# Patient Record
Sex: FEMALE | Race: BLACK OR AFRICAN AMERICAN | NOT HISPANIC OR LATINO
[De-identification: names, ages, dates, MRNs, and addresses within clinical notes are randomized per-mention and may not be internally consistent; named-entity substitution may affect disease eponyms.]

---

## 2017-03-24 ENCOUNTER — TRANSCRIPTION ENCOUNTER (OUTPATIENT)
Age: 82
End: 2017-03-24

## 2017-04-20 ENCOUNTER — TRANSCRIPTION ENCOUNTER (OUTPATIENT)
Age: 82
End: 2017-04-20

## 2017-07-25 ENCOUNTER — TRANSCRIPTION ENCOUNTER (OUTPATIENT)
Age: 82
End: 2017-07-25

## 2018-05-17 ENCOUNTER — INPATIENT (INPATIENT)
Facility: HOSPITAL | Age: 83
LOS: 0 days | Discharge: ANOTHER HOSPITAL | End: 2018-05-18
Attending: INTERNAL MEDICINE | Admitting: INTERNAL MEDICINE
Payer: MEDICARE

## 2018-05-17 VITALS
HEART RATE: 78 BPM | DIASTOLIC BLOOD PRESSURE: 85 MMHG | RESPIRATION RATE: 20 BRPM | SYSTOLIC BLOOD PRESSURE: 164 MMHG | WEIGHT: 142.64 LBS

## 2018-05-17 DIAGNOSIS — I63.9 CEREBRAL INFARCTION, UNSPECIFIED: ICD-10-CM

## 2018-05-17 LAB
ALBUMIN SERPL ELPH-MCNC: 4 G/DL — SIGNIFICANT CHANGE UP (ref 3.3–5)
ALP SERPL-CCNC: 104 U/L — SIGNIFICANT CHANGE UP (ref 40–120)
ALT FLD-CCNC: 18 U/L — SIGNIFICANT CHANGE UP (ref 4–33)
APTT BLD: 28.2 SEC — SIGNIFICANT CHANGE UP (ref 27.5–37.4)
AST SERPL-CCNC: 29 U/L — SIGNIFICANT CHANGE UP (ref 4–32)
BASE EXCESS BLDV CALC-SCNC: 4 MMOL/L — SIGNIFICANT CHANGE UP
BASOPHILS # BLD AUTO: 0.05 K/UL — SIGNIFICANT CHANGE UP (ref 0–0.2)
BASOPHILS NFR BLD AUTO: 0.7 % — SIGNIFICANT CHANGE UP (ref 0–2)
BILIRUB SERPL-MCNC: 0.3 MG/DL — SIGNIFICANT CHANGE UP (ref 0.2–1.2)
BLD GP AB SCN SERPL QL: NEGATIVE — SIGNIFICANT CHANGE UP
BLOOD GAS VENOUS - CREATININE: 0.77 MG/DL — SIGNIFICANT CHANGE UP (ref 0.5–1.3)
BUN SERPL-MCNC: 14 MG/DL — SIGNIFICANT CHANGE UP (ref 7–23)
CALCIUM SERPL-MCNC: 9.6 MG/DL — SIGNIFICANT CHANGE UP (ref 8.4–10.5)
CHLORIDE BLDV-SCNC: 109 MMOL/L — HIGH (ref 96–108)
CHLORIDE SERPL-SCNC: 101 MMOL/L — SIGNIFICANT CHANGE UP (ref 98–107)
CK MB BLD-MCNC: 2.18 NG/ML — SIGNIFICANT CHANGE UP (ref 1–4.7)
CK SERPL-CCNC: 124 U/L — SIGNIFICANT CHANGE UP (ref 25–170)
CO2 SERPL-SCNC: 23 MMOL/L — SIGNIFICANT CHANGE UP (ref 22–31)
CREAT SERPL-MCNC: 0.74 MG/DL — SIGNIFICANT CHANGE UP (ref 0.5–1.3)
EOSINOPHIL # BLD AUTO: 0.1 K/UL — SIGNIFICANT CHANGE UP (ref 0–0.5)
EOSINOPHIL NFR BLD AUTO: 1.5 % — SIGNIFICANT CHANGE UP (ref 0–6)
GAS PNL BLDV: 139 MMOL/L — SIGNIFICANT CHANGE UP (ref 136–146)
GLUCOSE BLDV-MCNC: 152 — HIGH (ref 70–99)
GLUCOSE SERPL-MCNC: 141 MG/DL — HIGH (ref 70–99)
HCO3 BLDV-SCNC: 26 MMOL/L — SIGNIFICANT CHANGE UP (ref 20–27)
HCT VFR BLD CALC: 47.8 % — HIGH (ref 34.5–45)
HCT VFR BLDV CALC: 48.4 % — HIGH (ref 34.5–45)
HGB BLD-MCNC: 15.3 G/DL — SIGNIFICANT CHANGE UP (ref 11.5–15.5)
HGB BLDV-MCNC: 15.8 G/DL — HIGH (ref 11.5–15.5)
IMM GRANULOCYTES # BLD AUTO: 0.02 # — SIGNIFICANT CHANGE UP
IMM GRANULOCYTES NFR BLD AUTO: 0.3 % — SIGNIFICANT CHANGE UP (ref 0–1.5)
INR BLD: 1.04 — SIGNIFICANT CHANGE UP (ref 0.88–1.17)
LACTATE BLDV-MCNC: 2.5 MMOL/L — HIGH (ref 0.5–2)
LYMPHOCYTES # BLD AUTO: 3.96 K/UL — HIGH (ref 1–3.3)
LYMPHOCYTES # BLD AUTO: 58.4 % — HIGH (ref 13–44)
MCHC RBC-ENTMCNC: 24.8 PG — LOW (ref 27–34)
MCHC RBC-ENTMCNC: 32 % — SIGNIFICANT CHANGE UP (ref 32–36)
MCV RBC AUTO: 77.3 FL — LOW (ref 80–100)
MONOCYTES # BLD AUTO: 0.77 K/UL — SIGNIFICANT CHANGE UP (ref 0–0.9)
MONOCYTES NFR BLD AUTO: 11.4 % — SIGNIFICANT CHANGE UP (ref 2–14)
NEUTROPHILS # BLD AUTO: 1.88 K/UL — SIGNIFICANT CHANGE UP (ref 1.8–7.4)
NEUTROPHILS NFR BLD AUTO: 27.7 % — LOW (ref 43–77)
NRBC # FLD: 0 — SIGNIFICANT CHANGE UP
PCO2 BLDV: 44 MMHG — SIGNIFICANT CHANGE UP (ref 41–51)
PH BLDV: 7.43 PH — SIGNIFICANT CHANGE UP (ref 7.32–7.43)
PLATELET # BLD AUTO: 224 K/UL — SIGNIFICANT CHANGE UP (ref 150–400)
PMV BLD: 11.7 FL — SIGNIFICANT CHANGE UP (ref 7–13)
PO2 BLDV: 25 MMHG — LOW (ref 35–40)
POTASSIUM BLDV-SCNC: 4.5 MMOL/L — SIGNIFICANT CHANGE UP (ref 3.4–4.5)
POTASSIUM SERPL-MCNC: 5.2 MMOL/L — SIGNIFICANT CHANGE UP (ref 3.5–5.3)
POTASSIUM SERPL-SCNC: 5.2 MMOL/L — SIGNIFICANT CHANGE UP (ref 3.5–5.3)
PROT SERPL-MCNC: 8.3 G/DL — SIGNIFICANT CHANGE UP (ref 6–8.3)
PROTHROM AB SERPL-ACNC: 11.6 SEC — SIGNIFICANT CHANGE UP (ref 9.8–13.1)
RBC # BLD: 6.18 M/UL — HIGH (ref 3.8–5.2)
RBC # FLD: 15.4 % — HIGH (ref 10.3–14.5)
RH IG SCN BLD-IMP: POSITIVE — SIGNIFICANT CHANGE UP
SAO2 % BLDV: 44.8 % — LOW (ref 60–85)
SODIUM SERPL-SCNC: 140 MMOL/L — SIGNIFICANT CHANGE UP (ref 135–145)
TROPONIN T SERPL-MCNC: < 0.06 NG/ML — SIGNIFICANT CHANGE UP (ref 0–0.06)
WBC # BLD: 6.78 K/UL — SIGNIFICANT CHANGE UP (ref 3.8–10.5)
WBC # FLD AUTO: 6.78 K/UL — SIGNIFICANT CHANGE UP (ref 3.8–10.5)

## 2018-05-17 PROCEDURE — 71045 X-RAY EXAM CHEST 1 VIEW: CPT | Mod: 26

## 2018-05-17 PROCEDURE — 70450 CT HEAD/BRAIN W/O DYE: CPT | Mod: 26

## 2018-05-17 RX ORDER — ALTEPLASE 100 MG
51 KIT INTRAVENOUS ONCE
Qty: 0 | Refills: 0 | Status: COMPLETED | OUTPATIENT
Start: 2018-05-17 | End: 2018-05-17

## 2018-05-17 RX ORDER — NICARDIPINE HYDROCHLORIDE 30 MG/1
2.5 CAPSULE, EXTENDED RELEASE ORAL
Qty: 40 | Refills: 0 | Status: DISCONTINUED | OUTPATIENT
Start: 2018-05-17 | End: 2018-06-05

## 2018-05-17 RX ORDER — ALTEPLASE 100 MG
5.7 KIT INTRAVENOUS ONCE
Qty: 0 | Refills: 0 | Status: COMPLETED | OUTPATIENT
Start: 2018-05-17 | End: 2018-05-17

## 2018-05-17 RX ORDER — ONDANSETRON 8 MG/1
4 TABLET, FILM COATED ORAL ONCE
Qty: 0 | Refills: 0 | Status: COMPLETED | OUTPATIENT
Start: 2018-05-17 | End: 2018-05-17

## 2018-05-17 RX ORDER — METOCLOPRAMIDE HCL 10 MG
10 TABLET ORAL ONCE
Qty: 0 | Refills: 0 | Status: COMPLETED | OUTPATIENT
Start: 2018-05-17 | End: 2018-05-17

## 2018-05-17 RX ORDER — PANTOPRAZOLE SODIUM 20 MG/1
40 TABLET, DELAYED RELEASE ORAL ONCE
Qty: 0 | Refills: 0 | Status: COMPLETED | OUTPATIENT
Start: 2018-05-17 | End: 2018-05-17

## 2018-05-17 RX ADMIN — Medication 10 MILLIGRAM(S): at 23:36

## 2018-05-17 RX ADMIN — NICARDIPINE HYDROCHLORIDE 12.5 MG/HR: 30 CAPSULE, EXTENDED RELEASE ORAL at 23:43

## 2018-05-17 RX ADMIN — ONDANSETRON 4 MILLIGRAM(S): 8 TABLET, FILM COATED ORAL at 23:36

## 2018-05-17 RX ADMIN — PANTOPRAZOLE SODIUM 40 MILLIGRAM(S): 20 TABLET, DELAYED RELEASE ORAL at 23:59

## 2018-05-17 RX ADMIN — ALTEPLASE 51 MILLIGRAM(S): KIT at 22:20

## 2018-05-17 RX ADMIN — ALTEPLASE 342 MILLIGRAM(S): KIT at 22:17

## 2018-05-17 NOTE — ED PROVIDER NOTE - PROGRESS NOTE DETAILS
[facility] Called to evaluate patient for stroke. 85F with DM p/w sudden onset slurred speech and generalized weakness at 2045, witnessed by family, along with generalized weakness. Persistent slurred speech since onset. No head injury, no recent trauma, no recent illness. On exam pt with slurred speech, no focal weakness, oriented to name and place but not year (abnormal per pt's family). Code stroke called, neuro c/s called and informed, CT ordered Raphael: While in ED, after tPA, after admission to MICU, pt developed severe vomiting around midnight with new right sided paralysis and dense aphasia. These are new sx. CT showed no bleeding, CTA, p-er Neuro, demonstrates basliar lesion and they have requested xfer to [facility]. Accepted by Dr. Zhen Reyes and also d/w Dr. JUNE Kendrick in [facility] ED.  EMS now at bedside to xfer to [facility].  Daughter consented. Raphael: While in ED, after tPA, after admission to MICU, pt developed severe vomiting around midnight with new right sided paralysis and dense aphasia. These are new sx. CT showed no bleeding, CTA, per Neuro, demonstrates basilar lesion and they have requested xfer to [facility] for possible intervention. Images unavailable to me and report pending. Accepted by Dr. Zhen Reyes and also d/w Dr. JUNE Kendrick in [facility] ED.  EMS now at bedside to xfer to [facility].  Daughter consented. VSS and pt protecting AW.  Is alert and cooperative.

## 2018-05-17 NOTE — ED PROVIDER NOTE - ATTENDING CONTRIBUTION TO CARE
85F p/w slurred speech sudden onset about 1/2 hour ago, a/w gen weakness.  Was shopping with daughter, felt like there was an 'earthquake under her".   Speech is still slightly slurred but improved.  Feeling dizzy when standing.  Had 3 episodes of vomiting.  Seen upon arrival, improving sx.  Nearly passed out.  USOH prior to the event.  Stroke eval in progress - likely not tPA - given improving deficits and no unilateral weakness..  Gait assessed mildly unsteady, c/o R arm numbness.  Decision made with Dr Reyes, stroke neurologist as well as resident CHARLA Munoz to give tPA, pt within window and displaying signs of stroke.  Pharmacy called, tPA administered bolus at 1019pm by Reagan.   Informed consent obtained from the family.    PMHX - DM -   meds glucophage  PSHX hemorrhoidectomy  no T  All none except latex  VS:  179/99 78 100RA.    98.2O    GEN - malaise; A+O x3   HEAD - NC/AT     ENT - PEERL, EOMI, mucous membranes  moist , no discharge      NECK: Neck supple, non-tender without lymphadenopathy, no masses, no JVD  PULM - CTA b/l,  symmetric breath sounds  COR -  normal heart sounds    ABD - , ND, NT, soft, no guarding, no rebound, no masses    BACK - no CVA tenderness, nontender spine     EXTREMS - no edema, no deformity, warm and well perfused    SKIN - no rash or bruising      NEUROLOGIC - alert, CN 2-12 intact, sensation nl, motor 5/5 RUE/LUE/RLE/LLE.

## 2018-05-17 NOTE — ED PROVIDER NOTE - CRITICAL CARE PROVIDED
additional history taking/documentation/consult w/ pt's family directly relating to pts condition/interpretation of diagnostic studies/consultation with other physicians/direct patient care (not related to procedure)

## 2018-05-17 NOTE — H&P ADULT - NSHPPHYSICALEXAM_GEN_ALL_CORE
GEN - moderate distress, malaise  HEAD - NC/AT     ENT - PEERL, EOMI, mucous membranes moist, no discharge      NECK: Neck supple, non-tender without lymphadenopathy, no masses, no JVD  PULM - CTA b/l,  symmetric breath sounds  COR -  normal heart sounds    ABD - ND, NT, soft, no guarding, no rebound, no masses    BACK - no CVA tenderness, nontender spine     EXTREM - no edema, no deformity, warm and well perfused    SKIN - no rash or bruising      NEUROLOGIC - AAOx2, slurred speech, alert, CN 2-12 intact, sensation nl, motor 5/5 RUE/LUE/RLE/LLE. GEN - moderate distress, malaise  HEAD - NC/AT     ENT - PEERL, EOMI, mucous membranes moist, no discharge      NECK: Neck supple, non-tender without lymphadenopathy, no masses, no JVD  PULM - CTA b/l,  symmetric breath sounds  COR -  normal heart sounds    ABD - ND, NT, soft, no guarding, no rebound, no masses    BACK - no CVA tenderness, nontender spine     EXTREM - no edema, no deformity, warm and well perfused    SKIN - no rash or bruising      NEUROLOGIC - AAOx3, slightly slurred speech, alert, CN 2-12 intact, sensation nl, motor 5/5 RUE/LUE/RLE/LLE.

## 2018-05-17 NOTE — ED PROVIDER NOTE - OBJECTIVE STATEMENT
85F p/w slurred speech sudden onset about 1/2 hour ago, a/w gen weakness.  Was shopping with daughter, felt like there was an 'earthquake under her".   Speech is still slightly slurred but improved.  Feeling dizzy when standing.  Had 3 episodes of vomiting.  Seen upon arrival, improving sx.  Nearly passed out.  USOH prior to the event.  Stroke eval in progress - likely not tPA - given improving deficits and no unilateral weakness..    PMHX - DM -   meds glucophage  PSHX hemorrhoidectomy  no T  All none except latex  VS:  179/99 78 100RA.    98.2O    GEN - malaise; A+O x3   HEAD - NC/AT     ENT - PEERL, EOMI, mucous membranes  moist , no discharge      NECK: Neck supple, non-tender without lymphadenopathy, no masses, no JVD  PULM - CTA b/l,  symmetric breath sounds  COR -  normal heart sounds    ABD - , ND, NT, soft, no guarding, no rebound, no masses    BACK - no CVA tenderness, nontender spine     EXTREMS - no edema, no deformity, warm and well perfused    SKIN - no rash or bruising      NEUROLOGIC - alert, CN 2-12 intact, sensation nl, motor 5/5 RUE/LUE/RLE/LLE. 85F p/w slurred speech sudden onset about 1/2 hour ago, a/w gen weakness.  Was shopping with daughter, felt like there was an 'earthquake under her".   Speech is still slightly slurred but improved.  Feeling dizzy when standing.  Had 3 episodes of vomiting.  Seen upon arrival, improving sx.  Nearly passed out.  USOH prior to the event.  Stroke eval in progress - likely not tPA - given improving deficits and no unilateral weakness..  Gait assessed mildly unsteady, c/o R arm numbness.  Decision made with Dr Reyes, stroke neurologist as well as resident CHARLA Munoz to give tPA, pt within window and displaying signs of stroke.  Pharmacy called, tPA administered bolus at 1019pm by Reagan.   Informed consent obtained from the family.    PMHX - DM -   meds glucophage  PSHX hemorrhoidectomy  no T  All none except latex  VS:  179/99 78 100RA.    98.2O    GEN - malaise; A+O x3   HEAD - NC/AT     ENT - PEERL, EOMI, mucous membranes  moist , no discharge      NECK: Neck supple, non-tender without lymphadenopathy, no masses, no JVD  PULM - CTA b/l,  symmetric breath sounds  COR -  normal heart sounds    ABD - , ND, NT, soft, no guarding, no rebound, no masses    BACK - no CVA tenderness, nontender spine     EXTREMS - no edema, no deformity, warm and well perfused    SKIN - no rash or bruising      NEUROLOGIC - alert, CN 2-12 intact, sensation nl, motor 5/5 RUE/LUE/RLE/LLE.

## 2018-05-17 NOTE — H&P ADULT - ASSESSMENT
85F with PMH DM p/w slurred speech sudden onset about 1/2 hour prior to arrival associated with generalized weakness.  Given tPA in the ED.    # Neuro  - pt presented with slurred speech, weakness and AMS (AAOx2) in setting of acute stroke  - tPA administered in ED  - CT orginianlly with no hemorrhage/mass effect/shift  - CT A ordered and repeat Ct head ordered given vomit with bloody streaks after tPA administration      # GI  - pt with nausea in ED prior to tPA administration and post-tPA pt with vomiting with bloody streaks    - NPO for the first 12 hours post-tPA  - received protonix 40 IV x1  - zofran and reglan as needed, each given once in ED      # Resp  - CXR with no urgent findings    # CV  - EKG done in the ED  - TTE ordered  - cardiac enzymes negative x1  - started on nicardipine drip      #   - UA and urine cx ordered    # Heme  - will keep active T+S and continue to trend Hg in setting of tPA administration  - no DVT ppx for 24 hours post-tPA    # MSK  - PT and OT consults placed 85F with PMH DM p/w slurred speech sudden onset about 1/2 hour prior to arrival associated with generalized weakness.  Given tPA in the ED.    # Neuro  - pt presented with slurred speech, weakness and AMS (AAOx2) in setting of acute stroke  - tPA administered in ED  - CT orginianlly with no hemorrhage/mass effect/shift  - CT A ordered and repeat Ct head ordered given vomit with bloody streaks after tPA administration    # GI  - pt with nausea in ED prior to tPA administration and post-tPA pt with vomiting with bloody streaks  - NPO for the first 12 hours post-tPA  - received protonix 40 IV x1  - zofran and reglan as needed, each given once in ED    # Resp  - CXR with no urgent findings    # CV  - EKG done in the ED  - TTE ordered  - cardiac enzymes negative x1  - started on nicardipine drip    #   - UA and urine cx ordered    # Heme  - will keep active T+S and continue to trend Hg in setting of tPA administration  - no DVT ppx for 24 hours post-tPA    # MSK  - PT and OT consults placed

## 2018-05-17 NOTE — H&P ADULT - HISTORY OF PRESENT ILLNESS
85F p/w slurred speech sudden onset about 1/2 hour prior to arrival associated with generalized weakness.  Was shopping with daughter, felt like there was an 'earthquake under her".   Speech is still slightly slurred but improved over time while in the ED.  Pt was feeling dizzy when standing and had 3 episodes of vomiting.  Gait was assessed and pt was mildly unsteady, c/o R arm numbness.  Decision was made to give tPA as pt was within window and displaying signs of stroke.  tPA was administered at 1019pm  Informed consent obtained from the family. 85F with PMH DM p/w slurred speech sudden onset about 1/2 hour prior to arrival associated with generalized weakness.  Was shopping with daughter, felt like there was an 'earthquake under her".   Speech is still slightly slurred but improved over time while in the ED.  Pt was feeling dizzy when standing and had 3 episodes of vomiting.  Gait was assessed and pt was mildly unsteady, c/o R arm numbness.  Decision was made to give tPA as pt was within window and displaying signs of stroke.  tPA was administered at 1019pm.  Informed consent obtained from the family.

## 2018-05-17 NOTE — CONSULT NOTE ADULT - ASSESSMENT
84 yearold RH AA woman PMH of DM type 2 presents with  sudden onset slurred speech and "dizziness" sensation at 8:45pm  (although patient states she did not feel anything spinning) . Patient also had right hand numbness/"cramping" which she could not fully explain. Upon coming to ED patient had 3 episodes of emesis. On exam, patient had mild dysarthria and gait was wide based and ataxic, needed assistence. No weakness or sensory deficit noted on neuro exam.    Symptoms suspicious for possible posterior stroke. tPA was given because ataxic gait could potentially life changing deficit and benefits outweighed risk.  Family wanted patient to get tPA, gave verbal consent, risks and benefits explained in detail by my self and ED attending.       tPA bolus of 5.7 was given at 10:17pm followed by a 51mg drip.       Plan:  -CTA H/N STAT  -MICU admission s/p tPA  -MRI brain w/o con  -Hba1c, Lipid panel  -TTE  -blood pressure control <180/105  -CTH in 24 hours; Hold ASA and DVT ppx until repeat CTH or MRI is stable  -NPO; dysphagia screen  -physical therapy      Case and plan discussed with Dr. Reyes

## 2018-05-17 NOTE — H&P ADULT - NSHPLABSRESULTS_GEN_ALL_CORE
15.3   6.78  )-----------( 224      ( 17 May 2018 22:00 )             47.8       05-17    140  |  101  |  14  ----------------------------<  141<H>  5.2   |  23  |  0.74    Ca    9.6      17 May 2018 22:00    TPro  8.3  /  Alb  4.0  /  TBili  0.3  /  DBili  x   /  AST  29  /  ALT  18  /  AlkPhos  104  05-17              PT/INR - ( 17 May 2018 22:00 )   PT: 11.6 SEC;   INR: 1.04          PTT - ( 17 May 2018 22:00 )  PTT:28.2 SEC    Lactate Trend      CARDIAC MARKERS ( 17 May 2018 22:00 )  x     / < 0.06 ng/mL / 124 u/L / 2.18 ng/mL / x            CAPILLARY BLOOD GLUCOSE      POCT Blood Glucose.: 126 mg/dL (17 May 2018 21:29)

## 2018-05-17 NOTE — ED PROVIDER NOTE - MEDICAL DECISION MAKING DETAILS
86yo F pmhx DM, , p/w CC slurred speech/difficulty walking - code stroke called - neuro at bedside. will send labs, ekg, cxr and reassess.

## 2018-05-17 NOTE — ED ADULT NURSE NOTE - OBJECTIVE STATEMENT
Fac RN: Pt aox3, ambulatory at baseline, brought in by daughter because pt verbalized that she felt like there was an "earthquake" Fac RN: Pt aox3, ambulatory at baseline, brought in by daughter because pt verbalized that she felt like there was an "earthquake" under her feet, felt dizzy, as if the entire store was moving, felt as if she was floating. In the car on the way to the ED, dtr reports that pt had slurred speech and vomited 3 times. Upon arrival and pt was on the stretcher, pt improved but still with slurred speech. Pt denies any pain, abd pain, fever, chills. Code stroke called. Neuro eval was done, TPA ordered, bolus given by neuro resident. 20G and 22G placed on RFA, labs sent, VS as noted, NAD.

## 2018-05-17 NOTE — CONSULT NOTE ADULT - SUBJECTIVE AND OBJECTIVE BOX
Neurology Consult    Name  JERED BOYCE    HPI: 86 yearold RH AA woman PMH of DM type 2 presents with slurred speech sudden onset at 8:45pm. Patient was shopping with daughter at [employer] when she suddenly started to scream that she felt there was an "earthquake" beneath her and states she felt dizzy although she denies having a spinning sensation. Daughter states her mother got weak in both legs, felt an itch all over her face and had numbness in both arms. Upon coming to the emergency room the patient vomiting 3 times. Daughter states slurred speech is better but not back to baseline. Patient also reported that her right hand felt weak but upon further questioning she stated it was more of a numnbess/ "cramping" feeling. Patient could not ambulate properly. At baseline has an unsteady gait but family states it is much worse now.     tPA bolus of 5.7 was given at 10:17pm followed by a 51mg drip.     NIHSS-1, MRS-0    	PMHX - DM -   	meds glucophage  	PSHX hemorrhoidectomy        MEDICATIONS  (STANDING):  alteplase    Bolus 5.7 milliGRAM(s) IV Bolus once  alteplase    IVPB 51 milliGRAM(s) IV Intermittent once    MEDICATIONS  (PRN):      Allergies    No Known Allergies    Intolerances        Objective:   Vital Signs Last 24 Hrs  T(C): --  T(F): --  HR: 78 (17 May 2018 21:44) (78 - 78)  BP: 164/85 (17 May 2018 21:44) (164/85 - 164/85)  BP(mean): --  RR: 20 (17 May 2018 21:44) (20 - 20)  SpO2: --    General Exam:   General appearance: No acute distress                   Neurological Exam:  Mental Status: AAOx3, fluent speech, follows commands, able to name objects and repeat    Cranial Nerves: EOMI, PERRL, V1-V3 intact, facial symmetry intact, mild dysarthria, tongue midline    Motor: UE 5/5 b/l, LE 5/5 b/l. No drift x4    Sensation: Intact to LT throughout    Coordination: FTN intact b/l      Gait: wide based, unsteady. unable to tandem gait, leans to both sides, needs assitance    Labs:            CBC Full  -  ( 17 May 2018 22:00 )  WBC Count : 6.78 K/uL  Hemoglobin : 15.3 g/dL  Hematocrit : 47.8 %  Platelet Count - Automated : 224 K/uL  Mean Cell Volume : 77.3 fL  Mean Cell Hemoglobin : 24.8 pg  Mean Cell Hemoglobin Concentration : 32.0 %  Auto Neutrophil # : 1.88 K/uL  Auto Lymphocyte # : 3.96 K/uL  Auto Monocyte # : 0.77 K/uL  Auto Eosinophil # : 0.10 K/uL  Auto Basophil # : 0.05 K/uL  Auto Neutrophil % : 27.7 %  Auto Lymphocyte % : 58.4 %  Auto Monocyte % : 11.4 %  Auto Eosinophil % : 1.5 %  Auto Basophil % : 0.7 %      Radiology  CTH: No acute intracranial bleeding, mass effect, or shift.

## 2018-05-17 NOTE — ED ADULT NURSE NOTE - CHIEF COMPLAINT QUOTE
pt brought in by family for possible stroke. as per daughter, they were shopping and at 2045 pt endorsed generalized numbness and dizziness. daughter noticed unsteady gait and slurred speech which she says is getting better now. no facial droop, upper or lower extremity drift noted. pt had an episode of vomitting. confused as to year and is not pts baseline as per daughter. [employer] notified, code stroke called. charge aware, pt straight to ct scan.

## 2018-05-17 NOTE — ED ADULT TRIAGE NOTE - CHIEF COMPLAINT QUOTE
pt brought in by family for possible stroke. as per daughter, they were shopping and at 2045 pt endorsed generalized numbness and dizziness. daughter noticed unsteady gait and slurred speech which she says is getting better now. no facial droop or upper or lower extremity drift noted. pt had an episode of vomitting. [employer] notified, code stroke called. charge aware, pt straight to ct scan. pt brought in by family for possible stroke. as per daughter, they were shopping and at 2045 pt endorsed generalized numbness and dizziness. daughter noticed unsteady gait and slurred speech which she says is getting better now. no facial droop, upper or lower extremity drift noted. pt had an episode of vomitting. confused as to year and is not pts baseline as per daughter. [employer] notified, code stroke called. charge aware, pt straight to ct scan.

## 2018-05-18 ENCOUNTER — TRANSCRIPTION ENCOUNTER (OUTPATIENT)
Age: 83
End: 2018-05-18

## 2018-05-18 ENCOUNTER — APPOINTMENT (OUTPATIENT)
Dept: NEUROSURGERY | Facility: CLINIC | Age: 83
End: 2018-05-18

## 2018-05-18 ENCOUNTER — INPATIENT (INPATIENT)
Facility: HOSPITAL | Age: 83
LOS: 11 days | Discharge: HOME | DRG: 61 | End: 2018-05-30
Attending: PSYCHIATRY & NEUROLOGY | Admitting: PSYCHIATRY & NEUROLOGY
Payer: MEDICARE

## 2018-05-18 VITALS
OXYGEN SATURATION: 99 % | DIASTOLIC BLOOD PRESSURE: 68 MMHG | TEMPERATURE: 98 F | RESPIRATION RATE: 26 BRPM | HEART RATE: 104 BPM | SYSTOLIC BLOOD PRESSURE: 123 MMHG

## 2018-05-18 VITALS
SYSTOLIC BLOOD PRESSURE: 122 MMHG | RESPIRATION RATE: 18 BRPM | HEART RATE: 87 BPM | DIASTOLIC BLOOD PRESSURE: 66 MMHG | OXYGEN SATURATION: 100 %

## 2018-05-18 DIAGNOSIS — I63.9 CEREBRAL INFARCTION, UNSPECIFIED: ICD-10-CM

## 2018-05-18 LAB
ALBUMIN SERPL ELPH-MCNC: 3.7 G/DL — SIGNIFICANT CHANGE UP (ref 3.3–5)
ALP SERPL-CCNC: 62 U/L — SIGNIFICANT CHANGE UP (ref 40–120)
ALT FLD-CCNC: <5 U/L — LOW (ref 10–45)
ANION GAP SERPL CALC-SCNC: -3 MMOL/L — LOW (ref 5–17)
ANION GAP SERPL CALC-SCNC: 12 MMOL/L — SIGNIFICANT CHANGE UP (ref 5–17)
ANION GAP SERPL CALC-SCNC: 13 MMOL/L — SIGNIFICANT CHANGE UP (ref 5–17)
ANION GAP SERPL CALC-SCNC: 16 MMOL/L — SIGNIFICANT CHANGE UP (ref 5–17)
APTT BLD: 21.1 SEC — SIGNIFICANT CHANGE UP (ref 27.5–37.4)
APTT BLD: 26.5 SEC — LOW (ref 27.5–37.4)
APTT BLD: 34.3 SEC — SIGNIFICANT CHANGE UP (ref 27.5–37.4)
APTT BLD: 34.3 SEC — SIGNIFICANT CHANGE UP (ref 27.5–37.4)
AST SERPL-CCNC: <5 U/L — LOW (ref 10–40)
BASOPHILS # BLD AUTO: 0 K/UL — SIGNIFICANT CHANGE UP (ref 0–0.2)
BASOPHILS NFR BLD AUTO: 0.1 % — SIGNIFICANT CHANGE UP (ref 0–2)
BILIRUB SERPL-MCNC: 0.4 MG/DL — SIGNIFICANT CHANGE UP (ref 0.2–1.2)
BLD GP AB SCN SERPL QL: NEGATIVE — SIGNIFICANT CHANGE UP
BUN SERPL-MCNC: 12 MG/DL — SIGNIFICANT CHANGE UP (ref 7–23)
BUN SERPL-MCNC: 7 MG/DL — SIGNIFICANT CHANGE UP (ref 7–23)
BUN SERPL-MCNC: 8 MG/DL — SIGNIFICANT CHANGE UP (ref 7–23)
BUN SERPL-MCNC: 8 MG/DL — SIGNIFICANT CHANGE UP (ref 7–23)
CALCIUM SERPL-MCNC: 8.1 MG/DL — LOW (ref 8.4–10.5)
CALCIUM SERPL-MCNC: 8.2 MG/DL — LOW (ref 8.4–10.5)
CALCIUM SERPL-MCNC: 8.2 MG/DL — LOW (ref 8.4–10.5)
CALCIUM SERPL-MCNC: 8.5 MG/DL — SIGNIFICANT CHANGE UP (ref 8.4–10.5)
CHLORIDE SERPL-SCNC: 105 MMOL/L — SIGNIFICANT CHANGE UP (ref 96–108)
CHLORIDE SERPL-SCNC: 106 MMOL/L — SIGNIFICANT CHANGE UP (ref 96–108)
CHLORIDE SERPL-SCNC: 106 MMOL/L — SIGNIFICANT CHANGE UP (ref 96–108)
CHLORIDE SERPL-SCNC: 99 MMOL/L — SIGNIFICANT CHANGE UP (ref 96–108)
CHOLEST SERPL-MCNC: 171 MG/DL — SIGNIFICANT CHANGE UP (ref 10–199)
CO2 SERPL-SCNC: 19 MMOL/L — LOW (ref 22–31)
CO2 SERPL-SCNC: 20 MMOL/L — LOW (ref 22–31)
CO2 SERPL-SCNC: 21 MMOL/L — LOW (ref 22–31)
CO2 SERPL-SCNC: 25 MMOL/L — SIGNIFICANT CHANGE UP (ref 22–31)
CREAT SERPL-MCNC: 0.46 MG/DL — LOW (ref 0.5–1.3)
CREAT SERPL-MCNC: 0.53 MG/DL — SIGNIFICANT CHANGE UP (ref 0.5–1.3)
CREAT SERPL-MCNC: 0.54 MG/DL — SIGNIFICANT CHANGE UP (ref 0.5–1.3)
CREAT SERPL-MCNC: 0.55 MG/DL — SIGNIFICANT CHANGE UP (ref 0.5–1.3)
EOSINOPHIL # BLD AUTO: 0 K/UL — SIGNIFICANT CHANGE UP (ref 0–0.5)
EOSINOPHIL NFR BLD AUTO: 0.2 % — SIGNIFICANT CHANGE UP (ref 0–6)
FIBRINOGEN PPP-MCNC: 420 MG/DL — SIGNIFICANT CHANGE UP (ref 310–510)
FSP PPP-MCNC: >=5 <20
GAS PNL BLDA: SIGNIFICANT CHANGE UP
GLUCOSE BLDC GLUCOMTR-MCNC: 125 MG/DL — HIGH (ref 70–99)
GLUCOSE BLDC GLUCOMTR-MCNC: 190 MG/DL — HIGH (ref 70–99)
GLUCOSE BLDC GLUCOMTR-MCNC: 194 MG/DL — HIGH (ref 70–99)
GLUCOSE SERPL-MCNC: 184 MG/DL — HIGH (ref 70–99)
GLUCOSE SERPL-MCNC: 202 MG/DL — HIGH (ref 70–99)
GLUCOSE SERPL-MCNC: 205 MG/DL — HIGH (ref 70–99)
GLUCOSE SERPL-MCNC: 245 MG/DL — HIGH (ref 70–99)
HBA1C BLD-MCNC: 7.5 % — HIGH (ref 4–5.6)
HCT VFR BLD CALC: 43.5 % — SIGNIFICANT CHANGE UP (ref 34.5–45)
HCT VFR BLD CALC: 44.1 % — SIGNIFICANT CHANGE UP (ref 34.5–45)
HCT VFR BLD CALC: 44.2 % — SIGNIFICANT CHANGE UP (ref 34.5–45)
HCT VFR BLD CALC: 47.1 % — HIGH (ref 34.5–45)
HDLC SERPL-MCNC: 69 MG/DL — SIGNIFICANT CHANGE UP (ref 40–125)
HGB BLD-MCNC: 14 G/DL — SIGNIFICANT CHANGE UP (ref 11.5–15.5)
HGB BLD-MCNC: 14.3 G/DL — SIGNIFICANT CHANGE UP (ref 11.5–15.5)
HGB BLD-MCNC: 14.5 G/DL — SIGNIFICANT CHANGE UP (ref 11.5–15.5)
HGB BLD-MCNC: 14.8 G/DL — SIGNIFICANT CHANGE UP (ref 11.5–15.5)
INR BLD: 1.31 RATIO — HIGH (ref 0.88–1.16)
LIPID PNL WITH DIRECT LDL SERPL: 94 MG/DL — SIGNIFICANT CHANGE UP
LYMPHOCYTES # BLD AUTO: 1.3 K/UL — SIGNIFICANT CHANGE UP (ref 1–3.3)
LYMPHOCYTES # BLD AUTO: 18.6 % — SIGNIFICANT CHANGE UP (ref 13–44)
MAGNESIUM SERPL-MCNC: 1.6 MG/DL — SIGNIFICANT CHANGE UP (ref 1.6–2.6)
MAGNESIUM SERPL-MCNC: 2.1 MG/DL — SIGNIFICANT CHANGE UP (ref 1.6–2.6)
MCHC RBC-ENTMCNC: 25.4 PG — LOW (ref 27–34)
MCHC RBC-ENTMCNC: 25.7 PG — LOW (ref 27–34)
MCHC RBC-ENTMCNC: 26 PG — LOW (ref 27–34)
MCHC RBC-ENTMCNC: 26.4 PG — LOW (ref 27–34)
MCHC RBC-ENTMCNC: 31.3 GM/DL — LOW (ref 32–36)
MCHC RBC-ENTMCNC: 32.1 GM/DL — SIGNIFICANT CHANGE UP (ref 32–36)
MCHC RBC-ENTMCNC: 32.5 GM/DL — SIGNIFICANT CHANGE UP (ref 32–36)
MCHC RBC-ENTMCNC: 32.7 GM/DL — SIGNIFICANT CHANGE UP (ref 32–36)
MCV RBC AUTO: 80.1 FL — SIGNIFICANT CHANGE UP (ref 80–100)
MCV RBC AUTO: 80.2 FL — SIGNIFICANT CHANGE UP (ref 80–100)
MCV RBC AUTO: 80.8 FL — SIGNIFICANT CHANGE UP (ref 80–100)
MCV RBC AUTO: 81 FL — SIGNIFICANT CHANGE UP (ref 80–100)
MONOCYTES # BLD AUTO: 0.6 K/UL — SIGNIFICANT CHANGE UP (ref 0–0.9)
MONOCYTES NFR BLD AUTO: 9.3 % — SIGNIFICANT CHANGE UP (ref 2–14)
NEUTROPHILS # BLD AUTO: 5 K/UL — SIGNIFICANT CHANGE UP (ref 1.8–7.4)
NEUTROPHILS NFR BLD AUTO: 71.8 % — SIGNIFICANT CHANGE UP (ref 43–77)
PHOSPHATE SERPL-MCNC: 2.5 MG/DL — SIGNIFICANT CHANGE UP (ref 2.5–4.5)
PHOSPHATE SERPL-MCNC: 3.9 MG/DL — SIGNIFICANT CHANGE UP (ref 2.5–4.5)
PLATELET # BLD AUTO: 163 K/UL — SIGNIFICANT CHANGE UP (ref 150–400)
PLATELET # BLD AUTO: 182 K/UL — SIGNIFICANT CHANGE UP (ref 150–400)
PLATELET # BLD AUTO: 190 K/UL — SIGNIFICANT CHANGE UP (ref 150–400)
PLATELET # BLD AUTO: 195 K/UL — SIGNIFICANT CHANGE UP (ref 150–400)
POTASSIUM SERPL-MCNC: 3.6 MMOL/L — SIGNIFICANT CHANGE UP (ref 3.5–5.3)
POTASSIUM SERPL-MCNC: 3.6 MMOL/L — SIGNIFICANT CHANGE UP (ref 3.5–5.3)
POTASSIUM SERPL-MCNC: 3.9 MMOL/L — SIGNIFICANT CHANGE UP (ref 3.5–5.3)
POTASSIUM SERPL-MCNC: >9 MMOL/L — CRITICAL HIGH (ref 3.5–5.3)
POTASSIUM SERPL-SCNC: 3.6 MMOL/L — SIGNIFICANT CHANGE UP (ref 3.5–5.3)
POTASSIUM SERPL-SCNC: 3.6 MMOL/L — SIGNIFICANT CHANGE UP (ref 3.5–5.3)
POTASSIUM SERPL-SCNC: 3.9 MMOL/L — SIGNIFICANT CHANGE UP (ref 3.5–5.3)
POTASSIUM SERPL-SCNC: >9 MMOL/L — CRITICAL HIGH (ref 3.5–5.3)
PROT SERPL-MCNC: 8.7 G/DL — HIGH (ref 6–8.3)
PROTHROM AB SERPL-ACNC: 14.2 SEC — HIGH (ref 9.8–12.7)
RBC # BLD: 5.43 M/UL — HIGH (ref 3.8–5.2)
RBC # BLD: 5.47 M/UL — HIGH (ref 3.8–5.2)
RBC # BLD: 5.5 M/UL — HIGH (ref 3.8–5.2)
RBC # BLD: 5.81 M/UL — HIGH (ref 3.8–5.2)
RBC # FLD: 13.5 % — SIGNIFICANT CHANGE UP (ref 10.3–14.5)
RBC # FLD: 13.7 % — SIGNIFICANT CHANGE UP (ref 10.3–14.5)
RH IG SCN BLD-IMP: POSITIVE — SIGNIFICANT CHANGE UP
RH IG SCN BLD-IMP: POSITIVE — SIGNIFICANT CHANGE UP
SODIUM SERPL-SCNC: 121 MMOL/L — LOW (ref 135–145)
SODIUM SERPL-SCNC: 138 MMOL/L — SIGNIFICANT CHANGE UP (ref 135–145)
SODIUM SERPL-SCNC: 139 MMOL/L — SIGNIFICANT CHANGE UP (ref 135–145)
SODIUM SERPL-SCNC: 141 MMOL/L — SIGNIFICANT CHANGE UP (ref 135–145)
TOTAL CHOLESTEROL/HDL RATIO MEASUREMENT: 2.5 RATIO — LOW (ref 3.3–7.1)
TRIGL SERPL-MCNC: 41 MG/DL — SIGNIFICANT CHANGE UP (ref 10–149)
WBC # BLD: 7 K/UL — SIGNIFICANT CHANGE UP (ref 3.8–10.5)
WBC # BLD: 7.8 K/UL — SIGNIFICANT CHANGE UP (ref 3.8–10.5)
WBC # BLD: 8.2 K/UL — SIGNIFICANT CHANGE UP (ref 3.8–10.5)
WBC # BLD: 9.5 K/UL — SIGNIFICANT CHANGE UP (ref 3.8–10.5)
WBC # FLD AUTO: 7 K/UL — SIGNIFICANT CHANGE UP (ref 3.8–10.5)
WBC # FLD AUTO: 7.8 K/UL — SIGNIFICANT CHANGE UP (ref 3.8–10.5)
WBC # FLD AUTO: 8.2 K/UL — SIGNIFICANT CHANGE UP (ref 3.8–10.5)
WBC # FLD AUTO: 9.5 K/UL — SIGNIFICANT CHANGE UP (ref 3.8–10.5)

## 2018-05-18 PROCEDURE — 70496 CT ANGIOGRAPHY HEAD: CPT | Mod: 26

## 2018-05-18 PROCEDURE — 71045 X-RAY EXAM CHEST 1 VIEW: CPT | Mod: 26

## 2018-05-18 PROCEDURE — 36223 PLACE CATH CAROTID/INOM ART: CPT | Mod: 59,RT

## 2018-05-18 PROCEDURE — 76377 3D RENDER W/INTRP POSTPROCES: CPT | Mod: 26

## 2018-05-18 PROCEDURE — 70498 CT ANGIOGRAPHY NECK: CPT | Mod: 26

## 2018-05-18 PROCEDURE — 70450 CT HEAD/BRAIN W/O DYE: CPT | Mod: 26,59

## 2018-05-18 PROCEDURE — 99291 CRITICAL CARE FIRST HOUR: CPT

## 2018-05-18 PROCEDURE — 99292 CRITICAL CARE ADDL 30 MIN: CPT

## 2018-05-18 PROCEDURE — 70450 CT HEAD/BRAIN W/O DYE: CPT | Mod: 26

## 2018-05-18 PROCEDURE — 61645 PERQ ART M-THROMBECT &/NFS: CPT | Mod: RT

## 2018-05-18 PROCEDURE — 93970 EXTREMITY STUDY: CPT | Mod: 26

## 2018-05-18 PROCEDURE — 99285 EMERGENCY DEPT VISIT HI MDM: CPT | Mod: 25

## 2018-05-18 RX ORDER — SODIUM CHLORIDE 9 MG/ML
1000 INJECTION INTRAMUSCULAR; INTRAVENOUS; SUBCUTANEOUS ONCE
Qty: 0 | Refills: 0 | Status: COMPLETED | OUTPATIENT
Start: 2018-05-18 | End: 2018-05-20

## 2018-05-18 RX ORDER — POTASSIUM CHLORIDE 20 MEQ
10 PACKET (EA) ORAL
Qty: 0 | Refills: 0 | Status: COMPLETED | OUTPATIENT
Start: 2018-05-18 | End: 2018-05-18

## 2018-05-18 RX ORDER — INSULIN LISPRO 100/ML
VIAL (ML) SUBCUTANEOUS EVERY 6 HOURS
Qty: 0 | Refills: 0 | Status: DISCONTINUED | OUTPATIENT
Start: 2018-05-18 | End: 2018-05-21

## 2018-05-18 RX ORDER — DEXTROSE 50 % IN WATER 50 %
15 SYRINGE (ML) INTRAVENOUS ONCE
Qty: 0 | Refills: 0 | Status: DISCONTINUED | OUTPATIENT
Start: 2018-05-18 | End: 2018-05-30

## 2018-05-18 RX ORDER — HEPARIN SODIUM 5000 [USP'U]/ML
300 INJECTION INTRAVENOUS; SUBCUTANEOUS
Qty: 25000 | Refills: 0 | Status: DISCONTINUED | OUTPATIENT
Start: 2018-05-18 | End: 2018-05-23

## 2018-05-18 RX ORDER — PHENYLEPHRINE HYDROCHLORIDE 10 MG/ML
0.1 INJECTION INTRAVENOUS
Qty: 40 | Refills: 0 | Status: DISCONTINUED | OUTPATIENT
Start: 2018-05-18 | End: 2018-05-20

## 2018-05-18 RX ORDER — SODIUM CHLORIDE 9 MG/ML
1000 INJECTION, SOLUTION INTRAVENOUS
Qty: 0 | Refills: 0 | Status: DISCONTINUED | OUTPATIENT
Start: 2018-05-18 | End: 2018-05-30

## 2018-05-18 RX ORDER — HEPARIN SODIUM 5000 [USP'U]/ML
300 INJECTION INTRAVENOUS; SUBCUTANEOUS
Qty: 25000 | Refills: 0 | Status: DISCONTINUED | OUTPATIENT
Start: 2018-05-18 | End: 2018-05-18

## 2018-05-18 RX ORDER — ONDANSETRON 8 MG/1
5 TABLET, FILM COATED ORAL EVERY 6 HOURS
Qty: 0 | Refills: 0 | Status: DISCONTINUED | OUTPATIENT
Start: 2018-05-18 | End: 2018-05-19

## 2018-05-18 RX ORDER — SODIUM CHLORIDE 9 MG/ML
1000 INJECTION INTRAMUSCULAR; INTRAVENOUS; SUBCUTANEOUS
Qty: 0 | Refills: 0 | Status: DISCONTINUED | OUTPATIENT
Start: 2018-05-18 | End: 2018-05-21

## 2018-05-18 RX ORDER — DEXTROSE 50 % IN WATER 50 %
25 SYRINGE (ML) INTRAVENOUS ONCE
Qty: 0 | Refills: 0 | Status: DISCONTINUED | OUTPATIENT
Start: 2018-05-18 | End: 2018-05-30

## 2018-05-18 RX ORDER — FENTANYL CITRATE 50 UG/ML
25 INJECTION INTRAVENOUS
Qty: 0 | Refills: 0 | Status: DISCONTINUED | OUTPATIENT
Start: 2018-05-18 | End: 2018-05-18

## 2018-05-18 RX ORDER — INSULIN LISPRO 100/ML
VIAL (ML) SUBCUTANEOUS EVERY 6 HOURS
Qty: 0 | Refills: 0 | Status: DISCONTINUED | OUTPATIENT
Start: 2018-05-18 | End: 2018-05-18

## 2018-05-18 RX ORDER — DEXTROSE 50 % IN WATER 50 %
12.5 SYRINGE (ML) INTRAVENOUS ONCE
Qty: 0 | Refills: 0 | Status: DISCONTINUED | OUTPATIENT
Start: 2018-05-18 | End: 2018-05-30

## 2018-05-18 RX ORDER — DEXMEDETOMIDINE HYDROCHLORIDE IN 0.9% SODIUM CHLORIDE 4 UG/ML
0.1 INJECTION INTRAVENOUS
Qty: 200 | Refills: 0 | Status: DISCONTINUED | OUTPATIENT
Start: 2018-05-18 | End: 2018-05-18

## 2018-05-18 RX ORDER — MAGNESIUM SULFATE 500 MG/ML
1 VIAL (ML) INJECTION ONCE
Qty: 0 | Refills: 0 | Status: COMPLETED | OUTPATIENT
Start: 2018-05-18 | End: 2018-05-18

## 2018-05-18 RX ORDER — GLUCAGON INJECTION, SOLUTION 0.5 MG/.1ML
1 INJECTION, SOLUTION SUBCUTANEOUS ONCE
Qty: 0 | Refills: 0 | Status: DISCONTINUED | OUTPATIENT
Start: 2018-05-18 | End: 2018-05-30

## 2018-05-18 RX ORDER — PROPOFOL 10 MG/ML
5 INJECTION, EMULSION INTRAVENOUS
Qty: 1000 | Refills: 0 | Status: DISCONTINUED | OUTPATIENT
Start: 2018-05-18 | End: 2018-05-18

## 2018-05-18 RX ORDER — INSULIN GLARGINE 100 [IU]/ML
5 INJECTION, SOLUTION SUBCUTANEOUS AT BEDTIME
Qty: 0 | Refills: 0 | Status: DISCONTINUED | OUTPATIENT
Start: 2018-05-18 | End: 2018-05-30

## 2018-05-18 RX ADMIN — SODIUM CHLORIDE 50 MILLILITER(S): 9 INJECTION INTRAMUSCULAR; INTRAVENOUS; SUBCUTANEOUS at 19:00

## 2018-05-18 RX ADMIN — DEXMEDETOMIDINE HYDROCHLORIDE IN 0.9% SODIUM CHLORIDE 1.65 MICROGRAM(S)/KG/HR: 4 INJECTION INTRAVENOUS at 17:00

## 2018-05-18 RX ADMIN — Medication 1: at 19:24

## 2018-05-18 RX ADMIN — Medication 100 MILLIEQUIVALENT(S): at 12:27

## 2018-05-18 RX ADMIN — ONDANSETRON 105 MILLIGRAM(S): 8 TABLET, FILM COATED ORAL at 06:20

## 2018-05-18 RX ADMIN — HEPARIN SODIUM 6 UNIT(S)/HR: 5000 INJECTION INTRAVENOUS; SUBCUTANEOUS at 22:15

## 2018-05-18 RX ADMIN — Medication 100 GRAM(S): at 14:27

## 2018-05-18 RX ADMIN — HEPARIN SODIUM 5 UNIT(S)/HR: 5000 INJECTION INTRAVENOUS; SUBCUTANEOUS at 19:00

## 2018-05-18 RX ADMIN — Medication 100 MILLIEQUIVALENT(S): at 14:31

## 2018-05-18 RX ADMIN — PHENYLEPHRINE HYDROCHLORIDE 2.47 MICROGRAM(S)/KG/MIN: 10 INJECTION INTRAVENOUS at 17:38

## 2018-05-18 RX ADMIN — INSULIN GLARGINE 5 UNIT(S): 100 INJECTION, SOLUTION SUBCUTANEOUS at 23:52

## 2018-05-18 NOTE — CHART NOTE - NSCHARTNOTEFT_GEN_A_CORE
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 Ash Deleon MD    RN: Ines/ Ximena     Anesthesia: Dr. 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 Retrograde filling via large right PCOMM. Dominant right vertebral and small left vert arteries. Super selective catheterization using triaxial system of the proximal basilar trunk via the right vertebral, only micro glide wire could be advanced to eh occulusion to left PCA, 4mg of IA tpa infused.  ( Official note to follow).    Patient tolerated procedure well, vital signs stable, hemodynamically stable, no change in neurological status compared to baseline. Results discussed with neurosurgery/ patient and their family. Groin sheath d/c'ed, manual compression held to hemostasis, no active bleeding, no hematoma, Avitene applied, perclose device applied, quick clot and safeguard balloon dressing applied at 4:25h. STAT labs, CBC/BMP stat upon arrival to NSCU. Patient transferred to NSCU for further care/ monitoring.     Barb Mullins PA-C  [phone]

## 2018-05-18 NOTE — AIRWAY REMOVAL NOTE  ADULT & PEDS - ARTIFICAL AIRWAY REMOVAL COMMENTS
Written order for extubation verified. The patient was identified by full name and birth date compared to the identification band. Present during the procedure was MARIBEL Ibrahim

## 2018-05-18 NOTE — PROGRESS NOTE ADULT - SUBJECTIVE AND OBJECTIVE BOX
84 y/o female woman with DM II was transferred from [facility] for acute onset of dysarthria and right sided weakness. She was last normal at around 8:45 PM 5/17, when she was walking in the shopping mall with her daughter. She suddenly developed acute onset of dizziness, imbalance and slurring of speech. She also reports to have noticed right hand weakness/numbess at the same time. She was treated with IV tPA. At around 12:00-12:30 AM 5/18, she was noted to have acute onset of right sided weakness involving face, arm and leg with severe dysarthria.  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 Catheter wire passed past stenosis but no significant clot removed; however, trickle flow obtained. Also, basilar noted to be filling retrograde via posterior communicating artery.    SUBJECTIVE: No events overnight.  No new neurologic complaints.      dexmedetomidine Infusion 0.1 MICROgram(s)/kG/Hr IV Continuous <Continuous>  dextrose 40% Gel 15 Gram(s) Oral once PRN  dextrose 5%. 1000 milliLiter(s) IV Continuous <Continuous>  dextrose 50% Injectable 12.5 Gram(s) IV Push once  dextrose 50% Injectable 25 Gram(s) IV Push once  dextrose 50% Injectable 25 Gram(s) IV Push once  fentaNYL    Injectable 25 MICROGram(s) IV Push every 2 hours PRN  glucagon  Injectable 1 milliGRAM(s) IntraMuscular once PRN  heparin  Infusion 300 Unit(s)/Hr IV Continuous <Continuous>  insulin lispro (HumaLOG) corrective regimen sliding scale   SubCutaneous every 6 hours  ondansetron  IVPB 5 milliGRAM(s) IV Intermittent every 6 hours PRN  phenylephrine    Infusion 0.1 MICROgram(s)/kG/Min IV Continuous <Continuous>  sodium chloride 0.9% Bolus 1000 milliLiter(s) IV Bolus once  sodium chloride 0.9%. 1000 milliLiter(s) IV Continuous <Continuous>      PHYSICAL EXAM:   Vital Signs Last 24 Hrs  T(C): 36.6 (18 May 2018 15:00), Max: 37.2 (18 May 2018 11:00)  T(F): 97.9 (18 May 2018 15:00), Max: 98.9 (18 May 2018 11:00)  HR: 60 (18 May 2018 17:15) (47 - 104)  BP: 123/68 (18 May 2018 01:30) (123/68 - 123/68)  RR: 23 (18 May 2018 17:15) (12 - 30)  SpO2: 100% (18 May 2018 17:15) (99% - 100%)    General: No acute distress  HEENT: EOM intact, visual fields full  Abdomen: Soft, nontender, nondistended   Extremities: No edema    NEUROLOGICAL EXAM:  Mental status: Awake, alert, intubated, opens eyes to stimuli, follows simple commands, no neglect  Cranial Nerves: right mild facial droop,   Motor exam: Normal tone, RUE slight drift, 4+/5 RUE, 4+/5 RLE, 5/5 LUE, 5/5 LLE  Sensation: Intact to light touch   Coordination/ Gait: Not able to assess     LABS:                        14.3   7.8   )-----------( 190      ( 18 May 2018 13:43 )             44.1    05-18    138  |  105  |  8   ----------------------------<  245<H>  3.6   |  21<L>  |  0.55    Ca    8.2<L>      18 May 2018 13:43  Phos  3.9     05-18  Mg     1.6     05-18    TPro  8.7<H>  /  Alb  3.7  /  TBili  0.4  /  DBili  x   /  AST  <5<L>  /  ALT  <5<L>  /  AlkPhos  62  05-18  PT/INR - ( 18 May 2018 02:00 )   PT: 14.2 sec;   INR: 1.31 ratio         PTT - ( 18 May 2018 13:43 )  PTT:26.5 sec  Hemoglobin A1C, Whole Blood: 7.5 % (05-18 @ 07:53) THE PATIENT WAS SEEN AND EXAMINED BY ME WITH THE HOUSESTAFF AND STROKE TEAM DURING MORNING ROUNDS.    HPI:  84 y/o female woman with DM II was transferred from [facility] for acute onset of dysarthria and right sided weakness. She was last normal at around 8:45 PM 5/17, when she was walking in the shopping mall with her daughter. She suddenly developed acute onset of dizziness, imbalance and slurring of speech. She also reports to have noticed right hand weakness/numbess at the same time. She was treated with IV tPA. At around 12:00-12:30 AM 5/18, she was noted to have acute onset of right sided weakness involving face, arm and leg with severe dysarthria.  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 Catheter wire passed past stenosis but no significant clot removed; however, trickle flow obtained. Also, basilar noted to be filling retrograde via posterior communicating artery.    SUBJECTIVE: No events overnight. No new neurologic complaints. Remains intubated and on mechanical ventilation    ROS: All negative except documented above.    dexmedetomidine Infusion 0.1 MICROgram(s)/kG/Hr IV Continuous <Continuous>  dextrose 40% Gel 15 Gram(s) Oral once PRN  dextrose 5%. 1000 milliLiter(s) IV Continuous <Continuous>  dextrose 50% Injectable 12.5 Gram(s) IV Push once  dextrose 50% Injectable 25 Gram(s) IV Push once  dextrose 50% Injectable 25 Gram(s) IV Push once  fentaNYL    Injectable 25 MICROGram(s) IV Push every 2 hours PRN  glucagon  Injectable 1 milliGRAM(s) IntraMuscular once PRN  heparin  Infusion 300 Unit(s)/Hr IV Continuous <Continuous>  insulin lispro (HumaLOG) corrective regimen sliding scale   SubCutaneous every 6 hours  ondansetron  IVPB 5 milliGRAM(s) IV Intermittent every 6 hours PRN  phenylephrine    Infusion 0.1 MICROgram(s)/kG/Min IV Continuous <Continuous>  sodium chloride 0.9% Bolus 1000 milliLiter(s) IV Bolus once  sodium chloride 0.9%. 1000 milliLiter(s) IV Continuous <Continuous>      PHYSICAL EXAM:   Vital Signs Last 24 Hrs  T(C): 36.6 (18 May 2018 15:00), Max: 37.2 (18 May 2018 11:00)  T(F): 97.9 (18 May 2018 15:00), Max: 98.9 (18 May 2018 11:00)  HR: 60 (18 May 2018 17:15) (47 - 104)  BP: 123/68 (18 May 2018 01:30) (123/68 - 123/68)  RR: 23 (18 May 2018 17:15) (12 - 30)  SpO2: 100% (18 May 2018 17:15) (99% - 100%)    General: No acute distress  HEENT: EOM intact, visual fields full  Abdomen: Soft, nontender, nondistended   Extremities: No edema    NEUROLOGICAL EXAM:  Mental status: Awake, alert, intubated, opens eyes to stimuli, follows simple commands, no neglect  Cranial Nerves: right mild facial droop, EOMs intact, intact visual fields by blink to threat  Motor exam: Normal tone, right mild hemiparesis (RUE and RLE 4/5 with motor drift), LUE and LLE 5/5   Sensation: Intact to light touch   Coordination/ Gait: Not able to assess     LABS:                        14.3   7.8   )-----------( 190      ( 18 May 2018 13:43 )             44.1    05-18    138  |  105  |  8   ----------------------------<  245<H>  3.6   |  21<L>  |  0.55    Ca    8.2<L>      18 May 2018 13:43  Phos  3.9     05-18  Mg     1.6     05-18    TPro  8.7<H>  /  Alb  3.7  /  TBili  0.4  /  DBili  x   /  AST  <5<L>  /  ALT  <5<L>  /  AlkPhos  62  05-18  PT/INR - ( 18 May 2018 02:00 )   PT: 14.2 sec;   INR: 1.31 ratio         PTT - ( 18 May 2018 13:43 )  PTT:26.5 sec  Hemoglobin A1C, Whole Blood: 7.5 % (05-18 @ 07:53)

## 2018-05-18 NOTE — CHART NOTE - NSCHARTNOTEFT_GEN_A_CORE
Patient reportedly got better after tPA as per nurse, slurred speech resolved. Then patient had elevated blood pressure and ED put patient on cardiene drip which resulted in lower blood pressures (). Patient got acutely worse with aphasia, dysarthria, right sided hemiplegia, right facial droop and right sided sensory deficit. Urgent CTH was done which was negative for any bleeding. Patient began to vomit at CT scan and SBP was 118, advised to give 1 L of NS bolus.  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 Patient will be transferred to [facility] for endovascular procedure.    Case and plan discussed with Dr. Reyes. Patient reportedly got better after tPA as per nurse, slurred speech resolved. Then patient had elevated blood pressure and ED put patient on cardiene drip which resulted in lower blood pressures (). Patient got acutely worse with aphasia, dysarthria, right sided hemiplegia, right facial droop and right sided sensory deficit. Urgent CTH was done which was negative for any bleeding. Patient began to vomit at CT scan and SBP was 118, advised to give 1 L of NS bolus.  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 Patient will be transferred to [facility] for endovascular procedure. ED and MICU aware of plan.    Case and plan discussed with Dr. Reyes.

## 2018-05-18 NOTE — PROGRESS NOTE ADULT - SUBJECTIVE AND OBJECTIVE BOX
SUMMARY:  86 yearold RH AA woman PMH of DM type 2 presents with slurred speech sudden onset at 8:45pm. Patient was shopping with daughter at [employer] when she suddenly started to scream that she felt there was an "earthquake" beneath her and states she felt dizzy although she denies having a spinning sensation. Daughter states her mother got weak in both legs, felt an itch all over her face and had numbness in both arms. Upon coming to the emergency room the patient vomiting 3 times. Daughter states slurred speech is better but not back to baseline. Patient also reported that her right hand felt weak but upon further questioning she stated it was more of a numnbess/ "cramping" feeling. Patient could not ambulate properly. At baseline has an unsteady gait but family states it is much worse then. tPA bolus of 5.7 was given at 10:17pm followed by a 51mg drip. NIHSS-1, MRS-0 she got progressively worse over next few hours, her right side became plegic and she started to have a gaze deviation. CTA H&N performed on her which was positive for basilar occlusion. She was transfered to [facility] for endovascular (18 May 2018 02:02)    OVERNIGHT EVENTS:     ADMISSION SCORES:   GCS: HH: MF: NIHSS: ICH Score:    SEDATION SCORES:  RASS: CAM-ICU:     REVIEW OF SYSTEMS:    VITALS: [] Reviewed    IMAGING/DATA: [] Reviewed    IVF FLUIDS/MEDICATIONS: [] Reviewed    ALLERGIES: Allergies    latex (Unknown)  penicillin (Unknown)    Intolerances        DEVICES:   [] Restraints [] PIVs [] ET tube [] central line [] PICC [] arterial line [] ramires [] NGT/OGT [] EVD [] LD [] HELENE/HMV [] LiCOX [] ICP monitor [] Trach [] PEG [] Chest Tube [] other:    EXAMINATION:  General: No acute distress  HEENT: Anicteric sclerae  Cardiac: [id number]  Lungs: Clear  Abdomen: Soft, non-tender, +BS  Extremities: No c/c/e  Skin/Incision Site: Clean, dry and intact  Neurologic: Awake, alert, fully oriented, follows commands, PERRL, VFFtc, EOMI, face symmetric, tongue midline, no drift, full strength SUMMARY:  Ms. Olivia is an 85 year-old right handed [place] woman with stroke risk factor of DM type 2 with baseline unsteadiness with walking who presented with sudden onset slurred speech associated with dizziness (though not spinning sensation) at 8:45pm on 5/17/18. Her daughter  her mother got weak in both legs, felt an itch all over her face and had numbness in both arms. She was initially brought to the [facility] ER where she vomited thrice. Dysarthria initially improved, though did not resolve. In addition, ambulation more unsteady. Admission NIHSS was 1. She was given IV tPA at 10:17pm. She got progressively worse over next few hours with new right sided plegia and gaze deviation. CTA performed which revealed basilar occlusion. She was transferred to [facility] for endovascular rescue. 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 Catheter wire passed past stenosis but no significant clot removed; however, trickle flow obtained. Also, basilar noted to be filling retrograde via posterior communicating artery.    OVERNIGHT EVENTS:   Admitted to [facility].    ADMISSION SCORES:   NIHSS: 1    REVIEW OF SYSTEMS:  Unable to obtain given mental status    VITALS/DATA/ORDERS: [x] Reviewed    ALLERGIES:  latex (Unknown)  penicillin (Unknown)    EXAMINATION:  General: No acute distress  HEENT: Anicteric sclerae  Cardiac: [id number]  Lungs: Clear anteriorly, decreased breath sounds at bases  Abdomen: Soft, non-tender, +BS  Extremities: No groin hematoma  Skin/Incision Site: Clean, dry and intact  Neurologic: Eyes closed, no opening to noxious stimulus, no commands, PERRL, +cough/gag, turns head to noxious stimulus, no movement in arms, b/l triple flexion in legs

## 2018-05-18 NOTE — CHART NOTE - NSCHARTNOTEFT_GEN_A_CORE
Notified by nursing staff in the ED that the patient developed sudden onset severe headache, global aphasia and acute right upper and lower extremity weakness. At time of evaluation pt was noted to have /60, nicardipine was titrated off. Exam significant for global aphasia, right upper and lower extremity hemiparesis, and left sided facial droop. Neuro was called and the patient was brought urgently for CT non contrast of the head and CT-angio head and neck.  During CT pt began to vomit, she was rolled over and mouth suctioned, O2 sat 98%, , /80.  Pt not in any respiratory distress during the incident. CT non con of the head did not demonstrate any evidence of bleeding. CT-Angio significant for basilar artery occlusion per neurology resident. Pt is to be transferred to [facility] for endovascular retrieval of clot per neurology's recommendations. Notified by nursing staff in the ED that the patient developed sudden onset severe headache, global aphasia and acute right upper and lower extremity weakness. At time of evaluation pt was noted to have /60, nicardipine was titrated off. Exam significant for global aphasia, right upper and lower extremity hemiparesis, and right sided facial droop. Neuro was called and the patient was brought urgently for CT non contrast of the head and CT-angio head and neck.  During CT pt began to vomit, she was rolled over and mouth suctioned, O2 sat 98%, , /80.  Pt not in any respiratory distress during the incident. CT non con of the head did not demonstrate any evidence of bleeding. CT-Angio significant for basilar artery occlusion per neurology resident. Pt given 1L IVF for BP management. Pt is to be transferred to [facility] for endovascular retrieval of clot per neurology's recommendations.    Dr. Mittal updated regarding pt's condition and transfer.    Sandra Dhillon MD ([place]) PGY-2  Emergency and Internal Medicine Resident

## 2018-05-18 NOTE — PROGRESS NOTE ADULT - SUBJECTIVE AND OBJECTIVE BOX
HPI:  Ms. Olivia is an 85 year-old right handed Vincentian woman with stroke risk factor of DM type 2 with baseline unsteadiness with walking who presented with sudden onset slurred speech associated with dizziness (though not spinning sensation) at 8:45pm on 5/17/18. Her daughter  her mother got weak in both legs, felt an itch all over her face and had numbness in both arms. She was initially brought to the [facility] ER where she vomited thrice. Dysarthria initially improved, though did not resolve. In addition, ambulation more unsteady. Admission NIHSS was 1. She was given IV tPA at 10:17pm. She got progressively worse over next few hours with new right sided plegia and gaze deviation. CTA performed which revealed basilar occlusion. She was transferred to [facility] for endovascular rescue. 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 Catheter wire passed past stenosis but no significant clot removed; however, trickle flow obtained. Also, basilar noted to be filling retrograde via posterior communicating artery.    OVERNIGHT EVENTS:   No acute events overnight.    VITALS:  T(C): , Max: 36.8 (05-18-18 @ 01:30)  HR:  (51 - 104)  BP:  (123/68 - 123/68)  ABP:  (107/50 - 168/92)  RR:  (12 - 26)  SpO2:  (99% - 100%)  Wt(kg): --  Device: Avea, Mode: AC/ CMV (Assist Control/ Continuous Mandatory Ventilation), RR (machine): 12, TV (machine): 450, FiO2: 50, PEEP: 5, ITime: 1, MAP: 8, PIP: 16    LABS:  Na: 141 (05-18 @ 06:06), 121 (05-18 @ 02:00)  K: 3.6 (05-18 @ 06:06), >9.0 (05-18 @ 02:00)  Cl: 106 (05-18 @ 06:06), 99 (05-18 @ 02:00)  CO2: 19 (05-18 @ 06:06), 25 (05-18 @ 02:00)  BUN: 8 (05-18 @ 06:06), 12 (05-18 @ 02:00)  Cr: 0.54 (05-18 @ 06:06), 0.46 (05-18 @ 02:00)  Glu:     Hgb: 14.5 (05-18 @ 06:07), 14.8 (05-18 @ 02:00)  Hct: 44.2 (05-18 @ 06:07), 47.1 (05-18 @ 02:00)  WBC: 8.2 (05-18 @ 06:07), 7.0 (05-18 @ 02:00)  Plt: 195 (05-18 @ 06:07), 163 (05-18 @ 02:00)    PT: -- (05-18 @ 06:06)  INR: -- (05-18 @ 06:06)  aPTT: 34.3 (05-18 @ 06:06)  PT: 14.2 (05-18 @ 02:00)  INR: 1.31 (05-18 @ 02:00)  aPTT: 21.1 (05-18 @ 02:00)    IMAGING:   Recent imaging studies were reviewed.    MEDICATIONS:  dexmedetomidine Infusion 0.1 MICROgram(s)/kG/Hr IV Continuous <Continuous>  fentaNYL    Injectable 25 MICROGram(s) IV Push every 2 hours PRN  heparin  Infusion 300 Unit(s)/Hr IV Continuous <Continuous>  ondansetron  IVPB 5 milliGRAM(s) IV Intermittent every 6 hours PRN  phenylephrine    Infusion 0.1 MICROgram(s)/kG/Min IV Continuous <Continuous>  propofol Infusion 5 MICROgram(s)/kG/Min IV Continuous <Continuous>  sodium chloride 0.9% Bolus 1000 milliLiter(s) IV Bolus once  sodium chloride 0.9%. 1000 milliLiter(s) IV Continuous <Continuous>    EXAMINATION:  General: No acute distress  HEENT: Anicteric sclerae  Cardiac: [id number]  Lungs: Clear anteriorly, decreased breath sounds at bases  Abdomen: Soft, non-tender, +BS  Extremities: No groin hematoma  Skin/Incision Site: Clean, dry and intact  Neurologic: Eyes closed, no opening to noxious stimulus, no commands, PERRL, +cough/gag, turns head to noxious stimulus, no movement in arms, b/l triple flexion in legs HPI:  Ms. Olivia is an 85 year-old right handed Vietnamese woman with stroke risk factor of DM type 2 with baseline unsteadiness with walking who presented with sudden onset slurred speech associated with dizziness (though not spinning sensation) at 8:45pm on 5/17/18. Her daughter  her mother got weak in both legs, felt an itch all over her face and had numbness in both arms. She was initially brought to the [facility] ER where she vomited thrice. Dysarthria initially improved, though did not resolve. In addition, ambulation more unsteady. Admission NIHSS was 1. She was given IV tPA at 10:17pm. She got progressively worse over next few hours with new right sided plegia and gaze deviation. CTA performed which revealed basilar occlusion. She was transferred to [facility] for endovascular rescue. 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 Catheter wire passed past stenosis but no significant clot removed; however, trickle flow obtained. Also, basilar noted to be filling retrograde via posterior communicating artery.    OVERNIGHT EVENTS:   No acute events overnight.    VITALS:  T(C): , Max: 36.8 (05-18-18 @ 01:30)  HR:  (51 - 104)  BP:  (123/68 - 123/68)  ABP:  (107/50 - 168/92)  RR:  (12 - 26)  SpO2:  (99% - 100%)  Wt(kg): --  Device: Avea, Mode: AC/ CMV (Assist Control/ Continuous Mandatory Ventilation), RR (machine): 12, TV (machine): 450, FiO2: 50, PEEP: 5, ITime: 1, MAP: 8, PIP: 16    LABS:  Na: 141 (05-18 @ 06:06), 121 (05-18 @ 02:00)  K: 3.6 (05-18 @ 06:06), >9.0 (05-18 @ 02:00)  Cl: 106 (05-18 @ 06:06), 99 (05-18 @ 02:00)  CO2: 19 (05-18 @ 06:06), 25 (05-18 @ 02:00)  BUN: 8 (05-18 @ 06:06), 12 (05-18 @ 02:00)  Cr: 0.54 (05-18 @ 06:06), 0.46 (05-18 @ 02:00)  Glu:     Hgb: 14.5 (05-18 @ 06:07), 14.8 (05-18 @ 02:00)  Hct: 44.2 (05-18 @ 06:07), 47.1 (05-18 @ 02:00)  WBC: 8.2 (05-18 @ 06:07), 7.0 (05-18 @ 02:00)  Plt: 195 (05-18 @ 06:07), 163 (05-18 @ 02:00)    PT: -- (05-18 @ 06:06)  INR: -- (05-18 @ 06:06)  aPTT: 34.3 (05-18 @ 06:06)  PT: 14.2 (05-18 @ 02:00)  INR: 1.31 (05-18 @ 02:00)  aPTT: 21.1 (05-18 @ 02:00)    IMAGING:   Recent imaging studies were reviewed.    MEDICATIONS:  dexmedetomidine Infusion 0.1 MICROgram(s)/kG/Hr IV Continuous <Continuous>  fentaNYL    Injectable 25 MICROGram(s) IV Push every 2 hours PRN  heparin  Infusion 300 Unit(s)/Hr IV Continuous <Continuous>  ondansetron  IVPB 5 milliGRAM(s) IV Intermittent every 6 hours PRN  phenylephrine    Infusion 0.1 MICROgram(s)/kG/Min IV Continuous <Continuous>  propofol Infusion 5 MICROgram(s)/kG/Min IV Continuous <Continuous>  sodium chloride 0.9% Bolus 1000 milliLiter(s) IV Bolus once  sodium chloride 0.9%. 1000 milliLiter(s) IV Continuous <Continuous>    EXAMINATION:  General: No acute distress  HEENT: Anicteric sclerae  Cardiac: [id number]  Lungs: Clear anteriorly, decreased breath sounds at bases  Abdomen: Soft, non-tender, +BS  Extremities: No groin hematoma  Skin/Incision Site: Clean, dry and intact  Neurologic: eyes open, follows commands, symmetrical strength at least 4+ throughout

## 2018-05-18 NOTE — H&P ADULT - ATTENDING COMMENTS
I have seen and examined this patient with the stroke neurology team.     History was reviewed with the patient and/or available family members.   ROS: All negative except documented in the HPI.   Neurological exam was performed and agree with exam as documented above.   Laboratory results and imaging studies were reviewed by me.   I agree with the neurology resident note as documented above.    86 Y/O woman with vascular risk factors of age and DM II is evaluated at [facility] for acute onset of right sided weakness. She was last normal at around 8:45 PM, when she was walking in the shopping mall with her daughter. She suddenly developed acute onset of dizziness, imbalance and slurring of speech. She also reports to have noticed right hand weakness/numbess at the same time. She presented to [facility]. CT brain did not show any evidence of acute infarct or hemorrhage. She was treated with IV tPA. At around 12:00-12:30 AM, she was noted to have acute onset of right sided weakness involving face, arm and leg with severe dysarthria. CT brain following right hemiplegia did not show any evidence of hemorrhage. 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 basilar vs. right vertebral artery atherosclerotic disease leading to brainstem hypoperfusion vs. artery to artery embolism I have seen and examined this patient with the stroke neurology team.     History was reviewed with the patient and/or available family members.   ROS: All negative except documented in the HPI.   Neurological exam was performed and agree with exam as documented above.   Laboratory results and imaging studies were reviewed by me.   I agree with the neurology resident note as documented above.    86 Y/O woman with vascular risk factors of age and DM II is evaluated at [facility] for acute onset of right sided weakness. She was last normal at around 8:45 PM, when she was walking in the shopping mall with her daughter. She suddenly developed acute onset of dizziness, imbalance and slurring of speech. She also reports to have noticed right hand weakness/numbess at the same time. She presented to [facility]. CT brain did not show any evidence of acute infarct or hemorrhage. She was treated with IV tPA. At around 12:00-12:30 AM, she was noted to have acute onset of right sided weakness involving face, arm and leg with severe dysarthria. CT brain following right hemiplegia did not show any evidence of hemorrhage. 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 basilar vs. right vertebral artery atherosclerotic disease leading to brainstem hypoperfusion vs. artery to artery embolism    Plan:   - Risks vs. benefits and adverse reactions/complications of mechanical embolectomy were discussed with patient and available family members at bedside in detail. After obtaining verbal and written consent, she would be transferred to [facility] suite for mechanical embolectomy as benefits of mechanical embolectomy would outweigh potential risks   - Continue with  24 hours post IV tPA precautions including BP goal<180/105 mmHg for first 24 hours followed by gradual normotension as per protocol  - MRI brain without contrast/MRA head and neck with and without contrast   - Aspirin and pharmacological DVT prophylaxis to be considered at 24 hours after the IV tPA and after repeating brain imaging, SCDs for DVT prophylaxis in the interim  - Atorvastatin 80 mg at bedtime after establishing enteral access or passing swallow evaluation  - TTE with bubble study and continue with telemetry to screen for possible cardiac source of embolism  - HbA1C and LDL, continue with aggressive vascular risk factors modifications   - PT/OT/Speech and swallow evaluation   - Would be admitted to NSCU for closer neurological observation  - Vent management and supportive care as per NSCU  - Stroke education     Above mentioned plan was discussed with patient and available family members at bedside in detail. All the questions were answered and concerns were addressed. More than 88 minutes were spent in evaluation and management of this critically ill and unstable patient with acute stroke due to intracranial large vessel occlusion.

## 2018-05-18 NOTE — ED ADULT NURSE NOTE - OBJECTIVE STATEMENT
Pt presents to Ed as transfer from [facility] a CVA, Pt AXOX3 Danish speaking, follows commands in english Pt presents to Ed as transfer by EMS from [facility] following a CVA, Pt AXOX3 Persian speaking, follows commands in english, daughter at bedside assisting with history and translation. Per daughter, pt was out shopping and had R side weakness onset at 845pm 5/17. Pt was brought to [facility], was given TPA at 1020 pm, finishing dose at 1120. Per report Stroke symptoms partially resolved, At 1230 am on 5/18 pt symptoms worsened, with global aphasia and Right upper and lower weakness. Pt has facial asymmetry, with facial droop to Right side. PERRL, able to follow commands, AXOX3 with slurred voice. Pt has no drift to upper and lower left sided extremities, unable to move Right upper and lower extremities. Breathing unlabored and symmetrical, airway patent, no pain reported.

## 2018-05-18 NOTE — ED PROVIDER NOTE - OBJECTIVE STATEMENT
Hx DM-2, transfer from [facility] for CVA, s/p TPA, subsequently developed R. sided weakness and aphasia, sent for clot retrieval. NICOLE (Attending): 85F PMH of DM2, right handed, transferred from [facility] ED for stroke rescue. Pt last known normal at 8:45 PM when she was shopping at store with daughter. Daughter reports pt stated it felt like ground was shaking beneath her like and earth quake and her legs where going in the air. When pt arrived at [facility], she had slurred speech. Stroke code called and tPA administered around 10:19PM. During ED course pt developed nausea, right sided weakness and complete aphasia. Repeat Head CT reported no ICH but large left MCA CVA. Pt transferred for possible IR intervention. Pt alert but unable to provide hx due to aphasia. Hx provided by daughter at bedside. [facility] MRN: [id number].

## 2018-05-18 NOTE — ED PROVIDER NOTE - PHYSICAL EXAMINATION
Gen: NAD  Eyes:  sclerae white, no icterus  ENT: Moist mucous membranes. No exudates  Neck: supple, no LAD, mass or goiter, trachea midline  CV: RRR. Audible S1 and S2. No murmurs, rubs, gallops, S3, nor S4  Pulm: Clear to auscultation bilaterally. No wheezes, rales, or rhonchi  Abd: BS+, nondistended, No tenderness to palpation  Musculoskeletal:  No edema  Skin: no lesions or scars noted

## 2018-05-18 NOTE — H&P ADULT - ASSESSMENT
84 yearold RH AA woman PMH of DM type 2 presents with slurred speech sudden onset at 8:45pm. Patient was shopping with daughter at [employer] when she suddenly started to scream that she felt there was an "earthquake" beneath her and states she felt dizzy although she denies having a spinning sensation. Daughter states her mother got weak in both legs, felt an itch all over her face and had numbness in both arms. Upon coming to the emergency room the patient vomiting 3 times. Daughter states slurred speech is better but not back to baseline. Patient also reported that her right hand felt weak but upon further questioning she stated it was more of a numnbess/ "cramping" feeling. Patient could not ambulate properly. At baseline has an unsteady gait but family states it is much worse then. tPA bolus of 5.7 was given at 10:17pm followed by a 51mg drip. NIHSS-1, MRS-0 she got progressively worse over next few hours, her right side became plegic and she started to have a gaze deviation. CTA H&N performed on her which was positive for basilar occlusion. She was transferred to [facility] for endovascular.  Exam shows right spastic hemiplegic and right gaze preference with moderate to severe dysarthria.  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

## 2018-05-18 NOTE — H&P ADULT - NSHPPHYSICALEXAM_GEN_ALL_CORE
Neurological Exam:  Mental Status: AAOx3, fluent speech, follows commands, able to name objects and repeat    Cranial Nerves: EOMI, PERRL, V1-V3 intact, right sided facial droop, severe dysarthria,   Motor: left spastic hemiplegic     Sensation: Extinction    Coordination: FTN intact b/l

## 2018-05-18 NOTE — PROGRESS NOTE ADULT - ASSESSMENT
86 y/o female with PMHx of DM presented with basilar stenosis, posterior circulation stroke s/p tPA likely intracranial atherosclerosis with thrombosis. CTA H&N performed on her which was positive for basilar occlusion.     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 basilar vs. right vertebral artery atherosclerotic disease leading to brainstem hypoperfusion vs. artery to artery embolism    ASSESSMENT:     NEURO: Neurologically has improved, Continue close monitoring for neurologic deterioration, Continue with  24 hours post IV tPA precautions including BP goal<180/105 mmHg for first 24 hours followed by gradual normotension as per protocol titrate statin to LDL goal less than 70, MRI Brain w/o, MRA Head w/o and Neck w/contrast. Physical therapy/OT/Speech eval/treatment pending.     ANTITHROMBOTIC THERAPY:  Aspirin and pharmacological DVT prophylaxis to be considered at 24 hours after the IV tPA and after repeating brain imaging, SCDs for DVT prophylaxis in the interim. Heparin gtt with PTT goal 40-60    PULMONARY: CXR clear, vent care per NSCU, saturating well     CARDIOVASCULAR: checkTTE with bubble study and continue with telemetry to screen for possible cardiac source of embolism; Telemetry monitoring to screen for arrythmia                             BP goal<180/105 mmHg for first 24 hours followed by gradual normotension     GASTROINTESTINAL:  dysphagia screen when stable       Diet: NPO    RENAL: BUN/Cr stable, good urine output      Na Goal: Greater than 135     [place]:    HEMATOLOGY: H/H stable, Platelets normal      DVT ppx: Heparin infusion    ID: afebrile, no leukocytosis     DISPOSITION: Rehab or home depending on PT eval once stable and workup is complete      CORE MEASURES:        Admission NIHSS: -1     TPA: [x] YES [] NO      LDL/HDL: 94/69     Depression Screen: pending     Statin Therapy:      Dysphagia Screen: [] PASS [] FAIL- pending     Smoking [] YES [x] NO      Afib [] YES [x] NO     Stroke Education [] YES [] NO- pending 84 y/o female with PMHx of DM presented with basilar stenosis, posterior circulation stroke s/p tPA likely intracranial atherosclerosis with thrombosis. CTA H&N performed on her which was positive for basilar occlusion.     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 basilar vs. right vertebral artery atherosclerotic disease leading to brainstem hypoperfusion vs. artery to artery embolism    ASSESSMENT:     NEURO: Neurologically has improved, Continue close monitoring for neurologic deterioration, Continue with  24 hours post IV tPA precautions including BP goal<180/105 mmHg for first 24 hours followed by gradual normotension as per protocol titrate statin to LDL goal less than 70, MRI Brain w/o, MRA Head w/o and Neck w/contrast. Physical therapy/OT/Speech eval/treatment pending.     ANTITHROMBOTIC THERAPY:  Aspirin and pharmacological DVT prophylaxis to be considered at 24 hours after the IV tPA and after repeating brain imaging, SCDs for DVT prophylaxis in the interim. Heparin gtt without bolus for now and within 12hours with PTT goal 40-60    PULMONARY: CXR clear, vent care per NSCU, saturating well     CARDIOVASCULAR: checkTTE with bubble study and continue with telemetry to screen for possible cardiac source of embolism; Telemetry monitoring to screen for arrythmia                             BP goal<180/105 mmHg for first 24 hours followed by gradual normotension     GASTROINTESTINAL:  dysphagia screen when stable       Diet: NPO    RENAL: BUN/Cr stable, good urine output      Na Goal: Greater than 135     [place]:    HEMATOLOGY: H/H stable, Platelets normal      DVT ppx: Heparin infusion    ID: afebrile, no leukocytosis     DISPOSITION: Rehab or home depending on PT eval once stable and workup is complete      CORE MEASURES:        Admission NIHSS: -1     TPA: [x] YES [] NO      LDL/HDL: 94/69     Depression Screen: pending     Statin Therapy:      Dysphagia Screen: [] PASS [] FAIL- pending     Smoking [] YES [x] NO      Afib [] YES [x] NO     Stroke Education [] YES [] NO- pending ASSESSMENT:   86 Y/O woman with vascular risk factors of age and DM II is evaluated at [facility] for acute onset of right sided weakness. She was last normal at around 8:45 PM, when she was walking in the shopping mall with her daughter. She suddenly developed acute onset of dizziness, imbalance and slurring of speech. She also reports to have noticed right hand weakness/numbess at the same time. She presented to [facility], where CT brain did not show any evidence of acute infarct or hemorrhage. She was treated with IV tPA. At around 12:00-12:30 AM, she was noted to have acute onset of right sided weakness involving face, arm and leg with severe dysarthria. CT brain following right hemiplegia did not show any evidence of acute infarct or hemorrhage. 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 She was subsequently transferred to [facility]  for further management. She underwent conventional angiogram with intentions for mechanical thrombectomy, which was not possible secondary to technical difficulties and she was subsequently treated with IA tPA with the partial recanalization of the previously noted, basilar occlusion. CT brain performed subsequently did not show any evidence of acute infarct or hemorrhage.     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 symptomatic intracranial (basilar artery) atherosclerosis and superimposed local thrombosis, leading to occlusion of pontine perforators     NEURO: Neurologically overall improved as compared to admission, continue close monitoring for neurologic deterioration, Continue with  24 hours post IV tPA precautions including BP goal<180/105 mmHg for first 24 hours followed by gradual normotension as per protocol, atorvastatin 80 mg at bedtime, considering atheroembolic etiology of her stroke; further dose titration as per LDL goal less than 70, MRI Brain w/o, MRA Head w/o with NOVA and Neck w/contrast once medically stable. Physical therapy/OT/Speech eval/treatment pending.     ANTITHROMBOTIC THERAPY:  Cautious therapeutic anticoagulation with heparin drip at 600 units per hour for now until 24 hours from IV TPA, followed by dose titration to achieve PTT goal 40-60; in the setting of a unstable atherosclerotic plaque involving the basilar artery    PULMONARY: CXR clear, vent care per NSCU, saturating well     CARDIOVASCULAR: check TTE with bubble study and continue with telemetry to screen for possible cardiac source of embolism; Telemetry monitoring to screen for arrythmia                             BP goal<180/105 mmHg for first 24 hours followed by gradual normotension; avoid hypotension    GASTROINTESTINAL: dysphagia screen once extubated and medically stable      Diet: NPO    RENAL: BUN/Cr stable, good urine output      Na Goal: Greater than 135     [place]:    HEMATOLOGY: H/H stable, Platelets normal      DVT ppx: Cautious therapeutic integration with heparin drip     ID: afebrile, no leukocytosis     DISPOSITION: Rehab or home depending on PT eval once stable and workup is complete    CORE MEASURES:        Admission NIHSS: 12     TPA: [x] YES [] NO      LDL/HDL: 94/69     Depression Screen: pending     Statin Therapy:      Dysphagia Screen: [] PASS [] FAIL- pending     Smoking [] YES [x] NO      Afib [] YES [x] NO     Stroke Education [] YES [] NO- pending

## 2018-05-18 NOTE — PROGRESS NOTE ADULT - ASSESSMENT
ASSESSMENT/PLAN: Basilar stenosis, posterior circulation stroke    NEURO: 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  Stroke w/u  CT in AM, MRI when able  Full anticoagulation once 24 hours post tPA  Re-examine once anesthesia completely worn off and minimize sedation  Activity: [] mobilize as tolerated [x] Bedrest: given groin puncture [] PT [] OT [] PMNR    PULM:  Acute resp failure due to neurologic condition  Wean once more awake    CV:  SBP goal 120-180mmHg  TTE    RENAL:  Fluids: IVF maintenance    GI:  Diet: NPO for now  GI prophylaxis [] not indicated [x] PPI: intubated [] other:  Bowel regimen [x] colace [x] senna [] other:    ENDO:   Goal euglycemia (-180)  RISS    HEME/ONC:  VTE prophylaxis: [x] SCDs [] chemoprophylaxis [x] hold chemoprophylaxis due to: post tPA [x] high risk of DVT/PE on admission due to: baseline impaired ambulation    ID:  Monitor for fever    SOCIAL/FAMILY:  [x] awaiting [] updated at bedside [] family meeting    CODE STATUS:  [x] Full Code [] DNR [] DNI [] Palliative/Comfort Care    DISPOSITION:  [x] ICU [] Stroke Unit [] Floor [] EMU [] RCU [] PCU    [x] Patient is at high risk of neurologic deterioration/death due to: extension of stroke, intracerebral hemorrhage, respiratory failure    Time spent: 45 [x] critical care minutes ASSESSMENT/PLAN: Basilar stenosis, posterior circulation stroke    NEURO: 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  Stroke w/u  MRI when able    PULM:  SBT, wean to extubate, minimize sedation    CV:  SBP goal 120-180mmHg  TTE    RENAL:  Fluids: IVF maintenance while npo    GI:  Diet: NPO for extubation  GI prophylaxis [X] not indicated [] PPI: intubated [] other:  Bowel regimen [x] colace [x] senna [] other:    [id number] 7.5%, ISS   Goal euglycemia (-180)  RISS    HEME/ONC:  VTE prophylaxis: [x] SCDs [] chemoprophylaxis [x] hold chemoprophylaxis due to: post tPA [x] high risk of DVT/PE on admission due to: baseline impaired ambulation    ID:  Monitor for fever    SOCIAL/FAMILY:  [x] awaiting [] updated at bedside [] family meeting    CODE STATUS:  [x] Full Code [] DNR [] DNI [] Palliative/Comfort Care    DISPOSITION:  [x] ICU [] Stroke Unit [] Floor [] EMU [] RCU [] PCU    [x] Patient is at high risk of neurologic deterioration/death due to: extension of stroke, intracerebral hemorrhage, respiratory failure    Time spent: 45 [x] critical care minutes 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  Stroke w/u  MRI when able    PULM:  SBT, wean to extubate, minimize sedation    CV:  SBP goal 120-180mmHg  TTE    RENAL:  Fluids: IVF maintenance while npo    GI:  Diet: NPO for extubation  GI prophylaxis [X] not indicated [] PPI: intubated [] other:  Bowel regimen [x] colace [x] senna [] other:    [id number] 7.5%, ISS   Goal euglycemia (-180)  RISS    HEME/ONC:  VTE prophylaxis: [x] SCDs [] chemoprophylaxis [x] hold chemoprophylaxis due to: post tPA [x] high risk of DVT/PE on admission due to: baseline impaired ambulation    ID:  Monitor for fever    SOCIAL/FAMILY:  [x] awaiting [] updated at bedside [] family meeting    CODE STATUS:  [x] Full Code [] DNR [] DNI [] Palliative/Comfort Care    DISPOSITION:  [x] ICU [] Stroke Unit [] Floor [] EMU [] RCU [] PCU    [x] Patient is at high risk of neurologic deterioration/death due to: extension of stroke, intracerebral hemorrhage, respiratory failure    Time spent: 45 [x] critical care minutes

## 2018-05-18 NOTE — DISCHARGE NOTE ADULT - CARE PROVIDERS DIRECT ADDRESSES
[email or web address], [email or web address]

## 2018-05-18 NOTE — DISCHARGE NOTE ADULT - MEDICATION SUMMARY - MEDICATIONS TO TAKE
I will START or STAY ON the medications listed below when I get home from the hospital:    warfarin 5 mg oral tablet  -- 1 tab(s) by mouth once a day, INR between 2-3  -- Indication: For A fib    insulin glargine  -- 5 unit(s) subcutaneous once a day (at bedtime)  -- Indication: For Diabetes    HumaLOG KwikPen (Concentrated) 200 units/mL subcutaneous solution  -- 2 Unit(s) if Glucose 151 - 200  4 Unit(s) if Glucose 201 - 250  6 Unit(s) if Glucose 251 - 300  8 Unit(s) if Glucose 301 - 350  10 Unit(s) if Glucose 351 - 400  12 Unit(s) if Glucose Greater Than 400  -- Indication: For Diabetes    atorvastatin 80 mg oral tablet  -- 1 tab(s) by mouth once a day (at bedtime)  -- Indication: For Hyperlipidemia    brimonidine 0.2% ophthalmic solution  -- 1 drop(s) to each affected eye 2 times a day  -- Indication: For Eye drops    latanoprost 0.005% ophthalmic solution  -- 1 drop(s) to each affected eye once a day (at bedtime)  -- Indication: For Eye drops I will START or STAY ON the medications listed below when I get home from the hospital:    Coumadin 4 mg oral tablet  -- 1 tab(s) by mouth once a day   -- Do not take this drug if you are pregnant.  It is very important that you take or use this exactly as directed.  Do not skip doses or discontinue unless directed by your doctor.  Obtain medical advice before taking any non-prescription drugs as some may affect the action of this medication.    -- Indication: For Stroke prevention    insulin glargine  -- 5 unit(s) subcutaneous once a day (at bedtime)  -- Indication: For Diabetes    HumaLOG KwikPen (Concentrated) 200 units/mL subcutaneous solution  -- 2 Unit(s) if Glucose 151 - 200  4 Unit(s) if Glucose 201 - 250  6 Unit(s) if Glucose 251 - 300  8 Unit(s) if Glucose 301 - 350  10 Unit(s) if Glucose 351 - 400  12 Unit(s) if Glucose Greater Than 400  -- Indication: For Diabetes    atorvastatin 80 mg oral tablet  -- 1 tab(s) by mouth once a day (at bedtime)  -- Indication: For Hyperlipidemia    brimonidine 0.2% ophthalmic solution  -- 1 drop(s) to each affected eye 2 times a day  -- Indication: For Eye drops    latanoprost 0.005% ophthalmic solution  -- 1 drop(s) to each affected eye once a day (at bedtime)  -- Indication: For Eye drops

## 2018-05-18 NOTE — DISCHARGE NOTE ADULT - HOSPITAL COURSE
86 Y/O woman with Multiple  vascular risk factors: age and DM II is evaluated at [facility] for acute onset of right sided weakness. She was last normal at around 8:45 PM on day of admission, when she was walking in the shopping [place] with her daughter. She suddenly developed acute onset of dizziness, imbalance and slurring of speech. She also reports to have noticed right hand weakness/numbess at the same time. She presented to [facility], where CT brain did not show any evidence of acute infarct or hemorrhage. She was treated with IV tPA. At around 12:00-12:30 AM, she was noted to have acute onset of right sided weakness involving face, arm and leg with severe dysarthria. CT brain following right hemiplegia did not show any evidence of acute infarct or hemorrhage. 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 She was subsequently transferred to [facility]  for further management. She underwent conventional angiogram with intentions for mechanical thrombectomy, which was not possible secondary to technical difficulties and she was subsequently treated with IA tPA with the partial recanalization of the previously noted, basilar occlusion. CT brain performed subsequently on 5/19/18 did not show any evidence of acute infarct or hemorrhage. Repeat MRI  5/22/18 evolution of bilateral cerebral Infarcts with small micro hemorrhages Patients neurological exam remains stable. 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 symptomatic intracranial (basilar artery) atherosclerosis and superimposed local thrombosis, leading to occlusion of pontine perforators. TTE: ef 70%, mild MR, minimal AR, mild TR. Patient placed on heparin drip and bridged to coumadin. Patient to be on coumadin for 3 months and then aspirin indefinitely. Stable for discharge to rehab with outpatient follow up. 86 Y/O woman with Multiple  vascular risk factors: age and DM II is evaluated at [facility] for acute onset of right sided weakness. She was last normal at around 8:45 PM on day of admission, when she was walking [place] the shopping [place] with her daughter. She suddenly developed acute onset of dizziness, imbalance and slurring of speech. She also reports to have noticed right hand weakness/numbess at the same time. She presented to [facility], where CT brain did not show any evidence of acute infarct or hemorrhage. She was treated with IV tPA. At around 12:00-12:30 AM, she was noted to have acute onset of right sided weakness involving face, arm and leg with severe dysarthria. CT brain following right hemiplegia did not show any evidence of acute infarct or hemorrhage. 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 She was subsequently transferred to [facility]  for further management. She underwent conventional angiogram with intentions for mechanical thrombectomy, which was not possible secondary to technical difficulties and she was subsequently treated with IA tPA with the partial recanalization of the previously noted, basilar occlusion. CT brain performed subsequently on 5/19/18 did not show any evidence of acute infarct or hemorrhage. Repeat MRI  5/22/18 evolution of bilateral cerebral Infarcts with small micro hemorrhages Patients neurological exam remains stable. 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 symptomatic intracranial (basilar artery) atherosclerosis and superimposed local thrombosis, leading to occlusion of pontine perforators. TTE: ef 70%, mild MR, minimal AR, mild TR. Patient placed on heparin drip and bridged to coumadin. Patient to be on coumadin for 3 months with INR between 2 and 2.5 and aspirin indefinitely. Stable for discharge to rehab with outpatient follow up.

## 2018-05-18 NOTE — DISCHARGE NOTE ADULT - CARE PROVIDER_API CALL
Zhen Reyes (MBBS), Neurology; Vascular Neurology  [address] [address]  Phone: [phone]  Fax: [phone]

## 2018-05-18 NOTE — PROGRESS NOTE ADULT - ASSESSMENT
ASSESSMENT/PLAN:    NEURO:  Activity: [] mobilize as tolerated [] Bedrest [] PT [] OT [] PMNR    PULM:    CV:  SBP goal    RENAL:  Fluids:    GI:  Diet:  GI prophylaxis [] not indicated [] PPI [] other:  Bowel regimen [] colace [] senna [] other:    ENDO:   Goal euglycemia (-180)    HEME/ONC:  VTE prophylaxis: [] SCDs [] chemoprophylaxis [] hold chemoprophylaxis due to: [] high risk of DVT/PE on admission due to:    ID:    MISC:    SOCIAL/FAMILY:  [] awaiting [] updated at bedside [] family meeting    CODE STATUS:  [] Full Code [] DNR [] DNI [] Palliative/Comfort Care    DISPOSITION:  [] ICU [] Stroke Unit [] Floor [] EMU [] RCU [] PCU    [] Patient is at high risk of neurologic deterioration/death due to:     Time seen:  Time spent: ___ [] critical care minutes    Contact: [phone] ASSESSMENT/PLAN: Basilar stenosis, posterior circulation stroke    NEURO: 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  Stroke w/u  CT in AM, MRI when able  Full anticoagulation once 24 hours post tPA  Re-examine once anesthesia completely worn off and minimize sedation  Activity: [] mobilize as tolerated [x] Bedrest: given groin puncture [] PT [] OT [] PMNR    PULM:  Acute resp failure due to neurologic condition  Wean once more awake    CV:  SBP goal 120-180mmHg  TTE    RENAL:  Fluids: IVF maintenance    GI:  Diet: NPO for now  GI prophylaxis [] not indicated [x] PPI: intubated [] other:  Bowel regimen [x] colace [x] senna [] other:    ENDO:   Goal euglycemia (-180)  RISS    HEME/ONC:  VTE prophylaxis: [x] SCDs [] chemoprophylaxis [x] hold chemoprophylaxis due to: post tPA [x] high risk of DVT/PE on admission due to: baseline impaired ambulation    ID:  Monitor for fever    SOCIAL/FAMILY:  [x] awaiting [] updated at bedside [] family meeting    CODE STATUS:  [x] Full Code [] DNR [] DNI [] Palliative/Comfort Care    DISPOSITION:  [x] ICU [] Stroke Unit [] Floor [] EMU [] RCU [] PCU    [x] Patient is at high risk of neurologic deterioration/death due to: extension of stroke, intracerebral hemorrhage, respiratory failure    Time spent: 45 [x] critical care minutes    Contact: [phone]

## 2018-05-18 NOTE — DISCHARGE NOTE ADULT - PATIENT PORTAL LINK FT
You can access the [facility], offered by [facility], by registering with the following website: [email or web address]

## 2018-05-18 NOTE — DISCHARGE NOTE ADULT - CARE PLAN
Principal Discharge DX:	Stroke  Goal:	Prevent recurrence  Assessment and plan of treatment:	Take medication as prescribed  Follow up with neurologist

## 2018-05-18 NOTE — ED ADULT NURSE REASSESSMENT NOTE - NS ED NURSE REASSESS COMMENT FT1
Cassius RN received report from previous shift, pt noted to acutely have worsening slurred speech, left sided facial droop, MD Wills made aware, pending CT scan, will continue to monitor. Cassius RN received report from previous shift, pt noted to acutely have worsening slurred speech, left sided facial droop, MD Wills made aware, pending CT scan, will continue to monitor.    Addendum Pt noted to also have right sided upper and lower extremity weakness, pending CT. scan

## 2018-05-18 NOTE — H&P ADULT - HISTORY OF PRESENT ILLNESS
84 yearold RH AA woman PMH of DM type 2 presents with slurred speech sudden onset at 8:45pm. Patient was shopping with daughter at [employer] when she suddenly started to scream that she felt there was an "earthquake" beneath her and states she felt dizzy although she denies having a spinning sensation. Daughter states her mother got weak in both legs, felt an itch all over her face and had numbness in both arms. Upon coming to the emergency room the patient vomiting 3 times. Daughter states slurred speech is better but not back to baseline. Patient also reported that her right hand felt weak but upon further questioning she stated it was more of a numnbess/ "cramping" feeling. Patient could not ambulate properly. At baseline has an unsteady gait but family states it is much worse then. tPA bolus of 5.7 was given at 10:17pm followed by a 51mg drip. NIHSS-1, MRS-0 she got progressively worse over next few hours, her right side became plegic and she started to have a gaze deviation. CTA H&N performed on her which was positive for basilar occlusion. She was transfered to [facility] for endovascular

## 2018-05-18 NOTE — DISCHARGE NOTE ADULT - MEDICATION SUMMARY - MEDICATIONS TO CHANGE
I will SWITCH the dose or number of times a day I take the medications listed below when I get home from the hospital:    Lipitor 40 mg oral tablet  -- 1 tab(s) by mouth once a day

## 2018-05-19 LAB
APTT BLD: 36 SEC — SIGNIFICANT CHANGE UP (ref 27.5–37.4)
APTT BLD: 60.2 SEC — HIGH (ref 27.5–37.4)
GLUCOSE BLDC GLUCOMTR-MCNC: 126 MG/DL — HIGH (ref 70–99)
GLUCOSE BLDC GLUCOMTR-MCNC: 129 MG/DL — HIGH (ref 70–99)
GLUCOSE BLDC GLUCOMTR-MCNC: 142 MG/DL — HIGH (ref 70–99)
GLUCOSE BLDC GLUCOMTR-MCNC: 143 MG/DL — HIGH (ref 70–99)

## 2018-05-19 PROCEDURE — 99291 CRITICAL CARE FIRST HOUR: CPT

## 2018-05-19 PROCEDURE — 93306 TTE W/DOPPLER COMPLETE: CPT | Mod: 26

## 2018-05-19 PROCEDURE — 70450 CT HEAD/BRAIN W/O DYE: CPT | Mod: 26

## 2018-05-19 PROCEDURE — 99233 SBSQ HOSP IP/OBS HIGH 50: CPT

## 2018-05-19 RX ORDER — ONDANSETRON 8 MG/1
4 TABLET, FILM COATED ORAL ONCE
Qty: 0 | Refills: 0 | Status: COMPLETED | OUTPATIENT
Start: 2018-05-19 | End: 2018-05-19

## 2018-05-19 RX ORDER — ONDANSETRON 8 MG/1
4 TABLET, FILM COATED ORAL EVERY 6 HOURS
Qty: 0 | Refills: 0 | Status: DISCONTINUED | OUTPATIENT
Start: 2018-05-19 | End: 2018-05-30

## 2018-05-19 RX ORDER — ACETAMINOPHEN 500 MG
1000 TABLET ORAL ONCE
Qty: 0 | Refills: 0 | Status: COMPLETED | OUTPATIENT
Start: 2018-05-19 | End: 2018-05-19

## 2018-05-19 RX ORDER — METOCLOPRAMIDE HCL 10 MG
10 TABLET ORAL ONCE
Qty: 0 | Refills: 0 | Status: COMPLETED | OUTPATIENT
Start: 2018-05-19 | End: 2018-05-19

## 2018-05-19 RX ADMIN — ONDANSETRON 4 MILLIGRAM(S): 8 TABLET, FILM COATED ORAL at 09:30

## 2018-05-19 RX ADMIN — HEPARIN SODIUM 7 UNIT(S)/HR: 5000 INJECTION INTRAVENOUS; SUBCUTANEOUS at 19:10

## 2018-05-19 RX ADMIN — Medication 400 MILLIGRAM(S): at 13:00

## 2018-05-19 RX ADMIN — HEPARIN SODIUM 7 UNIT(S)/HR: 5000 INJECTION INTRAVENOUS; SUBCUTANEOUS at 03:18

## 2018-05-19 RX ADMIN — ONDANSETRON 4 MILLIGRAM(S): 8 TABLET, FILM COATED ORAL at 16:07

## 2018-05-19 RX ADMIN — ONDANSETRON 4 MILLIGRAM(S): 8 TABLET, FILM COATED ORAL at 05:26

## 2018-05-19 RX ADMIN — Medication 400 MILLIGRAM(S): at 00:15

## 2018-05-19 RX ADMIN — Medication 400 MILLIGRAM(S): at 20:05

## 2018-05-19 RX ADMIN — SODIUM CHLORIDE 50 MILLILITER(S): 9 INJECTION INTRAMUSCULAR; INTRAVENOUS; SUBCUTANEOUS at 17:50

## 2018-05-19 RX ADMIN — Medication 1000 MILLIGRAM(S): at 00:30

## 2018-05-19 RX ADMIN — ONDANSETRON 4 MILLIGRAM(S): 8 TABLET, FILM COATED ORAL at 00:15

## 2018-05-19 RX ADMIN — Medication 10 MILLIGRAM(S): at 05:45

## 2018-05-19 RX ADMIN — Medication 1000 MILLIGRAM(S): at 20:30

## 2018-05-19 RX ADMIN — Medication 1000 MILLIGRAM(S): at 13:15

## 2018-05-19 RX ADMIN — SODIUM CHLORIDE 50 MILLILITER(S): 9 INJECTION INTRAMUSCULAR; INTRAVENOUS; SUBCUTANEOUS at 05:28

## 2018-05-19 RX ADMIN — INSULIN GLARGINE 5 UNIT(S): 100 INJECTION, SOLUTION SUBCUTANEOUS at 23:00

## 2018-05-19 NOTE — PROGRESS NOTE ADULT - ASSESSMENT
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  Stroke w/u  MRI when able    PULM:  SBT, wean to extubate, minimize sedation    CV:  SBP goal 120-180mmHg  TTE    RENAL:  Fluids: IVF maintenance until po intake sufficient    GI:  Diet: advance as tolerated  GI prophylaxis [X] not indicated [] PPI: intubated [] other:  Bowel regimen [x] colace [x] senna [] other:    LZVBf9g 7.5%, ISS   Goal euglycemia (-180)  RISS    HEME/ONC:  VTE prophylaxis: [x] SCDs [X] chemoprophylaxis heparin infusion [x] high risk of DVT/PE on admission due to: baseline impaired ambulation -- LE doppler 5/18 negative for DVT    ID:  Monitor for fever    SOCIAL/FAMILY:  [x] awaiting [] updated at bedside [] family meeting    CODE STATUS:  [x] Full Code [] DNR [] DNI [] Palliative/Comfort Care    DISPOSITION:  [x] ICU [] Stroke Unit [] Floor [] EMU [] RCU [] PCU    [x] Patient is at high risk of neurologic deterioration/death due to: extension of stroke, intracerebral hemorrhage, respiratory failure    Time spent: 45 [x] critical care minutes 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  Stroke w/u  MRI when able    PULM:  extubated, stable on RA    CV:  SBP goal 120-180mmHg  TTE    RENAL:  Fluids: IVF maintenance until po intake sufficient, then IVL    GI:  Diet: advance as tolerated  GI prophylaxis [X] not indicated [] PPI: intubated [] other:  Bowel regimen [x] colace [x] senna [] other:    DXOMx8h 7.5%, ISS, lantus 5u qhs  Goal euglycemia (-180)  RISS    HEME/ONC:  VTE prophylaxis: [x] SCDs [X] chemoprophylaxis heparin infusion [x] high risk of DVT/PE on admission due to: baseline impaired ambulation -- LE doppler 5/18 negative for DVT    ID:  Monitor for fever    SOCIAL/FAMILY:  [x] awaiting [] updated at bedside [] family meeting    CODE STATUS:  [x] Full Code [] DNR [] DNI [] Palliative/Comfort Care    DISPOSITION:  [x] ICU [] Stroke Unit [] Floor [] EMU [] RCU [] PCU    [x] Patient is at high risk of neurologic deterioration/death due to: extension of stroke, intracerebral hemorrhage, respiratory failure    Time spent: 45 [x] critical care minutes 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  Stroke w/u  MRI when able    PULM:  extubated, stable on RA    CV:  SBP goal 120-180mmHg  TTE    RENAL:  Fluids: IVF maintenance until po intake sufficient, then IVL    GI:  Diet: advance as tolerated  GI prophylaxis [X] not indicated [] PPI: intubated [] other:  Bowel regimen [x] colace [x] senna [] other:    FTHYb0n 7.5%, ISS, lantus 5u qhs  Goal euglycemia (-180)  RISS    HEME/ONC:  VTE prophylaxis: [x] SCDs [X] chemoprophylaxis heparin infusion [x] high risk of DVT/PE on admission due to: baseline impaired ambulation -- LE doppler 5/18 negative for DVT    ID:  Monitor for fever    SOCIAL/FAMILY:  [x] awaiting [] updated at bedside [] family meeting    CODE STATUS:  [x] Full Code [] DNR [] DNI [] Palliative/Comfort Care    DISPOSITION:  [] ICU [x] Stroke Unit [] Floor [] EMU [] RCU [] PCU      Time spent: [] critical care minutes

## 2018-05-19 NOTE — PATIENT PROFILE ADULT. - LANGUAGE ASSISTANCE NEEDED
No-Patient/Caregiver offered and refused free interpretation services./Staff RN, and family at bedside proficient in Creole

## 2018-05-19 NOTE — PROGRESS NOTE ADULT - SUBJECTIVE AND OBJECTIVE BOX
THE PATIENT WAS SEEN AND EXAMINED BY ME WITH THE HOUSESTAFF AND STROKE TEAM DURING MORNING ROUNDS.   HPI:  84 y/o female woman with DM II was transferred from [facility] for acute onset of dysarthria and right sided weakness. She was last normal at around 8:45 PM 5/17, when she was walking in the shopping mall with her daughter. She suddenly developed acute onset of dizziness, imbalance and slurring of speech. She also reports to have noticed right hand weakness/numbess at the same time. She was treated with IV tPA. At around 12:00-12:30 AM 5/18, she was noted to have acute onset of right sided weakness involving face, arm and leg with severe dysarthria.  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 Catheter wire passed past stenosis but no significant clot removed; however, trickle flow obtained. Also, basilar noted to be filling retrograde via posterior communicating artery.      SUBJECTIVE: No events overnight.  No new neurologic complaints.      acetaminophen  IVPB. 1000 milliGRAM(s) IV Intermittent once  dextrose 40% Gel 15 Gram(s) Oral once PRN  dextrose 5%. 1000 milliLiter(s) IV Continuous <Continuous>  dextrose 50% Injectable 12.5 Gram(s) IV Push once  dextrose 50% Injectable 25 Gram(s) IV Push once  dextrose 50% Injectable 25 Gram(s) IV Push once  glucagon  Injectable 1 milliGRAM(s) IntraMuscular once PRN  heparin  Infusion 300 Unit(s)/Hr IV Continuous <Continuous>  insulin glargine Injectable (LANTUS) 5 Unit(s) SubCutaneous at bedtime  insulin lispro (HumaLOG) corrective regimen sliding scale   SubCutaneous every 6 hours  ondansetron Injectable 4 milliGRAM(s) IV Push every 6 hours PRN  phenylephrine    Infusion 0.1 MICROgram(s)/kG/Min IV Continuous <Continuous>  sodium chloride 0.9% Bolus 1000 milliLiter(s) IV Bolus once  sodium chloride 0.9%. 1000 milliLiter(s) IV Continuous <Continuous>      PHYSICAL EXAM:   Vital Signs Last 24 Hrs  T(C): 37.2 (19 May 2018 11:00), Max: 37.2 (19 May 2018 03:00)  T(F): 98.9 (19 May 2018 11:00), Max: 99 (19 May 2018 03:00)  HR: 74 (19 May 2018 16:00) (57 - 89)  BP: --  BP(mean): --  RR: 22 (19 May 2018 16:00) (15 - 40)  SpO2: 97% (19 May 2018 16:00) (97% - 100%)    General: No acute distress  HEENT: EOM intact, visual fields full  Abdomen: Soft, nontender, nondistended   Extremities: No edema    NEUROLOGICAL EXAM:  Mental status: Awake, alert, intubated, opens eyes to stimuli, follows simple commands, no neglect  Cranial Nerves: right mild facial droop, EOMs intact, intact visual fields by blink to threat  Motor exam: Normal tone, right mild hemiparesis (RUE and RLE 4/5 with subtle motor drift), LUE and LLE 5/5   Sensation: Intact to light touch   Coordination/ Gait: Not able to assess   LABS:                        14.0   9.5   )-----------( 182      ( 18 May 2018 20:25 )             43.5    05-18    139  |  106  |  7   ----------------------------<  202<H>  3.9   |  20<L>  |  0.53    Ca    8.5      18 May 2018 20:25  Phos  2.5     05-18  Mg     2.1     05-18    TPro  8.7<H>  /  Alb  3.7  /  TBili  0.4  /  DBili  x   /  AST  <5<L>  /  ALT  <5<L>  /  AlkPhos  62  05-18  PT/INR - ( 18 May 2018 02:00 )   PT: 14.2 sec;   INR: 1.31 ratio         PTT - ( 19 May 2018 02:19 )  PTT:36.0 sec  Hemoglobin A1C, Whole Blood: 7.5 % (05-18 @ 07:53)      IMAGING: Reviewed by me.     CT Head No Cont (05.19.18 @ 10:33)   Status post endovascular management of basilar occlusion without evidence   of a definite infarct. No CT evidence of hemorrhage, herniation, mass   effect, or hydrocephalus.

## 2018-05-19 NOTE — PROGRESS NOTE ADULT - SUBJECTIVE AND OBJECTIVE BOX
Extubated. Improved exam. Weaned off neosynephrine.    Exam: Awake, alert, oriented to self, hospital, May 2018, PERRL, follows, minimal right arm drift, otherwise full strength

## 2018-05-19 NOTE — PROGRESS NOTE ADULT - ASSESSMENT
ASSESSMENT:   86 Y/O woman with vascular risk factors of age and DM II is evaluated at [facility] for acute onset of right sided weakness. She was last normal at around 8:45 PM, when she was walking in the shopping [place] with her daughter. She suddenly developed acute onset of dizziness, imbalance and slurring of speech. She also reports to have noticed right hand weakness/numbess at the same time. She presented to [facility], where CT brain did not show any evidence of acute infarct or hemorrhage. She was treated with IV tPA. At around 12:00-12:30 AM, she was noted to have acute onset of right sided weakness involving face, arm and leg with severe dysarthria. CT brain following right hemiplegia did not show any evidence of acute infarct or hemorrhage. 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 She was subsequently transferred to [facility]  for further management. She underwent conventional angiogram with intentions for mechanical thrombectomy, which was not possible secondary to technical difficulties and she was subsequently treated with IA tPA with the partial recanalization of the previously noted, basilar occlusion. CT brain performed subsequently did not show any evidence of acute infarct or hemorrhage.     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 symptomatic intracranial (basilar artery) atherosclerosis and superimposed local thrombosis, leading to occlusion of pontine perforators     NEURO: Neurologically overall improved as compared to admission, continue close monitoring for neurologic deterioration,   gradual normotension in setting of recent recanalization, atorvastatin 80 mg at bedtime, considering atheroembolic etiology of her stroke; further dose titration as per LDL goal less than 70, MRI Brain w/o, MRA Head w/o with NOVA and Neck w/contrast once medically stable. Physical therapy/OT/Speech eval/treatment pending.     ANTITHROMBOTIC THERAPY:  Cautious therapeutic anticoagulation with heparin drip, dose titration to achieve PTT goal 40-60; in the setting of a unstable atherosclerotic plaque involving the basilar artery    PULMONARY: extubated, respiratory care per ICU.    CARDIOVASCULAR:  TTE: ef 70%, mild MR, minimal AR, mild TR  continue with telemetry to screen for possible cardiac source of embolism; Telemetry monitoring to screen for arrythmia                             BP goal  normotension; avoid hypotension    GASTROINTESTINAL: dysphagia screen       Diet: NPO    RENAL: BUN/Cr without acute change, good urine output      Na Goal: Greater than 135     [place]:    HEMATOLOGY: H/H without acute change, Platelets 182     DVT ppx: Cautious therapeutic integration with heparin drip     ID: afebrile, no leukocytosis     DISPOSITION: Rehab or home depending on PT eval once stable and workup is complete    CORE MEASURES:        Admission NIHSS: 12     TPA: [x] YES [] NO      LDL/HDL: 94/69     Depression Screen: pending     Statin Therapy:      Dysphagia Screen: [] PASS [] FAIL- pending     Smoking [] YES [x] NO      Afib [] YES [x] NO     Stroke Education [] YES [] NO- pending ASSESSMENT:   84 Y/O woman with vascular risk factors of age and DM II is evaluated at [facility] for acute onset of right sided weakness. She was last normal at around 8:45 PM, when she was walking in the shopping mall with her daughter. She suddenly developed acute onset of dizziness, imbalance and slurring of speech. She also reports to have noticed right hand weakness/numbess at the same time. She presented to [facility], where CT brain did not show any evidence of acute infarct or hemorrhage. She was treated with IV tPA. At around 12:00-12:30 AM, she was noted to have acute onset of right sided weakness involving face, arm and leg with severe dysarthria. CT brain following right hemiplegia did not show any evidence of acute infarct or hemorrhage. 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 She was subsequently transferred to [facility]  for further management. She underwent conventional angiogram with intentions for mechanical thrombectomy, which was not possible secondary to technical difficulties and she was subsequently treated with IA tPA with the partial recanalization of the previously noted, basilar occlusion. CT brain performed subsequently did not show any evidence of acute infarct or hemorrhage.     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 symptomatic intracranial (basilar artery) atherosclerosis and superimposed local thrombosis, leading to occlusion of pontine perforators     NEURO: Neurologically overall improved as compared to admission, continue close monitoring for neurologic deterioration,   gradual normotension in setting of recent recanalization, atorvastatin 80 mg at bedtime, considering atheroembolic etiology of her stroke; further dose titration as per LDL goal less than 70, MRI Brain w/o, MRA Head w/o with NOVA and Neck w/contrast once medically stable. Physical therapy/OT/Speech eval/treatment pending.     ANTITHROMBOTIC THERAPY:  Cautious therapeutic anticoagulation with heparin drip, dose titration to achieve PTT goal 40-60; in the setting of a unstable atherosclerotic plaque involving the basilar artery. Subsequently, may consider Coumadin for 3 months and aspirin indefinitely (modified [facility]IS protocol) versus Plavix for 3 months and aspirin indefinitely (SAMMPRIS).    PULMONARY: extubated, respiratory care per ICU.    CARDIOVASCULAR:  TTE: ef 70%, mild MR, minimal AR, mild TR  continue with telemetry to screen for possible cardiac source of embolism; Telemetry monitoring to screen for arrythmia                             BP goal  normotension; avoid hypotension    GASTROINTESTINAL: dysphagia screen       Diet: NPO    RENAL: BUN/Cr without acute change, good urine output      Na Goal: Greater than 135     [place]:    HEMATOLOGY: H/H without acute change, Platelets 182     DVT ppx: Cautious therapeutic integration with heparin drip     ID: afebrile, no leukocytosis     DISPOSITION: Rehab or home depending on PT eval once stable and workup is complete    CORE MEASURES:        Admission NIHSS: 12     TPA: [x] YES [] NO      LDL/HDL: 94/69     Depression Screen: pending     Statin Therapy:      Dysphagia Screen: [] PASS [] FAIL- pending     Smoking [] YES [x] NO      Afib [] YES [x] NO     Stroke Education [] YES [] NO- pending

## 2018-05-19 NOTE — PROGRESS NOTE ADULT - SUBJECTIVE AND OBJECTIVE BOX
HPI:  Ms. Olivia is an 85 year-old right handed [place] woman with stroke risk factor of DM type 2 with baseline unsteadiness with walking who presented with sudden onset slurred speech associated with dizziness (though not spinning sensation) at 8:45pm on 5/17/18. Her daughter  her mother got weak in both legs, felt an itch all over her face and had numbness in both arms. She was initially brought to the [facility] ER where she vomited thrice. Dysarthria initially improved, though did not resolve. In addition, ambulation more unsteady. Admission NIHSS was 1. She was given IV tPA at 10:17pm. She got progressively worse over next few hours with new right sided plegia and gaze deviation. CTA performed which revealed basilar occlusion. She was transferred to [facility] for endovascular rescue. 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 Catheter wire passed past stenosis but no significant clot removed; however, trickle flow obtained. Also, basilar noted to be filling retrograde via posterior communicating artery.    5/18 extubated    OVERNIGHT EVENTS:   No acute events overnight.    VITALS:  T(C): , Max: 37.2 (05-18-18 @ 11:00)  HR:  (47 - 84)  BP: --  ABP:  (112/50 - 175/86)  RR:  (12 - 40)  SpO2:  (97% - 100%)  Wt(kg): --      LABS:  Na: 139 (05-18 @ 20:25), 138 (05-18 @ 13:43), 141 (05-18 @ 06:06), 121 (05-18 @ 02:00)  K: 3.9 (05-18 @ 20:25), 3.6 (05-18 @ 13:43), 3.6 (05-18 @ 06:06), >9.0 (05-18 @ 02:00)  Cl: 106 (05-18 @ 20:25), 105 (05-18 @ 13:43), 106 (05-18 @ 06:06), 99 (05-18 @ 02:00)  CO2: 20 (05-18 @ 20:25), 21 (05-18 @ 13:43), 19 (05-18 @ 06:06), 25 (05-18 @ 02:00)  BUN: 7 (05-18 @ 20:25), 8 (05-18 @ 13:43), 8 (05-18 @ 06:06), 12 (05-18 @ 02:00)  Cr: 0.53 (05-18 @ 20:25), 0.55 (05-18 @ 13:43), 0.54 (05-18 @ 06:06), 0.46 (05-18 @ 02:00)  Glu:     Hgb: 14.0 (05-18 @ 20:25), 14.3 (05-18 @ 13:43), 14.5 (05-18 @ 06:07), 14.8 (05-18 @ 02:00)  Hct: 43.5 (05-18 @ 20:25), 44.1 (05-18 @ 13:43), 44.2 (05-18 @ 06:07), 47.1 (05-18 @ 02:00)  WBC: 9.5 (05-18 @ 20:25), 7.8 (05-18 @ 13:43), 8.2 (05-18 @ 06:07), 7.0 (05-18 @ 02:00)  Plt: 182 (05-18 @ 20:25), 190 (05-18 @ 13:43), 195 (05-18 @ 06:07), 163 (05-18 @ 02:00)    PT: -- (05-19 @ 02:19)  INR: -- (05-19 @ 02:19)  aPTT: 36.0 (05-19 @ 02:19)  PT: -- (05-18 @ 20:25)  INR: -- (05-18 @ 20:25)  aPTT: 34.3 (05-18 @ 20:25)  PT: -- (05-18 @ 13:43)  INR: -- (05-18 @ 13:43)  aPTT: 26.5 (05-18 @ 13:43)    IMAGING:   Recent imaging studies were reviewed.    MEDICATIONS:  dextrose 40% Gel 15 Gram(s) Oral once PRN  dextrose 5%. 1000 milliLiter(s) IV Continuous <Continuous>  dextrose 50% Injectable 12.5 Gram(s) IV Push once  dextrose 50% Injectable 25 Gram(s) IV Push once  dextrose 50% Injectable 25 Gram(s) IV Push once  glucagon  Injectable 1 milliGRAM(s) IntraMuscular once PRN  heparin  Infusion 300 Unit(s)/Hr IV Continuous <Continuous>  insulin glargine Injectable (LANTUS) 5 Unit(s) SubCutaneous at bedtime  insulin lispro (HumaLOG) corrective regimen sliding scale   SubCutaneous every 6 hours  ondansetron Injectable 4 milliGRAM(s) IV Push every 6 hours PRN  phenylephrine    Infusion 0.1 MICROgram(s)/kG/Min IV Continuous <Continuous>  sodium chloride 0.9% Bolus 1000 milliLiter(s) IV Bolus once  sodium chloride 0.9%. 1000 milliLiter(s) IV Continuous <Continuous>    EXAMINATION:  General: No acute distress  HEENT: Anicteric sclerae  Cardiac: [id number]  Lungs: Clear anteriorly, decreased breath sounds at bases  Abdomen: Soft, non-tender, +BS  Extremities: No groin hematoma  Skin/Incision Site: Clean, dry and intact  Neurologic: eyes open, follows commands, symmetrical strength at least 4+ throughout HPI:  Ms. Olivia is an 85 year-old right handed [place] woman with stroke risk factor of DM type 2 with baseline unsteadiness with walking who presented with sudden onset slurred speech associated with dizziness (though not spinning sensation) at 8:45pm on 5/17/18. Her daughter  her mother got weak in both legs, felt an itch all over her face and had numbness in both arms. She was initially brought to the [facility] ER where she vomited thrice. Dysarthria initially improved, though did not resolve. In addition, ambulation more unsteady. Admission NIHSS was 1. She was given IV tPA at 10:17pm. She got progressively worse over next few hours with new right sided plegia and gaze deviation. CTA performed which revealed basilar occlusion. She was transferred to [facility] for endovascular rescue. 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 Catheter wire passed past stenosis but no significant clot removed; however, trickle flow obtained. Also, basilar noted to be filling retrograde via posterior communicating artery.    5/18 extubated    OVERNIGHT EVENTS:   nausea/vomiting multiple episodes    VITALS:  T(C): , Max: 37.2 (05-18-18 @ 11:00)  HR:  (47 - 84)  BP: --  ABP:  (112/50 - 175/86)  RR:  (12 - 40)  SpO2:  (97% - 100%)  Wt(kg): --      LABS:  Na: 139 (05-18 @ 20:25), 138 (05-18 @ 13:43), 141 (05-18 @ 06:06), 121 (05-18 @ 02:00)  K: 3.9 (05-18 @ 20:25), 3.6 (05-18 @ 13:43), 3.6 (05-18 @ 06:06), >9.0 (05-18 @ 02:00)  Cl: 106 (05-18 @ 20:25), 105 (05-18 @ 13:43), 106 (05-18 @ 06:06), 99 (05-18 @ 02:00)  CO2: 20 (05-18 @ 20:25), 21 (05-18 @ 13:43), 19 (05-18 @ 06:06), 25 (05-18 @ 02:00)  BUN: 7 (05-18 @ 20:25), 8 (05-18 @ 13:43), 8 (05-18 @ 06:06), 12 (05-18 @ 02:00)  Cr: 0.53 (05-18 @ 20:25), 0.55 (05-18 @ 13:43), 0.54 (05-18 @ 06:06), 0.46 (05-18 @ 02:00)  Glu:     Hgb: 14.0 (05-18 @ 20:25), 14.3 (05-18 @ 13:43), 14.5 (05-18 @ 06:07), 14.8 (05-18 @ 02:00)  Hct: 43.5 (05-18 @ 20:25), 44.1 (05-18 @ 13:43), 44.2 (05-18 @ 06:07), 47.1 (05-18 @ 02:00)  WBC: 9.5 (05-18 @ 20:25), 7.8 (05-18 @ 13:43), 8.2 (05-18 @ 06:07), 7.0 (05-18 @ 02:00)  Plt: 182 (05-18 @ 20:25), 190 (05-18 @ 13:43), 195 (05-18 @ 06:07), 163 (05-18 @ 02:00)    PT: -- (05-19 @ 02:19)  INR: -- (05-19 @ 02:19)  aPTT: 36.0 (05-19 @ 02:19)  PT: -- (05-18 @ 20:25)  INR: -- (05-18 @ 20:25)  aPTT: 34.3 (05-18 @ 20:25)  PT: -- (05-18 @ 13:43)  INR: -- (05-18 @ 13:43)  aPTT: 26.5 (05-18 @ 13:43)    IMAGING:   Recent imaging studies were reviewed.    MEDICATIONS:  dextrose 40% Gel 15 Gram(s) Oral once PRN  dextrose 5%. 1000 milliLiter(s) IV Continuous <Continuous>  dextrose 50% Injectable 12.5 Gram(s) IV Push once  dextrose 50% Injectable 25 Gram(s) IV Push once  dextrose 50% Injectable 25 Gram(s) IV Push once  glucagon  Injectable 1 milliGRAM(s) IntraMuscular once PRN  heparin  Infusion 300 Unit(s)/Hr IV Continuous <Continuous>  insulin glargine Injectable (LANTUS) 5 Unit(s) SubCutaneous at bedtime  insulin lispro (HumaLOG) corrective regimen sliding scale   SubCutaneous every 6 hours  ondansetron Injectable 4 milliGRAM(s) IV Push every 6 hours PRN  phenylephrine    Infusion 0.1 MICROgram(s)/kG/Min IV Continuous <Continuous>  sodium chloride 0.9% Bolus 1000 milliLiter(s) IV Bolus once  sodium chloride 0.9%. 1000 milliLiter(s) IV Continuous <Continuous>    EXAMINATION:  General: No acute distress  HEENT: Anicteric sclerae  Cardiac: [id number]  Lungs: Clear anteriorly, decreased breath sounds at bases  Abdomen: Soft, non-tender, +BS  Extremities: No groin hematoma  Skin/Incision Site: Clean, dry and intact  Neurologic: eyes open, follows commands, symmetrical strength at least 4+ throughout

## 2018-05-20 LAB
ANION GAP SERPL CALC-SCNC: 10 MMOL/L — SIGNIFICANT CHANGE UP (ref 5–17)
ANION GAP SERPL CALC-SCNC: 10 MMOL/L — SIGNIFICANT CHANGE UP (ref 5–17)
ANION GAP SERPL CALC-SCNC: 11 MMOL/L — SIGNIFICANT CHANGE UP (ref 5–17)
APTT BLD: 43.6 SEC — HIGH (ref 27.5–37.4)
APTT BLD: 45.2 SEC — HIGH (ref 27.5–37.4)
APTT BLD: 45.3 SEC — HIGH (ref 27.5–37.4)
APTT BLD: 45.9 SEC — HIGH (ref 27.5–37.4)
APTT BLD: 46.4 SEC — HIGH (ref 27.5–37.4)
BUN SERPL-MCNC: 5 MG/DL — LOW (ref 7–23)
BUN SERPL-MCNC: 5 MG/DL — LOW (ref 7–23)
BUN SERPL-MCNC: 6 MG/DL — LOW (ref 7–23)
CALCIUM SERPL-MCNC: 8.3 MG/DL — LOW (ref 8.4–10.5)
CALCIUM SERPL-MCNC: 8.4 MG/DL — SIGNIFICANT CHANGE UP (ref 8.4–10.5)
CALCIUM SERPL-MCNC: 8.6 MG/DL — SIGNIFICANT CHANGE UP (ref 8.4–10.5)
CHLORIDE SERPL-SCNC: 105 MMOL/L — SIGNIFICANT CHANGE UP (ref 96–108)
CHLORIDE SERPL-SCNC: 105 MMOL/L — SIGNIFICANT CHANGE UP (ref 96–108)
CHLORIDE SERPL-SCNC: 106 MMOL/L — SIGNIFICANT CHANGE UP (ref 96–108)
CO2 SERPL-SCNC: 23 MMOL/L — SIGNIFICANT CHANGE UP (ref 22–31)
CO2 SERPL-SCNC: 23 MMOL/L — SIGNIFICANT CHANGE UP (ref 22–31)
CO2 SERPL-SCNC: 24 MMOL/L — SIGNIFICANT CHANGE UP (ref 22–31)
CREAT SERPL-MCNC: 0.55 MG/DL — SIGNIFICANT CHANGE UP (ref 0.5–1.3)
CREAT SERPL-MCNC: 0.57 MG/DL — SIGNIFICANT CHANGE UP (ref 0.5–1.3)
CREAT SERPL-MCNC: 0.63 MG/DL — SIGNIFICANT CHANGE UP (ref 0.5–1.3)
GLUCOSE BLDC GLUCOMTR-MCNC: 117 MG/DL — HIGH (ref 70–99)
GLUCOSE BLDC GLUCOMTR-MCNC: 141 MG/DL — HIGH (ref 70–99)
GLUCOSE BLDC GLUCOMTR-MCNC: 155 MG/DL — HIGH (ref 70–99)
GLUCOSE BLDC GLUCOMTR-MCNC: 161 MG/DL — HIGH (ref 70–99)
GLUCOSE SERPL-MCNC: 136 MG/DL — HIGH (ref 70–99)
GLUCOSE SERPL-MCNC: 152 MG/DL — HIGH (ref 70–99)
GLUCOSE SERPL-MCNC: 160 MG/DL — HIGH (ref 70–99)
HCT VFR BLD CALC: 39.4 % — SIGNIFICANT CHANGE UP (ref 34.5–45)
HCT VFR BLD CALC: 40.3 % — SIGNIFICANT CHANGE UP (ref 34.5–45)
HCT VFR BLD CALC: 41.4 % — SIGNIFICANT CHANGE UP (ref 34.5–45)
HGB BLD-MCNC: 12.7 G/DL — SIGNIFICANT CHANGE UP (ref 11.5–15.5)
HGB BLD-MCNC: 13.3 G/DL — SIGNIFICANT CHANGE UP (ref 11.5–15.5)
HGB BLD-MCNC: 13.6 G/DL — SIGNIFICANT CHANGE UP (ref 11.5–15.5)
MAGNESIUM SERPL-MCNC: 2 MG/DL — SIGNIFICANT CHANGE UP (ref 1.6–2.6)
MAGNESIUM SERPL-MCNC: 2 MG/DL — SIGNIFICANT CHANGE UP (ref 1.6–2.6)
MCHC RBC-ENTMCNC: 25.6 PG — LOW (ref 27–34)
MCHC RBC-ENTMCNC: 26.2 PG — LOW (ref 27–34)
MCHC RBC-ENTMCNC: 26.4 PG — LOW (ref 27–34)
MCHC RBC-ENTMCNC: 32.1 GM/DL — SIGNIFICANT CHANGE UP (ref 32–36)
MCHC RBC-ENTMCNC: 32.8 GM/DL — SIGNIFICANT CHANGE UP (ref 32–36)
MCHC RBC-ENTMCNC: 33.1 GM/DL — SIGNIFICANT CHANGE UP (ref 32–36)
MCV RBC AUTO: 79.7 FL — LOW (ref 80–100)
MCV RBC AUTO: 79.8 FL — LOW (ref 80–100)
MCV RBC AUTO: 79.9 FL — LOW (ref 80–100)
PHOSPHATE SERPL-MCNC: 1.8 MG/DL — LOW (ref 2.5–4.5)
PHOSPHATE SERPL-MCNC: 1.9 MG/DL — LOW (ref 2.5–4.5)
PLATELET # BLD AUTO: 167 K/UL — SIGNIFICANT CHANGE UP (ref 150–400)
PLATELET # BLD AUTO: 174 K/UL — SIGNIFICANT CHANGE UP (ref 150–400)
PLATELET # BLD AUTO: 179 K/UL — SIGNIFICANT CHANGE UP (ref 150–400)
POTASSIUM SERPL-MCNC: 3.4 MMOL/L — LOW (ref 3.5–5.3)
POTASSIUM SERPL-MCNC: 3.4 MMOL/L — LOW (ref 3.5–5.3)
POTASSIUM SERPL-MCNC: 3.5 MMOL/L — SIGNIFICANT CHANGE UP (ref 3.5–5.3)
POTASSIUM SERPL-SCNC: 3.4 MMOL/L — LOW (ref 3.5–5.3)
POTASSIUM SERPL-SCNC: 3.4 MMOL/L — LOW (ref 3.5–5.3)
POTASSIUM SERPL-SCNC: 3.5 MMOL/L — SIGNIFICANT CHANGE UP (ref 3.5–5.3)
RBC # BLD: 4.95 M/UL — SIGNIFICANT CHANGE UP (ref 3.8–5.2)
RBC # BLD: 5.05 M/UL — SIGNIFICANT CHANGE UP (ref 3.8–5.2)
RBC # BLD: 5.18 M/UL — SIGNIFICANT CHANGE UP (ref 3.8–5.2)
RBC # FLD: 13.3 % — SIGNIFICANT CHANGE UP (ref 10.3–14.5)
RBC # FLD: 13.4 % — SIGNIFICANT CHANGE UP (ref 10.3–14.5)
RBC # FLD: 13.4 % — SIGNIFICANT CHANGE UP (ref 10.3–14.5)
SODIUM SERPL-SCNC: 138 MMOL/L — SIGNIFICANT CHANGE UP (ref 135–145)
SODIUM SERPL-SCNC: 139 MMOL/L — SIGNIFICANT CHANGE UP (ref 135–145)
SODIUM SERPL-SCNC: 140 MMOL/L — SIGNIFICANT CHANGE UP (ref 135–145)
WBC # BLD: 7.2 K/UL — SIGNIFICANT CHANGE UP (ref 3.8–10.5)
WBC # BLD: 8 K/UL — SIGNIFICANT CHANGE UP (ref 3.8–10.5)
WBC # BLD: 8.2 K/UL — SIGNIFICANT CHANGE UP (ref 3.8–10.5)
WBC # FLD AUTO: 7.2 K/UL — SIGNIFICANT CHANGE UP (ref 3.8–10.5)
WBC # FLD AUTO: 8 K/UL — SIGNIFICANT CHANGE UP (ref 3.8–10.5)
WBC # FLD AUTO: 8.2 K/UL — SIGNIFICANT CHANGE UP (ref 3.8–10.5)

## 2018-05-20 PROCEDURE — 99233 SBSQ HOSP IP/OBS HIGH 50: CPT

## 2018-05-20 RX ORDER — POTASSIUM CHLORIDE 20 MEQ
40 PACKET (EA) ORAL ONCE
Qty: 0 | Refills: 0 | Status: COMPLETED | OUTPATIENT
Start: 2018-05-20 | End: 2018-05-20

## 2018-05-20 RX ORDER — ACETAMINOPHEN 500 MG
650 TABLET ORAL EVERY 6 HOURS
Qty: 0 | Refills: 0 | Status: DISCONTINUED | OUTPATIENT
Start: 2018-05-20 | End: 2018-05-30

## 2018-05-20 RX ORDER — LATANOPROST 0.05 MG/ML
1 SOLUTION/ DROPS OPHTHALMIC; TOPICAL AT BEDTIME
Qty: 0 | Refills: 0 | Status: DISCONTINUED | OUTPATIENT
Start: 2018-05-20 | End: 2018-05-30

## 2018-05-20 RX ORDER — BRIMONIDINE TARTRATE 2 MG/MG
1 SOLUTION/ DROPS OPHTHALMIC
Qty: 0 | Refills: 0 | Status: DISCONTINUED | OUTPATIENT
Start: 2018-05-20 | End: 2018-05-30

## 2018-05-20 RX ORDER — POTASSIUM PHOSPHATE, MONOBASIC POTASSIUM PHOSPHATE, DIBASIC 236; 224 MG/ML; MG/ML
30 INJECTION, SOLUTION INTRAVENOUS ONCE
Qty: 0 | Refills: 0 | Status: COMPLETED | OUTPATIENT
Start: 2018-05-20 | End: 2018-05-20

## 2018-05-20 RX ORDER — POTASSIUM PHOSPHATE, MONOBASIC POTASSIUM PHOSPHATE, DIBASIC 236; 224 MG/ML; MG/ML
15 INJECTION, SOLUTION INTRAVENOUS ONCE
Qty: 0 | Refills: 0 | Status: COMPLETED | OUTPATIENT
Start: 2018-05-20 | End: 2018-05-20

## 2018-05-20 RX ADMIN — ONDANSETRON 4 MILLIGRAM(S): 8 TABLET, FILM COATED ORAL at 03:00

## 2018-05-20 RX ADMIN — INSULIN GLARGINE 5 UNIT(S): 100 INJECTION, SOLUTION SUBCUTANEOUS at 22:56

## 2018-05-20 RX ADMIN — SODIUM CHLORIDE 50 MILLILITER(S): 9 INJECTION INTRAMUSCULAR; INTRAVENOUS; SUBCUTANEOUS at 05:15

## 2018-05-20 RX ADMIN — Medication 2: at 12:14

## 2018-05-20 RX ADMIN — ONDANSETRON 4 MILLIGRAM(S): 8 TABLET, FILM COATED ORAL at 10:31

## 2018-05-20 RX ADMIN — Medication 2: at 06:44

## 2018-05-20 RX ADMIN — POTASSIUM PHOSPHATE, MONOBASIC POTASSIUM PHOSPHATE, DIBASIC 62.5 MILLIMOLE(S): 236; 224 INJECTION, SOLUTION INTRAVENOUS at 23:15

## 2018-05-20 RX ADMIN — SODIUM CHLORIDE 1000 MILLILITER(S): 9 INJECTION INTRAMUSCULAR; INTRAVENOUS; SUBCUTANEOUS at 02:30

## 2018-05-20 RX ADMIN — LATANOPROST 1 DROP(S): 0.05 SOLUTION/ DROPS OPHTHALMIC; TOPICAL at 22:56

## 2018-05-20 RX ADMIN — Medication 650 MILLIGRAM(S): at 14:15

## 2018-05-20 RX ADMIN — Medication 40 MILLIEQUIVALENT(S): at 23:14

## 2018-05-20 RX ADMIN — HEPARIN SODIUM 7 UNIT(S)/HR: 5000 INJECTION INTRAVENOUS; SUBCUTANEOUS at 18:08

## 2018-05-20 RX ADMIN — BRIMONIDINE TARTRATE 1 DROP(S): 2 SOLUTION/ DROPS OPHTHALMIC at 05:14

## 2018-05-20 RX ADMIN — BRIMONIDINE TARTRATE 1 DROP(S): 2 SOLUTION/ DROPS OPHTHALMIC at 17:51

## 2018-05-20 RX ADMIN — Medication 650 MILLIGRAM(S): at 13:45

## 2018-05-20 RX ADMIN — Medication 650 MILLIGRAM(S): at 21:30

## 2018-05-20 RX ADMIN — Medication 650 MILLIGRAM(S): at 21:00

## 2018-05-20 RX ADMIN — POTASSIUM PHOSPHATE, MONOBASIC POTASSIUM PHOSPHATE, DIBASIC 83.33 MILLIMOLE(S): 236; 224 INJECTION, SOLUTION INTRAVENOUS at 02:00

## 2018-05-20 RX ADMIN — Medication 40 MILLIEQUIVALENT(S): at 05:14

## 2018-05-20 NOTE — PROGRESS NOTE ADULT - NSHPATTENDINGPLANDISCUSS_GEN_ALL_CORE
neurology PA Cary Sanchez
Neurology resident, PA, NP and medical student on stroke service
neurology PA Cary Sanchez

## 2018-05-20 NOTE — PROGRESS NOTE ADULT - SUBJECTIVE AND OBJECTIVE BOX
THE PATIENT WAS SEEN AND EXAMINED BY ME WITH THE HOUSESTAFF AND STROKE TEAM DURING MORNING ROUNDS.   HPI:  86 y/o female woman with DM II was transferred from [facility] for acute onset of dysarthria and right sided weakness. She was last normal at around 8:45 PM 5/17, when she was walking in the shopping [place] with her daughter. She suddenly developed acute onset of dizziness, imbalance and slurring of speech. She also reports to have noticed right hand weakness/numbess at the same time. She was treated with IV tPA. At around 12:00-12:30 AM 5/18, she was noted to have acute onset of right sided weakness involving face, arm and leg with severe dysarthria.  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 Catheter wire passed past stenosis but no significant clot removed; however, trickle flow obtained. Also, basilar noted to be filling retrograde via posterior communicating artery.    SUBJECTIVE: No events overnight.  No new neurologic complaints.      brimonidine 0.2% Ophthalmic Solution 1 Drop(s) Both EYES two times a day  dextrose 40% Gel 15 Gram(s) Oral once PRN  dextrose 5%. 1000 milliLiter(s) IV Continuous <Continuous>  dextrose 50% Injectable 12.5 Gram(s) IV Push once  dextrose 50% Injectable 25 Gram(s) IV Push once  dextrose 50% Injectable 25 Gram(s) IV Push once  glucagon  Injectable 1 milliGRAM(s) IntraMuscular once PRN  heparin  Infusion 300 Unit(s)/Hr IV Continuous <Continuous>  insulin glargine Injectable (LANTUS) 5 Unit(s) SubCutaneous at bedtime  insulin lispro (HumaLOG) corrective regimen sliding scale   SubCutaneous every 6 hours  latanoprost 0.005% Ophthalmic Solution 1 Drop(s) Both EYES at bedtime  ondansetron Injectable 4 milliGRAM(s) IV Push every 6 hours PRN  phenylephrine    Infusion 0.1 MICROgram(s)/kG/Min IV Continuous <Continuous>  sodium chloride 0.9% Bolus 1000 milliLiter(s) IV Bolus once  sodium chloride 0.9%. 1000 milliLiter(s) IV Continuous <Continuous>      PHYSICAL EXAM:   Vital Signs Last 24 Hrs  T(C): 36.8 (20 May 2018 07:00), Max: 37.3 (19 May 2018 15:00)  T(F): 98.2 (20 May 2018 07:00), Max: 99.2 (19 May 2018 15:00)  HR: 68 (20 May 2018 08:00) (65 - 89)  BP: 155/73 (20 May 2018 06:00) (141/70 - 170/79)  BP(mean): 97 (20 May 2018 06:00) (92 - 106)  RR: 22 (20 May 2018 08:00) (19 - 35)  SpO2: 98% (20 May 2018 08:00) (94% - 99%)    General: No acute distress  HEENT: EOM intact, visual fields full  Abdomen: Soft, nontender, nondistended   Extremities: No edema    NEUROLOGICAL EXAM:  Mental status: Awake, alert, intubated, opens eyes to stimuli, follows simple commands, no neglect  Cranial Nerves: right mild facial droop, EOMs intact, intact visual fields by blink to threat  Motor exam: Normal tone, right mild hemiparesis (RUE and RLE 4/5 with subtle motor drift), LUE and LLE 5/5   Sensation: Intact to light touch   Coordination/ Gait: Not able to assess   LABS:                        12.7   8.2   )-----------( 167      ( 19 May 2018 23:54 )             39.4    05-19    140  |  106  |  6<L>  ----------------------------<  152<H>  3.4<L>   |  23  |  0.63    Ca    8.4      19 May 2018 23:54  Phos  1.8     05-19  Mg     2.0     05-19    PTT - ( 20 May 2018 05:34 )  PTT:45.3 sec  Hemoglobin A1C, Whole Blood: 7.5 % (05-18 @ 07:53)      IMAGING: Reviewed by me.     CT Head No Cont (05.19.18 @ 10:33)   Status post endovascular management of basilar occlusion without evidence   of a definite infarct. No CT evidence of hemorrhage, herniation, mass   effect, or hydrocephalus. THE PATIENT WAS SEEN AND EXAMINED BY ME WITH THE HOUSESTAFF AND STROKE TEAM DURING MORNING ROUNDS.   HPI:  86 y/o female woman with DM II was transferred from [facility] for acute onset of dysarthria and right sided weakness. She was last normal at around 8:45 PM 5/17, when she was walking in the shopping mall with her daughter. She suddenly developed acute onset of dizziness, imbalance and slurring of speech. She also reports to have noticed right hand weakness/numbess at the same time. She was treated with IV tPA. At around 12:00-12:30 AM 5/18, she was noted to have acute onset of right sided weakness involving face, arm and leg with severe dysarthria.  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 Catheter wire passed past stenosis but no significant clot removed; however, trickle flow obtained. Also, basilar noted to be filling retrograde via posterior communicating artery.    SUBJECTIVE: No events overnight.  No new neurologic complaints.      brimonidine 0.2% Ophthalmic Solution 1 Drop(s) Both EYES two times a day  dextrose 40% Gel 15 Gram(s) Oral once PRN  dextrose 5%. 1000 milliLiter(s) IV Continuous <Continuous>  dextrose 50% Injectable 12.5 Gram(s) IV Push once  dextrose 50% Injectable 25 Gram(s) IV Push once  dextrose 50% Injectable 25 Gram(s) IV Push once  glucagon  Injectable 1 milliGRAM(s) IntraMuscular once PRN  heparin  Infusion 300 Unit(s)/Hr IV Continuous <Continuous>  insulin glargine Injectable (LANTUS) 5 Unit(s) SubCutaneous at bedtime  insulin lispro (HumaLOG) corrective regimen sliding scale   SubCutaneous every 6 hours  latanoprost 0.005% Ophthalmic Solution 1 Drop(s) Both EYES at bedtime  ondansetron Injectable 4 milliGRAM(s) IV Push every 6 hours PRN  phenylephrine    Infusion 0.1 MICROgram(s)/kG/Min IV Continuous <Continuous>  sodium chloride 0.9% Bolus 1000 milliLiter(s) IV Bolus once  sodium chloride 0.9%. 1000 milliLiter(s) IV Continuous <Continuous>      PHYSICAL EXAM:   Vital Signs Last 24 Hrs  T(C): 36.8 (20 May 2018 07:00), Max: 37.3 (19 May 2018 15:00)  T(F): 98.2 (20 May 2018 07:00), Max: 99.2 (19 May 2018 15:00)  HR: 68 (20 May 2018 08:00) (65 - 89)  BP: 155/73 (20 May 2018 06:00) (141/70 - 170/79)  BP(mean): 97 (20 May 2018 06:00) (92 - 106)  RR: 22 (20 May 2018 08:00) (19 - 35)  SpO2: 98% (20 May 2018 08:00) (94% - 99%)    General: No acute distress  HEENT: EOM intact, visual fields full  Abdomen: Soft, nontender, nondistended   Extremities: No edema    NEUROLOGICAL EXAM:  Mental status: Awake, alert, oriented to person/place/time, events, follows all commands   Cranial Nerves: right mild facial droop, EOMs intact, intact visual fields by blink to threat  Motor exam: moves extremities well against gravity with mild right sided hemiparesis   Sensation: Intact to light touch   Coordination/ Gait: Not able to assess   LABS:                        12.7   8.2   )-----------( 167      ( 19 May 2018 23:54 )             39.4    05-19    140  |  106  |  6<L>  ----------------------------<  152<H>  3.4<L>   |  23  |  0.63    Ca    8.4      19 May 2018 23:54  Phos  1.8     05-19  Mg     2.0     05-19    PTT - ( 20 May 2018 05:34 )  PTT:45.3 sec  Hemoglobin A1C, Whole Blood: 7.5 % (05-18 @ 07:53)      IMAGING: Reviewed by me.     CT Head No Cont (05.19.18 @ 10:33)   Status post endovascular management of basilar occlusion without evidence   of a definite infarct. No CT evidence of hemorrhage, herniation, mass   effect, or hydrocephalus. THE PATIENT WAS SEEN AND EXAMINED BY ME WITH THE HOUSESTAFF AND STROKE TEAM DURING MORNING ROUNDS.   HPI:  86 y/o female woman with DM II was transferred from [facility] for acute onset of dysarthria and right sided weakness. She was last normal at around 8:45 PM 5/17, when she was walking in the shopping mall with her daughter. She suddenly developed acute onset of dizziness, imbalance and slurring of speech. She also reports to have noticed right hand weakness/numbess at the same time. She was treated with IV tPA. At around 12:00-12:30 AM 5/18, she was noted to have acute onset of right sided weakness involving face, arm and leg with severe dysarthria.  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 Catheter wire passed past stenosis but no significant clot removed; however, trickle flow obtained. Also, basilar noted to be filling retrograde via posterior communicating artery.    SUBJECTIVE: No events overnight.  No new neurologic complaints.      brimonidine 0.2% Ophthalmic Solution 1 Drop(s) Both EYES two times a day  dextrose 40% Gel 15 Gram(s) Oral once PRN  dextrose 5%. 1000 milliLiter(s) IV Continuous <Continuous>  dextrose 50% Injectable 12.5 Gram(s) IV Push once  dextrose 50% Injectable 25 Gram(s) IV Push once  dextrose 50% Injectable 25 Gram(s) IV Push once  glucagon  Injectable 1 milliGRAM(s) IntraMuscular once PRN  heparin  Infusion 300 Unit(s)/Hr IV Continuous <Continuous>  insulin glargine Injectable (LANTUS) 5 Unit(s) SubCutaneous at bedtime  insulin lispro (HumaLOG) corrective regimen sliding scale   SubCutaneous every 6 hours  latanoprost 0.005% Ophthalmic Solution 1 Drop(s) Both EYES at bedtime  ondansetron Injectable 4 milliGRAM(s) IV Push every 6 hours PRN  phenylephrine    Infusion 0.1 MICROgram(s)/kG/Min IV Continuous <Continuous>  sodium chloride 0.9% Bolus 1000 milliLiter(s) IV Bolus once  sodium chloride 0.9%. 1000 milliLiter(s) IV Continuous <Continuous>      PHYSICAL EXAM:   Vital Signs Last 24 Hrs  T(C): 36.8 (20 May 2018 07:00), Max: 37.3 (19 May 2018 15:00)  T(F): 98.2 (20 May 2018 07:00), Max: 99.2 (19 May 2018 15:00)  HR: 68 (20 May 2018 08:00) (65 - 89)  BP: 155/73 (20 May 2018 06:00) (141/70 - 170/79)  BP(mean): 97 (20 May 2018 06:00) (92 - 106)  RR: 22 (20 May 2018 08:00) (19 - 35)  SpO2: 98% (20 May 2018 08:00) (94% - 99%)    General: No acute distress  HEENT: EOM intact, visual fields full  Abdomen: Soft, nontender, nondistended   Extremities: No edema    NEUROLOGICAL EXAM:  Mental status: Awake, alert, oriented to person/place/time, events, follows all commands   Cranial Nerves: right mild facial droop, EOMs intact, intact visual fields by blink to threat  Motor exam: moves extremities well against gravity with very mild right sided hemiparesis   Sensation: Intact to light touch   Coordination/ Gait: Not able to assess   LABS:                        12.7   8.2   )-----------( 167      ( 19 May 2018 23:54 )             39.4    05-19    140  |  106  |  6<L>  ----------------------------<  152<H>  3.4<L>   |  23  |  0.63    Ca    8.4      19 May 2018 23:54  Phos  1.8     05-19  Mg     2.0     05-19    PTT - ( 20 May 2018 05:34 )  PTT:45.3 sec  Hemoglobin A1C, Whole Blood: 7.5 % (05-18 @ 07:53)      IMAGING: Reviewed by me.     CT Head No Cont (05.19.18 @ 10:33)   Status post endovascular management of basilar occlusion without evidence   of a definite infarct. No CT evidence of hemorrhage, herniation, mass   effect, or hydrocephalus.

## 2018-05-20 NOTE — PROGRESS NOTE ADULT - SUBJECTIVE AND OBJECTIVE BOX
HPI:  84 yearold RH AA woman PMH of DM type 2 presents with slurred speech sudden onset at 8:45pm. Patient was shopping with daughter at [employer] when she suddenly started to scream that she felt there was an "earthquake" beneath her and states she felt dizzy although she denies having a spinning sensation. Daughter states her mother got weak in both legs, felt an itch all over her face and had numbness in both arms. Upon coming to the emergency room the patient vomiting 3 times. Daughter states slurred speech is better but not back to baseline. Patient also reported that her right hand felt weak but upon further questioning she stated it was more of a numnbess/ "cramping" feeling. Patient could not ambulate properly. At baseline has an unsteady gait but family states it is much worse then. tPA bolus of 5.7 was given at 10:17pm followed by a 51mg drip. NIHSS-1, MRS-0 she got progressively worse over next few hours, her right side became plegic and she started to have a gaze deviation. CTA H&N performed on her which was positive for basilar occlusion. She was transfered to [facility] for endovascular (18 May 2018 02:02)    SURGERY:   PAST MEDICAL HISTORY:   PAST SURGICAL HISTORY:   FAMILY HISTORY:    ALLERGIES: latex (Unknown)  penicillin (Unknown)    **************************************  **************************************    OVERNIGHT EVENTS: [x] None; diet advanced to clears    ROS  Unobtainable due to mental status[] Negative [x]  Positives:    ADMISSION SCORES: GCS: HH: MF: NIHSS: RASS: CAM-ICU: ICP:    ICU Vital Signs Last 24 Hrs  T(C): 36.8 (20 May 2018 07:00), Max: 37.3 (19 May 2018 15:00)  T(F): 98.2 (20 May 2018 07:00), Max: 99.2 (19 May 2018 15:00)  HR: 82 (20 May 2018 10:00) (65 - 85)  BP: 155/73 (20 May 2018 06:00) (141/70 - 170/79)  BP(mean): 97 (20 May 2018 06:00) (92 - 106)  ABP: 141/70 (20 May 2018 10:00) (121/66 - 164/63)  ABP(mean): 98 (20 May 2018 10:00) (83 - 135)  RR: 21 (20 May 2018 10:00) (19 - 26)  SpO2: 99% (20 May 2018 10:00) (94% - 99%)     05-19 @ 07:01  -  05-20 @ 07:00  --------------------------------------------------------  IN: 1794.5 mL / OUT: 1850 mL / NET: -55.5 mL    05-20 @ 07:01  -  05-20 @ 11:12  --------------------------------------------------------  IN: 171 mL / OUT: 300 mL / NET: -129 mL           DEVICES: [] Restraints [] HELENE/HMV []LD [] ET tube [] Trach [] Chest Tube [] A-line [] Carolina [] NGT [] Rectal Tube [] EVD [] CVL  [] ICP/LiCOx    NEUROIMAGING:     EEG REPORT:     MEDICATIONS:  brimonidine 0.2% Ophthalmic Solution 1 Drop(s) Both EYES two times a day  dextrose 40% Gel 15 Gram(s) Oral once PRN  dextrose 5%. 1000 milliLiter(s) IV Continuous <Continuous>  dextrose 50% Injectable 12.5 Gram(s) IV Push once  dextrose 50% Injectable 25 Gram(s) IV Push once  dextrose 50% Injectable 25 Gram(s) IV Push once  glucagon  Injectable 1 milliGRAM(s) IntraMuscular once PRN  heparin  Infusion 300 Unit(s)/Hr IV Continuous <Continuous>  insulin glargine Injectable (LANTUS) 5 Unit(s) SubCutaneous at bedtime  insulin lispro (HumaLOG) corrective regimen sliding scale   SubCutaneous every 6 hours  latanoprost 0.005% Ophthalmic Solution 1 Drop(s) Both EYES at bedtime  ondansetron Injectable 4 milliGRAM(s) IV Push every 6 hours PRN  phenylephrine    Infusion 0.1 MICROgram(s)/kG/Min IV Continuous <Continuous>  sodium chloride 0.9% Bolus 1000 milliLiter(s) IV Bolus once  sodium chloride 0.9%. 1000 milliLiter(s) IV Continuous <Continuous>      PHYSICAL EXAM:  General: No acute distress  HEENT: Anicteric sclerae  Cardiac: [id number]  Lungs: Clear anteriorly, decreased breath sounds at bases  Abdomen: Soft, non-tender, +BS  Extremities: No groin hematoma  Skin/Incision Site: Clean, dry and intact  Neurologic: eyes open, follows commands, symmetrical strength at least 4+ throughout      LABS:                        12.7   8.2   )-----------( 167      ( 19 May 2018 23:54 )             39.4    05-19    140  |  106  |  6<L>  ----------------------------<  152<H>  3.4<L>   |  23  |  0.63    Ca    8.4      19 May 2018 23:54  Phos  1.8     05-19  Mg     2.0     05-19     ABG - ( 18 May 2018 20:17 )  pH, Arterial: 7.42  pH, Blood: x     /  pCO2: 37    /  pO2: 149   / HCO3: 24    / Base Excess: -.2   /  SaO2: 99

## 2018-05-20 NOTE — PROGRESS NOTE ADULT - ASSESSMENT
ASSESSMENT:   86 Y/O woman with vascular risk factors of age and DM II is evaluated at [facility] for acute onset of right sided weakness. She was last normal at around 8:45 PM, when she was walking in the shopping mall with her daughter. She suddenly developed acute onset of dizziness, imbalance and slurring of speech. She also reports to have noticed right hand weakness/numbess at the same time. She presented to [facility], where CT brain did not show any evidence of acute infarct or hemorrhage. She was treated with IV tPA. At around 12:00-12:30 AM, she was noted to have acute onset of right sided weakness involving face, arm and leg with severe dysarthria. CT brain following right hemiplegia did not show any evidence of acute infarct or hemorrhage. 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 She was subsequently transferred to [facility]  for further management. She underwent conventional angiogram with intentions for mechanical thrombectomy, which was not possible secondary to technical difficulties and she was subsequently treated with IA tPA with the partial recanalization of the previously noted, basilar occlusion. CT brain performed subsequently did not show any evidence of acute infarct or hemorrhage.     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 symptomatic intracranial (basilar artery) atherosclerosis and superimposed local thrombosis, leading to occlusion of pontine perforators     NEURO: Neurologically overall improved as compared to admission, continue close monitoring for neurologic deterioration,   gradual normotension in setting of recent recanalization, atorvastatin 80 mg at bedtime, considering atheroembolic etiology of her stroke; further dose titration as per LDL goal less than 70, MRI Brain w/o, MRA Head w/o with NOVA and Neck w/contrast once medically stable. Physical therapy/OT/Speech eval/treatment pending.     ANTITHROMBOTIC THERAPY:  Cautious therapeutic anticoagulation with heparin drip, dose titration to achieve PTT goal 40-60; in the setting of a unstable atherosclerotic plaque involving the basilar artery. Subsequently, may consider Coumadin for 3 months and aspirin indefinitely (modified SAMMPRIS protocol) versus Plavix for 3 months and aspirin indefinitely (SAMMPRIS).    PULMONARY: extubated, respiratory care per ICU.    CARDIOVASCULAR:  TTE: ef 70%, mild MR, minimal AR, mild TR  continue with telemetry to screen for possible cardiac source of embolism; Telemetry monitoring to screen for arrythmia                             BP goal  normotension; avoid hypotension    GASTROINTESTINAL: dysphagia screen       Diet: NPO    RENAL: BUN/Cr without acute change, good urine output      Na Goal: Greater than 135     Carolina:    HEMATOLOGY: H/H without acute change, Platelets 167     DVT ppx: Cautious therapeutic integration with heparin drip     ID: afebrile, no leukocytosis     DISPOSITION: Rehab or home depending on PT eval once stable and workup is complete    CORE MEASURES:        Admission NIHSS: 12     TPA: [x] YES [] NO      LDL/HDL: 94/69     Depression Screen: pending     Statin Therapy:      Dysphagia Screen: [] PASS [] FAIL- pending     Smoking [] YES [x] NO      Afib [] YES [x] NO     Stroke Education [] YES [] NO- pending ASSESSMENT:   84 Y/O woman with vascular risk factors of age and DM II is evaluated at [facility] for acute onset of right sided weakness. She was last normal at around 8:45 PM, when she was walking in the shopping [place] with her daughter. She suddenly developed acute onset of dizziness, imbalance and slurring of speech. She also reports to have noticed right hand weakness/numbess at the same time. She presented to [facility], where CT brain did not show any evidence of acute infarct or hemorrhage. She was treated with IV tPA. At around 12:00-12:30 AM, she was noted to have acute onset of right sided weakness involving face, arm and leg with severe dysarthria. CT brain following right hemiplegia did not show any evidence of acute infarct or hemorrhage. 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 She was subsequently transferred to [facility]  for further management. She underwent conventional angiogram with intentions for mechanical thrombectomy, which was not possible secondary to technical difficulties and she was subsequently treated with IA tPA with the partial recanalization of the previously noted, basilar occlusion. CT brain performed subsequently did not show any evidence of acute infarct or hemorrhage.     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 symptomatic intracranial (basilar artery) atherosclerosis and superimposed local thrombosis, leading to occlusion of pontine perforators     NEURO: Neurologically overall improved as compared to admission, continue close monitoring for neurologic deterioration,   gradual normotension in setting of recent recanalization, atorvastatin 80 mg at bedtime, considering atheroembolic etiology of her stroke; further dose titration as per LDL goal less than 70, MRI Brain w/o, MRA Head w/o with NOVA and Neck w/contrast once medically stable. Physical therapy/OT/Speech eval/treatment pending.     ANTITHROMBOTIC THERAPY:  Cautious therapeutic anticoagulation with heparin drip, dose titration to achieve PTT goal 40-60; in the setting of a unstable atherosclerotic plaque involving the basilar artery. Subsequently, may consider Coumadin for 3 months and aspirin indefinitely (modified SAMMPRIS protocol) versus Plavix for 3 months and aspirin indefinitely (SAMMPRIS).    PULMONARY: extubated, respiratory care per ICU.    CARDIOVASCULAR:  TTE: ef 70%, mild MR, minimal AR, mild TR  continue with telemetry to screen for possible cardiac source of embolism; Telemetry monitoring to screen for arrythmia                             BP goal  normotension; avoid hypotension    GASTROINTESTINAL: dysphagia screen per icu, if passed advnace as tolerated        Diet: NPO    RENAL: BUN/Cr without acute change, good urine output      Na Goal: Greater than 135     Carol[place]:  n    HEMATOLOGY: H/H without acute change, Platelets 167     DVT ppx: Cautious therapeutic integration with heparin drip     ID: afebrile, no leukocytosis     DISPOSITION: Rehab or home depending on PT eval once stable and workup is complete, d/w icu.    CORE MEASURES:        Admission NIHSS: 12     TPA: [x] YES [] NO      LDL/HDL: 94/69     Depression Screen: pending     Statin Therapy:      Dysphagia Screen: [] PASS [] FAIL- pending     Smoking [] YES [x] NO      Afib [] YES [x] NO     Stroke Education [] YES [] NO- pending

## 2018-05-20 NOTE — PROGRESS NOTE ADULT - ASSESSMENT
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  Stroke w/u  MRI when able    PULM:  extubated, stable on RA    CV:  SBP goal 120-180mmHg  TTE    RENAL:  Fluids: IVF maintenance until po intake sufficient, then IVL    GI:  Diet: advance as tolerated  GI prophylaxis [X] not indicated [] PPI: intubated [] other:  Bowel regimen [x] colace [x] senna [] other:    UXITg3f 7.5%, ISS, lantus 5u qhs  Goal euglycemia (-180)  RISS    HEME/ONC:  VTE prophylaxis: [x] SCDs [X] chemoprophylaxis heparin infusion [x] high risk of DVT/PE on admission due to: baseline impaired ambulation -- LE doppler 5/18 negative for DVT    ID:  Monitor for fever    SOCIAL/FAMILY:  [x] awaiting [] updated at bedside [] family meeting    CODE STATUS:  [x] Full Code [] DNR [] DNI [] Palliative/Comfort Care    DISPOSITION:  [] ICU [x] Stroke Unit [] Floor [] EMU [] RCU [] PCU      Time spent: [] critical care minutes

## 2018-05-20 NOTE — DOWNTIME INTERRUPTION NOTE - WHICH MANUAL FORMS INITIATED?
Downtime from 0145 to 0300    Computer Documentation resumed at 0305      All adequate forms completed if necessary

## 2018-05-21 LAB
ANION GAP SERPL CALC-SCNC: 11 MMOL/L — SIGNIFICANT CHANGE UP (ref 5–17)
APTT BLD: 44.4 SEC — HIGH (ref 27.5–37.4)
BUN SERPL-MCNC: 7 MG/DL — SIGNIFICANT CHANGE UP (ref 7–23)
CALCIUM SERPL-MCNC: 9.2 MG/DL — SIGNIFICANT CHANGE UP (ref 8.4–10.5)
CHLORIDE SERPL-SCNC: 101 MMOL/L — SIGNIFICANT CHANGE UP (ref 96–108)
CO2 SERPL-SCNC: 25 MMOL/L — SIGNIFICANT CHANGE UP (ref 22–31)
CREAT SERPL-MCNC: 0.64 MG/DL — SIGNIFICANT CHANGE UP (ref 0.5–1.3)
GLUCOSE BLDC GLUCOMTR-MCNC: 127 MG/DL — HIGH (ref 70–99)
GLUCOSE BLDC GLUCOMTR-MCNC: 135 MG/DL — HIGH (ref 70–99)
GLUCOSE BLDC GLUCOMTR-MCNC: 197 MG/DL — HIGH (ref 70–99)
GLUCOSE SERPL-MCNC: 189 MG/DL — HIGH (ref 70–99)
MAGNESIUM SERPL-MCNC: 2 MG/DL — SIGNIFICANT CHANGE UP (ref 1.6–2.6)
PHOSPHATE SERPL-MCNC: 3.3 MG/DL — SIGNIFICANT CHANGE UP (ref 2.5–4.5)
POTASSIUM SERPL-MCNC: 4.9 MMOL/L — SIGNIFICANT CHANGE UP (ref 3.5–5.3)
POTASSIUM SERPL-SCNC: 4.9 MMOL/L — SIGNIFICANT CHANGE UP (ref 3.5–5.3)
SODIUM SERPL-SCNC: 137 MMOL/L — SIGNIFICANT CHANGE UP (ref 135–145)

## 2018-05-21 PROCEDURE — 99233 SBSQ HOSP IP/OBS HIGH 50: CPT

## 2018-05-21 PROCEDURE — 99232 SBSQ HOSP IP/OBS MODERATE 35: CPT

## 2018-05-21 RX ORDER — INSULIN LISPRO 100/ML
VIAL (ML) SUBCUTANEOUS
Qty: 0 | Refills: 0 | Status: DISCONTINUED | OUTPATIENT
Start: 2018-05-21 | End: 2018-05-23

## 2018-05-21 RX ORDER — ATORVASTATIN CALCIUM 80 MG/1
80 TABLET, FILM COATED ORAL AT BEDTIME
Qty: 0 | Refills: 0 | Status: DISCONTINUED | OUTPATIENT
Start: 2018-05-21 | End: 2018-05-30

## 2018-05-21 RX ADMIN — Medication 2: at 21:47

## 2018-05-21 RX ADMIN — SODIUM CHLORIDE 50 MILLILITER(S): 9 INJECTION INTRAMUSCULAR; INTRAVENOUS; SUBCUTANEOUS at 07:00

## 2018-05-21 RX ADMIN — LATANOPROST 1 DROP(S): 0.05 SOLUTION/ DROPS OPHTHALMIC; TOPICAL at 21:47

## 2018-05-21 RX ADMIN — HEPARIN SODIUM 7 UNIT(S)/HR: 5000 INJECTION INTRAVENOUS; SUBCUTANEOUS at 07:00

## 2018-05-21 RX ADMIN — BRIMONIDINE TARTRATE 1 DROP(S): 2 SOLUTION/ DROPS OPHTHALMIC at 05:50

## 2018-05-21 RX ADMIN — ATORVASTATIN CALCIUM 80 MILLIGRAM(S): 80 TABLET, FILM COATED ORAL at 21:47

## 2018-05-21 RX ADMIN — BRIMONIDINE TARTRATE 1 DROP(S): 2 SOLUTION/ DROPS OPHTHALMIC at 17:23

## 2018-05-21 RX ADMIN — INSULIN GLARGINE 5 UNIT(S): 100 INJECTION, SOLUTION SUBCUTANEOUS at 21:46

## 2018-05-21 NOTE — PROGRESS NOTE ADULT - SUBJECTIVE AND OBJECTIVE BOX
Tolerating heparin gtt.     ROS: No headache, no chest pain, no shortness of breath    Exam:   NAD  Anicteric sclerae  Heart regular  Chest clear  Abdomen soft, +BS  No limb edema  No rash  Awake, alert, oriented to self, hospital, May 2018, PERRL, follows, minimal right arm drift, otherwise full strength

## 2018-05-21 NOTE — PROGRESS NOTE ADULT - ASSESSMENT
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, neuro checks q4 hr,  add statin,       PULM:  extubated, stable on RA, keep O2 >92%    CV:  SBP goal 120-180mmHg  TTE - EF (Visual Estimate): 65 %    RENAL:  Fluids: NS@50    GI:  Diet: clear liquid  GI prophylaxis [X] not indicated [] PPI: intubated [] other:  Bowel regimen [x] colace [x] senna [] other:    XNUQl7v 7.5%, ISS, lantus 5u qhs  Goal euglycemia (-180)  RISS    HEME/ONC:  VTE prophylaxis: [x] SCDs [X] chemoprophylaxis heparin infusion [x] high risk of DVT/PE on admission due to: baseline impaired ambulation -- LE doppler 5/18 negative for DVT    ID:  Monitor for fever    SOCIAL/FAMILY:  [x] awaiting [] updated at bedside [] family meeting    CODE STATUS:  [x] Full Code [] DNR [] DNI [] Palliative/Comfort Care    DISPOSITION:  [] ICU [x] Stroke Unit [] Floor [] EMU [] RCU [] PCU      Time spent: [] critical care minutes 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, neuro checks q4 hr,  add statin,       PULM:  extubated, stable on RA, keep O2 >92%    CV:  SBP goal 120-180mmHg  TTE - EF (Visual Estimate): 65 %    RENAL:  Fluids: NS@50    GI:  Diet: clear liquid  GI prophylaxis [X] not indicated [] PPI: intubated [] other:  Bowel regimen [x] colace [x] senna [] other:    OADVy7c 7.5%, ISS, lantus 5u qhs  Goal euglycemia (-180)  RISS    HEME/ONC:  VTE prophylaxis: [x] SCDs [X] chemoprophylaxis heparin infusion [x] high risk of DVT/PE on admission due to: baseline impaired ambulation -- LE doppler 5/18 negative for DVT    ID:  Monitor for fever    SOCIAL/FAMILY:  [x] awaiting [] updated at bedside [] family meeting    CODE STATUS:  [x] Full Code [] DNR [] DNI [] Palliative/Comfort Care    DISPOSITION:  [] ICU [] Stroke Unit [x] Floor [] EMU [] RCU [] PCU      Time spent: [] critical care minutes

## 2018-05-21 NOTE — PHYSICAL THERAPY INITIAL EVALUATION ADULT - PERTINENT HX OF CURRENT PROBLEM, REHAB EVAL
86y/o F p/w slurred speech sudden onset at 8:45pm. Pt was shopping with daughter at [employer] when she suddenly started to scream that she felt there was an "earthquake" beneath her and states she felt dizzy although she denies having a spinning sensation (cont below)

## 2018-05-21 NOTE — PROGRESS NOTE ADULT - ASSESSMENT
ASSESSMENT: 84 Y/O woman with vascular risk factors of age and DM II is evaluated at [facility] for acute onset of right sided weakness. She was last normal at around 8:45 PM on day of admission, when she was walking in the shopping mall with her daughter. She suddenly developed acute onset of dizziness, imbalance and slurring of speech. She also reports to have noticed right hand weakness/numbess at the same time. She presented to [facility], where CT brain did not show any evidence of acute infarct or hemorrhage. She was treated with IV tPA. At around 12:00-12:30 AM, she was noted to have acute onset of right sided weakness involving face, arm and leg with severe dysarthria. CT brain following right hemiplegia did not show any evidence of acute infarct or hemorrhage. 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 She was subsequently transferred to [facility]  for further management. She underwent conventional angiogram with intentions for mechanical thrombectomy, which was not possible secondary to technical difficulties and she was subsequently treated with IA tPA with the partial recanalization of the previously noted, basilar occlusion. CT brain performed subsequently did not show any evidence of acute infarct or hemorrhage.     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 symptomatic intracranial (basilar artery) atherosclerosis and superimposed local thrombosis, leading to occlusion of pontine perforators     NEURO: Neurologically patient  continues to improve and is much returned to her neurological baseline. Continue close monitoring for neurologic deterioration. Progress to gradual normotension in setting of recent recanalization. Continue atorvastatin 80 mg at bedtime considering atheroembolic etiology of her stroke; further dose titration as per LDL goal less than 70. Recommend  MRI Brain w/o, MRA Head w/o with NOVA and Neck w/contrast once medically stable. Once imaging has been completed, will then determine likelihood of antiplatelet versus anticoagulation therapy. Will continue Heparin infusion at this time with PTT goal 40-60. The potential for oral anticoagulation need as outpatient has been discussed today with patient and patient's family, all questions answered. Physical therapy/OT/Speech eval/treatment as patient tolerates Patient is deemed neurologically stable to transfer to stroke neurology floor, discussed with NSCU team.      ANTITHROMBOTIC THERAPY:  Cautious therapeutic anticoagulation with heparin drip, dose titration to achieve PTT goal 40-60; in the setting of a unstable atherosclerotic plaque involving the basilar artery. Subsequently, may consider Coumadin for 3 months and aspirin indefinitely (modified SAMMPRIS protocol) versus Plavix for 3 months and aspirin indefinitely ([facility]IS). Final determination pending further imaging as above.     PULMONARY:  Protecting airway, saturating well.     CARDIOVASCULAR:  TTE: ef 70%, mild MR, minimal AR, mild TR  continue with telemetry to screen for possible cardiac source of embolism; Telemetry monitoring to screen for arrythmia                             BP goal  normotension; avoid hypotension    GASTROINTESTINAL: dysphagia screen passed       Diet: Regular, consistent carb    RENAL: BUN/Cr without acute change, good urine output      Na Goal: Greater than 135     [place]:  n    HEMATOLOGY: H/H without acute change, Platelets 174     DVT ppx: Cautious therapeutic integration with heparin drip     ID: afebrile, no leukocytosis     DISPOSITION: Rehab or home depending on PT eval once stable and workup is complete.     CORE MEASURES:        Admission NIHSS: 12     TPA: [x] YES [] NO      LDL/HDL: 94/69     Depression Screen: Passed     Statin Therapy: Yes     Dysphagia Screen: [x] PASS [] FAIL     Smoking [] YES [x] NO      Afib [] YES [x] NO     Stroke Education [x] YES [] NO ASSESSMENT: 84 Y/O woman with vascular risk factors of age and DM II is evaluated at [facility] for acute onset of right sided weakness. She was last normal at around 8:45 PM on day of admission, when she was walking in the shopping mall with her daughter. She suddenly developed acute onset of dizziness, imbalance and slurring of speech. She also reports to have noticed right hand weakness/numbess at the same time. She presented to [facility], where CT brain did not show any evidence of acute infarct or hemorrhage. She was treated with IV tPA. At around 12:00-12:30 AM, she was noted to have acute onset of right sided weakness involving face, arm and leg with severe dysarthria. CT brain following right hemiplegia did not show any evidence of acute infarct or hemorrhage. 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 She was subsequently transferred to [facility]  for further management. She underwent conventional angiogram with intentions for mechanical thrombectomy, which was not possible secondary to technical difficulties and she was subsequently treated with IA tPA with the partial recanalization of the previously noted, basilar occlusion. CT brain performed subsequently did not show any evidence of acute infarct or hemorrhage.     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 symptomatic intracranial (basilar artery) atherosclerosis and superimposed local thrombosis, leading to occlusion of pontine perforators     NEURO: Neurologically patient  continues to improve and is much returned to her neurological baseline. Continue close monitoring for neurologic deterioration. Progress to gradual normotension in setting of recent recanalization. Continue atorvastatin 80 mg at bedtime considering atheroembolic etiology of her stroke; further dose titration as per LDL goal less than 70. Recommend  MRI Brain w/o, MRA Head w/o with NOVA and Neck w/contrast once medically stable. Once imaging has been completed, will then determine likelihood of antiplatelet versus anticoagulation therapy. Will continue Heparin infusion at this time with PTT goal 40-60. The potential for oral anticoagulation need as outpatient has been discussed today with patient and patient's family, all questions answered. Physical therapy/OT/Speech eval/treatment as patient tolerates Patient is deemed neurologically stable to transfer to stroke neurology floor, discussed with NSCU team.      ANTITHROMBOTIC THERAPY:  Cautious therapeutic anticoagulation with heparin drip, dose titration to achieve PTT goal 40-60; in the setting of a unstable atherosclerotic plaque involving the basilar artery. Subsequently, may consider Coumadin for 3 months and aspirin indefinitely (modified [facility] protocol) versus Plavix for 3 months and [facility] Final determination pending further imaging - MRA of Head and NEck..     PULMONARY:  Protecting airway, saturating well.     CARDIOVASCULAR:  TTE: ef 70%, mild MR, minimal AR, mild TR  continue with telemetry to screen for possible cardiac source of embolism; Telemetry monitoring to screen for arrythmia                             BP goal  normotension; avoid hypotension    GASTROINTESTINAL: dysphagia screen passed       Diet: Regular, consistent carb    RENAL: BUN/Cr without acute change, good urine output      Na Goal: Greater than 135     [place]:  n    HEMATOLOGY: H/H without acute change, Platelets 174     DVT ppx: Cautious therapeutic integration with heparin drip     ID: afebrile, no leukocytosis     DISPOSITION: Rehab or home depending on PT eval once stable and workup is complete.     CORE MEASURES:        Admission NIHSS: 12     TPA: [x] YES [] NO      LDL/HDL: 94/69     Depression Screen: Passed     Statin Therapy: Yes     Dysphagia Screen: [x] PASS [] FAIL     Smoking [] YES [x] NO      Afib [] YES [x] NO     Stroke Education [x] YES [] NO

## 2018-05-21 NOTE — PROGRESS NOTE ADULT - ASSESSMENT
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

## 2018-05-21 NOTE — PROGRESS NOTE ADULT - SUBJECTIVE AND OBJECTIVE BOX
HPI:  Ms. Olivia is an 85 year-old right handed [place] woman with stroke risk factor of DM type 2 with baseline unsteadiness with walking who presented with sudden onset slurred speech associated with dizziness (though not spinning sensation) at 8:45pm on 5/17/18. Her daughter  her mother got weak in both legs, felt an itch all over her face and had numbness in both arms. She was initially brought to the [facility] ER where she vomited thrice. Dysarthria initially improved, though did not resolve. In addition, ambulation more unsteady. Admission NIHSS was 1. She was given IV tPA at 10:17pm. She got progressively worse over next few hours with new right sided plegia and gaze deviation. CTA performed which revealed basilar occlusion. She was transferred to [facility] for endovascular rescue. 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 Catheter wire passed past stenosis but no significant clot removed; however, trickle flow obtained. Also, basilar noted to be filling retrograde via posterior communicating artery.    5/18 extubated    OVERNIGHT EVENTS:   no events overnight    VITALS:  T(C): 36.8 (05-21-18 @ 11:00), Max: 36.9 (05-20-18 @ 23:00)  HR: 74 (05-21-18 @ 11:00) (60 - 81)  BP: --  BP(mean): --  ABP: 156/79 (05-21-18 @ 11:00) (118/65 - 180/76)  ABP(mean): 109 (05-21-18 @ 11:00) (84 - 119)  RR: 20 (05-21-18 @ 11:00) (17 - 29)  SpO2: 99% (05-21-18 @ 11:00) (95% - 99%)  Wt(kg): --  CVP(mm Hg): --  CI: --  CAPILLARY BLOOD GLUCOSE      POCT Blood Glucose.: 127 mg/dL (21 May 2018 05:44)  POCT Blood Glucose.: 141 mg/dL (20 May 2018 22:54)  POCT Blood Glucose.: 117 mg/dL (20 May 2018 17:55)  POCT Blood Glucose.: 161 mg/dL (20 May 2018 12:09)   N/A      05-20 @ 07:01  -  05-21 @ 07:00  --------------------------------------------------------  IN:    heparin Infusion: 168 mL    IV PiggyBack: 250 mL    Oral Fluid: 300 mL    sodium chloride 0.9%.: 1200 mL  Total IN: 1918 mL    OUT:    Voided: 3220 mL  Total OUT: 3220 mL    Total NET: -1302 mL      05-21 @ 07:01  -  05-21 @ 11:19  --------------------------------------------------------  IN:    heparin Infusion: 21 mL    sodium chloride 0.9%.: 150 mL  Total IN: 171 mL    OUT:    Voided: 300 mL  Total OUT: 300 mL    Total NET: -129 mL          LABS:  CBC (05-20 @ 21:32)                          13.3                     7.2     )--------------(  174        --    % Neuts, --    % Lymphs, ANC: --                              40.3      BMP (05-20 @ 21:32)       138     |  105     |  5<L>  			Ca++ --      Ca 8.3<L>       ---------------------------------( 160<H>		Mg 2.0          3.4<L>  |  23      |  0.55  			Ph 1.9<L>      Coags (05-20 @ 21:32)  aPTT 46.4<H> / INR -- / PT --  Coags (05-20 @ 17:53)  aPTT 43.6<H> / INR -- / PT --        IMAGING:   Recent imaging studies were reviewed.    MEDICATIONS:  MEDICATIONS  (STANDING):  brimonidine 0.2% Ophthalmic Solution 1 Drop(s) Both EYES two times a day  dextrose 5%. 1000 milliLiter(s) (50 mL/Hr) IV Continuous <Continuous>  dextrose 50% Injectable 12.5 Gram(s) IV Push once  dextrose 50% Injectable 25 Gram(s) IV Push once  dextrose 50% Injectable 25 Gram(s) IV Push once  heparin  Infusion 300 Unit(s)/Hr (7 mL/Hr) IV Continuous <Continuous>  insulin glargine Injectable (LANTUS) 5 Unit(s) SubCutaneous at bedtime  insulin lispro (HumaLOG) corrective regimen sliding scale   SubCutaneous every 6 hours  latanoprost 0.005% Ophthalmic Solution 1 Drop(s) Both EYES at bedtime  sodium chloride 0.9%. 1000 milliLiter(s) (50 mL/Hr) IV Continuous <Continuous>    MEDICATIONS  (PRN):  acetaminophen   Tablet. 650 milliGRAM(s) Oral every 6 hours PRN Moderate Pain (4 - 6)  dextrose 40% Gel 15 Gram(s) Oral once PRN Blood Glucose LESS THAN 70 milliGRAM(s)/deciliter  glucagon  Injectable 1 milliGRAM(s) IntraMuscular once PRN Glucose LESS THAN 70 milligrams/deciliter  ondansetron Injectable 4 milliGRAM(s) IV Push every 6 hours PRN Nausea and/or Vomiting      EXAMINATION:  General: No acute distress  Neurologic: AOx3, follows commands, symmetrical strength at least 5+ throughout, no drift  HEENT: Anicteric sclerae  Cardiac: [id number]  Lungs: Clear anteriorly, decreased breath sounds at bases  Abdomen: Soft, non-tender, +BS  Extremities: No groin hematoma  Skin/Incision Site: Clean, dry and intact

## 2018-05-21 NOTE — PHYSICAL THERAPY INITIAL EVALUATION ADULT - ADDITIONAL COMMENTS
Pt lives in an apartment in a private home ~6 steps to enter and full flight (~12) steps to second floor. Prior to admission pt was independent with all functional mobility and did not use an AD to ambulate. Pt lives with daughter.

## 2018-05-21 NOTE — PHYSICAL THERAPY INITIAL EVALUATION ADULT - PRECAUTIONS/LIMITATIONS, REHAB EVAL
fall precautions/Daughter states her mother got weak in both legs, felt an itch all over her face and had numbness in both arms. Upon coming to the ED the pt vomited 3 times. Daughter states slurred speech is better but not back to baseline. Pt also reported that her right hand felt weak but upon further questioning she stated it was more of a numnbess/ "cramping" feeling. tPA bolus of 5.7 was given at 10:17pm followed by a 51mg drip. NIHSS-1, MRS-0 she got progressively worse over next few hours, her right side became plegic and she started to have a gaze deviation. CTA H&N performed on her which was positive for basilar occlusion. She was transfered to [facility] for endovascular intervention.

## 2018-05-21 NOTE — PROGRESS NOTE ADULT - ASSESSMENT
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

## 2018-05-21 NOTE — PROGRESS NOTE ADULT - SUBJECTIVE AND OBJECTIVE BOX
THE PATIENT WAS SEEN AND EXAMINED BY ME WITH THE HOUSESTAFF AND STROKE TEAM DURING MORNING ROUNDS.   HPI:  84 y/o female woman with DM II was transferred from [facility] for acute onset of dysarthria and right sided weakness. She was last normal at around 8:45 PM 5/17, when she was walking in the shopping [place] with her daughter. She suddenly developed acute onset of dizziness, imbalance and slurring of speech. She also reports to have noticed right hand weakness/numbess at the same time. She was treated with IV tPA. At around 12:00-12:30 AM 5/18, she was noted to have acute onset of right sided weakness involving face, arm and leg with severe dysarthria.  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 Catheter wire passed past stenosis but no significant clot removed; however, trickle flow obtained. Also, basilar noted to be filling retrograde via posterior communicating artery.      SUBJECTIVE: No events overnight.  No new neurologic complaints.      acetaminophen   Tablet. 650 milliGRAM(s) Oral every 6 hours PRN  atorvastatin 80 milliGRAM(s) Oral at bedtime  brimonidine 0.2% Ophthalmic Solution 1 Drop(s) Both EYES two times a day  dextrose 40% Gel 15 Gram(s) Oral once PRN  dextrose 5%. 1000 milliLiter(s) IV Continuous <Continuous>  dextrose 50% Injectable 12.5 Gram(s) IV Push once  dextrose 50% Injectable 25 Gram(s) IV Push once  dextrose 50% Injectable 25 Gram(s) IV Push once  glucagon  Injectable 1 milliGRAM(s) IntraMuscular once PRN  heparin  Infusion 300 Unit(s)/Hr IV Continuous <Continuous>  insulin glargine Injectable (LANTUS) 5 Unit(s) SubCutaneous at bedtime  insulin lispro (HumaLOG) corrective regimen sliding scale   SubCutaneous four times a day before meals  latanoprost 0.005% Ophthalmic Solution 1 Drop(s) Both EYES at bedtime  ondansetron Injectable 4 milliGRAM(s) IV Push every 6 hours PRN      PHYSICAL EXAM:   Vital Signs Last 24 Hrs  T(C): 36.8 (21 May 2018 11:00), Max: 36.9 (20 May 2018 23:00)  T(F): 98.2 (21 May 2018 11:00), Max: 98.4 (20 May 2018 23:00)  HR: 69 (21 May 2018 12:02) (60 - 79)  BP: 164/76 (21 May 2018 12:02) (164/76 - 164/76)  BP(mean): 102 (21 May 2018 12:02) (102 - 102)  RR: 20 (21 May 2018 12:02) (17 - 29)  SpO2: 98% (21 May 2018 12:02) (95% - 99%)      General: Awake. Surrounded by family. Resting in bed.  No acute distress  HEENT: Normocephalic, atraumatic,  EOM intact, visual fields full  Abdomen: Soft, nontender, nondistended   Extremities: No edema    NEUROLOGICAL EXAM:  Mental status: Awake, alert, oriented to person/place/time, events, follows all commands given in Creole , patient's daughter is CTICU RN, translating.    Cranial Nerves: Mild right facial droop, No Nystagmus. No dysarthria    Motor exam: Normal tone, no drift. Moving all  moves extremities well against gravity. No appreciable hemiparesis on this exam.    Sensation: Intact to light touch   Coordination/ Gait: Not assessed..        LABS:                        13.3   7.2   )-----------( 174      ( 20 May 2018 21:32 )             40.3    05-20    138  |  105  |  5<L>  ----------------------------<  160<H>  3.4<L>   |  23  |  0.55    Ca    8.3<L>      20 May 2018 21:32  Phos  1.9     05-20  Mg     2.0     05-20    PTT - ( 20 May 2018 21:32 )  PTT:46.4 sec  Hemoglobin A1C, Whole Blood: 7.5 % (05-18 @ 07:53)      IMAGING: Reviewed by me.        Transthoracic Echocardiogram (05.19.18 @ 05:29)     Conclusions:  1. Normal left ventricular internal dimensions and wall  thicknesses.  2. Normal left ventricular systolic function. No segmental  wall motion abnormalities.  3. Normal diastolic function  4. Normal right ventricular size and function.  *** No previous Echo exam.     CT Head No Cont (05.19.18 @ 10:33)     IMPRESSION:   Status post endovascular management of basilar occlusion without evidence   of a definite infarct. No CT evidence of hemorrhage, herniation, mass   effect, or hydrocephalus.

## 2018-05-22 LAB
ANION GAP SERPL CALC-SCNC: 10 MMOL/L — SIGNIFICANT CHANGE UP (ref 5–17)
APTT BLD: 39.2 SEC — HIGH (ref 27.5–37.4)
BUN SERPL-MCNC: 12 MG/DL — SIGNIFICANT CHANGE UP (ref 7–23)
CALCIUM SERPL-MCNC: 9 MG/DL — SIGNIFICANT CHANGE UP (ref 8.4–10.5)
CHLORIDE SERPL-SCNC: 101 MMOL/L — SIGNIFICANT CHANGE UP (ref 96–108)
CO2 SERPL-SCNC: 26 MMOL/L — SIGNIFICANT CHANGE UP (ref 22–31)
CREAT SERPL-MCNC: 0.73 MG/DL — SIGNIFICANT CHANGE UP (ref 0.5–1.3)
GLUCOSE BLDC GLUCOMTR-MCNC: 127 MG/DL — HIGH (ref 70–99)
GLUCOSE BLDC GLUCOMTR-MCNC: 141 MG/DL — HIGH (ref 70–99)
GLUCOSE BLDC GLUCOMTR-MCNC: 193 MG/DL — HIGH (ref 70–99)
GLUCOSE BLDC GLUCOMTR-MCNC: 216 MG/DL — HIGH (ref 70–99)
GLUCOSE BLDC GLUCOMTR-MCNC: 63 MG/DL — LOW (ref 70–99)
GLUCOSE SERPL-MCNC: 233 MG/DL — HIGH (ref 70–99)
HCT VFR BLD CALC: 44.6 % — SIGNIFICANT CHANGE UP (ref 34.5–45)
HGB BLD-MCNC: 14.6 G/DL — SIGNIFICANT CHANGE UP (ref 11.5–15.5)
MAGNESIUM SERPL-MCNC: 2 MG/DL — SIGNIFICANT CHANGE UP (ref 1.6–2.6)
MCHC RBC-ENTMCNC: 26.1 PG — LOW (ref 27–34)
MCHC RBC-ENTMCNC: 32.8 GM/DL — SIGNIFICANT CHANGE UP (ref 32–36)
MCV RBC AUTO: 79.6 FL — LOW (ref 80–100)
PHOSPHATE SERPL-MCNC: 3 MG/DL — SIGNIFICANT CHANGE UP (ref 2.5–4.5)
PLATELET # BLD AUTO: 186 K/UL — SIGNIFICANT CHANGE UP (ref 150–400)
POTASSIUM SERPL-MCNC: 4.6 MMOL/L — SIGNIFICANT CHANGE UP (ref 3.5–5.3)
POTASSIUM SERPL-SCNC: 4.6 MMOL/L — SIGNIFICANT CHANGE UP (ref 3.5–5.3)
RBC # BLD: 5.61 M/UL — HIGH (ref 3.8–5.2)
RBC # FLD: 13.8 % — SIGNIFICANT CHANGE UP (ref 10.3–14.5)
SODIUM SERPL-SCNC: 137 MMOL/L — SIGNIFICANT CHANGE UP (ref 135–145)
WBC # BLD: 7.2 K/UL — SIGNIFICANT CHANGE UP (ref 3.8–10.5)
WBC # FLD AUTO: 7.2 K/UL — SIGNIFICANT CHANGE UP (ref 3.8–10.5)

## 2018-05-22 PROCEDURE — 99233 SBSQ HOSP IP/OBS HIGH 50: CPT

## 2018-05-22 PROCEDURE — 70544 MR ANGIOGRAPHY HEAD W/O DYE: CPT | Mod: 26,59

## 2018-05-22 PROCEDURE — 70548 MR ANGIOGRAPHY NECK W/DYE: CPT | Mod: 26

## 2018-05-22 PROCEDURE — 70553 MRI BRAIN STEM W/O & W/DYE: CPT | Mod: 26

## 2018-05-22 PROCEDURE — 99232 SBSQ HOSP IP/OBS MODERATE 35: CPT

## 2018-05-22 PROCEDURE — 99221 1ST HOSP IP/OBS SF/LOW 40: CPT

## 2018-05-22 RX ADMIN — BRIMONIDINE TARTRATE 1 DROP(S): 2 SOLUTION/ DROPS OPHTHALMIC at 19:00

## 2018-05-22 RX ADMIN — Medication 4: at 11:36

## 2018-05-22 RX ADMIN — BRIMONIDINE TARTRATE 1 DROP(S): 2 SOLUTION/ DROPS OPHTHALMIC at 05:47

## 2018-05-22 RX ADMIN — LATANOPROST 1 DROP(S): 0.05 SOLUTION/ DROPS OPHTHALMIC; TOPICAL at 21:53

## 2018-05-22 RX ADMIN — ONDANSETRON 4 MILLIGRAM(S): 8 TABLET, FILM COATED ORAL at 06:50

## 2018-05-22 RX ADMIN — Medication 2: at 21:52

## 2018-05-22 RX ADMIN — ATORVASTATIN CALCIUM 80 MILLIGRAM(S): 80 TABLET, FILM COATED ORAL at 21:52

## 2018-05-22 RX ADMIN — HEPARIN SODIUM 7 UNIT(S)/HR: 5000 INJECTION INTRAVENOUS; SUBCUTANEOUS at 07:00

## 2018-05-22 RX ADMIN — INSULIN GLARGINE 5 UNIT(S): 100 INJECTION, SOLUTION SUBCUTANEOUS at 21:53

## 2018-05-22 NOTE — DIETITIAN INITIAL EVALUATION ADULT. - ORAL INTAKE PTA
Pt with good appetite and intake PTA. Pt typically eats 3 meals daily and has Glucerna supplement. Pt takes MVI PTA. Pt reports lactose intolerance./good

## 2018-05-22 NOTE — OCCUPATIONAL THERAPY INITIAL EVALUATION ADULT - BALANCE TRAINING, PT EVAL
Goal: Pt will demonstrate G dynamic standing balance in 4 weeks to increase safety/independence for ADL/IADL performance.

## 2018-05-22 NOTE — OCCUPATIONAL THERAPY INITIAL EVALUATION ADULT - LIVES WITH, PROFILE
children/Pt lives with her daughter in a 2nd floor apartment of a private home, 6 steps to enter home, 14 steps to entrance with a tub. Pt was independent with ADLs, IADLs, functional mobility prior to admission.

## 2018-05-22 NOTE — PROGRESS NOTE ADULT - SUBJECTIVE AND OBJECTIVE BOX
THE PATIENT WAS SEEN AND EXAMINED BY ME WITH THE HOUSESTAFF AND STROKE TEAM DURING MORNING ROUNDS.   HPI:  86 y/o female woman with DM II was transferred from [facility] for acute onset of dysarthria and right sided weakness. She was last normal at around 8:45 PM 5/17, when she was [place] the [place] with her daughter. She suddenly developed acute onset of dizziness, imbalance and slurring of speech. She also reports to have noticed right hand weakness/numbess at the same time. She was treated with IV tPA. At around 12:00-12:30 AM 5/18, she was noted to have acute onset of right sided weakness involving face, arm and leg with severe dysarthria.  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 Catheter wire passed past stenosis but no significant clot removed; however, trickle flow obtained. Also, basilar noted to be filling retrograde via posterior communicating artery.        SUBJECTIVE: No events overnight.  No new neurologic complaints.      acetaminophen   Tablet. 650 milliGRAM(s) Oral every 6 hours PRN  atorvastatin 80 milliGRAM(s) Oral at bedtime  brimonidine 0.2% Ophthalmic Solution 1 Drop(s) Both EYES two times a day  dextrose 40% Gel 15 Gram(s) Oral once PRN  dextrose 5%. 1000 milliLiter(s) IV Continuous <Continuous>  dextrose 50% Injectable 12.5 Gram(s) IV Push once  dextrose 50% Injectable 25 Gram(s) IV Push once  dextrose 50% Injectable 25 Gram(s) IV Push once  glucagon  Injectable 1 milliGRAM(s) IntraMuscular once PRN  heparin  Infusion 300 Unit(s)/Hr IV Continuous <Continuous>  insulin glargine Injectable (LANTUS) 5 Unit(s) SubCutaneous at bedtime  insulin lispro (HumaLOG) corrective regimen sliding scale   SubCutaneous four times a day before meals  latanoprost 0.005% Ophthalmic Solution 1 Drop(s) Both EYES at bedtime  ondansetron Injectable 4 milliGRAM(s) IV Push every 6 hours PRN      PHYSICAL EXAM:   Vital Signs Last 24 Hrs  T(C): 37.3 (22 May 2018 15:00), Max: 37.3 (22 May 2018 15:00)  T(F): 99.1 (22 May 2018 15:00), Max: 99.1 (22 May 2018 15:00)  HR: 81 (22 May 2018 15:00) (60 - 81)  BP: 133/62 (22 May 2018 15:00) (122/61 - 149/69)  BP(mean): 81 (22 May 2018 15:00) (80 - 92)  RR: 18 (22 May 2018 15:00) (15 - 23)  SpO2: 96% (22 May 2018 15:00) (96% - 98%)      General: Awake. Smiling. Resting in bed.  No acute distress  HEENT: Normocephalic, atraumatic,  EOM intact, visual fields full  Abdomen: Soft, nontender, nondistended   Extremities: No edema    NEUROLOGICAL EXAM:  Mental status: Awake, alert, oriented to person/place/time, events, follows all commands.    Cranial Nerves: Mild right facial droop, No Nystagmus. No dysarthria.    Motor exam: Normal tone, no drift. Moving all  moves extremities well against gravity. No appreciable hemiparesis on this exam.    Sensation: Intact to light touch   Coordination/ Gait: Not assessed..        LABS:                        13.3   7.2   )-----------( 174      ( 20 May 2018 21:32 )             40.3    05-21    137  |  101  |  7   ----------------------------<  189<H>  4.9   |  25  |  0.64    Ca    9.2      21 May 2018 21:40  Phos  3.3     05-21  Mg     2.0     05-21    PTT - ( 21 May 2018 21:40 )  PTT:44.4 sec  Hemoglobin A1C, Whole Blood: 7.5 % (05-18 @ 07:53)      IMAGING: Reviewed by me.      Transthoracic Echocardiogram (05.19.18 @ 05:29)     Conclusions:  1. Normal left ventricular internal dimensions and wall  thicknesses.  2. Normal left ventricular systolic function. No segmental  wall motion abnormalities.  3. Normal diastolic function  4. Normal right ventricular size and function.  *** No previous Echo exam.     CT Head No Cont (05.19.18 @ 10:33)     IMPRESSION:   Status post endovascular management of basilar occlusion without evidence   of a definite infarct. No CT evidence of hemorrhage, herniation, mass   effect, or hydrocephalus.

## 2018-05-22 NOTE — DIETITIAN INITIAL EVALUATION ADULT. - SOURCE
medical record, pt's daughter at bedside. Pt speaks Creole, daughter at bedside able to translate/other (specify)/patient

## 2018-05-22 NOTE — OCCUPATIONAL THERAPY INITIAL EVALUATION ADULT - DIAGNOSIS, OT EVAL
Pt with impaired strength and balance impacting pt's ability to complete ADLs, IADLs, functional mobility.

## 2018-05-22 NOTE — OCCUPATIONAL THERAPY INITIAL EVALUATION ADULT - PLANNED THERAPY INTERVENTIONS, OT EVAL
bed mobility training/strengthening/transfer training/ADL retraining/balance training/IADL retraining/neuromuscular re-education

## 2018-05-22 NOTE — CONSULT NOTE ADULT - SUBJECTIVE AND OBJECTIVE BOX
Cc: Gait dysfunction    HPI:  Patient is an 85 year old RH AA woman PMH of DM type 2 presented on 5/18 with slurred speech and dizziness. Patient could not ambulate properly. Presented to outside hospital and given tPA. over next few hours, her right side became plegic and she started to have a gaze deviation. CTA H&N performed on her which was positive for basilar occlusion. She was transfered to [facility] for endovascular.  Patient given IA tpa.     REVIEW OF SYSTEMS: No chest pain, shortness of breath, nausea, vomiting or diarhea.      PAST MEDICAL & SURGICAL HISTORY  DM2    FUNCTIONAL HISTORY:   Lives with family in apartment attached to home with 6 LOGAN and one flight.  PTA Independent with ambulation and ADLs    CURRENT FUNCTIONAL STATUS:  min A transfers; CG gait    RECENT LABS/IMAGING  CBC Full  -  ( 20 May 2018 21:32 )  WBC Count : 7.2 K/uL  Hemoglobin : 13.3 g/dL  Hematocrit : 40.3 %  Platelet Count - Automated : 174 K/uL  Mean Cell Volume : 79.8 fl  Mean Cell Hemoglobin : 26.4 pg  Mean Cell Hemoglobin Concentration : 33.1 gm/dL  Auto Neutrophil # : x  Auto Lymphocyte # : x  Auto Monocyte # : x  Auto Eosinophil # : x  Auto Basophil # : x  Auto Neutrophil % : x  Auto Lymphocyte % : x  Auto Monocyte % : x  Auto Eosinophil % : x  Auto Basophil % : x    05-21    137  |  101  |  7   ----------------------------<  189<H>  4.9   |  25  |  0.64    Ca    9.2      21 May 2018 21:40  Phos  3.3     05-21  Mg     2.0     05-21    VITALS  T(C): 36.8 (05-22-18 @ 07:00), Max: 36.9 (05-21-18 @ 19:00)  HR: 73 (05-22-18 @ 07:00) (59 - 79)  BP: 122/61 (05-22-18 @ 07:00) (122/61 - 164/76)  RR: 18 (05-22-18 @ 07:00) (15 - 23)  SpO2: 97% (05-22-18 @ 07:00) (94% - 99%)  Wt(kg): --    ALLERGIES  latex (Unknown)  penicillin (Unknown)    MEDICATIONS   acetaminophen   Tablet. 650 milliGRAM(s) Oral every 6 hours PRN  atorvastatin 80 milliGRAM(s) Oral at bedtime  brimonidine 0.2% Ophthalmic Solution 1 Drop(s) Both EYES two times a day  dextrose 40% Gel 15 Gram(s) Oral once PRN  dextrose 5%. 1000 milliLiter(s) IV Continuous <Continuous>  dextrose 50% Injectable 12.5 Gram(s) IV Push once  dextrose 50% Injectable 25 Gram(s) IV Push once  dextrose 50% Injectable 25 Gram(s) IV Push once  glucagon  Injectable 1 milliGRAM(s) IntraMuscular once PRN  heparin  Infusion 300 Unit(s)/Hr IV Continuous <Continuous>  insulin glargine Injectable (LANTUS) 5 Unit(s) SubCutaneous at bedtime  insulin lispro (HumaLOG) corrective regimen sliding scale   SubCutaneous four times a day before meals  latanoprost 0.005% Ophthalmic Solution 1 Drop(s) Both EYES at bedtime  ondansetron Injectable 4 milliGRAM(s) IV Push every 6 hours PRN    PHYSICAL EXAM  Constitutional - NAD, Comfortable  HEENT - NCAT, EOMI  Neck - Supple, No limited ROM  Chest - CTA bilaterally, No wheeze, No rhonchi, No crackles  Cardiovascular - RRR, S1S2, No murmurs  Abdomen - BS+, Soft, NTND  Extremities - No C/C/E, No calf tenderness   Neurologic Exam -                    Cognitive - Awake, Alert, AAO to self, place, date, year, situation     Communication - Fluent, No dysarthria, no aphasia     Cranial Nerves - CN 2-12 intact     Motor - No focal deficits                       Sensory - Intact to LT     Reflexes - DTR Intact, No primitive reflexive     Balance - WNL Static  Psychiatric - Mood stable, Affect WNL      Impression:   84 yo with CVA with gait dysfunction    Plan:  PT- ROM, Bed Mob, Transfers, Amb w AD and bracing as needed  OT- ADLs, bracing  SLP- Dysphagia eval and treat  Prec- Falls, Cardiac  DVT Prophylaxis- on heparin  Skin- Turn q2 h  Dispo- Cc: Gait dysfunction    HPI:  Patient is an 85 year old RH AA woman PMH of DM type 2 presented on 5/18 with slurred speech and dizziness. Patient could not ambulate properly. Presented to outside hospital and given tPA. over next few hours, her right side became plegic and she started to have a gaze deviation. CTA H&N performed on her which was positive for basilar occlusion. She was transfered to [facility] for endovascular.  Patient given IA tpa.     REVIEW OF SYSTEMS: + HA (controlled with medications), + poor balance No chest pain, shortness of breath, nausea, vomiting or diarhea.      PAST MEDICAL & SURGICAL HISTORY  DM2    FUNCTIONAL HISTORY:   Lives with family in apartment attached to home with 6 LOGAN and one flight.  PTA Independent with ambulation and ADLs    CURRENT FUNCTIONAL STATUS:  min A transfers; CG gait    RECENT LABS/IMAGING  CBC Full  -  ( 20 May 2018 21:32 )  WBC Count : 7.2 K/uL  Hemoglobin : 13.3 g/dL  Hematocrit : 40.3 %  Platelet Count - Automated : 174 K/uL  Mean Cell Volume : 79.8 fl  Mean Cell Hemoglobin : 26.4 pg  Mean Cell Hemoglobin Concentration : 33.1 gm/dL  Auto Neutrophil # : x  Auto Lymphocyte # : x  Auto Monocyte # : x  Auto Eosinophil # : x  Auto Basophil # : x  Auto Neutrophil % : x  Auto Lymphocyte % : x  Auto Monocyte % : x  Auto Eosinophil % : x  Auto Basophil % : x    05-21    137  |  101  |  7   ----------------------------<  189<H>  4.9   |  25  |  0.64    Ca    9.2      21 May 2018 21:40  Phos  3.3     05-21  Mg     2.0     05-21    VITALS  T(C): 36.8 (05-22-18 @ 07:00), Max: 36.9 (05-21-18 @ 19:00)  HR: 73 (05-22-18 @ 07:00) (59 - 79)  BP: 122/61 (05-22-18 @ 07:00) (122/61 - 164/76)  RR: 18 (05-22-18 @ 07:00) (15 - 23)  SpO2: 97% (05-22-18 @ 07:00) (94% - 99%)  Wt(kg): --    ALLERGIES  latex (Unknown)  penicillin (Unknown)    MEDICATIONS   acetaminophen   Tablet. 650 milliGRAM(s) Oral every 6 hours PRN  atorvastatin 80 milliGRAM(s) Oral at bedtime  brimonidine 0.2% Ophthalmic Solution 1 Drop(s) Both EYES two times a day  dextrose 40% Gel 15 Gram(s) Oral once PRN  dextrose 5%. 1000 milliLiter(s) IV Continuous <Continuous>  dextrose 50% Injectable 12.5 Gram(s) IV Push once  dextrose 50% Injectable 25 Gram(s) IV Push once  dextrose 50% Injectable 25 Gram(s) IV Push once  glucagon  Injectable 1 milliGRAM(s) IntraMuscular once PRN  heparin  Infusion 300 Unit(s)/Hr IV Continuous <Continuous>  insulin glargine Injectable (LANTUS) 5 Unit(s) SubCutaneous at bedtime  insulin lispro (HumaLOG) corrective regimen sliding scale   SubCutaneous four times a day before meals  latanoprost 0.005% Ophthalmic Solution 1 Drop(s) Both EYES at bedtime  ondansetron Injectable 4 milliGRAM(s) IV Push every 6 hours PRN    PHYSICAL EXAM  Constitutional - NAD, Comfortable  HEENT - NCAT, EOMI  Neck - Supple, No limited ROM  Chest - CTA bilaterally, No wheeze, No rhonchi, No crackles  Cardiovascular - RRR, S1S2, No murmurs  Abdomen - BS+, Soft, NTND  Extremities - No C/C/E, No calf tenderness   Neurologic Exam -                    Cognitive - Awake, Alert, AAO to self, place, date, year, situation     Motor - No focal deficits     Slight dysmetria                     Psychiatric - Mood stable, Affect WNL      Impression:   84 yo with CVA with gait dysfunction    Plan:  PT- ROM, Bed Mob, Transfers, Amb w AD   OT- ADLs  Prec- Falls, Cardiac  DVT Prophylaxis- on heparin  Skin- Turn q2 h  Dispo- Acute Rehab- can tolerate 3h/d PT/OT/SLP and requires daily physician visits

## 2018-05-22 NOTE — DIETITIAN INITIAL EVALUATION ADULT. - ENERGY NEEDS
Ht: 62 Wt: 145 pounds BMI: 26.5 kg/m2 IBW: 110 pounds(+/-10%) %%  No edema. No pressure ulcers documented.

## 2018-05-22 NOTE — DIETITIAN INITIAL EVALUATION ADULT. - OTHER INFO
Pt seen for length of stay in [facility]. Per chart,  pt with w/ basilar stenosis, posterior circulation stroke s/p tPA and stroke rescue. Pt's reports poor appetite and intake inhouse, observed lunch tray 50% consumed. Pt requesting Glucerna, 3 per day (provides additional 660cal, 30gm protein).  Pt denies any history of chewing/swallowing difficulty or GI distress at this time. Last BM today. Pt reports stable weight,  pounds, dosing weight 145 pounds. Pt declined to have any nutrition-related questions/concerns at this time. Pt made aware RD remains available.

## 2018-05-22 NOTE — OCCUPATIONAL THERAPY INITIAL EVALUATION ADULT - ADDITIONAL COMMENTS
CT Head 5/19: Status post endovascular management of basilar occlusion without evidence of a definite infarct. No CT evidence of hemorrhage, herniation, mass effect, or hydrocephalus.  LE Doppler (-)  Pt s/p angiography 5/18.

## 2018-05-22 NOTE — DIETITIAN INITIAL EVALUATION ADULT. - ADHERENCE
Pt with T2DM, reports mostly good compliance. Pt checks SMBG 1x daily, range of 110-150mg/dl. Pt noted to have HgbA1c (5/18) 7.5% indicating good glycemic control given pt's advanced age/good

## 2018-05-22 NOTE — PROGRESS NOTE ADULT - ASSESSMENT
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, neuro checks q4 hr, listed for floor under neuro service    PULM:  extubated, stable on RA, keep O2 >92%    CV:  SBP goal 120-180mmHg  TTE - EF (Visual Estimate): 65 %    RENAL:  Fluids: NS@50    GI:  Diet: clear liquid  GI prophylaxis [X] not indicated [] PPI: intubated [] other:  Bowel regimen [x] colace [x] senna [] other:    [id number] 7.5%, ISS, lantus 5u qhs  Goal euglycemia (-180)  RISS    HEME/ONC:  VTE prophylaxis: [x] SCDs [X] chemoprophylaxis heparin infusion [x] high risk of DVT/PE on admission due to: baseline impaired ambulation -- LE doppler 5/18 negative for DVT    ID:  Monitor for fever    SOCIAL/FAMILY:  [x] awaiting [] updated at bedside [] family meeting    CODE STATUS:  [x] Full Code [] DNR [] DNI [] Palliative/Comfort Care    DISPOSITION:  [] ICU [] Stroke Unit [x] Floor [] EMU [] RCU [] PCU      Time spent: [] critical care minutes 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, neuro checks q4 hr, listed for floor under neuro service  -continue heparin gtt with goal aptt between 40-60    PULM:  extubated, stable on RA, keep O2 >92%    CV:  SBP goal 120-180mmHg  TTE - EF (Visual Estimate): 65 %    RENAL:  Fluids: NS@50    GI:  Diet: clear liquid  GI prophylaxis [X] not indicated [] PPI: intubated [] other:  Bowel regimen [x] colace [x] senna [] other:    [id number] 7.5%, ISS, lantus 5u qhs  Goal euglycemia (-180)  RISS    HEME/ONC:  VTE prophylaxis: [x] SCDs [X] chemoprophylaxis heparin infusion [x] high risk of DVT/PE on admission due to: baseline impaired ambulation -- LE doppler 5/18 negative for DVT    ID:  Monitor for fever    SOCIAL/FAMILY:  [x] awaiting [] updated at bedside [] family meeting    CODE STATUS:  [x] Full Code [] DNR [] DNI [] Palliative/Comfort Care    DISPOSITION:  [] ICU [] Stroke Unit [x] Floor [] EMU [] RCU [] PCU      Time spent: [] critical care minutes

## 2018-05-22 NOTE — PROGRESS NOTE ADULT - SUBJECTIVE AND OBJECTIVE BOX
HPI:  Ms. Olivia is an 85 year-old right handed [place] woman with stroke risk factor of DM type 2 with baseline unsteadiness with walking who presented with sudden onset slurred speech associated with dizziness (though not spinning sensation) at 8:45pm on 5/17/18. Her daughter  her mother got weak in both legs, felt an itch all over her face and had numbness in both arms. She was initially brought to the [facility] ER where she vomited thrice. Dysarthria initially improved, though did not resolve. In addition, ambulation more unsteady. Admission NIHSS was 1. She was given IV tPA at 10:17pm. She got progressively worse over next few hours with new right sided plegia and gaze deviation. CTA performed which revealed basilar occlusion. She was transferred to [facility] for endovascular rescue. 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 Catheter wire passed past stenosis but no significant clot removed; however, trickle flow obtained. Also, basilar noted to be filling retrograde via posterior communicating artery.    5/18 extubated    OVERNIGHT EVENTS:   no events overnight    VITALS:  T(C): 36.8 (05-21-18 @ 23:00), Max: 36.9 (05-20-18 @ 23:00)  HR: 60 (05-22-18 @ 03:00) (60 - 81)  BP: 156/71 (05-21-18 @ 23:00)  BP(mean):  97  (05-21-18 @ 23:00)  ABP: 156/79 (05-21-18 @ 11:00) (118/65 - 180/76)  ABP(mean): 109 (05-21-18 @ 11:00) (84 - 119)  RR: 20 (05-21-18 @ 11:00) (17 - 29)  SpO2: 98% (05-22-18 @ 03:00) (95% - 99%)  Wt(kg): --  CVP(mm Hg): --  CI: --  CAPILLARY BLOOD GLUCOSE    POCT Blood Glucose.: 197 mg/dL (21 May 2018 21:45)  POCT Blood Glucose.: 127 mg/dL (21 May 2018 05:44)  POCT Blood Glucose.: 141 mg/dL (20 May 2018 22:54)  POCT Blood Glucose.: 117 mg/dL (20 May 2018 17:55)  POCT Blood Glucose.: 161 mg/dL (20 May 2018 12:09)   N/A      05-22 @ 07:00  -  05-21 @ 07:00  --------------------------------------------------------  IN:    heparin Infusion: 168 mL    Oral Fluid: 240 mL    sodium chloride 0.9%.: 200 mL  Total IN: 608 mL    OUT:    Voided: 1750 mL  Total OUT: 1750 mL    Total NET: -1142 mL      05-21 @ 07:01  -  05-21 @ 11:19  --------------------------------------------------------  IN:    heparin Infusion: 21 mL    sodium chloride 0.9%.: 150 mL  Total IN: 171 mL    OUT:    Voided: 300 mL  Total OUT: 300 mL    Total NET: -129 mL          LABS:  CBC (05-21 @ 21:40)                          13.3                     7.2     )--------------(  174        --    % Neuts, --    % Lymphs, ANC: --                              40.3      BMP (05-21 @ 21:40)       137     |  101     |  7<L>  		Ca++ 9.2      Ca 8.L>       ---------------------------------( 160<H>		Mg 2.0          4.9  |  25      |  0.64  			Ph 3.3      Coags (05-20 @ 21:32)  aPTT 46.4<H> / INR -- / PT --  Coags (05-21 @ 21:40)  aPTT 44.4<H> / INR -- / PT --        IMAGING:   Recent imaging studies were reviewed.    MEDICATIONS  (STANDING):  atorvastatin 80 milliGRAM(s) Oral at bedtime  brimonidine 0.2% Ophthalmic Solution 1 Drop(s) Both EYES two times a day  dextrose 5%. 1000 milliLiter(s) (50 mL/Hr) IV Continuous <Continuous>  dextrose 50% Injectable 12.5 Gram(s) IV Push once  dextrose 50% Injectable 25 Gram(s) IV Push once  dextrose 50% Injectable 25 Gram(s) IV Push once  heparin  Infusion 300 Unit(s)/Hr (7 mL/Hr) IV Continuous <Continuous>  insulin glargine Injectable (LANTUS) 5 Unit(s) SubCutaneous at bedtime  insulin lispro (HumaLOG) corrective regimen sliding scale   SubCutaneous four times a day before meals  latanoprost 0.005% Ophthalmic Solution 1 Drop(s) Both EYES at bedtime    MEDICATIONS  (PRN):  acetaminophen   Tablet. 650 milliGRAM(s) Oral every 6 hours PRN Moderate Pain (4 - 6)  dextrose 40% Gel 15 Gram(s) Oral once PRN Blood Glucose LESS THAN 70 milliGRAM(s)/deciliter  glucagon  Injectable 1 milliGRAM(s) IntraMuscular once PRN Glucose LESS THAN 70 milligrams/deciliter  ondansetron Injectable 4 milliGRAM(s) IV Push every 6 hours PRN Nausea and/or Vomiting      EXAMINATION:  General: No acute distress  Neurologic: AOx3, follows commands, symmetrical strength at least 5+ throughout, no drift  HEENT: Anicteric sclerae  Cardiac: [id number]  Lungs: Clear anteriorly, decreased breath sounds at bases  Abdomen: Soft, non-tender, +BS  Extremities: No groin hematoma  Skin/Incision Site: Clean, dry and intact

## 2018-05-23 LAB
APTT BLD: 39.6 SEC — HIGH (ref 27.5–37.4)
APTT BLD: 47.4 SEC — HIGH (ref 27.5–37.4)
GLUCOSE BLDC GLUCOMTR-MCNC: 122 MG/DL — HIGH (ref 70–99)
GLUCOSE BLDC GLUCOMTR-MCNC: 142 MG/DL — HIGH (ref 70–99)
GLUCOSE BLDC GLUCOMTR-MCNC: 214 MG/DL — HIGH (ref 70–99)
GLUCOSE BLDC GLUCOMTR-MCNC: 80 MG/DL — SIGNIFICANT CHANGE UP (ref 70–99)

## 2018-05-23 PROCEDURE — 99233 SBSQ HOSP IP/OBS HIGH 50: CPT

## 2018-05-23 PROCEDURE — 70450 CT HEAD/BRAIN W/O DYE: CPT | Mod: 26

## 2018-05-23 RX ORDER — HEPARIN SODIUM 5000 [USP'U]/ML
700 INJECTION INTRAVENOUS; SUBCUTANEOUS
Qty: 25000 | Refills: 0 | Status: DISCONTINUED | OUTPATIENT
Start: 2018-05-23 | End: 2018-05-24

## 2018-05-23 RX ORDER — INSULIN LISPRO 100/ML
VIAL (ML) SUBCUTANEOUS
Qty: 0 | Refills: 0 | Status: DISCONTINUED | OUTPATIENT
Start: 2018-05-23 | End: 2018-05-30

## 2018-05-23 RX ADMIN — LATANOPROST 1 DROP(S): 0.05 SOLUTION/ DROPS OPHTHALMIC; TOPICAL at 21:44

## 2018-05-23 RX ADMIN — BRIMONIDINE TARTRATE 1 DROP(S): 2 SOLUTION/ DROPS OPHTHALMIC at 17:42

## 2018-05-23 RX ADMIN — Medication 4: at 12:01

## 2018-05-23 RX ADMIN — HEPARIN SODIUM 7 UNIT(S)/HR: 5000 INJECTION INTRAVENOUS; SUBCUTANEOUS at 12:15

## 2018-05-23 RX ADMIN — ATORVASTATIN CALCIUM 80 MILLIGRAM(S): 80 TABLET, FILM COATED ORAL at 21:43

## 2018-05-23 RX ADMIN — BRIMONIDINE TARTRATE 1 DROP(S): 2 SOLUTION/ DROPS OPHTHALMIC at 05:47

## 2018-05-23 RX ADMIN — INSULIN GLARGINE 5 UNIT(S): 100 INJECTION, SOLUTION SUBCUTANEOUS at 21:43

## 2018-05-23 RX ADMIN — HEPARIN SODIUM 7 UNIT(S)/HR: 5000 INJECTION INTRAVENOUS; SUBCUTANEOUS at 07:12

## 2018-05-23 NOTE — PROGRESS NOTE ADULT - ASSESSMENT
ASSESSMENT: 84 Y/O woman with Multiple  vascular risk factors: age and DM II is evaluated at [facility] for acute onset of right sided weakness. She was last normal at around 8:45 PM on day of admission, when she was walking in the shopping mall with her daughter. She suddenly developed acute onset of dizziness, imbalance and slurring of speech. She also reports to have noticed right hand weakness/numbess at the same time. She presented to [facility], where CT brain did not show any evidence of acute infarct or hemorrhage. She was treated with IV tPA. At around 12:00-12:30 AM, she was noted to have acute onset of right sided weakness involving face, arm and leg with severe dysarthria. CT brain following right hemiplegia did not show any evidence of acute infarct or hemorrhage. 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 She was subsequently transferred to [facility]  for further management. She underwent conventional angiogram with intentions for mechanical thrombectomy, which was not possible secondary to technical difficulties and she was subsequently treated with IA tPA with the partial recanalization of the previously noted, basilar occlusion. CT brain performed subsequently on 5/19/18 did not show any evidence of acute infarct or hemorrhage (see above). Repeat MRI  5/22/18 evolution of bilateral cerebral Infarcts with small micro hemorrhages Patients neurological exam remains stable. Patient to continue Heparin drip at recommended target range discussed with NSCU team as well as Dr. Deleon and also recommend to maintain Systolic blood pressure in this setting to 140-160 systolic. Patient is neurological stable to transfer to the stroke unit.      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 symptomatic intracranial (basilar artery) atherosclerosis and superimposed local thrombosis, leading to occlusion of pontine perforators     NEURO: Neurologically patient  exam remains unchanged, she continues to improve and is much returned to her neurological baseline. Continue close monitoring for neurologic deterioration. Maintain systolic blood pressure 140-160 avoid hypotension. Course of next few weeks progress to gradual normotension in setting of recent recanalization. Continue atorvastatin 80 mg at bedtime considering atheroembolic etiology of her stroke; further dose titration as per LDL goal less than 70. Will continue Heparin infusion at this time with PTT goal 40-60. The potential for oral anticoagulation need as outpatient has been discussed with patient and patient's family, all questions answered. Time of transition to oral agent is under consideration. Physical therapy/OT/Speech eval/treatment as patient tolerates Patient is deemed neurologically stable to transfer to stroke unit discussed with NSCU team.      ANTITHROMBOTIC THERAPY:  Cautious therapeutic anticoagulation with heparin drip, dose titration to achieve PTT goal 40-60; in the setting of an unstable atherosclerotic plaque involving the basilar artery and  now small amount micro hemorrhage in otherwise neurologically stable/unchanged exam. Subsequently, may consider Coumadin for 3 months and aspirin indefinitely (modified SAMMPRIS protocol) versus Plavix for 3 months and [facility] Final determination pending.     PULMONARY:  Protecting airway, saturating well.     CARDIOVASCULAR:  TTE: ef 70%, mild MR, minimal AR, mild TR  continue with telemetry to screen for possible cardiac source of embolism; Telemetry monitoring to screen for arrythmia                             BP goal. Recommend Maintenance of Systolic b/p of 140-160. ; avoid hypotension    GASTROINTESTINAL: dysphagia screen passed       Diet: Regular, consistent carb    RENAL: BUN/Cr 12/0.73  (5/22/18), good urine output      Na Goal: Greater than 135     [place]:  n    HEMATOLOGY: H/H 14.6/44.6, Platelets 186 (5/22/18)     DVT ppx: Cautious therapeutic integration with heparin drip     ID: afebrile overnight. No leukocytosis (5/22/18).    DISPOSITION: Rehab or home depending on PT eval once stable and workup is complete.     CORE MEASURES:        Admission NIHSS: 12     TPA: [x] YES [] NO      LDL/HDL: 94/69     Depression Screen: Passed     Statin Therapy: Yes     Dysphagia Screen: [x] PASS [] FAIL     Smoking [] YES [x] NO      Afib [] YES [x] NO     Stroke Education [x] YES [] NO

## 2018-05-23 NOTE — PROGRESS NOTE ADULT - SUBJECTIVE AND OBJECTIVE BOX
THE PATIENT WAS SEEN AND EXAMINED BY ME WITH THE HOUSESTAFF AND STROKE TEAM DURING MORNING ROUNDS.   HPI:  84 y/o female woman with DM II was transferred from [facility] for acute onset of dysarthria and right sided weakness. She was last normal at around 8:45 PM 5/17, when she was walking in the shopping mall with her daughter. She suddenly developed acute onset of dizziness, imbalance and slurring of speech. She also reports to have noticed right hand weakness/numbess at the same time. She was treated with IV tPA. At around 12:00-12:30 AM 5/18, she was noted to have acute onset of right sided weakness involving face, arm and leg with severe dysarthria.  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 Catheter wire passed past stenosis but no significant clot removed; however, trickle flow obtained. Also, basilar noted to be filling retrograde via posterior communicating artery.        SUBJECTIVE: No events overnight.  No new neurologic complaints.     acetaminophen   Tablet. 650 milliGRAM(s) Oral every 6 hours PRN  atorvastatin 80 milliGRAM(s) Oral at bedtime  brimonidine 0.2% Ophthalmic Solution 1 Drop(s) Both EYES two times a day  dextrose 40% Gel 15 Gram(s) Oral once PRN  dextrose 5%. 1000 milliLiter(s) IV Continuous <Continuous>  dextrose 50% Injectable 12.5 Gram(s) IV Push once  dextrose 50% Injectable 25 Gram(s) IV Push once  dextrose 50% Injectable 25 Gram(s) IV Push once  glucagon  Injectable 1 milliGRAM(s) IntraMuscular once PRN  heparin  Infusion 700 Unit(s)/Hr IV Continuous <Continuous>  insulin glargine Injectable (LANTUS) 5 Unit(s) SubCutaneous at bedtime  insulin lispro (HumaLOG) corrective regimen sliding scale   SubCutaneous Before meals and at bedtime  latanoprost 0.005% Ophthalmic Solution 1 Drop(s) Both EYES at bedtime  ondansetron Injectable 4 milliGRAM(s) IV Push every 6 hours PRN      PHYSICAL EXAM:   Vital Signs Last 24 Hrs  T(C): 36.8 (23 May 2018 15:00), Max: 37.1 (22 May 2018 19:00)  T(F): 98.3 (23 May 2018 15:00), Max: 98.8 (22 May 2018 19:00)  HR: 72 (23 May 2018 15:00) (64 - 86)  BP: 121/54 (23 May 2018 15:00) (121/54 - 157/71)  BP(mean): 77 (23 May 2018 15:00) (77 - 96)  RR: 23 (23 May 2018 15:00) (17 - 24)  SpO2: 98% (23 May 2018 15:00) (96% - 98%)      General: Awake. Smiling. Resting in bed. Said " I feel good".   No acute distress  HEENT: Normocephalic, atraumatic,  EOM intact, visual fields full  Abdomen: Soft, nontender, nondistended   Extremities: No edema    NEUROLOGICAL EXAM:  Mental status: Awake, alert, oriented to person/place/time, events, follows all commands.    Cranial Nerves: Mild right facial droop, No Nystagmus. No dysarthria.    Motor exam: Normal tone, no drift. Moving all  moves extremities well against gravity. No appreciable hemiparesis on this exam.    Sensation: Intact to light touch   Coordination/ Gait: Not assessed..      LABS:                        14.6   7.2   )-----------( 186      ( 22 May 2018 21:54 )             44.6    05-22    137  |  101  |  12  ----------------------------<  233<H>  4.6   |  26  |  0.73    Ca    9.0      22 May 2018 21:54  Phos  3.0     05-22  Mg     2.0     05-22    PTT - ( 23 May 2018 04:27 )  PTT:47.4 sec  Hemoglobin A1C, Whole Blood: 7.5 % (05-18 @ 07:53)      IMAGING: Reviewed by me.      MR Head w/wo IV Cont (05.22.18 @ 18:41)     IMPRESSION:  Evolving infarction in the the bilateral cerebellar hemispheres,   midbrain, and mala with hemorrhagic transformation in the left cerebellar   hemisphere.  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  Intracranial arterial stenoses,calcified  carotid siphons likely from   diabetes, and tortuous vessels likely hypertensive without   hemodynamically significant stenosis at the ICA origins.      Transthoracic Echocardiogram (05.19.18 @ 05:29)     Conclusions:  1. Normal left ventricular internal dimensions and wall  thicknesses.  2. Normal left ventricular systolic function. No segmental  wall motion abnormalities.  3. Normal diastolic function  4. Normal right ventricular size and function.  *** No previous Echo exam.     CT Head No Cont (05.19.18 @ 10:33)     IMPRESSION:   Status post endovascular management of basilar occlusion without evidence   of a definite infarct. No CT evidence of hemorrhage, herniation, mass   effect, or hydrocephalus.

## 2018-05-23 NOTE — PROGRESS NOTE ADULT - SUBJECTIVE AND OBJECTIVE BOX
HPI:  Ms. Olivia is an 85 year-old right handed [place] woman with stroke risk factor of DM type 2 with baseline unsteadiness with walking who presented with sudden onset slurred speech associated with dizziness (though not spinning sensation) at 8:45pm on 5/17/18. Her daughter  her mother got weak in both legs, felt an itch all over her face and had numbness in both arms. She was initially brought to the [facility] ER where she vomited thrice. Dysarthria initially improved, though did not resolve. In addition, ambulation more unsteady. Admission NIHSS was 1. She was given IV tPA at 10:17pm. She got progressively worse over next few hours with new right sided plegia and gaze deviation. CTA performed which revealed basilar occlusion. She was transferred to [facility] for endovascular rescue. 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 Catheter wire passed past stenosis but no significant clot removed; however, trickle flow obtained. Also, basilar noted to be filling retrograde via posterior communicating artery.    5/18 extubated    VITALS:  T(C): , Max: 37.3 (05-22-18 @ 15:00)  HR:  (64 - 86)  BP:  (126/65 - 157/71)  ABP: --  RR:  (17 - 24)  SpO2:  (96% - 97%)  Wt(kg): --      LABS:  Na: 137 (05-22 @ 21:54), 137 (05-21 @ 21:40), 138 (05-20 @ 21:32), 139 (05-20 @ 17:53)  K: 4.6 (05-22 @ 21:54), 4.9 (05-21 @ 21:40), 3.4 (05-20 @ 21:32), 3.5 (05-20 @ 17:53)  Cl: 101 (05-22 @ 21:54), 101 (05-21 @ 21:40), 105 (05-20 @ 21:32), 105 (05-20 @ 17:53)  CO2: 26 (05-22 @ 21:54), 25 (05-21 @ 21:40), 23 (05-20 @ 21:32), 24 (05-20 @ 17:53)  BUN: 12 (05-22 @ 21:54), 7 (05-21 @ 21:40), 5 (05-20 @ 21:32), 5 (05-20 @ 17:53)  Cr: 0.73 (05-22 @ 21:54), 0.64 (05-21 @ 21:40), 0.55 (05-20 @ 21:32), 0.57 (05-20 @ 17:53)  Glu:     Hgb: 14.6 (05-22 @ 21:54), 13.3 (05-20 @ 21:32), 13.6 (05-20 @ 17:53)  Hct: 44.6 (05-22 @ 21:54), 40.3 (05-20 @ 21:32), 41.4 (05-20 @ 17:53)  WBC: 7.2 (05-22 @ 21:54), 7.2 (05-20 @ 21:32), 8.0 (05-20 @ 17:53)  Plt: 186 (05-22 @ 21:54), 174 (05-20 @ 21:32), 179 (05-20 @ 17:53)    PT: -- (05-23 @ 04:27)  INR: -- (05-23 @ 04:27)  aPTT: 47.4 (05-23 @ 04:27)  PT: -- (05-22 @ 21:54)  INR: -- (05-22 @ 21:54)  aPTT: 39.2 (05-22 @ 21:54)    IMAGING:   Recent imaging studies were reviewed.    MEDICATIONS:  acetaminophen   Tablet. 650 milliGRAM(s) Oral every 6 hours PRN  atorvastatin 80 milliGRAM(s) Oral at bedtime  brimonidine 0.2% Ophthalmic Solution 1 Drop(s) Both EYES two times a day  dextrose 40% Gel 15 Gram(s) Oral once PRN  dextrose 5%. 1000 milliLiter(s) IV Continuous <Continuous>  dextrose 50% Injectable 12.5 Gram(s) IV Push once  dextrose 50% Injectable 25 Gram(s) IV Push once  dextrose 50% Injectable 25 Gram(s) IV Push once  glucagon  Injectable 1 milliGRAM(s) IntraMuscular once PRN  insulin glargine Injectable (LANTUS) 5 Unit(s) SubCutaneous at bedtime  insulin lispro (HumaLOG) corrective regimen sliding scale   SubCutaneous Before meals and at bedtime  latanoprost 0.005% Ophthalmic Solution 1 Drop(s) Both EYES at bedtime  ondansetron Injectable 4 milliGRAM(s) IV Push every 6 hours PRN    EXAMINATION:  General: No acute distress  Neurologic: AOx3, follows commands, symmetrical strength at least 5+ throughout, no drift  HEENT: Anicteric sclerae  Cardiac: [id number]  Lungs: Clear anteriorly, decreased breath sounds at bases  Abdomen: Soft, non-tender, +BS  Extremities: No groin hematoma  Skin/Incision Site: Clean, dry and intact HPI:  Ms. Olivia is an 85 year-old right handed [place] woman with stroke risk factor of DM type 2 with baseline unsteadiness with walking who presented with sudden onset slurred speech associated with dizziness (though not spinning sensation) at 8:45pm on 5/17/18. Her daughter  her mother got weak in both legs, felt an itch all over her face and had numbness in both arms. She was initially brought to the [facility] ER where she vomited thrice. Dysarthria initially improved, though did not resolve. In addition, ambulation more unsteady. Admission NIHSS was 1. She was given IV tPA at 10:17pm. She got progressively worse over next few hours with new right sided plegia and gaze deviation. CTA performed which revealed basilar occlusion. She was transferred to [facility] for endovascular rescue. 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 Catheter wire passed past stenosis but no significant clot removed; however, trickle flow obtained. Also, basilar noted to be filling retrograde via posterior communicating artery.    5/18 extubated    VITALS:  T(C): , Max: 37.3 (05-22-18 @ 15:00)  HR:  (64 - 86)  BP:  (126/65 - 157/71)  ABP: --  RR:  (17 - 24)  SpO2:  (96% - 97%)  Wt(kg): --      LABS:  Na: 137 (05-22 @ 21:54), 137 (05-21 @ 21:40), 138 (05-20 @ 21:32), 139 (05-20 @ 17:53)  K: 4.6 (05-22 @ 21:54), 4.9 (05-21 @ 21:40), 3.4 (05-20 @ 21:32), 3.5 (05-20 @ 17:53)  Cl: 101 (05-22 @ 21:54), 101 (05-21 @ 21:40), 105 (05-20 @ 21:32), 105 (05-20 @ 17:53)  CO2: 26 (05-22 @ 21:54), 25 (05-21 @ 21:40), 23 (05-20 @ 21:32), 24 (05-20 @ 17:53)  BUN: 12 (05-22 @ 21:54), 7 (05-21 @ 21:40), 5 (05-20 @ 21:32), 5 (05-20 @ 17:53)  Cr: 0.73 (05-22 @ 21:54), 0.64 (05-21 @ 21:40), 0.55 (05-20 @ 21:32), 0.57 (05-20 @ 17:53)  Glu:     Hgb: 14.6 (05-22 @ 21:54), 13.3 (05-20 @ 21:32), 13.6 (05-20 @ 17:53)  Hct: 44.6 (05-22 @ 21:54), 40.3 (05-20 @ 21:32), 41.4 (05-20 @ 17:53)  WBC: 7.2 (05-22 @ 21:54), 7.2 (05-20 @ 21:32), 8.0 (05-20 @ 17:53)  Plt: 186 (05-22 @ 21:54), 174 (05-20 @ 21:32), 179 (05-20 @ 17:53)    PT: -- (05-23 @ 04:27)  INR: -- (05-23 @ 04:27)  aPTT: 47.4 (05-23 @ 04:27)  PT: -- (05-22 @ 21:54)  INR: -- (05-22 @ 21:54)  aPTT: 39.2 (05-22 @ 21:54)    IMAGING:   Recent imaging studies were reviewed.    MEDICATIONS:  acetaminophen   Tablet. 650 milliGRAM(s) Oral every 6 hours PRN  atorvastatin 80 milliGRAM(s) Oral at bedtime  brimonidine 0.2% Ophthalmic Solution 1 Drop(s) Both EYES two times a day  dextrose 40% Gel 15 Gram(s) Oral once PRN  dextrose 5%. 1000 milliLiter(s) IV Continuous <Continuous>  dextrose 50% Injectable 12.5 Gram(s) IV Push once  dextrose 50% Injectable 25 Gram(s) IV Push once  dextrose 50% Injectable 25 Gram(s) IV Push once  glucagon  Injectable 1 milliGRAM(s) IntraMuscular once PRN  insulin glargine Injectable (LANTUS) 5 Unit(s) SubCutaneous at bedtime  insulin lispro (HumaLOG) corrective regimen sliding scale   SubCutaneous Before meals and at bedtime  latanoprost 0.005% Ophthalmic Solution 1 Drop(s) Both EYES at bedtime  ondansetron Injectable 4 milliGRAM(s) IV Push every 6 hours PRN    EXAMINATION:  General: No acute distress  Neurologic: AOx3, follows commands, symmetrical strength 5/5 throughout, no drift  HEENT: Anicteric sclerae  Cardiac: [id number]  Lungs: Clear anteriorly  Abdomen: Soft, non-tender, +BS  Extremities: no peripheral edema  Skin/Incision Site: Clean, dry and intact

## 2018-05-23 NOTE — PROGRESS NOTE ADULT - ASSESSMENT
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, neuro checks q4 hr, listed for floor under neuro service  -discontinue heparin, start dual antiplatelet tx and statin    PULM:  extubated, stable on RA, keep O2 >92%    CV:  SBP goal 120-180mmHg  TTE - EF (Visual Estimate): 65 %    RENAL:  Fluids: NS@50    GI:  Diet: clear liquid  GI prophylaxis [X] not indicated [] PPI: intubated [] other:  Bowel regimen [x] colace [x] senna [] other:    [id number] 7.5%, ISS, lantus 5u qhs  Goal euglycemia (-180)  RISS    HEME/ONC:  VTE prophylaxis: [x] SCDs [X] chemoprophylaxis heparin infusion [x] high risk of DVT/PE on admission due to: baseline impaired ambulation -- LE doppler 5/18 negative for DVT    ID:  Monitor for fever    SOCIAL/FAMILY:  [x] awaiting [] updated at bedside [] family meeting    CODE STATUS:  [x] Full Code [] DNR [] DNI [] Palliative/Comfort Care    DISPOSITION:  [] ICU [] Stroke Unit [x] Floor [] EMU [] RCU [] PCU 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, neuro checks q4 hr, listed for floor under neuro service  -continue heparin gtt with goal aptt between 40-60    PULM:  extubated, stable on RA, keep O2 >92%    CV:  SBP goal 120-180mmHg  TTE - EF (Visual Estimate): 65 %    RENAL:  Fluids: NS@50    GI:  Diet: clear liquid  GI prophylaxis [X] not indicated [] PPI: intubated [] other:  Bowel regimen [x] colace [x] senna [] other:    [id number] 7.5%, ISS, lantus 5u qhs  Goal euglycemia (-180)  RISS    HEME/ONC:  VTE prophylaxis: [x] SCDs [X] chemoprophylaxis heparin infusion [x] high risk of DVT/PE on admission due to: baseline impaired ambulation -- LE doppler 5/18 negative for DVT    ID:  Monitor for fever    SOCIAL/FAMILY:  [x] awaiting [] updated at bedside [] family meeting    CODE STATUS:  [x] Full Code [] DNR [] DNI [] Palliative/Comfort Care    DISPOSITION:  [] ICU [] Stroke Unit [x] Floor [] EMU [] RCU [] PCU 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, neuro checks q4 hr, listed for floor under neuro service  -continue heparin gtt with goal aptt between 40-60    PULM:  extubated, stable on RA, keep O2 >92%    CV:  SBP goal 120-180mmHg  TTE - EF (Visual Estimate): 65 %    RENAL:  Fluids: NS@50    GI:  Diet: clear liquid  GI prophylaxis [X] not indicated [] PPI: intubated [] other:  Bowel regimen [x] colace [x] senna [] other:    SVKKg0h 7.5%, ISS, lantus 5u qhs  Goal euglycemia (-180)  RISS    HEME/ONC:  VTE prophylaxis: [x] SCDs [X] chemoprophylaxis heparin infusion [x] high risk of DVT/PE on admission due to: baseline impaired ambulation -- LE doppler 5/18 negative for DVT    ID:  Monitor for fever    SOCIAL/FAMILY:  [x] awaiting [] updated at bedside [] family meeting    CODE STATUS:  [x] Full Code [] DNR [] DNI [] Palliative/Comfort Care    DISPOSITION:  [] ICU [x] Stroke Unit [] Floor [] EMU [] RCU [] PCU

## 2018-05-24 LAB
ANION GAP SERPL CALC-SCNC: 11 MMOL/L — SIGNIFICANT CHANGE UP (ref 5–17)
APTT BLD: 39.3 SEC — HIGH (ref 27.5–37.4)
APTT BLD: 42.8 SEC — HIGH (ref 27.5–37.4)
APTT BLD: 45.6 SEC — HIGH (ref 27.5–37.4)
APTT BLD: 46.7 SEC — HIGH (ref 27.5–37.4)
BUN SERPL-MCNC: 11 MG/DL — SIGNIFICANT CHANGE UP (ref 7–23)
CALCIUM SERPL-MCNC: 9.3 MG/DL — SIGNIFICANT CHANGE UP (ref 8.4–10.5)
CHLORIDE SERPL-SCNC: 100 MMOL/L — SIGNIFICANT CHANGE UP (ref 96–108)
CO2 SERPL-SCNC: 27 MMOL/L — SIGNIFICANT CHANGE UP (ref 22–31)
CREAT SERPL-MCNC: 0.66 MG/DL — SIGNIFICANT CHANGE UP (ref 0.5–1.3)
GLUCOSE BLDC GLUCOMTR-MCNC: 129 MG/DL — HIGH (ref 70–99)
GLUCOSE BLDC GLUCOMTR-MCNC: 133 MG/DL — HIGH (ref 70–99)
GLUCOSE BLDC GLUCOMTR-MCNC: 181 MG/DL — HIGH (ref 70–99)
GLUCOSE BLDC GLUCOMTR-MCNC: 190 MG/DL — HIGH (ref 70–99)
GLUCOSE SERPL-MCNC: 136 MG/DL — HIGH (ref 70–99)
HCT VFR BLD CALC: 46.1 % — HIGH (ref 34.5–45)
HGB BLD-MCNC: 14.6 G/DL — SIGNIFICANT CHANGE UP (ref 11.5–15.5)
INR BLD: 1.18 RATIO — HIGH (ref 0.88–1.16)
MCHC RBC-ENTMCNC: 25.2 PG — LOW (ref 27–34)
MCHC RBC-ENTMCNC: 31.5 GM/DL — LOW (ref 32–36)
MCV RBC AUTO: 79.8 FL — LOW (ref 80–100)
PLATELET # BLD AUTO: 182 K/UL — SIGNIFICANT CHANGE UP (ref 150–400)
POTASSIUM SERPL-MCNC: 4.1 MMOL/L — SIGNIFICANT CHANGE UP (ref 3.5–5.3)
POTASSIUM SERPL-SCNC: 4.1 MMOL/L — SIGNIFICANT CHANGE UP (ref 3.5–5.3)
PROTHROM AB SERPL-ACNC: 12.9 SEC — HIGH (ref 9.8–12.7)
RBC # BLD: 5.78 M/UL — HIGH (ref 3.8–5.2)
RBC # FLD: 14.2 % — SIGNIFICANT CHANGE UP (ref 10.3–14.5)
SODIUM SERPL-SCNC: 138 MMOL/L — SIGNIFICANT CHANGE UP (ref 135–145)
WBC # BLD: 7.6 K/UL — SIGNIFICANT CHANGE UP (ref 3.8–10.5)
WBC # FLD AUTO: 7.6 K/UL — SIGNIFICANT CHANGE UP (ref 3.8–10.5)

## 2018-05-24 PROCEDURE — 99232 SBSQ HOSP IP/OBS MODERATE 35: CPT

## 2018-05-24 PROCEDURE — 99233 SBSQ HOSP IP/OBS HIGH 50: CPT

## 2018-05-24 RX ORDER — BRIMONIDINE TARTRATE 2 MG/MG
1 SOLUTION/ DROPS OPHTHALMIC
Qty: 0 | Refills: 0 | DISCHARGE
Start: 2018-05-24

## 2018-05-24 RX ORDER — ATORVASTATIN CALCIUM 80 MG/1
1 TABLET, FILM COATED ORAL
Qty: 0 | Refills: 0 | DISCHARGE
Start: 2018-05-24

## 2018-05-24 RX ORDER — HEPARIN SODIUM 5000 [USP'U]/ML
750 INJECTION INTRAVENOUS; SUBCUTANEOUS
Qty: 25000 | Refills: 0 | Status: DISCONTINUED | OUTPATIENT
Start: 2018-05-24 | End: 2018-05-26

## 2018-05-24 RX ORDER — WARFARIN SODIUM 2.5 MG/1
1 TABLET ORAL
Qty: 0 | Refills: 0 | COMMUNITY
Start: 2018-05-24

## 2018-05-24 RX ORDER — WARFARIN SODIUM 2.5 MG/1
5 TABLET ORAL ONCE
Qty: 0 | Refills: 0 | Status: COMPLETED | OUTPATIENT
Start: 2018-05-24 | End: 2018-05-24

## 2018-05-24 RX ORDER — INSULIN GLARGINE 100 [IU]/ML
5 INJECTION, SOLUTION SUBCUTANEOUS
Qty: 0 | Refills: 0 | DISCHARGE
Start: 2018-05-24

## 2018-05-24 RX ORDER — INSULIN LISPRO 100/ML
1 VIAL (ML) SUBCUTANEOUS
Qty: 200 | Refills: 0
Start: 2018-05-24

## 2018-05-24 RX ORDER — LATANOPROST 0.05 MG/ML
1 SOLUTION/ DROPS OPHTHALMIC; TOPICAL
Qty: 0 | Refills: 0 | DISCHARGE
Start: 2018-05-24

## 2018-05-24 RX ADMIN — HEPARIN SODIUM 7.5 UNIT(S)/HR: 5000 INJECTION INTRAVENOUS; SUBCUTANEOUS at 00:46

## 2018-05-24 RX ADMIN — Medication 2: at 12:36

## 2018-05-24 RX ADMIN — BRIMONIDINE TARTRATE 1 DROP(S): 2 SOLUTION/ DROPS OPHTHALMIC at 17:02

## 2018-05-24 RX ADMIN — Medication 2: at 21:39

## 2018-05-24 RX ADMIN — BRIMONIDINE TARTRATE 1 DROP(S): 2 SOLUTION/ DROPS OPHTHALMIC at 05:26

## 2018-05-24 RX ADMIN — INSULIN GLARGINE 5 UNIT(S): 100 INJECTION, SOLUTION SUBCUTANEOUS at 21:39

## 2018-05-24 RX ADMIN — HEPARIN SODIUM 7 UNIT(S)/HR: 5000 INJECTION INTRAVENOUS; SUBCUTANEOUS at 00:41

## 2018-05-24 RX ADMIN — LATANOPROST 1 DROP(S): 0.05 SOLUTION/ DROPS OPHTHALMIC; TOPICAL at 21:39

## 2018-05-24 RX ADMIN — WARFARIN SODIUM 5 MILLIGRAM(S): 2.5 TABLET ORAL at 21:39

## 2018-05-24 RX ADMIN — ATORVASTATIN CALCIUM 80 MILLIGRAM(S): 80 TABLET, FILM COATED ORAL at 21:39

## 2018-05-24 RX ADMIN — HEPARIN SODIUM 7.5 UNIT(S)/HR: 5000 INJECTION INTRAVENOUS; SUBCUTANEOUS at 06:23

## 2018-05-24 NOTE — PROGRESS NOTE ADULT - ASSESSMENT
ASSESSMENT:   84 Y/O woman with vascular risk factors of age and DM II is evaluated at [facility] for acute onset of right sided weakness. She was last normal at around 8:45 PM, when she was walking in the shopping [place] with her daughter. She suddenly developed acute onset of dizziness, imbalance and slurring of speech. She also reports to have noticed right hand weakness/numbess at the same time. She presented to [facility], where CT brain did not show any evidence of acute infarct or hemorrhage. She was treated with IV tPA. At around 12:00-12:30 AM, she was noted to have acute onset of right sided weakness involving face, arm and leg with severe dysarthria. CT brain following right hemiplegia did not show any evidence of acute infarct or hemorrhage. 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 She was subsequently transferred to [facility]  for further management. She underwent conventional angiogram with intentions for mechanical thrombectomy, which was not possible secondary to technical difficulties and she was subsequently treated with IA tPA with the partial recanalization of the previously noted, basilar occlusion. CT brain performed subsequently did not show any evidence of acute infarct or hemorrhage.     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 symptomatic intracranial (basilar artery) atherosclerosis and superimposed local thrombosis, leading to occlusion of pontine perforators     NEURO: Neurologically overall improved as compared to admission, continue close monitoring for neurologic deterioration,   gradual normotension in setting of recent recanalization, atorvastatin 80 mg at bedtime, considering atheroembolic etiology of her stroke; further dose titration as per LDL goal less than 70, MR imaging as noted above. Physical therapy/OT eval AR.     ANTITHROMBOTIC THERAPY:  Cautious therapeutic anticoagulation with heparin drip, dose titration to achieve PTT goal 40-60; in the setting of a unstable atherosclerotic plaque involving the basilar artery, bridge to Coumadin for 3 months and aspirin indefinitely once INR controlled (modified SAMMPRIS protocol)      PULMONARY: protecting airway and saturating well    CARDIOVASCULAR:  TTE: ef 70%, mild MR, minimal AR, mild TR  continue with telemetry to screen for possible cardiac source of embolism; Telemetry monitoring to screen for arrythmia                             BP goal  normotension; avoid hypotension    GASTROINTESTINAL: dysphagia screen per icu, advance diet as tolerated      Diet: regular     RENAL: BUN/Cr without acute change, good urine output      Na Goal: Greater than 135     [place]:  n    HEMATOLOGY: H/H without acute change, Platelets 182     DVT ppx: Cautious therapeutic integration with heparin drip     ID: afebrile, no leukocytosis     DISPOSITION: AR     CORE MEASURES:        Admission NIHSS: 12     TPA: [x] YES [] NO      LDL/HDL: 94/69     Depression Screen: 0     Statin Therapy: y      Dysphagia Screen: [] PASS [x] FAIL     Smoking [] YES [x] NO      Afib [] YES [x] NO     Stroke Education [x] YES [] NO-

## 2018-05-24 NOTE — PROGRESS NOTE ADULT - SUBJECTIVE AND OBJECTIVE BOX
Cc: Gait dysfunction    HPI:  Patient tolerating therapies- min A with gait and transfers    MEDICATIONS  (STANDING):  atorvastatin 80 milliGRAM(s) Oral at bedtime  brimonidine 0.2% Ophthalmic Solution 1 Drop(s) Both EYES two times a day  dextrose 5%. 1000 milliLiter(s) (50 mL/Hr) IV Continuous <Continuous>  dextrose 50% Injectable 12.5 Gram(s) IV Push once  dextrose 50% Injectable 25 Gram(s) IV Push once  dextrose 50% Injectable 25 Gram(s) IV Push once  heparin  Infusion 750 Unit(s)/Hr (7.5 mL/Hr) IV Continuous <Continuous>  insulin glargine Injectable (LANTUS) 5 Unit(s) SubCutaneous at bedtime  insulin lispro (HumaLOG) corrective regimen sliding scale   SubCutaneous Before meals and at bedtime  latanoprost 0.005% Ophthalmic Solution 1 Drop(s) Both EYES at bedtime  warfarin 5 milliGRAM(s) Oral once    MEDICATIONS  (PRN):  acetaminophen   Tablet. 650 milliGRAM(s) Oral every 6 hours PRN Moderate Pain (4 - 6)  dextrose 40% Gel 15 Gram(s) Oral once PRN Blood Glucose LESS THAN 70 milliGRAM(s)/deciliter  glucagon  Injectable 1 milliGRAM(s) IntraMuscular once PRN Glucose LESS THAN 70 milligrams/deciliter  ondansetron Injectable 4 milliGRAM(s) IV Push every 6 hours PRN Nausea and/or Vomiting    Vital Signs Last 24 Hrs  T(C): 37.1 (24 May 2018 10:00), Max: 37.1 (24 May 2018 10:00)  T(F): 98.7 (24 May 2018 10:00), Max: 98.7 (24 May 2018 10:00)  HR: 88 (24 May 2018 10:00) (66 - 88)  BP: 113/57 (24 May 2018 10:00) (107/48 - 154/65)  BP(mean): 74 (24 May 2018 10:00) (63 - 90)  RR: 24 (24 May 2018 10:00) (20 - 24)  SpO2: 97% (24 May 2018 10:00) (95% - 98%)    PHYSICAL EXAM  Constitutional - NAD, Comfortable  HEENT - NCAT, EOMI  Neck - Supple, No limited ROM  Chest - CTA bilaterally, No wheeze, No rhonchi, No crackles  Cardiovascular - RRR, S1S2, No murmurs  Abdomen - BS+, Soft, NTND  Extremities - No C/C/E, No calf tenderness   Neurologic Exam -                    Cognitive - Awake, Alert, AAO to self, place, date, year, situation     Motor - No focal deficits     Slight dysmetria                     Psychiatric - Mood stable, Affect WNL    Impression:   86 yo with CVA with gait dysfunction    Plan:  PT- ROM, Bed Mob, Transfers, Amb w AD   OT- ADLs  Prec- Falls, Cardiac  DVT Prophylaxis- on heparin  Skin- Turn q2 h  Dispo- Acute Rehab- can tolerate 3h/d PT/OT/SLP and requires daily physician visits

## 2018-05-24 NOTE — PROGRESS NOTE ADULT - SUBJECTIVE AND OBJECTIVE BOX
THE PATIENT WAS SEEN AND EXAMINED BY ME WITH THE HOUSESTAFF AND STROKE TEAM DURING MORNING ROUNDS.   HPI:  84 y/o female woman with DM II was transferred from [facility] for acute onset of dysarthria and right sided weakness. She was last normal at around 8:45 PM 5/17, when she was walking in the shopping [place] with her daughter. She suddenly developed acute onset of dizziness, imbalance and slurring of speech. She also reports to have noticed right hand weakness/numbess at the same time. She was treated with IV tPA. At around 12:00-12:30 AM 5/18, she was noted to have acute onset of right sided weakness involving face, arm and leg with severe dysarthria.  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 Catheter wire passed past stenosis but no significant clot removed; however, trickle flow obtained. Also, basilar noted to be filling retrograde via posterior communicating artery.    SUBJECTIVE: No events overnight.  No new neurologic complaints.      acetaminophen   Tablet. 650 milliGRAM(s) Oral every 6 hours PRN  atorvastatin 80 milliGRAM(s) Oral at bedtime  brimonidine 0.2% Ophthalmic Solution 1 Drop(s) Both EYES two times a day  dextrose 40% Gel 15 Gram(s) Oral once PRN  dextrose 5%. 1000 milliLiter(s) IV Continuous <Continuous>  dextrose 50% Injectable 12.5 Gram(s) IV Push once  dextrose 50% Injectable 25 Gram(s) IV Push once  dextrose 50% Injectable 25 Gram(s) IV Push once  glucagon  Injectable 1 milliGRAM(s) IntraMuscular once PRN  heparin  Infusion 750 Unit(s)/Hr IV Continuous <Continuous>  insulin glargine Injectable (LANTUS) 5 Unit(s) SubCutaneous at bedtime  insulin lispro (HumaLOG) corrective regimen sliding scale   SubCutaneous Before meals and at bedtime  latanoprost 0.005% Ophthalmic Solution 1 Drop(s) Both EYES at bedtime  ondansetron Injectable 4 milliGRAM(s) IV Push every 6 hours PRN  warfarin 5 milliGRAM(s) Oral once      PHYSICAL EXAM:   Vital Signs Last 24 Hrs  T(C): 37.1 (24 May 2018 10:00), Max: 37.1 (24 May 2018 10:00)  T(F): 98.7 (24 May 2018 10:00), Max: 98.7 (24 May 2018 10:00)  HR: 70 (24 May 2018 14:00) (66 - 88)  BP: 114/49 (24 May 2018 14:00) (107/48 - 154/65)  BP(mean): 80 (24 May 2018 12:00) (63 - 90)  RR: 22 (24 May 2018 14:00) (19 - 24)  SpO2: 98% (24 May 2018 14:00) (95% - 99%)    General: Awake. Smiling. Resting in chair .    No acute distress  HEENT: Normocephalic, atraumatic,  EOM intact, visual fields full  Abdomen: Soft, nontender, nondistended   Extremities: No edema    NEUROLOGICAL EXAM:  Mental status: Awake, alert, oriented to person/place/time, events, follows all commands.    Cranial Nerves: Mild right facial droop, No Nystagmus. No dysarthria.    Motor exam: Normal tone, no drift. Moving all  moves extremities well against gravity. No appreciable hemiparesis on this exam.  mild right drift   Sensation: Intact to light touch   Coordination/ Gait: subtle RUE ataxia.      LABS:                        14.6   7.6   )-----------( 182      ( 24 May 2018 00:20 )             46.1    05-24    138  |  100  |  11  ----------------------------<  136<H>  4.1   |  27  |  0.66    Ca    9.3      24 May 2018 00:20  Phos  3.0     05-22  Mg     2.0     05-22    PTT - ( 24 May 2018 12:14 )  PTT:46.7 sec  Hemoglobin A1C, Whole Blood: 7.5 % (05-18 @ 07:53)      IMAGING: Reviewed by me.    MR Head w/wo IV Cont (05.22.18 @ 18:41)     IMPRESSION:  Evolving infarction in the the bilateral cerebellar hemispheres,   midbrain, and mala with hemorrhagic transformation in the left cerebellar   hemisphere.  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  Intracranial arterial stenoses,calcified  carotid siphons likely from   diabetes, and tortuous vessels likely hypertensive without   hemodynamically significant stenosis at the ICA origins.    CT Head No Cont (05.19.18 @ 10:33)   Status post endovascular management of basilar occlusion without evidence   of a definite infarct. No CT evidence of hemorrhage, herniation, mass   effect, or hydrocephalus.

## 2018-05-25 LAB
ANION GAP SERPL CALC-SCNC: 11 MMOL/L — SIGNIFICANT CHANGE UP (ref 5–17)
APTT BLD: 46 SEC — HIGH (ref 27.5–37.4)
BUN SERPL-MCNC: 14 MG/DL — SIGNIFICANT CHANGE UP (ref 7–23)
CALCIUM SERPL-MCNC: 9.7 MG/DL — SIGNIFICANT CHANGE UP (ref 8.4–10.5)
CHLORIDE SERPL-SCNC: 99 MMOL/L — SIGNIFICANT CHANGE UP (ref 96–108)
CO2 SERPL-SCNC: 24 MMOL/L — SIGNIFICANT CHANGE UP (ref 22–31)
CREAT SERPL-MCNC: 0.75 MG/DL — SIGNIFICANT CHANGE UP (ref 0.5–1.3)
GLUCOSE BLDC GLUCOMTR-MCNC: 149 MG/DL — HIGH (ref 70–99)
GLUCOSE BLDC GLUCOMTR-MCNC: 165 MG/DL — HIGH (ref 70–99)
GLUCOSE BLDC GLUCOMTR-MCNC: 174 MG/DL — HIGH (ref 70–99)
GLUCOSE BLDC GLUCOMTR-MCNC: 222 MG/DL — HIGH (ref 70–99)
GLUCOSE SERPL-MCNC: 151 MG/DL — HIGH (ref 70–99)
HCT VFR BLD CALC: 50.1 % — HIGH (ref 34.5–45)
HGB BLD-MCNC: 15.5 G/DL — SIGNIFICANT CHANGE UP (ref 11.5–15.5)
INR BLD: 1.27 RATIO — HIGH (ref 0.88–1.16)
MCHC RBC-ENTMCNC: 25 PG — LOW (ref 27–34)
MCHC RBC-ENTMCNC: 30.9 GM/DL — LOW (ref 32–36)
MCV RBC AUTO: 80.9 FL — SIGNIFICANT CHANGE UP (ref 80–100)
PLATELET # BLD AUTO: 190 K/UL — SIGNIFICANT CHANGE UP (ref 150–400)
POTASSIUM SERPL-MCNC: 5.3 MMOL/L — SIGNIFICANT CHANGE UP (ref 3.5–5.3)
POTASSIUM SERPL-SCNC: 5.3 MMOL/L — SIGNIFICANT CHANGE UP (ref 3.5–5.3)
PROTHROM AB SERPL-ACNC: 13.8 SEC — HIGH (ref 9.8–12.7)
RBC # BLD: 6.2 M/UL — HIGH (ref 3.8–5.2)
RBC # FLD: 14.6 % — HIGH (ref 10.3–14.5)
SODIUM SERPL-SCNC: 134 MMOL/L — LOW (ref 135–145)
WBC # BLD: 8.3 K/UL — SIGNIFICANT CHANGE UP (ref 3.8–10.5)
WBC # FLD AUTO: 8.3 K/UL — SIGNIFICANT CHANGE UP (ref 3.8–10.5)

## 2018-05-25 PROCEDURE — 99233 SBSQ HOSP IP/OBS HIGH 50: CPT

## 2018-05-25 RX ORDER — WARFARIN SODIUM 2.5 MG/1
5 TABLET ORAL ONCE
Qty: 0 | Refills: 0 | Status: COMPLETED | OUTPATIENT
Start: 2018-05-25 | End: 2018-05-25

## 2018-05-25 RX ADMIN — LATANOPROST 1 DROP(S): 0.05 SOLUTION/ DROPS OPHTHALMIC; TOPICAL at 21:44

## 2018-05-25 RX ADMIN — Medication 2: at 08:51

## 2018-05-25 RX ADMIN — BRIMONIDINE TARTRATE 1 DROP(S): 2 SOLUTION/ DROPS OPHTHALMIC at 06:01

## 2018-05-25 RX ADMIN — ATORVASTATIN CALCIUM 80 MILLIGRAM(S): 80 TABLET, FILM COATED ORAL at 21:43

## 2018-05-25 RX ADMIN — Medication 2: at 22:26

## 2018-05-25 RX ADMIN — INSULIN GLARGINE 5 UNIT(S): 100 INJECTION, SOLUTION SUBCUTANEOUS at 22:27

## 2018-05-25 RX ADMIN — BRIMONIDINE TARTRATE 1 DROP(S): 2 SOLUTION/ DROPS OPHTHALMIC at 17:00

## 2018-05-25 RX ADMIN — Medication 4: at 12:49

## 2018-05-25 RX ADMIN — WARFARIN SODIUM 5 MILLIGRAM(S): 2.5 TABLET ORAL at 21:43

## 2018-05-25 NOTE — PROGRESS NOTE ADULT - ASSESSMENT
ASSESSMENT:   86 Y/O woman with vascular risk factors of age and DM II is evaluated at [facility] for acute onset of right sided weakness. She was last normal at around 8:45 PM, when she was walking in the shopping mall with her daughter. She suddenly developed acute onset of dizziness, imbalance and slurring of speech. She also reports to have noticed right hand weakness/numbess at the same time. She presented to [facility], where CT brain did not show any evidence of acute infarct or hemorrhage. She was treated with IV tPA. At around 12:00-12:30 AM, she was noted to have acute onset of right sided weakness involving face, arm and leg with severe dysarthria. CT brain following right hemiplegia did not show any evidence of acute infarct or hemorrhage. 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 She was subsequently transferred to [facility]  for further management. She underwent conventional angiogram with intentions for mechanical thrombectomy, which was not possible secondary to technical difficulties and she was subsequently treated with IA tPA with the partial recanalization of the previously noted, basilar occlusion. CT brain performed subsequently did not show any evidence of acute infarct or hemorrhage.     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 symptomatic intracranial (basilar artery) atherosclerosis and superimposed local thrombosis, leading to occlusion of pontine perforators     NEURO: Neurologically overall improved as compared to admission, continue close monitoring for neurologic deterioration,   gradual normotension in setting of recent recanalization, atorvastatin 80 mg at bedtime, considering atheroembolic etiology of her stroke; further dose titration as per LDL goal less than 70, MR imaging as noted above. Physical therapy/OT eval AR.     ANTITHROMBOTIC THERAPY:  Cautious therapeutic anticoagulation with heparin drip, dose titration to achieve PTT goal 40-60; in the setting of a unstable atherosclerotic plaque involving the basilar artery, bridge to Coumadin for 3 months and aspirin indefinitely once INR controlled (modified [facility] protocol)      PULMONARY: protecting airway and saturating well    CARDIOVASCULAR:  TTE: ef 70%, mild MR, minimal AR, mild TR  continue with telemetry to screen for possible cardiac source of embolism; Telemetry monitoring to screen for arrythmia                             BP goal  normotension; avoid hypotension    GASTROINTESTINAL: dysphagia screen per icu, advance diet as tolerated      Diet: regular     RENAL: BUN/Cr without acute change, good urine output , mild hyponatremia- continue to monitor     Na Goal: Greater than 135     [place]:  n    HEMATOLOGY: H/H without acute change, Platelets 190     DVT ppx: Cautious therapeutic integration with heparin drip     ID: afebrile, no leukocytosis     DISPOSITION: AR     CORE MEASURES:        Admission NIHSS: 12     TPA: [x] YES [] NO      LDL/HDL: 94/69     Depression Screen: 0     Statin Therapy: y      Dysphagia Screen: [] PASS [x] FAIL     Smoking [] YES [x] NO      Afib [] YES [x] NO     Stroke Education [x] YES [] NO- ASSESSMENT:   86 Y/O woman with vascular risk factors of age and DM II is evaluated at [facility] for acute onset of right sided weakness. She was last normal at around 8:45 PM, when she was walking in the shopping mall with her daughter. She suddenly developed acute onset of dizziness, imbalance and slurring of speech. She also reports to have noticed right hand weakness/numbess at the same time. She presented to [facility], where CT brain did not show any evidence of acute infarct or hemorrhage. She was treated with IV tPA. At around 12:00-12:30 AM, she was noted to have acute onset of right sided weakness involving face, arm and leg with severe dysarthria. CT brain following right hemiplegia did not show any evidence of acute infarct or hemorrhage. 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 She was subsequently transferred to [facility]  for further management. She underwent conventional angiogram with intentions for mechanical thrombectomy, which was not possible secondary to technical difficulties and she was subsequently treated with IA tPA with the partial recanalization of the previously noted, basilar occlusion. CT brain performed subsequently did not show any evidence of acute infarct or hemorrhage.     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 symptomatic intracranial (basilar artery) atherosclerosis and superimposed local thrombosis, leading to occlusion of pontine perforators     NEURO: Neurologically overall improved as compared to admission, continue close monitoring for neurologic deterioration,   gradual normotension in setting of recent recanalization, atorvastatin 80 mg at bedtime, considering atheroembolic etiology of her stroke; further dose titration as per LDL goal less than 70, MR imaging as noted above. Physical therapy/OT eval AR.     ANTITHROMBOTIC THERAPY:  Cautious therapeutic anticoagulation with heparin drip, dose titration to achieve PTT goal 40-50; in the setting of a unstable atherosclerotic plaque involving the basilar artery, bridge to Coumadin for 3 months and aspirin indefinitely once INR controlled (modified [facility] protocol)      PULMONARY: protecting airway and saturating well    CARDIOVASCULAR:  TTE: ef 70%, mild MR, minimal AR, mild TR   Telemetry monitoring to screen for arrythmia iwth no acute findings                              BP goal  normotension; avoid hypotension    GASTROINTESTINAL: dysphagia screen per icu, advance diet as tolerated      Diet: regular     RENAL: BUN/Cr without acute change, good urine output , mild hyponatremia- continue to monitor     Na Goal: Greater than 135     [place]:  n    HEMATOLOGY: H/H without acute change, Platelets 190     DVT ppx: Cautious therapeutic integration with heparin drip     ID: afebrile, no leukocytosis     DISPOSITION: AR     CORE MEASURES:        Admission NIHSS: 12     TPA: [x] YES [] NO      LDL/HDL: 94/69     Depression Screen: 0     Statin Therapy: y      Dysphagia Screen: [] PASS [x] FAIL     Smoking [] YES [x] NO      Afib [] YES [x] NO     Stroke Education [x] YES [] NO-

## 2018-05-25 NOTE — PROGRESS NOTE ADULT - SUBJECTIVE AND OBJECTIVE BOX
THE PATIENT WAS SEEN AND EXAMINED BY ME WITH THE HOUSESTAFF AND STROKE TEAM DURING MORNING ROUNDS.   HPI:  84 y/o female woman with DM II was transferred from [facility] for acute onset of dysarthria and right sided weakness. She was last normal at around 8:45 PM 5/17, when she was walking in the shopping [place] with her daughter. She suddenly developed acute onset of dizziness, imbalance and slurring of speech. She also reports to have noticed right hand weakness/numbess at the same time. She was treated with IV tPA. At around 12:00-12:30 AM 5/18, she was noted to have acute onset of right sided weakness involving face, arm and leg with severe dysarthria.  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 Catheter wire passed past stenosis but no significant clot removed; however, trickle flow obtained. Also, basilar noted to be filling retrograde via posterior communicating artery.    SUBJECTIVE: No events overnight.  No new neurologic complaints.      acetaminophen   Tablet. 650 milliGRAM(s) Oral every 6 hours PRN  atorvastatin 80 milliGRAM(s) Oral at bedtime  brimonidine 0.2% Ophthalmic Solution 1 Drop(s) Both EYES two times a day  dextrose 40% Gel 15 Gram(s) Oral once PRN  dextrose 5%. 1000 milliLiter(s) IV Continuous <Continuous>  dextrose 50% Injectable 12.5 Gram(s) IV Push once  dextrose 50% Injectable 25 Gram(s) IV Push once  dextrose 50% Injectable 25 Gram(s) IV Push once  glucagon  Injectable 1 milliGRAM(s) IntraMuscular once PRN  heparin  Infusion 750 Unit(s)/Hr IV Continuous <Continuous>  insulin glargine Injectable (LANTUS) 5 Unit(s) SubCutaneous at bedtime  insulin lispro (HumaLOG) corrective regimen sliding scale   SubCutaneous Before meals and at bedtime  latanoprost 0.005% Ophthalmic Solution 1 Drop(s) Both EYES at bedtime  ondansetron Injectable 4 milliGRAM(s) IV Push every 6 hours PRN  warfarin 5 milliGRAM(s) Oral once      PHYSICAL EXAM:   Vital Signs Last 24 Hrs  T(C): 36.7 (25 May 2018 07:45), Max: 37.1 (24 May 2018 10:00)  T(F): 98 (25 May 2018 07:45), Max: 98.7 (24 May 2018 10:00)  HR: 89 (25 May 2018 07:45) (65 - 89)  BP: 113/75 (25 May 2018 07:45) (102/59 - 129/60)  BP(mean): 63 (24 May 2018 18:00) (63 - 81)  RR: 18 (25 May 2018 07:45) (16 - 24)  SpO2: 95% (25 May 2018 07:45) (95% - 99%)    General: Awake. Smiling. Resting in chair .    No acute distress  HEENT: Normocephalic, atraumatic,  EOM intact, visual fields full  Abdomen: Soft, nontender, nondistended   Extremities: No edema    NEUROLOGICAL EXAM:  Mental status: Awake, alert, oriented to person/place/time, events, follows all commands.    Cranial Nerves: Mild right facial droop, No Nystagmus. No dysarthria.    Motor exam: Normal tone, no drift. Moving all  moves extremities well against gravity. No appreciable hemiparesis on this exam.  mild right drift   Sensation: Intact to light touch   Coordination/ Gait: subtle RUE ataxia.      LABS:                        15.5   8.3   )-----------( 190      ( 25 May 2018 06:23 )             50.1    05-25    134<L>  |  99  |  14  ----------------------------<  151<H>  5.3   |  24  |  0.75    Ca    9.7      25 May 2018 06:23    PT/INR - ( 24 May 2018 18:31 )   PT: 12.9 sec;   INR: 1.18 ratio         PTT - ( 24 May 2018 18:31 )  PTT:45.6 sec  Hemoglobin A1C, Whole Blood: 7.5 % (05-18 @ 07:53)      IMAGING: Reviewed by me.   MR Head w/wo IV Cont (05.22.18 @ 18:41)     IMPRESSION:  Evolving infarction in the the bilateral cerebellar hemispheres,   midbrain, and mala with hemorrhagic transformation in the left cerebellar   hemisphere.  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  Intracranial arterial stenoses,calcified  carotid siphons likely from   diabetes, and tortuous vessels likely hypertensive without   hemodynamically significant stenosis at the ICA origins.    CT Head No Cont (05.19.18 @ 10:33)   Status post endovascular management of basilar occlusion without evidence   of a definite infarct. No CT evidence of hemorrhage, herniation, mass   effect, or hydrocephalus.

## 2018-05-26 LAB
ANION GAP SERPL CALC-SCNC: 11 MMOL/L — SIGNIFICANT CHANGE UP (ref 5–17)
APTT BLD: 40.2 SEC — HIGH (ref 27.5–37.4)
APTT BLD: 67.6 SEC — HIGH (ref 27.5–37.4)
BUN SERPL-MCNC: 10 MG/DL — SIGNIFICANT CHANGE UP (ref 7–23)
CALCIUM SERPL-MCNC: 9.2 MG/DL — SIGNIFICANT CHANGE UP (ref 8.4–10.5)
CHLORIDE SERPL-SCNC: 101 MMOL/L — SIGNIFICANT CHANGE UP (ref 96–108)
CO2 SERPL-SCNC: 25 MMOL/L — SIGNIFICANT CHANGE UP (ref 22–31)
CREAT SERPL-MCNC: 0.64 MG/DL — SIGNIFICANT CHANGE UP (ref 0.5–1.3)
GLUCOSE BLDC GLUCOMTR-MCNC: 130 MG/DL — HIGH (ref 70–99)
GLUCOSE BLDC GLUCOMTR-MCNC: 132 MG/DL — HIGH (ref 70–99)
GLUCOSE BLDC GLUCOMTR-MCNC: 140 MG/DL — HIGH (ref 70–99)
GLUCOSE BLDC GLUCOMTR-MCNC: 246 MG/DL — HIGH (ref 70–99)
GLUCOSE SERPL-MCNC: 168 MG/DL — HIGH (ref 70–99)
HCT VFR BLD CALC: 45.2 % — HIGH (ref 34.5–45)
HGB BLD-MCNC: 14.1 G/DL — SIGNIFICANT CHANGE UP (ref 11.5–15.5)
INR BLD: 1.34 RATIO — HIGH (ref 0.88–1.16)
MCHC RBC-ENTMCNC: 24.4 PG — LOW (ref 27–34)
MCHC RBC-ENTMCNC: 31.2 GM/DL — LOW (ref 32–36)
MCV RBC AUTO: 78.3 FL — LOW (ref 80–100)
PLATELET # BLD AUTO: 203 K/UL — SIGNIFICANT CHANGE UP (ref 150–400)
POTASSIUM SERPL-MCNC: 4.5 MMOL/L — SIGNIFICANT CHANGE UP (ref 3.5–5.3)
POTASSIUM SERPL-SCNC: 4.5 MMOL/L — SIGNIFICANT CHANGE UP (ref 3.5–5.3)
PROTHROM AB SERPL-ACNC: 15.2 SEC — HIGH (ref 10–13.1)
RBC # BLD: 5.77 M/UL — HIGH (ref 3.8–5.2)
RBC # FLD: 15.7 % — HIGH (ref 10.3–14.5)
SODIUM SERPL-SCNC: 137 MMOL/L — SIGNIFICANT CHANGE UP (ref 135–145)
WBC # BLD: 6.76 K/UL — SIGNIFICANT CHANGE UP (ref 3.8–10.5)
WBC # FLD AUTO: 6.76 K/UL — SIGNIFICANT CHANGE UP (ref 3.8–10.5)

## 2018-05-26 PROCEDURE — 99233 SBSQ HOSP IP/OBS HIGH 50: CPT

## 2018-05-26 RX ORDER — HEPARIN SODIUM 5000 [USP'U]/ML
750 INJECTION INTRAVENOUS; SUBCUTANEOUS
Qty: 25000 | Refills: 0 | Status: DISCONTINUED | OUTPATIENT
Start: 2018-05-26 | End: 2018-05-27

## 2018-05-26 RX ORDER — WARFARIN SODIUM 2.5 MG/1
7 TABLET ORAL ONCE
Qty: 0 | Refills: 0 | Status: COMPLETED | OUTPATIENT
Start: 2018-05-26 | End: 2018-05-26

## 2018-05-26 RX ADMIN — HEPARIN SODIUM 750 UNIT(S)/HR: 5000 INJECTION INTRAVENOUS; SUBCUTANEOUS at 20:14

## 2018-05-26 RX ADMIN — Medication 4: at 12:50

## 2018-05-26 RX ADMIN — HEPARIN SODIUM 7.5 UNIT(S)/HR: 5000 INJECTION INTRAVENOUS; SUBCUTANEOUS at 08:55

## 2018-05-26 RX ADMIN — BRIMONIDINE TARTRATE 1 DROP(S): 2 SOLUTION/ DROPS OPHTHALMIC at 17:41

## 2018-05-26 RX ADMIN — HEPARIN SODIUM 8 UNIT(S)/HR: 5000 INJECTION INTRAVENOUS; SUBCUTANEOUS at 11:18

## 2018-05-26 RX ADMIN — LATANOPROST 1 DROP(S): 0.05 SOLUTION/ DROPS OPHTHALMIC; TOPICAL at 21:17

## 2018-05-26 RX ADMIN — WARFARIN SODIUM 7 MILLIGRAM(S): 2.5 TABLET ORAL at 21:09

## 2018-05-26 RX ADMIN — INSULIN GLARGINE 5 UNIT(S): 100 INJECTION, SOLUTION SUBCUTANEOUS at 21:16

## 2018-05-26 RX ADMIN — BRIMONIDINE TARTRATE 1 DROP(S): 2 SOLUTION/ DROPS OPHTHALMIC at 05:03

## 2018-05-26 RX ADMIN — ATORVASTATIN CALCIUM 80 MILLIGRAM(S): 80 TABLET, FILM COATED ORAL at 21:09

## 2018-05-26 NOTE — PROVIDER CONTACT NOTE (OTHER) - ACTION/TREATMENT ORDERED:
Paused heparin as per MD order and restarted Heparin drip at 7.5 as per new order. Bag changed and cosigned with RN. Paused heparin as per MD order and restarted Heparin drip at 7.5 as per new order. Bag changed and cosigned with RN. As per MD Reyes, send labs at 6am.

## 2018-05-26 NOTE — PROGRESS NOTE ADULT - SUBJECTIVE AND OBJECTIVE BOX
THE PATIENT WAS SEEN AND EXAMINED BY ME WITH THE HOUSESTAFF AND STROKE TEAM DURING MORNING ROUNDS.   HPI:    84 y/o female woman with DM II was transferred from [facility] for acute onset of dysarthria and right sided weakness. She was last normal at around 8:45 PM 5/17, when she was walking in the shopping [place] with her daughter. She suddenly developed acute onset of dizziness, imbalance and slurring of speech. She also reports to have noticed right hand weakness/numbess at the same time. She was treated with IV tPA. At around 12:00-12:30 AM 5/18, she was noted to have acute onset of right sided weakness involving face, arm and leg with severe dysarthria.  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 Catheter wire passed past stenosis but no significant clot removed; however, trickle flow obtained. Also, basilar noted to be filling retrograde via posterior communicating artery.    SUBJECTIVE: No events overnight.  No new neurologic complaints.      acetaminophen   Tablet. 650 milliGRAM(s) Oral every 6 hours PRN  atorvastatin 80 milliGRAM(s) Oral at bedtime  brimonidine 0.2% Ophthalmic Solution 1 Drop(s) Both EYES two times a day  dextrose 40% Gel 15 Gram(s) Oral once PRN  dextrose 5%. 1000 milliLiter(s) IV Continuous <Continuous>  dextrose 50% Injectable 12.5 Gram(s) IV Push once  dextrose 50% Injectable 25 Gram(s) IV Push once  dextrose 50% Injectable 25 Gram(s) IV Push once  glucagon  Injectable 1 milliGRAM(s) IntraMuscular once PRN  heparin  Infusion 750 Unit(s)/Hr IV Continuous <Continuous>  insulin glargine Injectable (LANTUS) 5 Unit(s) SubCutaneous at bedtime  insulin lispro (HumaLOG) corrective regimen sliding scale   SubCutaneous Before meals and at bedtime  latanoprost 0.005% Ophthalmic Solution 1 Drop(s) Both EYES at bedtime  ondansetron Injectable 4 milliGRAM(s) IV Push every 6 hours PRN  warfarin 7 milliGRAM(s) Oral once      PHYSICAL EXAM:   Vital Signs Last 24 Hrs  T(C): 36.7 (26 May 2018 09:02), Max: 37.1 (25 May 2018 15:53)  T(F): 98.1 (26 May 2018 09:02), Max: 98.8 (25 May 2018 15:53)  HR: 79 (26 May 2018 09:02) (72 - 88)  BP: 114/67 (26 May 2018 09:02) (106/69 - 145/68)  BP(mean): --  RR: 18 (26 May 2018 09:02) (16 - 18)  SpO2: 96% (26 May 2018 09:02) (94% - 100%)    General: Awake. Smiling. Resting in chair .    No acute distress  HEENT: Normocephalic, atraumatic,  EOM intact, visual fields full  Abdomen: Soft, nontender, nondistended   Extremities: No edema    NEUROLOGICAL EXAM:  Mental status: Awake, alert, oriented to person/place/time, events, follows all commands.    Cranial Nerves: Mild right facial droop, No Nystagmus. No dysarthria.    Motor exam: Normal tone, no drift. Moving all  moves extremities well against gravity. No appreciable hemiparesis on this exam.  mild right drift   Sensation: Intact to light touch   Coordination/ Gait: subtle RUE ataxia.      LABS:                        14.1   6.76  )-----------( 203      ( 26 May 2018 07:56 )             45.2    05-26    137  |  101  |  10  ----------------------------<  168<H>  4.5   |  25  |  0.64    Ca    9.2      26 May 2018 06:41    PT/INR - ( 26 May 2018 07:56 )   PT: 15.2 sec;   INR: 1.34 ratio         PTT - ( 26 May 2018 07:56 )  PTT:40.2 sec  Hemoglobin A1C, Whole Blood: 7.5 % (05-18 @ 07:53)      IMAGING: Reviewed by me.     CT Head No Cont (05.23.18 @ 10:48)  Foci of low density in the bilateral inferior cerebellar   hemispheres corresponding to acute infarcts seen on the prior MRI. No CT   evidence of large hemorrhagic transformation    MR Head w/wo IV Cont (05.22.18 @ 18:41)     Evolving infarction in the the bilateral cerebellar hemispheres,   midbrain, and mala with hemorrhagic transformation in the left cerebellar   hemisphere.  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  Intracranial arterial stenoses,calcified  carotid siphons likely from   diabetes, and tortuous vessels likely hypertensive without   hemodynamically significant stenosis at the ICA origins.    CT Head No Cont (05.19.18 @ 10:33)   Status post endovascular management of basilar occlusion without evidence   of a definite infarct. No CT evidence of hemorrhage, herniation, mass   effect, or hydrocephalus.

## 2018-05-26 NOTE — PROGRESS NOTE ADULT - ASSESSMENT
ASSESSMENT:   84 Y/O woman with vascular risk factors of age and DM II is evaluated at [facility] for acute onset of right sided weakness. She was last normal at around 8:45 PM, when she was walking in the shopping mall with her daughter. She suddenly developed acute onset of dizziness, imbalance and slurring of speech. She also reports to have noticed right hand weakness/numbess at the same time. She presented to [facility], where CT brain did not show any evidence of acute infarct or hemorrhage. She was treated with IV tPA. At around 12:00-12:30 AM, she was noted to have acute onset of right sided weakness involving face, arm and leg with severe dysarthria. CT brain following right hemiplegia did not show any evidence of acute infarct or hemorrhage. 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 She was subsequently transferred to [facility]  for further management. She underwent conventional angiogram with intentions for mechanical thrombectomy, which was not possible secondary to technical difficulties and she was subsequently treated with IA tPA with the partial recanalization of the previously noted, basilar occlusion. CT brain performed subsequently did not show any evidence of acute infarct or hemorrhage.     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 symptomatic intracranial (basilar artery) atherosclerosis and superimposed local thrombosis, leading to occlusion of pontine perforators     NEURO: Neurologically overall improved as compared to admission, continue close monitoring for neurologic deterioration,  gradual normotension in setting of recent recanalization, atorvastatin 80 mg at bedtime, considering atheroembolic etiology of her stroke; further dose titration as per LDL goal less than 70, MR imaging as noted above. Physical therapy/OT eval AR.     ANTITHROMBOTIC THERAPY:  Cautious therapeutic anticoagulation with heparin drip, now increased rate to 8ml/hr, dose titration to achieve PTT goal 40-60; in the setting of a unstable atherosclerotic plaque involving the basilar artery, bridge to Coumadin for 3 months and aspirin indefinitely once INR controlled (modified [facility] protocol), INR 1.3 coumadin dose 7mg today, repeat INR in AM    PULMONARY: protecting airway and saturating well    CARDIOVASCULAR:  TTE: ef 70%, mild MR, minimal AR, mild TR   Telemetry monitoring to screen for arrythmia iwth no acute findings                              BP goal  normotension; avoid hypotension    GASTROINTESTINAL: dysphagia screen per icu, advance diet as tolerated      Diet: regular     RENAL: BUN/Cr without acute change, good urine output , mild hyponatremia- continue to monitor     Na Goal: Greater than 135     [place]:  n    HEMATOLOGY: H/H without acute change, Platelets 203     DVT ppx: Cautious therapeutic integration with heparin drip     ID: afebrile, no leukocytosis     DISPOSITION: AR     CORE MEASURES:        Admission NIHSS: 12     TPA: [x] YES [] NO      LDL/HDL: 94/69     Depression Screen: 0     Statin Therapy: y      Dysphagia Screen: [] PASS [x] FAIL     Smoking [] YES [x] NO      Afib [] YES [x] NO     Stroke Education [x] YES [] NO-

## 2018-05-27 LAB
ANION GAP SERPL CALC-SCNC: 12 MMOL/L — SIGNIFICANT CHANGE UP (ref 5–17)
APTT BLD: 43 SEC — HIGH (ref 27.5–37.4)
APTT BLD: 55.9 SEC — HIGH (ref 27.5–37.4)
BUN SERPL-MCNC: 13 MG/DL — SIGNIFICANT CHANGE UP (ref 7–23)
CALCIUM SERPL-MCNC: 9.8 MG/DL — SIGNIFICANT CHANGE UP (ref 8.4–10.5)
CHLORIDE SERPL-SCNC: 102 MMOL/L — SIGNIFICANT CHANGE UP (ref 96–108)
CO2 SERPL-SCNC: 25 MMOL/L — SIGNIFICANT CHANGE UP (ref 22–31)
CREAT SERPL-MCNC: 0.65 MG/DL — SIGNIFICANT CHANGE UP (ref 0.5–1.3)
GLUCOSE BLDC GLUCOMTR-MCNC: 118 MG/DL — HIGH (ref 70–99)
GLUCOSE BLDC GLUCOMTR-MCNC: 132 MG/DL — HIGH (ref 70–99)
GLUCOSE BLDC GLUCOMTR-MCNC: 139 MG/DL — HIGH (ref 70–99)
GLUCOSE BLDC GLUCOMTR-MCNC: 161 MG/DL — HIGH (ref 70–99)
GLUCOSE BLDC GLUCOMTR-MCNC: 192 MG/DL — HIGH (ref 70–99)
GLUCOSE SERPL-MCNC: 138 MG/DL — HIGH (ref 70–99)
HCT VFR BLD CALC: 47.9 % — HIGH (ref 34.5–45)
HCT VFR BLD CALC: 49.9 % — HIGH (ref 34.5–45)
HGB BLD-MCNC: 15.9 G/DL — HIGH (ref 11.5–15.5)
HGB BLD-MCNC: 16 G/DL — HIGH (ref 11.5–15.5)
INR BLD: 1.72 RATIO — HIGH (ref 0.88–1.16)
MCHC RBC-ENTMCNC: 25.8 PG — LOW (ref 27–34)
MCHC RBC-ENTMCNC: 26.1 PG — LOW (ref 27–34)
MCHC RBC-ENTMCNC: 32.1 GM/DL — SIGNIFICANT CHANGE UP (ref 32–36)
MCHC RBC-ENTMCNC: 33.2 GM/DL — SIGNIFICANT CHANGE UP (ref 32–36)
MCV RBC AUTO: 77.8 FL — LOW (ref 80–100)
MCV RBC AUTO: 81.2 FL — SIGNIFICANT CHANGE UP (ref 80–100)
PLATELET # BLD AUTO: 232 K/UL — SIGNIFICANT CHANGE UP (ref 150–400)
PLATELET # BLD AUTO: 244 K/UL — SIGNIFICANT CHANGE UP (ref 150–400)
POTASSIUM SERPL-MCNC: 4.4 MMOL/L — SIGNIFICANT CHANGE UP (ref 3.5–5.3)
POTASSIUM SERPL-SCNC: 4.4 MMOL/L — SIGNIFICANT CHANGE UP (ref 3.5–5.3)
PROTHROM AB SERPL-ACNC: 19.6 SEC — HIGH (ref 10–13.1)
RBC # BLD: 6.15 M/UL — HIGH (ref 3.8–5.2)
RBC # BLD: 6.16 M/UL — HIGH (ref 3.8–5.2)
RBC # FLD: 14.8 % — HIGH (ref 10.3–14.5)
RBC # FLD: 15.9 % — HIGH (ref 10.3–14.5)
SODIUM SERPL-SCNC: 139 MMOL/L — SIGNIFICANT CHANGE UP (ref 135–145)
WBC # BLD: 5.4 K/UL — SIGNIFICANT CHANGE UP (ref 3.8–10.5)
WBC # BLD: 6.46 K/UL — SIGNIFICANT CHANGE UP (ref 3.8–10.5)
WBC # FLD AUTO: 5.4 K/UL — SIGNIFICANT CHANGE UP (ref 3.8–10.5)
WBC # FLD AUTO: 6.46 K/UL — SIGNIFICANT CHANGE UP (ref 3.8–10.5)

## 2018-05-27 PROCEDURE — 99233 SBSQ HOSP IP/OBS HIGH 50: CPT

## 2018-05-27 RX ORDER — HEPARIN SODIUM 5000 [USP'U]/ML
650 INJECTION INTRAVENOUS; SUBCUTANEOUS
Qty: 25000 | Refills: 0 | Status: DISCONTINUED | OUTPATIENT
Start: 2018-05-27 | End: 2018-05-28

## 2018-05-27 RX ORDER — WARFARIN SODIUM 2.5 MG/1
5 TABLET ORAL ONCE
Qty: 0 | Refills: 0 | Status: COMPLETED | OUTPATIENT
Start: 2018-05-27 | End: 2018-05-27

## 2018-05-27 RX ADMIN — ATORVASTATIN CALCIUM 80 MILLIGRAM(S): 80 TABLET, FILM COATED ORAL at 21:26

## 2018-05-27 RX ADMIN — HEPARIN SODIUM 6.5 UNIT(S)/HR: 5000 INJECTION INTRAVENOUS; SUBCUTANEOUS at 19:06

## 2018-05-27 RX ADMIN — INSULIN GLARGINE 5 UNIT(S): 100 INJECTION, SOLUTION SUBCUTANEOUS at 21:26

## 2018-05-27 RX ADMIN — WARFARIN SODIUM 5 MILLIGRAM(S): 2.5 TABLET ORAL at 21:26

## 2018-05-27 RX ADMIN — Medication 2: at 18:23

## 2018-05-27 RX ADMIN — BRIMONIDINE TARTRATE 1 DROP(S): 2 SOLUTION/ DROPS OPHTHALMIC at 05:35

## 2018-05-27 RX ADMIN — HEPARIN SODIUM 6.5 UNIT(S)/HR: 5000 INJECTION INTRAVENOUS; SUBCUTANEOUS at 11:38

## 2018-05-27 RX ADMIN — LATANOPROST 1 DROP(S): 0.05 SOLUTION/ DROPS OPHTHALMIC; TOPICAL at 21:26

## 2018-05-27 RX ADMIN — BRIMONIDINE TARTRATE 1 DROP(S): 2 SOLUTION/ DROPS OPHTHALMIC at 17:18

## 2018-05-27 NOTE — PROGRESS NOTE ADULT - ASSESSMENT
ASSESSMENT:   84 Y/O woman with vascular risk factors of age and DM II is evaluated at [facility] for acute onset of right sided weakness. She was last normal at around 8:45 PM, when she was walking in the shopping mall with her daughter. She suddenly developed acute onset of dizziness, imbalance and slurring of speech. She also reports to have noticed right hand weakness/numbess at the same time. She presented to [facility], where CT brain did not show any evidence of acute infarct or hemorrhage. She was treated with IV tPA. At around 12:00-12:30 AM, she was noted to have acute onset of right sided weakness involving face, arm and leg with severe dysarthria. CT brain following right hemiplegia did not show any evidence of acute infarct or hemorrhage. 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 She was subsequently transferred to [facility]  for further management. She underwent conventional angiogram with intentions for mechanical thrombectomy, which was not possible secondary to technical difficulties and she was subsequently treated with IA tPA with the partial recanalization of the previously noted, basilar occlusion. CT brain performed subsequently did not show any evidence of acute infarct or hemorrhage.     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 symptomatic intracranial (basilar artery) atherosclerosis and superimposed local thrombosis, leading to occlusion of pontine perforators     NEURO: Neurologically overall improved as compared to admission, continue close monitoring for neurologic deterioration,  gradual normotension in setting of recent recanalization, atorvastatin 80 mg at bedtime, considering atheroembolic etiology of her stroke; further dose titration as per LDL goal less than 70, MR imaging as noted above. Physical therapy/OT eval AR.     ANTITHROMBOTIC THERAPY:  Cautious therapeutic anticoagulation with heparin drip goal 40-60, tis am PTT at 67, stopped Heparin drip X 1 hr, restarted at 6.5ml/hr. dose titration to achieve PTT goal 40-60; in the setting of a unstable atherosclerotic plaque involving the basilar artery, bridge to Coumadin for 3 months and aspirin indefinitely once INR controlled (modified [facility] protocol)  INR this am at 1.72, will redose coumadin at 5 mg today, will repeat INR in am    PULMONARY: protecting airway and saturating well    CARDIOVASCULAR:  TTE: ef 70%, mild MR, minimal AR, mild TR   Telemetry monitoring to screen for arrythmia iwth no acute findings                              BP goal  normotension; avoid hypotension    GASTROINTESTINAL: dysphagia screen per icu, advance diet as tolerated      Diet: regular     ENDOCRINE: Hba1c 7.5 uptrending FS, endocrine consult called.     RENAL: BUN/Cr without acute change, good urine output , mild hyponatremia- continue to monitor     Na Goal: Greater than 135     [place]:  n    HEMATOLOGY: H/H without acute change, Platelets 203     DVT ppx: Cautious therapeutic integration with heparin drip     ID: afebrile, no leukocytosis     DISPOSITION: AR     CORE MEASURES:        Admission NIHSS: 12     TPA: [x] YES [] NO      LDL/HDL: 94/69     Depression Screen: 0     Statin Therapy: y      Dysphagia Screen: [] PASS [x] FAIL     Smoking [] YES [x] NO      Afib [] YES [x] NO     Stroke Education [x] YES [] NO- ASSESSMENT:   84 Y/O woman with vascular risk factors of age and DM II is evaluated at [facility] for acute onset of right sided weakness. She was last normal at around 8:45 PM, when she was walking in the shopping mall with her daughter. She suddenly developed acute onset of dizziness, imbalance and slurring of speech. She also reports to have noticed right hand weakness/numbess at the same time. She presented to [facility], where CT brain did not show any evidence of acute infarct or hemorrhage. She was treated with IV tPA. At around 12:00-12:30 AM, she was noted to have acute onset of right sided weakness involving face, arm and leg with severe dysarthria. CT brain following right hemiplegia did not show any evidence of acute infarct or hemorrhage. 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 She was subsequently transferred to [facility]  for further management. She underwent conventional angiogram with intentions for mechanical thrombectomy, which was not possible secondary to technical difficulties and she was subsequently treated with IA tPA with the partial recanalization of the previously noted, basilar occlusion. CT brain performed subsequently did not show any evidence of acute infarct or hemorrhage.     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 symptomatic intracranial (basilar artery) atherosclerosis and superimposed local thrombosis, leading to occlusion of pontine perforators     NEURO: Neurologically overall improved as compared to admission, continue close monitoring for neurologic deterioration,  gradual normotension in setting of recent recanalization, atorvastatin 80 mg at bedtime, considering atheroembolic etiology of her stroke; further dose titration as per LDL goal less than 70, MR imaging as noted above. Physical therapy/OT eval AR.     ANTITHROMBOTIC THERAPY:  Cautious therapeutic anticoagulation with heparin drip goal 40-60, tis am PTT at 67, stopped Heparin drip X 1 hr, restarted at 6.5ml/hr. dose titration to achieve PTT goal 40-60; in the setting of a unstable atherosclerotic plaque involving the basilar artery, bridge to Coumadin for 3 months and aspirin indefinitely once INR controlled (modified [facility] protocol)  INR this am at 1.72, will redose coumadin at 5 mg today, will repeat INR in am    PULMONARY: protecting airway and saturating well    CARDIOVASCULAR:  TTE: ef 70%, mild MR, minimal AR, mild TR   Telemetry monitoring to screen for arrythmia iwth no acute findings                              BP goal  normotension; avoid hypotension    GASTROINTESTINAL: dysphagia screen per icu, advance diet as tolerated      Diet: consistent carb    ENDOCRINE: Hba1c 7.5 uptrending FS, endocrine consult called.     RENAL: BUN/Cr without acute change, good urine output , mild hyponatremia- continue to monitor     Na Goal: Greater than 135     [place]:  n    HEMATOLOGY: H/H without acute change, Platelets 203     DVT ppx: Cautious therapeutic integration with heparin drip     ID: afebrile, no leukocytosis     DISPOSITION: AR     CORE MEASURES:        Admission NIHSS: 12     TPA: [x] YES [] NO      LDL/HDL: 94/69     Depression Screen: 0     Statin Therapy: y      Dysphagia Screen: [] PASS [x] FAIL     Smoking [] YES [x] NO      Afib [] YES [x] NO     Stroke Education [x] YES [] NO-

## 2018-05-27 NOTE — PROGRESS NOTE ADULT - SUBJECTIVE AND OBJECTIVE BOX
THE PATIENT WAS SEEN AND EXAMINED BY ME WITH THE HOUSESTAFF AND STROKE TEAM DURING MORNING ROUNDS.   HPI:    84 y/o female woman with DM II was transferred from [facility] for acute onset of dysarthria and right sided weakness. She was last normal at around 8:45 PM 5/17, when she was walking in the shopping mall with her daughter. She suddenly developed acute onset of dizziness, imbalance and slurring of speech. She also reports to have noticed right hand weakness/numbess at the same time. She was treated with IV tPA. At around 12:00-12:30 AM 5/18, she was noted to have acute onset of right sided weakness involving face, arm and leg with severe dysarthria.  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 Catheter wire passed past stenosis but no significant clot removed; however, trickle flow obtained. Also, basilar noted to be filling retrograde via posterior communicating artery.    SUBJECTIVE: No events overnight.  Patient this morning Heparin drip PTT >60, stoppe X 1 hr then restarted at lower dose. Assymptomatic at the time. Blood sugars continue to be elevated, will call endocrinology.     MEDICATIONS  (STANDING):  atorvastatin 80 milliGRAM(s) Oral at bedtime  brimonidine 0.2% Ophthalmic Solution 1 Drop(s) Both EYES two times a day  dextrose 5%. 1000 milliLiter(s) (50 mL/Hr) IV Continuous <Continuous>  dextrose 50% Injectable 12.5 Gram(s) IV Push once  dextrose 50% Injectable 25 Gram(s) IV Push once  dextrose 50% Injectable 25 Gram(s) IV Push once  heparin  Infusion 650 Unit(s)/Hr (6.5 mL/Hr) IV Continuous <Continuous>  insulin glargine Injectable (LANTUS) 5 Unit(s) SubCutaneous at bedtime  insulin lispro (HumaLOG) corrective regimen sliding scale   SubCutaneous Before meals and at bedtime  latanoprost 0.005% Ophthalmic Solution 1 Drop(s) Both EYES at bedtime  warfarin 5 milliGRAM(s) Oral once    MEDICATIONS  (PRN):  acetaminophen   Tablet. 650 milliGRAM(s) Oral every 6 hours PRN Moderate Pain (4 - 6)  dextrose 40% Gel 15 Gram(s) Oral once PRN Blood Glucose LESS THAN 70 milliGRAM(s)/deciliter  glucagon  Injectable 1 milliGRAM(s) IntraMuscular once PRN Glucose LESS THAN 70 milligrams/deciliter  ondansetron Injectable 4 milliGRAM(s) IV Push every 6 hours PRN Nausea and/or Vomiting      PHYSICAL EXAM:   Vital Signs Last 24 Hrs  Vital Signs Last 24 Hrs  T(C): 36.3 (27 May 2018 12:23), Max: 36.8 (27 May 2018 00:09)  T(F): 97.4 (27 May 2018 12:23), Max: 98.2 (27 May 2018 00:09)  HR: 66 (27 May 2018 12:23) (66 - 77)  BP: 128/72 (27 May 2018 12:23) (108/68 - 133/71)  BP(mean): --  RR: 16 (27 May 2018 12:23) (16 - 18)  SpO2: 99% (27 May 2018 12:23) (96% - 99%)    General: Awake. Smiling. Resting in chair .    No acute distress  HEENT: Normocephalic, atraumatic,  EOM intact, visual fields full  Abdomen: Soft, nontender, nondistended   Extremities: No edema    NEUROLOGICAL EXAM:  Mental status: Awake, alert, oriented to person/place/time, events, follows all commands.    Cranial Nerves: Mild right facial droop, No Nystagmus. No dysarthria.    Motor exam: Normal tone, no drift. Moving all  moves extremities well against gravity. No appreciable hemiparesis on this exam.  mild right drift   Sensation: Intact to light touch   Coordination/ Gait: subtle RUE ataxia.      LABS:                CBC Full  -  ( 27 May 2018 08:58 )  WBC Count : 6.46 K/uL  Hemoglobin : 15.9 g/dL  Hematocrit : 47.9 %  Platelet Count - Automated : 232 K/uL  Mean Cell Volume : 77.8 fl  Mean Cell Hemoglobin : 25.8 pg  Mean Cell Hemoglobin Concentration : 33.2 gm/dL  Auto Neutrophil # : x  Auto Lymphocyte # : x  Auto Monocyte # : x  Auto Eosinophil # : x  Auto Basophil # : x  Auto Neutrophil % : x  Auto Lymphocyte % : x  Auto Monocyte % : x  Auto Eosinophil % : x  Auto Basophil % : x         Hemoglobin A1C, Whole Blood: 7.5 % (05-18 @ 07:53)    PT/INR - ( 27 May 2018 09:18 )   PT: 19.6 sec;   INR: 1.72 ratio         PTT - ( 27 May 2018 09:18 )  PTT:55.9 sec  IMAGING: Reviewed by me.     CT Head No Cont (05.23.18 @ 10:48)  Foci of low density in the bilateral inferior cerebellar   hemispheres corresponding to acute infarcts seen on the prior MRI. No CT   evidence of large hemorrhagic transformation    MR Head w/wo IV Cont (05.22.18 @ 18:41)     Evolving infarction in the the bilateral cerebellar hemispheres,   midbrain, and mala with hemorrhagic transformation in the left cerebellar   hemisphere.  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  Intracranial arterial stenoses,calcified  carotid siphons likely from   diabetes, and tortuous vessels likely hypertensive without   hemodynamically significant stenosis at the ICA origins.    CT Head No Cont (05.19.18 @ 10:33)   Status post endovascular management of basilar occlusion without evidence   of a definite infarct. No CT evidence of hemorrhage, herniation, mass   effect, or hydrocephalus. THE PATIENT WAS SEEN AND EXAMINED BY ME WITH THE HOUSESTAFF AND STROKE TEAM DURING MORNING ROUNDS.   HPI:    86 y/o female woman with DM II was transferred from [facility] for acute onset of dysarthria and right sided weakness. She was last normal at around 8:45 PM 5/17, when she was walking in the shopping mall with her daughter. She suddenly developed acute onset of dizziness, imbalance and slurring of speech. She also reports to have noticed right hand weakness/numbess at the same time. She was treated with IV tPA. At around 12:00-12:30 AM 5/18, she was noted to have acute onset of right sided weakness involving face, arm and leg with severe dysarthria.  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 Catheter wire passed past stenosis but no significant clot removed; however, trickle flow obtained. Also, basilar noted to be filling retrograde via posterior communicating artery.    SUBJECTIVE: No events overnight.  Patient this morning Heparin drip PTT >60, stoppe X 1 hr then restarted at lower dose. Assymptomatic at the time. Blood sugars continue to be elevated, will call endocrinology. Small bump noted in groin area of puncture, no heamtoma, no color change.     MEDICATIONS  (STANDING):  atorvastatin 80 milliGRAM(s) Oral at bedtime  brimonidine 0.2% Ophthalmic Solution 1 Drop(s) Both EYES two times a day  dextrose 5%. 1000 milliLiter(s) (50 mL/Hr) IV Continuous <Continuous>  dextrose 50% Injectable 12.5 Gram(s) IV Push once  dextrose 50% Injectable 25 Gram(s) IV Push once  dextrose 50% Injectable 25 Gram(s) IV Push once  heparin  Infusion 650 Unit(s)/Hr (6.5 mL/Hr) IV Continuous <Continuous>  insulin glargine Injectable (LANTUS) 5 Unit(s) SubCutaneous at bedtime  insulin lispro (HumaLOG) corrective regimen sliding scale   SubCutaneous Before meals and at bedtime  latanoprost 0.005% Ophthalmic Solution 1 Drop(s) Both EYES at bedtime  warfarin 5 milliGRAM(s) Oral once    MEDICATIONS  (PRN):  acetaminophen   Tablet. 650 milliGRAM(s) Oral every 6 hours PRN Moderate Pain (4 - 6)  dextrose 40% Gel 15 Gram(s) Oral once PRN Blood Glucose LESS THAN 70 milliGRAM(s)/deciliter  glucagon  Injectable 1 milliGRAM(s) IntraMuscular once PRN Glucose LESS THAN 70 milligrams/deciliter  ondansetron Injectable 4 milliGRAM(s) IV Push every 6 hours PRN Nausea and/or Vomiting      PHYSICAL EXAM:   Vital Signs Last 24 Hrs  Vital Signs Last 24 Hrs  T(C): 36.3 (27 May 2018 12:23), Max: 36.8 (27 May 2018 00:09)  T(F): 97.4 (27 May 2018 12:23), Max: 98.2 (27 May 2018 00:09)  HR: 66 (27 May 2018 12:23) (66 - 77)  BP: 128/72 (27 May 2018 12:23) (108/68 - 133/71)  BP(mean): --  RR: 16 (27 May 2018 12:23) (16 - 18)  SpO2: 99% (27 May 2018 12:23) (96% - 99%)    General: Awake. Smiling. Resting in chair .    No acute distress  HEENT: Normocephalic, atraumatic,  EOM intact, visual fields full  Abdomen: Soft, nontender, nondistended   Extremities: No edema    NEUROLOGICAL EXAM:  Mental status: Awake, alert, oriented to person/place/time, events, follows all commands.    Cranial Nerves: Mild right facial droop, No Nystagmus. No dysarthria.    Motor exam: Normal tone, no drift. Moving all  moves extremities well against gravity. No appreciable hemiparesis on this exam.  mild right drift   Sensation: Intact to light touch   Coordination/ Gait: subtle RUE ataxia.      LABS:                CBC Full  -  ( 27 May 2018 08:58 )  WBC Count : 6.46 K/uL  Hemoglobin : 15.9 g/dL  Hematocrit : 47.9 %  Platelet Count - Automated : 232 K/uL  Mean Cell Volume : 77.8 fl  Mean Cell Hemoglobin : 25.8 pg  Mean Cell Hemoglobin Concentration : 33.2 gm/dL  Auto Neutrophil # : x  Auto Lymphocyte # : x  Auto Monocyte # : x  Auto Eosinophil # : x  Auto Basophil # : x  Auto Neutrophil % : x  Auto Lymphocyte % : x  Auto Monocyte % : x  Auto Eosinophil % : x  Auto Basophil % : x         Hemoglobin A1C, Whole Blood: 7.5 % (05-18 @ 07:53)    PT/INR - ( 27 May 2018 09:18 )   PT: 19.6 sec;   INR: 1.72 ratio         PTT - ( 27 May 2018 09:18 )  PTT:55.9 sec  IMAGING: Reviewed by me.     CT Head No Cont (05.23.18 @ 10:48)  Foci of low density in the bilateral inferior cerebellar   hemispheres corresponding to acute infarcts seen on the prior MRI. No CT   evidence of large hemorrhagic transformation    MR Head w/wo IV Cont (05.22.18 @ 18:41)     Evolving infarction in the the bilateral cerebellar hemispheres,   midbrain, and mala with hemorrhagic transformation in the left cerebellar   hemisphere.  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  Intracranial arterial stenoses,calcified  carotid siphons likely from   diabetes, and tortuous vessels likely hypertensive without   hemodynamically significant stenosis at the ICA origins.    CT Head No Cont (05.19.18 @ 10:33)   Status post endovascular management of basilar occlusion without evidence   of a definite infarct. No CT evidence of hemorrhage, herniation, mass   effect, or hydrocephalus.

## 2018-05-28 LAB
APTT BLD: 68.2 SEC — HIGH (ref 27.5–37.4)
APTT BLD: 81.3 SEC — HIGH (ref 27.5–37.4)
GLUCOSE BLDC GLUCOMTR-MCNC: 132 MG/DL — HIGH (ref 70–99)
GLUCOSE BLDC GLUCOMTR-MCNC: 145 MG/DL — HIGH (ref 70–99)
GLUCOSE BLDC GLUCOMTR-MCNC: 155 MG/DL — HIGH (ref 70–99)
GLUCOSE BLDC GLUCOMTR-MCNC: 218 MG/DL — HIGH (ref 70–99)
HCT VFR BLD CALC: 47.9 % — HIGH (ref 34.5–45)
HGB BLD-MCNC: 15 G/DL — SIGNIFICANT CHANGE UP (ref 11.5–15.5)
INR BLD: 3.27 RATIO — HIGH (ref 0.88–1.16)
MCHC RBC-ENTMCNC: 25.4 PG — LOW (ref 27–34)
MCHC RBC-ENTMCNC: 31.4 GM/DL — LOW (ref 32–36)
MCV RBC AUTO: 81.1 FL — SIGNIFICANT CHANGE UP (ref 80–100)
PLATELET # BLD AUTO: 213 K/UL — SIGNIFICANT CHANGE UP (ref 150–400)
PROTHROM AB SERPL-ACNC: 36.5 SEC — HIGH (ref 9.8–12.7)
RBC # BLD: 5.9 M/UL — HIGH (ref 3.8–5.2)
RBC # FLD: 14.7 % — HIGH (ref 10.3–14.5)
WBC # BLD: 5.7 K/UL — SIGNIFICANT CHANGE UP (ref 3.8–10.5)
WBC # FLD AUTO: 5.7 K/UL — SIGNIFICANT CHANGE UP (ref 3.8–10.5)

## 2018-05-28 PROCEDURE — 99233 SBSQ HOSP IP/OBS HIGH 50: CPT

## 2018-05-28 RX ORDER — HEPARIN SODIUM 5000 [USP'U]/ML
600 INJECTION INTRAVENOUS; SUBCUTANEOUS
Qty: 25000 | Refills: 0 | Status: DISCONTINUED | OUTPATIENT
Start: 2018-05-28 | End: 2018-05-28

## 2018-05-28 RX ORDER — HEPARIN SODIUM 5000 [USP'U]/ML
400 INJECTION INTRAVENOUS; SUBCUTANEOUS
Qty: 25000 | Refills: 0 | Status: DISCONTINUED | OUTPATIENT
Start: 2018-05-28 | End: 2018-05-28

## 2018-05-28 RX ADMIN — Medication 2: at 17:46

## 2018-05-28 RX ADMIN — Medication 4: at 12:41

## 2018-05-28 RX ADMIN — LATANOPROST 1 DROP(S): 0.05 SOLUTION/ DROPS OPHTHALMIC; TOPICAL at 22:57

## 2018-05-28 RX ADMIN — INSULIN GLARGINE 5 UNIT(S): 100 INJECTION, SOLUTION SUBCUTANEOUS at 22:57

## 2018-05-28 RX ADMIN — BRIMONIDINE TARTRATE 1 DROP(S): 2 SOLUTION/ DROPS OPHTHALMIC at 05:24

## 2018-05-28 RX ADMIN — ATORVASTATIN CALCIUM 80 MILLIGRAM(S): 80 TABLET, FILM COATED ORAL at 22:57

## 2018-05-28 RX ADMIN — BRIMONIDINE TARTRATE 1 DROP(S): 2 SOLUTION/ DROPS OPHTHALMIC at 17:47

## 2018-05-28 RX ADMIN — HEPARIN SODIUM 6 UNIT(S)/HR: 5000 INJECTION INTRAVENOUS; SUBCUTANEOUS at 01:59

## 2018-05-28 NOTE — PROGRESS NOTE ADULT - ASSESSMENT
ASSESSMENT:   86 Y/O woman with vascular risk factors of age and DM II is evaluated at [facility] for acute onset of right sided weakness. She was last normal at around 8:45 PM, when she was walking in the shopping [place] with her daughter. She suddenly developed acute onset of dizziness, imbalance and slurring of speech. She also reports to have noticed right hand weakness/numbess at the same time. She presented to [facility], where CT brain did not show any evidence of acute infarct or hemorrhage. She was treated with IV tPA. At around 12:00-12:30 AM, she was noted to have acute onset of right sided weakness involving face, arm and leg with severe dysarthria. CT brain following right hemiplegia did not show any evidence of acute infarct or hemorrhage. 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 She was subsequently transferred to [facility]  for further management. She underwent conventional angiogram with intentions for mechanical thrombectomy, which was not possible secondary to technical difficulties and she was subsequently treated with IA tPA with the partial recanalization of the previously noted, basilar occlusion. CT brain performed subsequently did not show any evidence of acute infarct or hemorrhage.     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 symptomatic intracranial (basilar artery) atherosclerosis and superimposed local thrombosis, leading to occlusion of pontine perforators     NEURO: Neurologically overall improved as compared to admission, continue close monitoring for neurologic deterioration,  gradual normotension in setting of recent recanalization, atorvastatin 80 mg at bedtime, considering atheroembolic etiology of her stroke; further dose titration as per LDL goal less than 70, MR imaging as noted above. Physical therapy/OT eval AR.     ANTITHROMBOTIC THERAPY:  Cautious therapeutic anticoagulation with heparin drip, now held as INR supra-therapeutic.  Check INR daily and dose Coumadin accordingly (PM dose on hold in setting of elevated INR)    PULMONARY: protecting airway and saturating well    CARDIOVASCULAR:  TTE: ef 70%, mild MR, minimal AR, mild TR   Telemetry monitoring to screen for arrythmia with no acute findings                              BP goal  normotension; avoid hypotension    GASTROINTESTINAL: dysphagia screen per icu, advance diet as tolerated      Diet: regular     RENAL: BUN/Cr without acute change, good urine output , mild hyponatremia- resolved      Na Goal: Greater than 135     [place]:  n    HEMATOLOGY: H/H without acute change, Platelets 213     DVT ppx: INR supra therapeutic venodynes     ID: afebrile, no leukocytosis     DISPOSITION: AR     CORE MEASURES:        Admission NIHSS: 12     TPA: [x] YES [] NO      LDL/HDL: 94/69     Depression Screen: 0     Statin Therapy: y      Dysphagia Screen: [] PASS [x] FAIL     Smoking [] YES [x] NO      Afib [] YES [x] NO     Stroke Education [x] YES [] NO-

## 2018-05-28 NOTE — PROGRESS NOTE ADULT - SUBJECTIVE AND OBJECTIVE BOX
THE PATIENT WAS SEEN AND EXAMINED BY ME WITH THE HOUSESTAFF AND STROKE TEAM DURING MORNING ROUNDS.   HPI:    84 y/o female woman with DM II was transferred from [facility] for acute onset of dysarthria and right sided weakness. She was last normal at around 8:45 PM 5/17, when she was walking in the shopping [place] with her daughter. She suddenly developed acute onset of dizziness, imbalance and slurring of speech. She also reports to have noticed right hand weakness/numbess at the same time. She was treated with IV tPA. At around 12:00-12:30 AM 5/18, she was noted to have acute onset of right sided weakness involving face, arm and leg with severe dysarthria.  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 Catheter wire passed past stenosis but no significant clot removed; however, trickle flow obtained. Also, basilar noted to be filling retrograde via posterior communicating artery.      SUBJECTIVE: No events overnight.  No new neurologic complaints.      acetaminophen   Tablet. 650 milliGRAM(s) Oral every 6 hours PRN  atorvastatin 80 milliGRAM(s) Oral at bedtime  brimonidine 0.2% Ophthalmic Solution 1 Drop(s) Both EYES two times a day  dextrose 40% Gel 15 Gram(s) Oral once PRN  dextrose 5%. 1000 milliLiter(s) IV Continuous <Continuous>  dextrose 50% Injectable 12.5 Gram(s) IV Push once  dextrose 50% Injectable 25 Gram(s) IV Push once  dextrose 50% Injectable 25 Gram(s) IV Push once  glucagon  Injectable 1 milliGRAM(s) IntraMuscular once PRN  insulin glargine Injectable (LANTUS) 5 Unit(s) SubCutaneous at bedtime  insulin lispro (HumaLOG) corrective regimen sliding scale   SubCutaneous Before meals and at bedtime  latanoprost 0.005% Ophthalmic Solution 1 Drop(s) Both EYES at bedtime  ondansetron Injectable 4 milliGRAM(s) IV Push every 6 hours PRN      PHYSICAL EXAM:   Vital Signs Last 24 Hrs  T(C): 37.2 (28 May 2018 13:01), Max: 37.2 (28 May 2018 13:01)  T(F): 99 (28 May 2018 13:01), Max: 99 (28 May 2018 13:01)  HR: 95 (28 May 2018 13:01) (63 - 95)  BP: 133/60 (28 May 2018 13:01) (105/69 - 146/72)  BP(mean): --  RR: 18 (28 May 2018 13:01) (16 - 18)  SpO2: 97% (28 May 2018 13:01) (96% - 98%)    General: Awake. Smiling. Resting in chair .    No acute distress  HEENT: Normocephalic, atraumatic,  EOM intact, visual fields full  Abdomen: Soft, nontender, nondistended   Extremities: No edema    NEUROLOGICAL EXAM:  Mental status: Awake, alert, oriented to person/place/time, events, follows all commands.    Cranial Nerves: Mild right facial droop, No Nystagmus. No dysarthria.    Motor exam: Normal tone, no drift. Moving all  moves extremities well against gravity. No appreciable hemiparesis on this exam.  mild right drift   Sensation: Intact to light touch   Coordination/ Gait: subtle RUE ataxia.      LABS:                        15.0   5.7   )-----------( 213      ( 28 May 2018 07:57 )             47.9    05-27    139  |  102  |  13  ----------------------------<  138<H>  4.4   |  25  |  0.65    Ca    9.8      27 May 2018 07:00    PT/INR - ( 28 May 2018 09:49 )   PT: 36.5 sec;   INR: 3.27 ratio         PTT - ( 28 May 2018 07:57 )  PTT:81.3 sec  Hemoglobin A1C, Whole Blood: 7.5 % (05-18 @ 07:53)      IMAGING: Reviewed by me.     CT Head No Cont (05.23.18 @ 10:48)  Foci of low density in the bilateral inferior cerebellar   hemispheres corresponding to acute infarcts seen on the prior MRI. No CT   evidence of large hemorrhagic transformation    MR Head w/wo IV Cont (05.22.18 @ 18:41)     Evolving infarction in the the bilateral cerebellar hemispheres,   midbrain, and mala with hemorrhagic transformation in the left cerebellar   hemisphere.  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  Intracranial arterial stenoses,calcified  carotid siphons likely from   diabetes, and tortuous vessels likely hypertensive without   hemodynamically significant stenosis at the ICA origins.    CT Head No Cont (05.19.18 @ 10:33)   Status post endovascular management of basilar occlusion without evidence   of a definite infarct. No CT evidence of hemorrhage, herniation, mass   effect, or hydrocephalus.

## 2018-05-29 LAB
ANION GAP SERPL CALC-SCNC: 10 MMOL/L — SIGNIFICANT CHANGE UP (ref 5–17)
BUN SERPL-MCNC: 14 MG/DL — SIGNIFICANT CHANGE UP (ref 7–23)
CALCIUM SERPL-MCNC: 9.7 MG/DL — SIGNIFICANT CHANGE UP (ref 8.4–10.5)
CHLORIDE SERPL-SCNC: 102 MMOL/L — SIGNIFICANT CHANGE UP (ref 96–108)
CO2 SERPL-SCNC: 25 MMOL/L — SIGNIFICANT CHANGE UP (ref 22–31)
CREAT SERPL-MCNC: 0.68 MG/DL — SIGNIFICANT CHANGE UP (ref 0.5–1.3)
GLUCOSE BLDC GLUCOMTR-MCNC: 136 MG/DL — HIGH (ref 70–99)
GLUCOSE BLDC GLUCOMTR-MCNC: 136 MG/DL — HIGH (ref 70–99)
GLUCOSE BLDC GLUCOMTR-MCNC: 154 MG/DL — HIGH (ref 70–99)
GLUCOSE BLDC GLUCOMTR-MCNC: 189 MG/DL — HIGH (ref 70–99)
GLUCOSE SERPL-MCNC: 132 MG/DL — HIGH (ref 70–99)
HCT VFR BLD CALC: 47.5 % — HIGH (ref 34.5–45)
HGB BLD-MCNC: 15.1 G/DL — SIGNIFICANT CHANGE UP (ref 11.5–15.5)
INR BLD: 2.43 RATIO — HIGH (ref 0.88–1.16)
INR BLD: 2.68 RATIO — HIGH (ref 0.88–1.16)
MCHC RBC-ENTMCNC: 25.8 PG — LOW (ref 27–34)
MCHC RBC-ENTMCNC: 31.8 GM/DL — LOW (ref 32–36)
MCV RBC AUTO: 80.9 FL — SIGNIFICANT CHANGE UP (ref 80–100)
PLATELET # BLD AUTO: 221 K/UL — SIGNIFICANT CHANGE UP (ref 150–400)
POTASSIUM SERPL-MCNC: 4.6 MMOL/L — SIGNIFICANT CHANGE UP (ref 3.5–5.3)
POTASSIUM SERPL-SCNC: 4.6 MMOL/L — SIGNIFICANT CHANGE UP (ref 3.5–5.3)
PROTHROM AB SERPL-ACNC: 26.7 SEC — HIGH (ref 9.8–12.7)
PROTHROM AB SERPL-ACNC: 29.8 SEC — HIGH (ref 9.8–12.7)
RBC # BLD: 5.86 M/UL — HIGH (ref 3.8–5.2)
RBC # FLD: 14.5 % — SIGNIFICANT CHANGE UP (ref 10.3–14.5)
SODIUM SERPL-SCNC: 137 MMOL/L — SIGNIFICANT CHANGE UP (ref 135–145)
WBC # BLD: 6.1 K/UL — SIGNIFICANT CHANGE UP (ref 3.8–10.5)
WBC # FLD AUTO: 6.1 K/UL — SIGNIFICANT CHANGE UP (ref 3.8–10.5)

## 2018-05-29 RX ORDER — WARFARIN SODIUM 2.5 MG/1
1 TABLET ORAL
Qty: 30 | Refills: 0 | OUTPATIENT
Start: 2018-05-29 | End: 2018-06-27

## 2018-05-29 RX ORDER — WARFARIN SODIUM 2.5 MG/1
1 TABLET ORAL
Qty: 30 | Refills: 0
Start: 2018-05-29 | End: 2018-06-27

## 2018-05-29 RX ORDER — WARFARIN SODIUM 2.5 MG/1
4 TABLET ORAL ONCE
Qty: 0 | Refills: 0 | Status: COMPLETED | OUTPATIENT
Start: 2018-05-29 | End: 2018-05-29

## 2018-05-29 RX ADMIN — INSULIN GLARGINE 5 UNIT(S): 100 INJECTION, SOLUTION SUBCUTANEOUS at 22:19

## 2018-05-29 RX ADMIN — BRIMONIDINE TARTRATE 1 DROP(S): 2 SOLUTION/ DROPS OPHTHALMIC at 05:39

## 2018-05-29 RX ADMIN — BRIMONIDINE TARTRATE 1 DROP(S): 2 SOLUTION/ DROPS OPHTHALMIC at 18:28

## 2018-05-29 RX ADMIN — ATORVASTATIN CALCIUM 80 MILLIGRAM(S): 80 TABLET, FILM COATED ORAL at 21:04

## 2018-05-29 RX ADMIN — WARFARIN SODIUM 4 MILLIGRAM(S): 2.5 TABLET ORAL at 21:04

## 2018-05-29 RX ADMIN — LATANOPROST 1 DROP(S): 0.05 SOLUTION/ DROPS OPHTHALMIC; TOPICAL at 21:04

## 2018-05-29 RX ADMIN — Medication 2: at 13:12

## 2018-05-29 RX ADMIN — Medication 2: at 22:19

## 2018-05-29 NOTE — PROGRESS NOTE ADULT - SUBJECTIVE AND OBJECTIVE BOX
THE PATIENT WAS SEEN AND EXAMINED BY ME WITH THE HOUSESTAFF AND STROKE TEAM DURING MORNING ROUNDS.   HPI:    84 y/o female woman with DM II was transferred from [facility] for acute onset of dysarthria and right sided weakness. She was last normal at around 8:45 PM 5/17, when she was walking in the shopping mall with her daughter. She suddenly developed acute onset of dizziness, imbalance and slurring of speech. She also reported to have noticed right hand weakness/numbess at the same time. She was treated with IV tPA. At around 12:00-12:30 AM 5/18, she was noted to have acute onset of right sided weakness involving face, arm and leg with severe dysarthria.  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 On 5/18/18, she underwent angiography which revealed reveled focal basilar stenosis just proximal to the AICA. Catheter wire passed past stenosis but no significant clot removed; however, trickle flow obtained. Also, basilar noted to be filling retrograde via posterior communicating artery.    SUBJECTIVE: No events overnight.  No new neurologic complaints.      acetaminophen   Tablet. 650 milliGRAM(s) Oral every 6 hours PRN  atorvastatin 80 milliGRAM(s) Oral at bedtime  brimonidine 0.2% Ophthalmic Solution 1 Drop(s) Both EYES two times a day  dextrose 40% Gel 15 Gram(s) Oral once PRN  dextrose 5%. 1000 milliLiter(s) IV Continuous <Continuous>  dextrose 50% Injectable 12.5 Gram(s) IV Push once  dextrose 50% Injectable 25 Gram(s) IV Push once  dextrose 50% Injectable 25 Gram(s) IV Push once  glucagon  Injectable 1 milliGRAM(s) IntraMuscular once PRN  insulin glargine Injectable (LANTUS) 5 Unit(s) SubCutaneous at bedtime  insulin lispro (HumaLOG) corrective regimen sliding scale   SubCutaneous Before meals and at bedtime  latanoprost 0.005% Ophthalmic Solution 1 Drop(s) Both EYES at bedtime  ondansetron Injectable 4 milliGRAM(s) IV Push every 6 hours PRN      PHYSICAL EXAM:   Vital Signs Last 24 Hrs  T(C): 36.9 (29 May 2018 12:28), Max: 36.9 (28 May 2018 15:57)  T(F): 98.4 (29 May 2018 12:28), Max: 98.4 (28 May 2018 15:57)  HR: 82 (29 May 2018 12:28) (76 - 95)  BP: 116/58 (29 May 2018 12:28) (116/58 - 165/80)  RR: 18 (29 May 2018 12:28) (17 - 18)  SpO2: 95% (29 May 2018 12:28) (94% - 100%)    General: Awake Resting in chair, No acute distress  HEENT: Normocephalic, atraumatic,  EOM intact, visual fields full  Abdomen: Soft, nontender, nondistended   Extremities: No edema    NEUROLOGICAL EXAM:  Mental status: Awake, alert, oriented to person, place, time, events, follows all commands.    Cranial Nerves: Mild right facial droop, No Nystagmus. No dysarthria.    Motor exam: Normal tone, no drift. Moving all moves extremities well against gravity. No appreciable hemiparesis on this exam. mild RUE drift   Sensation: Intact to light touch   Coordination/ Gait: subtle RUE ataxia.      LABS:                        15.1   6.1   )-----------( 221      ( 29 May 2018 06:22 )             47.5    05-29    137  |  102  |  14  ----------------------------<  132<H>  4.6   |  25  |  0.68    Ca    9.7      29 May 2018 06:22    PT/INR - ( 29 May 2018 06:22 )   PT: 29.8 sec;   INR: 2.68 ratio         PTT - ( 28 May 2018 07:57 )  PTT:81.3 sec  Hemoglobin A1C, Whole Blood: 7.5 % (05-18 @ 07:53)      IMAGING: Reviewed by me.     CT Head No Cont (05.23.18 @ 10:48)  Foci of low density in the bilateral inferior cerebellar   hemispheres corresponding to acute infarcts seen on the prior MRI. No CT   evidence of large hemorrhagic transformation    MR Head w/wo IV Cont (05.22.18 @ 18:41)   Evolving infarction in the the bilateral cerebellar hemispheres,   midbrain, and mala with hemorrhagic transformation in the left cerebellar   hemisphere.  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  Intracranial arterial stenoses,calcified  carotid siphons likely from   diabetes, and tortuous vessels likely hypertensive without   hemodynamically significant stenosis at the ICA origins.    CT Head No Cont (05.19.18 @ 10:33)   Status post endovascular management of basilar occlusion without evidence   of a definite infarct. No CT evidence of hemorrhage, herniation, mass   effect, or hydrocephalus.

## 2018-05-29 NOTE — PROGRESS NOTE ADULT - ASSESSMENT
ASSESSMENT:   84 Y/O woman with vascular risk factors of age and DM II evaluated at [facility] for acute onset of right sided weakness. She was last normal at around 8:45 PM, when she was walking in the shopping [place] with her daughter. She suddenly developed acute onset of dizziness, imbalance and slurring of speech. She also reported to have noticed right hand weakness/numbess at the same time. She presented to [facility], where CT brain did not show any evidence of acute infarct or hemorrhage. She was treated with IV tPA. At around 12:00-12:30 AM, she was noted to have acute onset of right sided weakness involving face, arm and leg with severe dysarthria. CT brain following right hemiplegia did not show any evidence of acute infarct or hemorrhage. 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 She was subsequently transferred to [facility]  for further management. She underwent conventional angiogram with intentions for mechanical thrombectomy, which was not possible secondary to technical difficulties and she was subsequently treated with IA tPA with the partial recanalization of the previously noted, basilar occlusion. CT brain performed subsequently did not show any evidence of acute infarct or hemorrhage.     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 symptomatic intracranial (basilar artery) atherosclerosis and superimposed local thrombosis, leading to occlusion of pontine perforators     NEURO: Neurologically without acute change; Has overall improved as compared to admission, continue close monitoring for neurologic deterioration,  gradual normotension in setting of recent recanalization, atorvastatin 80 mg at bedtime, considering atheroembolic etiology of her stroke; further dose titration as per LDL goal less than 70, MR imaging as noted above. Physical therapy/OT eval AR.     ANTITHROMBOTIC THERAPY: On Coumadin  in the setting of a unstable atherosclerotic plaque involving the basilar artery, bridge to Coumadin for 3 months and aspirin indefinitely once INR controlled (modified [facility] protocol) INR 2.68 this AM; Continue to check INR daily and dose Coumadin accordingly (PM dose on hold in setting of elevated INR on 5/28)    PULMONARY: protecting airway and saturating well    CARDIOVASCULAR:  TTE: ef 70%, mild MR, minimal AR, mild TR   Telemetry monitoring to screen for arrythmia with no acute findings                              BP goal  normotension; avoid hypotension    GASTROINTESTINAL: dysphagia screen passed; tolerating regular diet     Diet: regular     RENAL: BUN/Cr without acute change, good urine output , electrolytes within normal limits.      Na Goal: Greater than 135     [place]:  no    HEMATOLOGY: H/H 15.1/47.5, Platelets 221     DVT ppx: venodynes; Coumadin    ID: afebrile, no leukocytosis     DISPOSITION: AR     CORE MEASURES:        Admission NIHSS: 12     TPA: [x] YES [] NO      LDL/HDL: 94/69     Depression Screen: 0     Statin Therapy: y      Dysphagia Screen: [] PASS [x] FAIL     Smoking [] YES [x] NO      Afib [] YES [x] NO     Stroke Education [x] YES [] NO-

## 2018-05-30 VITALS
RESPIRATION RATE: 17 BRPM | HEART RATE: 88 BPM | DIASTOLIC BLOOD PRESSURE: 78 MMHG | SYSTOLIC BLOOD PRESSURE: 128 MMHG | OXYGEN SATURATION: 100 % | TEMPERATURE: 99 F

## 2018-05-30 LAB
ANION GAP SERPL CALC-SCNC: 10 MMOL/L — SIGNIFICANT CHANGE UP (ref 5–17)
BUN SERPL-MCNC: 16 MG/DL — SIGNIFICANT CHANGE UP (ref 7–23)
CALCIUM SERPL-MCNC: 9.2 MG/DL — SIGNIFICANT CHANGE UP (ref 8.4–10.5)
CHLORIDE SERPL-SCNC: 102 MMOL/L — SIGNIFICANT CHANGE UP (ref 96–108)
CO2 SERPL-SCNC: 25 MMOL/L — SIGNIFICANT CHANGE UP (ref 22–31)
CREAT SERPL-MCNC: 0.64 MG/DL — SIGNIFICANT CHANGE UP (ref 0.5–1.3)
GLUCOSE BLDC GLUCOMTR-MCNC: 122 MG/DL — HIGH (ref 70–99)
GLUCOSE BLDC GLUCOMTR-MCNC: 129 MG/DL — HIGH (ref 70–99)
GLUCOSE BLDC GLUCOMTR-MCNC: 177 MG/DL — HIGH (ref 70–99)
GLUCOSE SERPL-MCNC: 134 MG/DL — HIGH (ref 70–99)
HCT VFR BLD CALC: 44 % — SIGNIFICANT CHANGE UP (ref 34.5–45)
HGB BLD-MCNC: 14.3 G/DL — SIGNIFICANT CHANGE UP (ref 11.5–15.5)
INR BLD: 2.43 RATIO — HIGH (ref 0.88–1.16)
MCHC RBC-ENTMCNC: 26.4 PG — LOW (ref 27–34)
MCHC RBC-ENTMCNC: 32.6 GM/DL — SIGNIFICANT CHANGE UP (ref 32–36)
MCV RBC AUTO: 80.9 FL — SIGNIFICANT CHANGE UP (ref 80–100)
PLATELET # BLD AUTO: 214 K/UL — SIGNIFICANT CHANGE UP (ref 150–400)
POTASSIUM SERPL-MCNC: 4.5 MMOL/L — SIGNIFICANT CHANGE UP (ref 3.5–5.3)
POTASSIUM SERPL-SCNC: 4.5 MMOL/L — SIGNIFICANT CHANGE UP (ref 3.5–5.3)
PROTHROM AB SERPL-ACNC: 26.7 SEC — HIGH (ref 9.8–12.7)
RBC # BLD: 5.43 M/UL — HIGH (ref 3.8–5.2)
RBC # FLD: 14.4 % — SIGNIFICANT CHANGE UP (ref 10.3–14.5)
SODIUM SERPL-SCNC: 137 MMOL/L — SIGNIFICANT CHANGE UP (ref 135–145)
WBC # BLD: 5.8 K/UL — SIGNIFICANT CHANGE UP (ref 3.8–10.5)
WBC # FLD AUTO: 5.8 K/UL — SIGNIFICANT CHANGE UP (ref 3.8–10.5)

## 2018-05-30 PROCEDURE — 70553 MRI BRAIN STEM W/O & W/DYE: CPT

## 2018-05-30 PROCEDURE — 97165 OT EVAL LOW COMPLEX 30 MIN: CPT

## 2018-05-30 PROCEDURE — 93970 EXTREMITY STUDY: CPT

## 2018-05-30 PROCEDURE — 99285 EMERGENCY DEPT VISIT HI MDM: CPT

## 2018-05-30 PROCEDURE — 86900 BLOOD TYPING SEROLOGIC ABO: CPT

## 2018-05-30 PROCEDURE — C1894: CPT

## 2018-05-30 PROCEDURE — 97163 PT EVAL HIGH COMPLEX 45 MIN: CPT

## 2018-05-30 PROCEDURE — 97535 SELF CARE MNGMENT TRAINING: CPT

## 2018-05-30 PROCEDURE — 61645 PERQ ART M-THROMBECT &/NFS: CPT

## 2018-05-30 PROCEDURE — 70548 MR ANGIOGRAPHY NECK W/DYE: CPT

## 2018-05-30 PROCEDURE — 80053 COMPREHEN METABOLIC PANEL: CPT

## 2018-05-30 PROCEDURE — 85027 COMPLETE CBC AUTOMATED: CPT

## 2018-05-30 PROCEDURE — 97530 THERAPEUTIC ACTIVITIES: CPT

## 2018-05-30 PROCEDURE — 84295 ASSAY OF SERUM SODIUM: CPT

## 2018-05-30 PROCEDURE — 85610 PROTHROMBIN TIME: CPT

## 2018-05-30 PROCEDURE — 84100 ASSAY OF PHOSPHORUS: CPT

## 2018-05-30 PROCEDURE — 70450 CT HEAD/BRAIN W/O DYE: CPT

## 2018-05-30 PROCEDURE — 82565 ASSAY OF CREATININE: CPT

## 2018-05-30 PROCEDURE — C1725: CPT

## 2018-05-30 PROCEDURE — 85384 FIBRINOGEN ACTIVITY: CPT

## 2018-05-30 PROCEDURE — 82330 ASSAY OF CALCIUM: CPT

## 2018-05-30 PROCEDURE — 71045 X-RAY EXAM CHEST 1 VIEW: CPT

## 2018-05-30 PROCEDURE — 97112 NEUROMUSCULAR REEDUCATION: CPT

## 2018-05-30 PROCEDURE — 85730 THROMBOPLASTIN TIME PARTIAL: CPT

## 2018-05-30 PROCEDURE — 80061 LIPID PANEL: CPT

## 2018-05-30 PROCEDURE — 85014 HEMATOCRIT: CPT

## 2018-05-30 PROCEDURE — 99232 SBSQ HOSP IP/OBS MODERATE 35: CPT

## 2018-05-30 PROCEDURE — 97116 GAIT TRAINING THERAPY: CPT

## 2018-05-30 PROCEDURE — 94002 VENT MGMT INPAT INIT DAY: CPT

## 2018-05-30 PROCEDURE — 84132 ASSAY OF SERUM POTASSIUM: CPT

## 2018-05-30 PROCEDURE — 83735 ASSAY OF MAGNESIUM: CPT

## 2018-05-30 PROCEDURE — 82962 GLUCOSE BLOOD TEST: CPT

## 2018-05-30 PROCEDURE — 86850 RBC ANTIBODY SCREEN: CPT

## 2018-05-30 PROCEDURE — 70544 MR ANGIOGRAPHY HEAD W/O DYE: CPT

## 2018-05-30 PROCEDURE — 80048 BASIC METABOLIC PNL TOTAL CA: CPT

## 2018-05-30 PROCEDURE — C1769: CPT

## 2018-05-30 PROCEDURE — 82947 ASSAY GLUCOSE BLOOD QUANT: CPT

## 2018-05-30 PROCEDURE — 93306 TTE W/DOPPLER COMPLETE: CPT

## 2018-05-30 PROCEDURE — 83036 HEMOGLOBIN GLYCOSYLATED A1C: CPT

## 2018-05-30 PROCEDURE — 82803 BLOOD GASES ANY COMBINATION: CPT

## 2018-05-30 PROCEDURE — C1887: CPT

## 2018-05-30 PROCEDURE — 86901 BLOOD TYPING SEROLOGIC RH(D): CPT

## 2018-05-30 PROCEDURE — 85362 FIBRIN DEGRADATION PRODUCTS: CPT

## 2018-05-30 PROCEDURE — 82435 ASSAY OF BLOOD CHLORIDE: CPT

## 2018-05-30 PROCEDURE — C1760: CPT

## 2018-05-30 PROCEDURE — 83605 ASSAY OF LACTIC ACID: CPT

## 2018-05-30 PROCEDURE — C1757: CPT

## 2018-05-30 RX ORDER — WARFARIN SODIUM 2.5 MG/1
4 TABLET ORAL ONCE
Qty: 0 | Refills: 0 | Status: DISCONTINUED | OUTPATIENT
Start: 2018-05-30 | End: 2018-05-30

## 2018-05-30 RX ADMIN — BRIMONIDINE TARTRATE 1 DROP(S): 2 SOLUTION/ DROPS OPHTHALMIC at 05:53

## 2018-05-30 RX ADMIN — Medication 2: at 12:45

## 2018-05-30 RX ADMIN — BRIMONIDINE TARTRATE 1 DROP(S): 2 SOLUTION/ DROPS OPHTHALMIC at 18:20

## 2018-05-30 NOTE — PROGRESS NOTE ADULT - ASSESSMENT
84 y/o female with vascular risk factors of age and DM II evaluated at [facility] for acute onset of right sided weakness. She was last normal at around 8:45 PM, when she was walking in the shopping [place] with her daughter. She suddenly developed acute onset of dizziness, imbalance and slurring of speech. She also reported to have noticed right hand weakness/numbess at the same time. She presented to [facility], where CT brain did not show any evidence of acute infarct or hemorrhage. She was treated with IV tPA. At around 12:00-12:30 AM, she was noted to have acute onset of right sided weakness involving face, arm and leg with severe dysarthria. CT brain following right hemiplegia did not show any evidence of acute infarct or hemorrhage. 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 She was subsequently transferred to [facility]  for further management. She underwent conventional angiogram with intentions for mechanical thrombectomy, which was not possible secondary to technical difficulties and she was subsequently treated with IA tPA with the partial recanalization of the previously noted, basilar occlusion. CT brain performed subsequently did not show any evidence of acute infarct or hemorrhage.     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 symptomatic intracranial (basilar artery) atherosclerosis and superimposed local thrombosis, leading to occlusion of pontine perforators     NEURO: Neurologically without acute change; Has overall improved as compared to admission, continue close monitoring for neurologic deterioration, gradual normotension in setting of recent recanalization, atorvastatin 80 mg at bedtime- considering atheroembolic etiology of her stroke; further dose titration as per LDL goal less than 70, MR imaging as noted above. Physical therapy/OT eval AR.     ANTITHROMBOTIC THERAPY: On Coumadin in the setting of a unstable atherosclerotic plaque involving the basilar artery, bridge to Coumadin for 3 months and aspirin indefinitely once INR controlled (modified [facility] protocol) INR 2.68 this AM; Continue to check INR daily and dose Coumadin accordingly (PM dose on hold in setting of elevated INR on 5/28)    PULMONARY: protecting airway and saturating well    CARDIOVASCULAR:  TTE: ef 70%, mild MR, minimal AR, mild TR. Telemetry monitoring to screen for arrythmia with no acute findings.                              BP goal  normotension; avoid hypotension    GASTROINTESTINAL: dysphagia screen passed; tolerating regular diet     Diet: regular     RENAL: BUN/Cr without acute change, good urine output , electrolytes within normal limits.      Na Goal: Greater than 135     [place]:  no    HEMATOLOGY: H/H 14.3/44.0, Platelets 214     DVT ppx: venodynes; Coumadin    ID: afebrile, no leukocytosis     DISPOSITION: AR     CORE MEASURES:        Admission NIHSS: 12     TPA: [x] YES [] NO      LDL/HDL: 94/69     Depression Screen: 0     Statin Therapy: y      Dysphagia Screen: [] PASS [x] FAIL     Smoking [] YES [x] NO      Afib [] YES [x] NO     Stroke Education [x] YES [] NO

## 2018-05-30 NOTE — PROGRESS NOTE ADULT - SUBJECTIVE AND OBJECTIVE BOX
THE PATIENT WAS SEEN AND EXAMINED BY ME WITH THE HOUSESTAFF AND STROKE TEAM DURING MORNING ROUNDS.   HPI:    86 y/o female with PMHx of DM II was transferred from [facility] for acute onset of dysarthria and right sided weakness. She was last normal at around 8:45 PM 5/17, when she was walking in the shopping [place] with her daughter. She suddenly developed acute onset of dizziness, imbalance and slurring of speech. She also reported to have noticed right hand weakness/numbess at the same time. She was treated with IV tPA. At around 12:00-12:30 AM 5/18, she was noted to have acute onset of right sided weakness involving face, arm and leg with severe dysarthria.  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 On 5/18/18, she underwent angiography which revealed reveled focal basilar stenosis just proximal to the AICA. Catheter wire passed past stenosis but no significant clot removed; however, trickle flow obtained. Also, basilar noted to be filling retrograde via posterior communicating artery.    SUBJECTIVE: No events overnight.  No new neurologic complaints.      acetaminophen   Tablet. 650 milliGRAM(s) Oral every 6 hours PRN  atorvastatin 80 milliGRAM(s) Oral at bedtime  brimonidine 0.2% Ophthalmic Solution 1 Drop(s) Both EYES two times a day  dextrose 40% Gel 15 Gram(s) Oral once PRN  dextrose 5%. 1000 milliLiter(s) IV Continuous <Continuous>  dextrose 50% Injectable 12.5 Gram(s) IV Push once  dextrose 50% Injectable 25 Gram(s) IV Push once  dextrose 50% Injectable 25 Gram(s) IV Push once  glucagon  Injectable 1 milliGRAM(s) IntraMuscular once PRN  insulin glargine Injectable (LANTUS) 5 Unit(s) SubCutaneous at bedtime  insulin lispro (HumaLOG) corrective regimen sliding scale   SubCutaneous Before meals and at bedtime  latanoprost 0.005% Ophthalmic Solution 1 Drop(s) Both EYES at bedtime  ondansetron Injectable 4 milliGRAM(s) IV Push every 6 hours PRN  warfarin 4 milliGRAM(s) Oral once    PHYSICAL EXAM:   Vital Signs Last 24 Hrs  T(C): 36.8 (30 May 2018 08:25), Max: 36.9 (29 May 2018 12:28)  T(F): 98.2 (30 May 2018 08:25), Max: 98.4 (29 May 2018 12:28)  HR: 83 (30 May 2018 08:25) (74 - 94)  BP: 114/68 (30 May 2018 08:25) (110/69 - 125/69)  RR: 16 (30 May 2018 08:25) (16 - 18)  SpO2: 97% (30 May 2018 08:25) (95% - 98%)    General: Awake Resting in chair, No acute distress  HEENT: Normocephalic, atraumatic, EOM intact, visual fields full  Abdomen: Soft, nontender, nondistended   Extremities: No edema    NEUROLOGICAL EXAM:  Mental status: Awake, alert, oriented to person, place, time, events, follows all commands.    Cranial Nerves: Mild right facial droop, No Nystagmus. No dysarthria.    Motor exam: Normal tone. Moving all moves extremities well against gravity. No appreciable hemiparesis on this exam. mild RUE drift   Sensation: Intact to light touch   Coordination/ Gait: subtle RUE ataxia    LABS:                        14.3   5.8   )-----------( 214      ( 30 May 2018 06:15 )             44.0    05-30    137  |  102  |  16  ----------------------------<  134<H>  4.5   |  25  |  0.64    Ca    9.2      30 May 2018 06:14    PT/INR - ( 30 May 2018 06:21 )   PT: 26.7 sec;   INR: 2.43 ratio      Hemoglobin A1C, Whole Blood: 7.5 % (05-18 @ 07:53)    IMAGING: Reviewed by me.     CT Head No Cont (05.23.18 @ 10:48)  Foci of low density in the bilateral inferior cerebellar   hemispheres corresponding to acute infarcts seen on the prior MRI. No CT   evidence of large hemorrhagic transformation    MR Head w/wo IV Cont (05.22.18 @ 18:41)   Evolving infarction in the the bilateral cerebellar hemispheres,   midbrain, and mala with hemorrhagic transformation in the left cerebellar   hemisphere.  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  Intracranial arterial stenoses,calcified  carotid siphons likely from   diabetes, and tortuous vessels likely hypertensive without   hemodynamically significant stenosis at the ICA origins.    CT Head No Cont (05.19.18 @ 10:33)   Status post endovascular management of basilar occlusion without evidence   of a definite infarct. No CT evidence of hemorrhage, herniation, mass   effect, or hydrocephalus.

## 2018-05-30 NOTE — PROGRESS NOTE ADULT - SUBJECTIVE AND OBJECTIVE BOX
Cc: Gait dysfunction    HPI:  Patient tolerating therapies- denies pain    MEDICATIONS  (STANDING):  atorvastatin 80 milliGRAM(s) Oral at bedtime  brimonidine 0.2% Ophthalmic Solution 1 Drop(s) Both EYES two times a day  dextrose 5%. 1000 milliLiter(s) (50 mL/Hr) IV Continuous <Continuous>  dextrose 50% Injectable 12.5 Gram(s) IV Push once  dextrose 50% Injectable 25 Gram(s) IV Push once  dextrose 50% Injectable 25 Gram(s) IV Push once  insulin glargine Injectable (LANTUS) 5 Unit(s) SubCutaneous at bedtime  insulin lispro (HumaLOG) corrective regimen sliding scale   SubCutaneous Before meals and at bedtime  latanoprost 0.005% Ophthalmic Solution 1 Drop(s) Both EYES at bedtime  warfarin 4 milliGRAM(s) Oral once    MEDICATIONS  (PRN):  acetaminophen   Tablet. 650 milliGRAM(s) Oral every 6 hours PRN Moderate Pain (4 - 6)  dextrose 40% Gel 15 Gram(s) Oral once PRN Blood Glucose LESS THAN 70 milliGRAM(s)/deciliter  glucagon  Injectable 1 milliGRAM(s) IntraMuscular once PRN Glucose LESS THAN 70 milligrams/deciliter  ondansetron Injectable 4 milliGRAM(s) IV Push every 6 hours PRN Nausea and/or Vomiting    Vital Signs Last 24 Hrs  T(C): 37 (30 May 2018 12:25), Max: 37 (30 May 2018 12:25)  T(F): 98.6 (30 May 2018 12:25), Max: 98.6 (30 May 2018 12:25)  HR: 88 (30 May 2018 12:25) (74 - 94)  BP: 128/78 (30 May 2018 12:25) (110/69 - 128/78)  BP(mean): --  RR: 17 (30 May 2018 12:25) (16 - 18)  SpO2: 100% (30 May 2018 12:25) (97% - 100%)    PHYSICAL EXAM  Constitutional - NAD, Comfortable  HEENT - NCAT, EOMI  Neck - Supple, No limited ROM  Chest - CTA bilaterally, No wheeze, No rhonchi, No crackles  Cardiovascular - RRR, S1S2, No murmurs  Abdomen - BS+, Soft, NTND  Extremities - No C/C/E, No calf tenderness   Neurologic Exam -                    Cognitive - Awake, Alert, AAO to self, place, date, year, situation     Motor - No focal deficits     Slight dysmetria                     Psychiatric - Mood stable, Affect WNL    Impression:   84 yo with CVA with gait dysfunction    Plan:  PT- ROM, Bed Mob, Transfers, Amb w AD   OT- ADLs  Prec- Falls, Cardiac  DVT Prophylaxis- on coumadin  Skin- Turn q2 h  Dispo- Acute Rehab- can tolerate 3h/d PT/OT/SLP and requires daily physician visits

## 2018-05-30 NOTE — PROGRESS NOTE ADULT - PROVIDER SPECIALTY LIST ADULT
NSICU
Neurology
Rehab Medicine
Rehab Medicine
Neurology
NSICU
Neurology
Neurology

## 2018-06-07 PROBLEM — E11.9 TYPE 2 DIABETES MELLITUS WITHOUT COMPLICATIONS: Chronic | Status: ACTIVE | Noted: 2018-05-17

## 2018-06-07 RX ORDER — ATORVASTATIN CALCIUM 80 MG/1
1 TABLET, FILM COATED ORAL
Qty: 0 | Refills: 0 | COMMUNITY

## 2018-06-20 ENCOUNTER — APPOINTMENT (OUTPATIENT)
Dept: NEUROLOGY | Facility: CLINIC | Age: 83
End: 2018-06-20
Payer: MEDICARE

## 2018-06-20 VITALS
HEIGHT: 62.5 IN | HEART RATE: 78 BPM | SYSTOLIC BLOOD PRESSURE: 162 MMHG | WEIGHT: 134 LBS | BODY MASS INDEX: 24.04 KG/M2 | DIASTOLIC BLOOD PRESSURE: 82 MMHG

## 2018-06-20 DIAGNOSIS — Z78.9 OTHER SPECIFIED HEALTH STATUS: ICD-10-CM

## 2018-06-20 DIAGNOSIS — Z83.3 FAMILY HISTORY OF DIABETES MELLITUS: ICD-10-CM

## 2018-06-20 PROCEDURE — 99215 OFFICE O/P EST HI 40 MIN: CPT

## 2018-07-02 ENCOUNTER — APPOINTMENT (OUTPATIENT)
Dept: NEUROLOGY | Facility: CLINIC | Age: 83
End: 2018-07-02
Payer: MEDICARE

## 2018-07-02 PROCEDURE — 93888 INTRACRANIAL LIMITED STUDY: CPT

## 2018-07-02 PROCEDURE — 93880 EXTRACRANIAL BILAT STUDY: CPT

## 2018-08-23 ENCOUNTER — OTHER (OUTPATIENT)
Age: 83
End: 2018-08-23

## 2018-08-23 ENCOUNTER — APPOINTMENT (OUTPATIENT)
Dept: NEUROLOGY | Facility: CLINIC | Age: 83
End: 2018-08-23
Payer: MEDICARE

## 2018-08-23 VITALS
WEIGHT: 132 LBS | SYSTOLIC BLOOD PRESSURE: 140 MMHG | DIASTOLIC BLOOD PRESSURE: 82 MMHG | BODY MASS INDEX: 23.68 KG/M2 | HEIGHT: 62.5 IN | HEART RATE: 76 BPM

## 2018-08-23 PROBLEM — E11.9 TYPE 2 DIABETES MELLITUS WITHOUT COMPLICATIONS: Chronic | Status: ACTIVE | Noted: 2018-05-22

## 2018-08-23 PROCEDURE — 99215 OFFICE O/P EST HI 40 MIN: CPT

## 2018-08-23 RX ORDER — WARFARIN SODIUM 5 MG/1
5 TABLET ORAL
Refills: 0 | Status: DISCONTINUED | COMMUNITY
End: 2018-08-23

## 2018-11-07 LAB
BUN SERPL-MCNC: 10 MG/DL
CREAT SERPL-MCNC: 0.7 MG/DL

## 2018-11-13 ENCOUNTER — FORM ENCOUNTER (OUTPATIENT)
Age: 83
End: 2018-11-13

## 2018-11-14 ENCOUNTER — OUTPATIENT (OUTPATIENT)
Dept: OUTPATIENT SERVICES | Facility: HOSPITAL | Age: 83
LOS: 1 days | End: 2018-11-14
Payer: MEDICARE

## 2018-11-14 ENCOUNTER — APPOINTMENT (OUTPATIENT)
Dept: MRI IMAGING | Facility: HOSPITAL | Age: 83
End: 2018-11-14

## 2018-11-14 DIAGNOSIS — I63.02 CEREBRAL INFARCTION DUE TO THROMBOSIS OF BASILAR ARTERY: ICD-10-CM

## 2018-11-14 PROCEDURE — 70549 MR ANGIOGRAPH NECK W/O&W/DYE: CPT

## 2018-11-14 PROCEDURE — 70544 MR ANGIOGRAPHY HEAD W/O DYE: CPT

## 2018-11-14 PROCEDURE — A9585: CPT

## 2018-11-14 PROCEDURE — 70551 MRI BRAIN STEM W/O DYE: CPT

## 2018-11-14 PROCEDURE — 70549 MR ANGIOGRAPH NECK W/O&W/DYE: CPT | Mod: 26

## 2018-11-14 PROCEDURE — 70551 MRI BRAIN STEM W/O DYE: CPT | Mod: 26

## 2018-12-11 ENCOUNTER — APPOINTMENT (OUTPATIENT)
Dept: NEUROLOGY | Facility: CLINIC | Age: 83
End: 2018-12-11

## 2019-01-15 ENCOUNTER — APPOINTMENT (OUTPATIENT)
Dept: NEUROLOGY | Facility: CLINIC | Age: 84
End: 2019-01-15
Payer: MEDICARE

## 2019-01-15 ENCOUNTER — TRANSCRIPTION ENCOUNTER (OUTPATIENT)
Age: 84
End: 2019-01-15

## 2019-01-15 VITALS — SYSTOLIC BLOOD PRESSURE: 176 MMHG | HEART RATE: 82 BPM | DIASTOLIC BLOOD PRESSURE: 98 MMHG

## 2019-01-15 VITALS — HEART RATE: 74 BPM | DIASTOLIC BLOOD PRESSURE: 95 MMHG | SYSTOLIC BLOOD PRESSURE: 184 MMHG

## 2019-01-15 VITALS — DIASTOLIC BLOOD PRESSURE: 75 MMHG | HEART RATE: 77 BPM | SYSTOLIC BLOOD PRESSURE: 163 MMHG

## 2019-01-15 VITALS
BODY MASS INDEX: 23.86 KG/M2 | DIASTOLIC BLOOD PRESSURE: 79 MMHG | HEIGHT: 62.5 IN | SYSTOLIC BLOOD PRESSURE: 177 MMHG | HEART RATE: 88 BPM | WEIGHT: 133 LBS

## 2019-01-15 PROCEDURE — 99215 OFFICE O/P EST HI 40 MIN: CPT

## 2019-01-15 NOTE — HISTORY OF PRESENT ILLNESS
[FreeTextEntry1] : 86 Y/O R handed woman with vascular risk factors of age and DM II is seen in the vascular neurology office for the evaluation and management of stroke, leading to hospital admission in 5/18.\par \par She reported to be admitted to [facility] on 5/17/18 with acute dysarthria and imbalance. She was treated with IV tPA. Subsequently she developed acute right hemiplegia. She was transferred to [facility] for further management. She was treated with mechanical thrombectomy. She was discharged to inpatient rehab after appropriate stroke work up. She denies taking any antiplatelet medications before her stroke. She denies any personal or family history of DVT/PEs. She denies any history of recurrent miscarriages in the past. She denies any family history of stroke at young age. Her therapeutic anticoagulation with warfarin was switched to antiplatelet therapy in 8/18. \par \par Currently she continues to have imbalance but otherwise denies any focal neurological symptoms. Denies any episodes of new or recurrent focal neurological symptoms concerning for stroke or TIA since her last office visit. Reports compliance with her medications and denies any significant side effects and denies any significant side effects. Her current post-stroke mRS is reported to be 1 due to some imbalance. Of note, she reports to have noticed intermittent lightheadedness/presyncope upon suddenly standing up from sitting position and reports to have an episode of syncope when tried to get up suddenly from bending over position in the past.\par \par ROS: All negative except documented in the history of present illness.\par \par Workup:\par CT brain (5/17 and 18/18): No evidence of acute infarct or hemorrhage\par CTA head and neck (5/18/18): Severe to critical stenosis of right intracranial/intradural vertebral artery and occlusion of intravertebral left vertebral artery, thrombosis of mid basilar segment with reconstitution of top of the basilar and distal basilar artery through right PCOM, no significant extracranial vertebral artery stenoses and hypoplastic left vertebral artery \par MRI brain (5/22/18): Acute infarct in the left ventral ariadna-mala, probably right AICA-PICA complex distribution and left PICA distribution with minimal hemorrhagic transformation (HI 1)\par MRA head and neck (5/22/18): Moderate to severe stenosis of the right intracranial/intradural vertebral artery, critical stenosis versus occlusion of left intracranial/intradural vertebral artery, short segment mid basilar artery occlusion with reconstitution of distal basilar artery and top of the basilar through PCOM\par LDL: 94 (5/18)\par HbA1c: 7.5 (5/18)\par Transthoracic echocardiogram: Mild mitral regurgitation, minimal aortic regurgitation, normal left atrium, normal left ventricular systolic function or no segmental wall motion abnormalities\par Lower extremity duplex: No evidence of DVT\par \par Home medications:\par Reviewed with the patient/available family members and updated as appropriate.

## 2019-01-15 NOTE — ASSESSMENT
[FreeTextEntry1] : Assessment:\par 84 Y/O R handed woman with vascular risk factors of age and DM II is seen in the vascular neurology office for the evaluation and management of stroke, leading to hospital admission in 5/18. She presented to [facility] on 5/17/18 with acute dysarthria and imbalance. She was treated with IV tPA. Subsequently she developed acute right hemiplegia and was subsequently transferred to [facility] for further management. CT brain (5/17 and 18/18) did not show any evidence of acute infarct or hemorrhage. CTA head and neck (5/18/18) showed severe to critical stenosis of right intracranial/intradural vertebral artery and occlusion of intravertebral left vertebral artery, thrombosis of mid-basilar segment with reconstitution of top of the basilar and distal basilar artery through right PCOM without any no significant extracranial vertebral artery stenoses and hypoplastic left vertebral artery. MRI brain (5/22/18) showed an acute infarct in the left ventral ariadna-mala, probably right AICA and PICA distribution as well as left PICA distribution with minimal hemorrhagic transformation (HI 1). MRA head and neck (5/22/18) showed moderate to severe stenosis of the right intracranial/intradural vertebral artery, critical stenosis versus occlusion of left intracranial/intradural vertebral artery, short segment mid-basilar artery occlusion with reconstitution of distal basilar artery and top of the basilar through PCOM. TTE did not show any obvious structural cardiac source of embolism. Lower extremity duplex did not show any evidence of DVT being paradoxical source of embolism. MRI brain (11/14/18) did not show any evidence of acute or subacute infarct and showed mild leukoaraiosis. MRA head and neck (11/14/18) showed extensive intracranial atherosclerosis leading to vertebrobasilar system (right vertebral artery dominant) predominantly involving left vertebral artery and proximal to mid basilar artery leading to critical stenosis versus complete occlusion with reconstitution of distal basilar artery and top of the basilar through right PCOM, which was confirmed on MRA NOVA. She was treated with short duration terapeutic anticoagulation with warfarin, goal INR 2-3 for about 3 months after her stroke, which was switched to antiplatelet therapy since 8/18.\par \par Impression:\par Cerebral thrombosis with cerebral infarction. Stroke involving the vertebrobasilar system (bilateral PICAs, left AICA and basilar/left pontine  distribution) - likely etiology being large vessel disease i.e. symptomatic intracranial/bilateral vertebral arteries and basilar artery atherosclerotic stenosis with superimposed local thrombosis as well as artery to artery embolism and less likely to be related to embolism from a proximal source like cardiac source of embolism\par \par Plan:\par - Aspirin 81 mg once a day for secondary stroke prevention, indefinitely if not contraindicated due to any specific reasons in the future. Aggrenox one capsule twice a day for secondary stroke prevention was not approved by her medical insurance. Would resend the letter of medical necessity.\par - Atorvastatin 80 mg at bedtime considering likely atheroembolic etiology of her stroke \par - She should follow up closely with her primary care physician for optimal vascular risk factors modifications including blood pressure goal less than 130/80 mmHg, HbA1c goal <7 and preferably 6-6.5 and optimal cholesterol management \par - She is advised to check blood pressure regularly at home, preferably at different times during the day and multiple days a week and was advised to keep a log of BPs to bring to primary care physician visit for further instructions regarding optimal BP management. Also advised to followup closely with PCP regarding regular blood work including monitoring of HbA1c and cholesterol profile\par - Prolonged cardiac monitoring with 30 days event monitor to screen for occult cardiac arrhythmias like paroxysmal atrial fibrillation as it could potentially change medical management for this patient; advised to follow up with her cardiologist/PCP to obtain cardiac monitor - she has not been able to undergo cardiac monitoring due to "personal reasons". Importance of undergoing complete evaluation to better characterize the etiology of her stroke was reemphasized\par - Cardiologist referral to obtain event monitor\par - Re-referral to a sleep disorder specialist to rule out possibility of obstructive sleep apnea as a possible vascular risk factors\par - Physical therapy referral\par - CUS and TCDs to be performed during next office visit to followup on cerebral vasculature\par \par - Above mentioned plan was discussed with patient and available family members in detail. I have answered all their questions to the best of my abilities. I have reviewed measures for secondary stroke prevention with the patient and available family members, including aggressive vascular risk factors modification, healthy diet, regular exercise and medication compliance. As well as discussed the need for calling 911 immediately in case of any symptoms concerning for stroke in the future. \par - I would follow her in the office in about 6 months or earlier as needed. Advised to call me once the laboratory tests and imaging studies have been performed to discuss the results over the phone.

## 2019-01-15 NOTE — PHYSICAL EXAM
[FreeTextEntry1] : General exam: Sitting in the chair and does not appear to be in distress\par Carotid bruits: None\par CVS: S1-S2 present\par RS: CTA B\par Skin: No lesion noted on visible skin \par \par Neuro exam: \par MS: Alert, awake and is oriented to time, place and person with normal attention span, normal recent and remote memory\par Language: Fluent speech with intact comprehension, with intact naming and repetition, no right-left confusion, no finger agnosia and no apraxia. Normal fund of knowledge. No dysarthria. \par Cr.N.: Pupils bilaterally 2-3 mm in size, equal, round and reactive to light, no papilledema, intact visual fields to confrontation, extraocular movements intact without any diplopia, no ptosis, no nystagmus, right nasolabial flattening with intact facial sensations, no hearing loss to rubbing fingers, tongue is in the midline, uvula elevates in the midline and without any drooping of the soft palate, normal shrugging of the shoulders bilaterally.\par \par Motor: \par Tone - Normal\par Bulk - No atrophy\par Power - Subtle RUE pronator drift\par RUE - Proximally and distally 5-/5; reduced fine finger movements in the right hand\par RLE - 5-/5\par LUE and LLE - 5/5\par DTRs - +1 on the left-side and +2 on the right side \par Plantars: Bilaterally flexor\par Sensory: Intact to light touch and pinprick, no sensory extinction. \par Cerebellar: No ataxia on finger-nose-finger but noted to have mild ataxia/dysmetria on knee-heel-shin testing on the right, as well as without any dysdiadochokinesia\par Gait: Slightly wide-based gait with normal stride and length, was able to perform toe and heel walking with minimal support but was not able to perform tandem walking\par Romberg's sign - Mild swaying with the eyes closed.

## 2019-01-15 NOTE — REVIEW OF SYSTEMS
[As Noted in HPI] : as noted in HPI [Anxiety] : anxiety [Negative] : Heme/Lymph [Suicidal] : not suicidal [de-identified] : Denies any homicidal ideation

## 2019-01-16 ENCOUNTER — APPOINTMENT (OUTPATIENT)
Dept: INTERNAL MEDICINE | Facility: CLINIC | Age: 84
End: 2019-01-16
Payer: MEDICARE

## 2019-01-16 VITALS
SYSTOLIC BLOOD PRESSURE: 189 MMHG | HEART RATE: 68 BPM | HEIGHT: 62.5 IN | WEIGHT: 133 LBS | DIASTOLIC BLOOD PRESSURE: 76 MMHG | BODY MASS INDEX: 23.86 KG/M2

## 2019-01-16 VITALS — DIASTOLIC BLOOD PRESSURE: 80 MMHG | SYSTOLIC BLOOD PRESSURE: 130 MMHG

## 2019-01-16 PROCEDURE — 99213 OFFICE O/P EST LOW 20 MIN: CPT

## 2019-01-16 NOTE — PHYSICAL EXAM
[No Acute Distress] : no acute distress [Well Nourished] : well nourished [Well Developed] : well developed [Well-Appearing] : well-appearing [Normal Sclera/Conjunctiva] : normal sclera/conjunctiva [Normal Outer Ear/Nose] : the outer ears and nose were normal in appearance [No JVD] : no jugular venous distention [No Respiratory Distress] : no respiratory distress  [Clear to Auscultation] : lungs were clear to auscultation bilaterally [No Accessory Muscle Use] : no accessory muscle use [Normal Rate] : normal rate  [Regular Rhythm] : with a regular rhythm [Normal S1, S2] : normal S1 and S2 [Normal Gait] : normal gait [Coordination Grossly Intact] : coordination grossly intact

## 2019-01-16 NOTE — HISTORY OF PRESENT ILLNESS
[FreeTextEntry1] : told  bp was very high  in endo office  yesterday  told she had some orthostatic changes she has no headaches but  a syncopal episode 1 mo ago  she saw

## 2019-05-08 ENCOUNTER — APPOINTMENT (OUTPATIENT)
Dept: INTERNAL MEDICINE | Facility: CLINIC | Age: 84
End: 2019-05-08
Payer: MEDICARE

## 2019-05-08 ENCOUNTER — RECORD ABSTRACTING (OUTPATIENT)
Age: 84
End: 2019-05-08

## 2019-05-08 VITALS
BODY MASS INDEX: 24.84 KG/M2 | SYSTOLIC BLOOD PRESSURE: 157 MMHG | DIASTOLIC BLOOD PRESSURE: 85 MMHG | WEIGHT: 135 LBS | HEIGHT: 62 IN

## 2019-05-08 DIAGNOSIS — Z86.73 PERSONAL HISTORY OF TRANSIENT ISCHEMIC ATTACK (TIA), AND CEREBRAL INFARCTION W/OUT RESIDUAL DEFICITS: ICD-10-CM

## 2019-05-08 DIAGNOSIS — Z87.09 PERSONAL HISTORY OF OTHER DISEASES OF THE RESPIRATORY SYSTEM: ICD-10-CM

## 2019-05-08 LAB
BILIRUB UR QL STRIP: NORMAL
CLARITY UR: CLEAR
COLLECTION METHOD: NORMAL
GLUCOSE UR-MCNC: NORMAL
HCG UR QL: 0.2 EU/DL
HGB UR QL STRIP.AUTO: NORMAL
KETONES UR-MCNC: NORMAL
LEUKOCYTE ESTERASE UR QL STRIP: NORMAL
NITRITE UR QL STRIP: NORMAL
PH UR STRIP: 7
PROT UR STRIP-MCNC: NORMAL
SP GR UR STRIP: 1.02

## 2019-05-08 PROCEDURE — G0444 DEPRESSION SCREEN ANNUAL: CPT | Mod: 59

## 2019-05-08 PROCEDURE — 81003 URINALYSIS AUTO W/O SCOPE: CPT | Mod: QW

## 2019-05-08 PROCEDURE — G0439: CPT

## 2019-05-08 PROCEDURE — G0442 ANNUAL ALCOHOL SCREEN 15 MIN: CPT | Mod: 59

## 2019-05-08 RX ORDER — TRAVOPROST 0.04 MG/ML
0 SOLUTION/ DROPS OPHTHALMIC
Refills: 0 | Status: ACTIVE | COMMUNITY

## 2019-05-08 RX ORDER — WARFARIN 5 MG/1
5 TABLET ORAL DAILY
Refills: 0 | Status: DISCONTINUED | COMMUNITY
End: 2019-05-08

## 2019-05-08 RX ORDER — BLOOD SUGAR DIAGNOSTIC
STRIP MISCELLANEOUS TWICE DAILY
Qty: 2 | Refills: 2 | Status: ACTIVE | COMMUNITY

## 2019-05-08 RX ORDER — INSULIN GLARGINE 100 [IU]/ML
100 INJECTION, SOLUTION SUBCUTANEOUS AT BEDTIME
Refills: 0 | Status: DISCONTINUED | COMMUNITY
End: 2019-05-08

## 2019-05-08 RX ORDER — ASPRIN AND EXTENDED-RELEASE DIPYRIDAMOLE 25; 200 MG/1; MG/1
25-200 CAPSULE ORAL TWICE DAILY
Qty: 60 | Refills: 3 | Status: DISCONTINUED | COMMUNITY
Start: 2018-08-23 | End: 2019-05-08

## 2019-05-08 RX ORDER — INSULIN DETEMIR 100 [IU]/ML
100 INJECTION, SOLUTION SUBCUTANEOUS AT BEDTIME
Refills: 0 | Status: DISCONTINUED | COMMUNITY
End: 2019-05-08

## 2019-05-08 RX ORDER — BRIMONIDINE TARTRATE 1 MG/ML
0.1 SOLUTION/ DROPS OPHTHALMIC
Refills: 0 | Status: ACTIVE | COMMUNITY

## 2019-05-08 RX ORDER — ESCITALOPRAM OXALATE 10 MG/1
10 TABLET ORAL DAILY
Qty: 90 | Refills: 0 | Status: DISCONTINUED | COMMUNITY
Start: 2019-01-16 | End: 2019-05-08

## 2019-05-08 NOTE — COUNSELING
[Activity counseling provided] : activity [Healthy eating counseling provided] : healthy eating [ - Annual Depression Screening] : Annual Depression Screening [ - Annual Alcohol Misuse Screening] : Annual Alcohol Misuse Screening [de-identified] : patient counseled on    diet   importance of exercise and not smoking  regular dental care and periodic eye exams.  \par

## 2019-05-09 LAB
ALBUMIN SERPL ELPH-MCNC: 4.1 G/DL
ALP BLD-CCNC: 123 U/L
ALT SERPL-CCNC: 77 U/L
ANION GAP SERPL CALC-SCNC: 14 MMOL/L
AST SERPL-CCNC: 52 U/L
BASOPHILS # BLD AUTO: 0.04 K/UL
BASOPHILS NFR BLD AUTO: 0.6 %
BILIRUB SERPL-MCNC: 0.6 MG/DL
BUN SERPL-MCNC: 18 MG/DL
CALCIUM SERPL-MCNC: 9.9 MG/DL
CHLORIDE SERPL-SCNC: 102 MMOL/L
CHOLEST SERPL-MCNC: 124 MG/DL
CHOLEST/HDLC SERPL: 2.2 RATIO
CO2 SERPL-SCNC: 24 MMOL/L
CREAT SERPL-MCNC: 0.69 MG/DL
EOSINOPHIL # BLD AUTO: 0.17 K/UL
EOSINOPHIL NFR BLD AUTO: 2.6 %
GLUCOSE SERPL-MCNC: 127 MG/DL
HCT VFR BLD CALC: 47.5 %
HDLC SERPL-MCNC: 56 MG/DL
HGB BLD-MCNC: 15.2 G/DL
IMM GRANULOCYTES NFR BLD AUTO: 0.3 %
LDLC SERPL CALC-MCNC: 60 MG/DL
LYMPHOCYTES # BLD AUTO: 3.11 K/UL
LYMPHOCYTES NFR BLD AUTO: 47.3 %
MAN DIFF?: NORMAL
MCHC RBC-ENTMCNC: 25.5 PG
MCHC RBC-ENTMCNC: 32 GM/DL
MCV RBC AUTO: 79.7 FL
MONOCYTES # BLD AUTO: 0.88 K/UL
MONOCYTES NFR BLD AUTO: 13.4 %
NEUTROPHILS # BLD AUTO: 2.36 K/UL
NEUTROPHILS NFR BLD AUTO: 35.8 %
PLATELET # BLD AUTO: 182 K/UL
POTASSIUM SERPL-SCNC: 4.7 MMOL/L
PROT SERPL-MCNC: 7.7 G/DL
RBC # BLD: 5.96 M/UL
RBC # FLD: 15.6 %
SODIUM SERPL-SCNC: 140 MMOL/L
TRIGL SERPL-MCNC: 40 MG/DL
WBC # FLD AUTO: 6.58 K/UL

## 2019-05-13 LAB
ESTIMATED AVERAGE GLUCOSE: 194 MG/DL
HBA1C MFR BLD HPLC: 8.4 %

## 2019-05-24 ENCOUNTER — OUTPATIENT (OUTPATIENT)
Dept: OUTPATIENT SERVICES | Facility: HOSPITAL | Age: 84
LOS: 1 days | End: 2019-05-24
Payer: MEDICARE

## 2019-05-24 VITALS
RESPIRATION RATE: 16 BRPM | HEIGHT: 62 IN | WEIGHT: 136.91 LBS | DIASTOLIC BLOOD PRESSURE: 74 MMHG | OXYGEN SATURATION: 94 % | HEART RATE: 84 BPM | SYSTOLIC BLOOD PRESSURE: 159 MMHG

## 2019-05-24 DIAGNOSIS — Z98.890 OTHER SPECIFIED POSTPROCEDURAL STATES: Chronic | ICD-10-CM

## 2019-05-24 DIAGNOSIS — I66.9 OCCLUSION AND STENOSIS OF UNSPECIFIED CEREBRAL ARTERY: ICD-10-CM

## 2019-05-24 DIAGNOSIS — I63.9 CEREBRAL INFARCTION, UNSPECIFIED: Chronic | ICD-10-CM

## 2019-05-24 PROCEDURE — 33285 INSJ SUBQ CAR RHYTHM MNTR: CPT

## 2019-05-24 PROCEDURE — C1764: CPT

## 2019-05-24 RX ORDER — METFORMIN HYDROCHLORIDE 850 MG/1
1 TABLET ORAL
Qty: 0 | Refills: 0 | DISCHARGE

## 2019-05-24 RX ORDER — INSULIN DEGLUDEC 100 U/ML
10 INJECTION, SOLUTION SUBCUTANEOUS
Qty: 0 | Refills: 0 | DISCHARGE

## 2019-05-24 RX ORDER — SODIUM CHLORIDE 9 MG/ML
3 INJECTION INTRAMUSCULAR; INTRAVENOUS; SUBCUTANEOUS EVERY 8 HOURS
Refills: 0 | Status: DISCONTINUED | OUTPATIENT
Start: 2019-05-24 | End: 2019-06-08

## 2019-05-24 RX ORDER — ASPIRIN/CALCIUM CARB/MAGNESIUM 324 MG
1 TABLET ORAL
Qty: 0 | Refills: 0 | DISCHARGE

## 2019-05-24 RX ORDER — LOSARTAN POTASSIUM 100 MG/1
1 TABLET, FILM COATED ORAL
Qty: 0 | Refills: 0 | DISCHARGE

## 2019-05-24 NOTE — H&P CARDIOLOGY - PMH
CVA (cerebral vascular accident)    Diabetes    DM (diabetes mellitus)    HLD (hyperlipidemia)    HTN (hypertension)

## 2019-05-24 NOTE — H&P CARDIOLOGY - HISTORY OF PRESENT ILLNESS
86 year old [place] female with PMH of CVA 5/2018- with residual balance issues and right sided weakness, HTN, HLD, DMT2- last A1c 7.5, no complications- followed by Dr Yelena OLSON, presents today for LOOP implant. Patient's daughter reports CVA 5/2018, after which she was referred to Dr Sheth for cardiology evaluation. 86 year old [place] female with PMH of CVA s/p thrombectomy 5/2018- with residual balance issues and mild right sided weakness, HTN, HLD, DMT2- last A1c 7.5, no complications- followed by Dr Yelena OLSON, presents today for LOOP implant. Patient's daughter reports CVA 5/2018, after which she was referred to Dr Sheth for cardiology evaluation. LOOP recommended to evaluate for PAF.       Creole speaking - opted for daughter to translate on her behalf.

## 2019-05-24 NOTE — H&P CARDIOLOGY - PSH
Acute CVA (cerebrovascular accident)  s/p thrombectomy  H/O rotator cuff surgery    History of arthroscopic knee surgery    History of hemorrhoidectomy

## 2019-06-03 ENCOUNTER — FORM ENCOUNTER (OUTPATIENT)
Age: 84
End: 2019-06-03

## 2019-06-03 ENCOUNTER — APPOINTMENT (OUTPATIENT)
Dept: ELECTROPHYSIOLOGY | Facility: CLINIC | Age: 84
End: 2019-06-03

## 2019-06-03 PROBLEM — I10 ESSENTIAL (PRIMARY) HYPERTENSION: Chronic | Status: ACTIVE | Noted: 2019-05-24

## 2019-06-03 PROBLEM — I63.9 CEREBRAL INFARCTION, UNSPECIFIED: Chronic | Status: ACTIVE | Noted: 2019-05-24

## 2019-06-03 PROBLEM — E78.5 HYPERLIPIDEMIA, UNSPECIFIED: Chronic | Status: ACTIVE | Noted: 2019-05-24

## 2019-06-04 ENCOUNTER — OUTPATIENT (OUTPATIENT)
Dept: OUTPATIENT SERVICES | Facility: HOSPITAL | Age: 84
LOS: 1 days | End: 2019-06-04
Payer: MEDICARE

## 2019-06-04 ENCOUNTER — APPOINTMENT (OUTPATIENT)
Dept: ULTRASOUND IMAGING | Facility: CLINIC | Age: 84
End: 2019-06-04
Payer: MEDICARE

## 2019-06-04 DIAGNOSIS — R94.5 ABNORMAL RESULTS OF LIVER FUNCTION STUDIES: ICD-10-CM

## 2019-06-04 DIAGNOSIS — Z98.890 OTHER SPECIFIED POSTPROCEDURAL STATES: Chronic | ICD-10-CM

## 2019-06-04 DIAGNOSIS — I63.9 CEREBRAL INFARCTION, UNSPECIFIED: Chronic | ICD-10-CM

## 2019-06-04 PROCEDURE — 76705 ECHO EXAM OF ABDOMEN: CPT | Mod: 26

## 2019-06-04 PROCEDURE — 76705 ECHO EXAM OF ABDOMEN: CPT

## 2019-07-01 ENCOUNTER — MEDICATION RENEWAL (OUTPATIENT)
Age: 84
End: 2019-07-01

## 2019-07-15 ENCOUNTER — APPOINTMENT (OUTPATIENT)
Dept: NEUROLOGY | Facility: CLINIC | Age: 84
End: 2019-07-15
Payer: MEDICARE

## 2019-07-15 VITALS
HEART RATE: 76 BPM | SYSTOLIC BLOOD PRESSURE: 156 MMHG | DIASTOLIC BLOOD PRESSURE: 71 MMHG | HEIGHT: 62 IN | BODY MASS INDEX: 24.84 KG/M2 | WEIGHT: 135 LBS

## 2019-07-15 PROCEDURE — 99214 OFFICE O/P EST MOD 30 MIN: CPT

## 2019-07-15 RX ORDER — WARFARIN 4 MG/1
4 TABLET ORAL DAILY
Refills: 0 | Status: DISCONTINUED | COMMUNITY
End: 2019-07-15

## 2019-08-02 ENCOUNTER — APPOINTMENT (OUTPATIENT)
Dept: NEUROLOGY | Facility: CLINIC | Age: 84
End: 2019-08-02
Payer: MEDICARE

## 2019-08-02 PROCEDURE — 93888 INTRACRANIAL LIMITED STUDY: CPT

## 2019-08-02 PROCEDURE — 93880 EXTRACRANIAL BILAT STUDY: CPT

## 2019-08-12 ENCOUNTER — OTHER (OUTPATIENT)
Age: 84
End: 2019-08-12

## 2019-08-15 ENCOUNTER — TRANSCRIPTION ENCOUNTER (OUTPATIENT)
Age: 84
End: 2019-08-15

## 2019-11-13 ENCOUNTER — APPOINTMENT (OUTPATIENT)
Dept: NEUROLOGY | Facility: CLINIC | Age: 84
End: 2019-11-13
Payer: MEDICARE

## 2019-11-13 VITALS
WEIGHT: 134 LBS | SYSTOLIC BLOOD PRESSURE: 183 MMHG | HEART RATE: 80 BPM | BODY MASS INDEX: 24.66 KG/M2 | HEIGHT: 62 IN | DIASTOLIC BLOOD PRESSURE: 88 MMHG

## 2019-11-13 DIAGNOSIS — R27.0 ATAXIA, UNSPECIFIED: ICD-10-CM

## 2019-11-13 DIAGNOSIS — G62.9 POLYNEUROPATHY, UNSPECIFIED: ICD-10-CM

## 2019-11-13 PROCEDURE — 99215 OFFICE O/P EST HI 40 MIN: CPT

## 2019-11-13 NOTE — HISTORY OF PRESENT ILLNESS
[FreeTextEntry1] : Ms. Olivia is a 85 Y/O R handed woman with vascular risk factors of age and DM II is seen in the vascular neurology office for the evaluation and management of stroke, leading to hospital admission in 5/18.\par \par No new neurological symptoms. Her A1c is 6.0 from 8.4; was on statin 40 mg BID ( adverse effect of diarrhea son 80 mg dose of atorvastatin.\par \par She reported to be admitted to [facility] on 5/17/18 with acute dysarthria and imbalance. She was treated with IV tPA. Subsequently she developed acute right hemiplegia. She was transferred to [facility] for further management. She was treated with mechanical thrombectomy. She was discharged to inpatient rehab after appropriate stroke work up. She denies taking any antiplatelet medications before her stroke. She denies any personal or family history of DVT/PEs. She denies any history of recurrent miscarriages in the past. She denies any family history of stroke at young age. Her therapeutic anticoagulation with warfarin was switched to antiplatelet therapy in 8/18. \par \par ROS: All negative except documented in the history of present illness.\par \par Workup:\par CT brain (5/17 and 18/18): No evidence of acute infarct or hemorrhage\par CTA head and neck (5/18/18): Severe to critical stenosis of right intracranial/intradural vertebral artery and occlusion of intravertebral left vertebral artery, thrombosis of mid basilar segment with reconstitution of top of the basilar and distal basilar artery through right PCOM, no significant extracranial vertebral artery stenoses and hypoplastic left vertebral artery \par MRI brain (5/22/18): Acute infarct in the left ventral ariadna-mala, probably right AICA-PICA complex distribution and left PICA distribution with minimal hemorrhagic transformation (HI 1)\par MRA head and neck (5/22/18): Moderate to severe stenosis of the right intracranial/intradural vertebral artery, critical stenosis versus occlusion of left intracranial/intradural vertebral artery, short segment mid basilar artery occlusion with reconstitution of distal basilar artery and top of the basilar through PCOM\par LDL: 94 (5/18)\par HbA1c: 7.5 (5/18)\par Transthoracic echocardiogram: Mild mitral regurgitation, minimal aortic regurgitation, normal left atrium, normal left ventricular systolic function or no segmental wall motion abnormalities\par Lower extremity duplex: No evidence of DVT\par \par Home medications:\par Reviewed with the patient/available family members and updated as appropriate.\par \par \par Since last visit she states her balance has improved. She is not enrolled in PT because she "does not want it" but reports doing exercise at home. . She is being followed by Dr. Sheth and is recently s/p ILR placement. She had a sleep study done which was reportedly negative.

## 2019-11-13 NOTE — DISCUSSION/SUMMARY
[Antithrombotic therapy with ___] : antithrombotic therapy with  [unfilled] [Lipid Lowering Therapy] : lipid lowering therapy [Intensive Blood Pressure Control] : intensive blood pressure control [Patient encouraged to discuss with Primary MD] : I encouraged the patient to discuss these important issues with ~his/her~ primary care doctor [Goals and Counseling] : I have reviewed the goals of stroke risk factor modification. I counseled the patient on measures to reduce stroke risk, including the importance of medication compliance, risk factor control, exercise, healthy diet and avoidance of smoking. I reviewed stroke warning signs and symptoms and appropriate actions to take if such occur. [FreeTextEntry1] : Assessment:\par Ms. Olivia is a 87 Y/O R handed woman with vascular risk factors of age and DM II is seen in the vascular neurology office for the evaluation and management of stroke, leading to hospital admission in 5/18. She presented to [facility] on 5/17/18 with acute dysarthria and imbalance. She was treated with IV tPA. Subsequently she developed acute right hemiplegia and was subsequently transferred to [facility] for further management. CT brain (5/17 and 18/18) did not show any evidence of acute infarct or hemorrhage. CTA head and neck (5/18/18) showed severe to critical stenosis of right intracranial/intradural vertebral artery and occlusion of intravertebral left vertebral artery, thrombosis of mid-basilar segment with reconstitution of top of the basilar and distal basilar artery through right PCOM without any no significant extracranial vertebral artery stenoses and hypoplastic left vertebral artery. MRI brain (5/22/18) showed an acute infarct in the left ventral ariadna-mala, probably right AICA and PICA distribution as well as left PICA distribution with minimal hemorrhagic transformation (HI 1). MRA head and neck (5/22/18) showed moderate to severe stenosis of the right intracranial/intradural vertebral artery, critical stenosis versus occlusion of left intracranial/intradural vertebral artery, short segment mid-basilar artery occlusion with reconstitution of distal basilar artery and top of the basilar through PCOM. TTE did not show any obvious structural cardiac source of embolism. Lower extremity duplex did not show any evidence of DVT being paradoxical source of embolism. MRI brain (11/14/18) did not show any evidence of acute or subacute infarct and showed mild leukoaraiosis. MRA head and neck (11/14/18) showed extensive intracranial atherosclerosis leading to vertebrobasilar system (right vertebral artery dominant) predominantly involving left vertebral artery and proximal to mid basilar artery leading to critical stenosis versus complete occlusion with reconstitution of distal basilar artery and top of the basilar through right PCOM, which was confirmed on MRA NOVA. 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 symptomatic intracranial/bilateral vertebral arteries and basilar artery atherosclerotic stenosis with superimposed local thrombosis as well as artery to artery embolism \par \par Plan:\par - Aspirin 81 mg once a day for secondary stroke prevention,\par - Atorvastatin 80 mg in divided dose due to adverse effects , 40 mg BID. \par \par - She should follow up closely with her primary care physician for optimal vascular risk factors modifications including blood pressure goal less than 130/80 mmHg, HbA1c goal <7 and preferably 6-6.5 and optimal cholesterol management \par - She is advised to check blood pressure regularly at home, \par - Follow up with cardiology for evaluation and management \par - Re-referral to a sleep disorder specialist to rule out possibility of obstructive sleep apnea as a possible vascular risk factors - Completed and reportedly negative \par - CUS and TCDs to be performed during last visit - discussed vertebral artery stenosis/ atherosclerosis \par

## 2019-11-13 NOTE — REVIEW OF SYSTEMS
[As Noted in HPI] : as noted in HPI [Anxiety] : anxiety [Negative] : Heme/Lymph [Suicidal] : not suicidal [de-identified] : Denies any homicidal ideation

## 2020-03-05 ENCOUNTER — APPOINTMENT (OUTPATIENT)
Dept: INTERNAL MEDICINE | Facility: CLINIC | Age: 85
End: 2020-03-05
Payer: MEDICARE

## 2020-03-05 VITALS — SYSTOLIC BLOOD PRESSURE: 132 MMHG | DIASTOLIC BLOOD PRESSURE: 80 MMHG | RESPIRATION RATE: 11 BRPM | HEART RATE: 76 BPM

## 2020-03-05 PROCEDURE — 99214 OFFICE O/P EST MOD 30 MIN: CPT

## 2020-03-05 RX ORDER — LOSARTAN POTASSIUM 50 MG/1
50 TABLET, FILM COATED ORAL
Refills: 0 | Status: DISCONTINUED | COMMUNITY
End: 2020-03-05

## 2020-03-05 NOTE — HISTORY OF PRESENT ILLNESS
[FreeTextEntry1] : f/u htn hyperlipidemia and dm  she  has had her bp meds increased by cardiologist  she has no headaches  or dizziness  she says she  is eating well re her dm  she is doing some walking with her walker  a she feels unsteady without the walker

## 2020-04-23 ENCOUNTER — APPOINTMENT (OUTPATIENT)
Dept: INTERNAL MEDICINE | Facility: CLINIC | Age: 85
End: 2020-04-23
Payer: MEDICARE

## 2020-04-23 PROCEDURE — 99214 OFFICE O/P EST MOD 30 MIN: CPT | Mod: 95

## 2020-04-23 RX ORDER — ATORVASTATIN CALCIUM 40 MG/1
40 TABLET, FILM COATED ORAL
Qty: 60 | Refills: 0 | Status: DISCONTINUED | COMMUNITY
Start: 2019-11-13 | End: 2020-04-23

## 2020-04-23 NOTE — REVIEW OF SYSTEMS
[Muscle Pain] : muscle pain [Fever] : fever [Negative] : Genitourinary [Shortness Of Breath] : no shortness of breath

## 2020-04-23 NOTE — HISTORY OF PRESENT ILLNESS
[Home] : at home, [unfilled] , at the time of the visit. [Medical Office: ([facility])___] : at the medical office located in  [Self] : self [Patient] : the patient [FreeTextEntry8] : fever over 100    since 2 days ago  cough  body aches no diarrhea. no sob  eating  ok not drinking as much taste is ok  bp as per daughter  is ok    also  she is on same dm rx but no  recent a1c   sees endo    her diet is variable she goes up stairs but otherwise not  that active.

## 2020-04-26 ENCOUNTER — TRANSCRIPTION ENCOUNTER (OUTPATIENT)
Age: 85
End: 2020-04-26

## 2020-05-03 ENCOUNTER — INPATIENT (INPATIENT)
Facility: HOSPITAL | Age: 85
LOS: 11 days | Discharge: HOME HEALTH | DRG: 177 | End: 2020-05-15
Attending: INTERNAL MEDICINE | Admitting: STUDENT IN AN ORGANIZED HEALTH CARE EDUCATION/TRAINING PROGRAM
Payer: MEDICARE

## 2020-05-03 VITALS
DIASTOLIC BLOOD PRESSURE: 87 MMHG | TEMPERATURE: 99 F | RESPIRATION RATE: 20 BRPM | HEART RATE: 113 BPM | OXYGEN SATURATION: 92 % | SYSTOLIC BLOOD PRESSURE: 165 MMHG

## 2020-05-03 DIAGNOSIS — Z98.890 OTHER SPECIFIED POSTPROCEDURAL STATES: Chronic | ICD-10-CM

## 2020-05-03 DIAGNOSIS — Z29.9 ENCOUNTER FOR PROPHYLACTIC MEASURES, UNSPECIFIED: ICD-10-CM

## 2020-05-03 DIAGNOSIS — U07.1 COVID-19: ICD-10-CM

## 2020-05-03 DIAGNOSIS — R19.7 DIARRHEA, UNSPECIFIED: ICD-10-CM

## 2020-05-03 DIAGNOSIS — I63.9 CEREBRAL INFARCTION, UNSPECIFIED: Chronic | ICD-10-CM

## 2020-05-03 LAB
ALBUMIN SERPL ELPH-MCNC: 2.9 G/DL — LOW (ref 3.3–5)
ALP SERPL-CCNC: 90 U/L — SIGNIFICANT CHANGE UP (ref 40–120)
ALT FLD-CCNC: 65 U/L — HIGH (ref 10–45)
ANION GAP SERPL CALC-SCNC: 16 MMOL/L — SIGNIFICANT CHANGE UP (ref 5–17)
APTT BLD: 29.2 SEC — SIGNIFICANT CHANGE UP (ref 27.5–36.3)
AST SERPL-CCNC: 61 U/L — HIGH (ref 10–40)
BASOPHILS # BLD AUTO: 0 K/UL — SIGNIFICANT CHANGE UP (ref 0–0.2)
BASOPHILS NFR BLD AUTO: 0 % — SIGNIFICANT CHANGE UP (ref 0–2)
BILIRUB SERPL-MCNC: 0.5 MG/DL — SIGNIFICANT CHANGE UP (ref 0.2–1.2)
BUN SERPL-MCNC: 8 MG/DL — SIGNIFICANT CHANGE UP (ref 7–23)
CALCIUM SERPL-MCNC: 8.6 MG/DL — SIGNIFICANT CHANGE UP (ref 8.4–10.5)
CHLORIDE SERPL-SCNC: 99 MMOL/L — SIGNIFICANT CHANGE UP (ref 96–108)
CO2 SERPL-SCNC: 22 MMOL/L — SIGNIFICANT CHANGE UP (ref 22–31)
CREAT SERPL-MCNC: 0.42 MG/DL — LOW (ref 0.5–1.3)
CRP SERPL-MCNC: 8.45 MG/DL — HIGH (ref 0–0.4)
D DIMER BLD IA.RAPID-MCNC: 521 NG/ML DDU — HIGH
EOSINOPHIL # BLD AUTO: 0 K/UL — SIGNIFICANT CHANGE UP (ref 0–0.5)
EOSINOPHIL NFR BLD AUTO: 0 % — SIGNIFICANT CHANGE UP (ref 0–6)
GAS PNL BLDV: SIGNIFICANT CHANGE UP
GLUCOSE SERPL-MCNC: 200 MG/DL — HIGH (ref 70–99)
HCT VFR BLD CALC: 50.9 % — HIGH (ref 34.5–45)
HGB BLD-MCNC: 15.7 G/DL — HIGH (ref 11.5–15.5)
INR BLD: 1.21 RATIO — HIGH (ref 0.88–1.16)
LIDOCAIN IGE QN: 42 U/L — SIGNIFICANT CHANGE UP (ref 7–60)
LYMPHOCYTES # BLD AUTO: 0.34 K/UL — LOW (ref 1–3.3)
LYMPHOCYTES # BLD AUTO: 4.3 % — LOW (ref 13–44)
MCHC RBC-ENTMCNC: 24 PG — LOW (ref 27–34)
MCHC RBC-ENTMCNC: 30.8 GM/DL — LOW (ref 32–36)
MCV RBC AUTO: 77.8 FL — LOW (ref 80–100)
MONOCYTES # BLD AUTO: 0.28 K/UL — SIGNIFICANT CHANGE UP (ref 0–0.9)
MONOCYTES NFR BLD AUTO: 3.5 % — SIGNIFICANT CHANGE UP (ref 2–14)
NEUTROPHILS # BLD AUTO: 6.77 K/UL — SIGNIFICANT CHANGE UP (ref 1.8–7.4)
NEUTROPHILS NFR BLD AUTO: 83.5 % — HIGH (ref 43–77)
NT-PROBNP SERPL-SCNC: 122 PG/ML — SIGNIFICANT CHANGE UP (ref 0–300)
PLATELET # BLD AUTO: 243 K/UL — SIGNIFICANT CHANGE UP (ref 150–400)
POTASSIUM SERPL-MCNC: 4.7 MMOL/L — SIGNIFICANT CHANGE UP (ref 3.5–5.3)
POTASSIUM SERPL-SCNC: 4.7 MMOL/L — SIGNIFICANT CHANGE UP (ref 3.5–5.3)
PROCALCITONIN SERPL-MCNC: 48.08 NG/ML — HIGH (ref 0.02–0.1)
PROT SERPL-MCNC: 7 G/DL — SIGNIFICANT CHANGE UP (ref 6–8.3)
PROTHROM AB SERPL-ACNC: 13.9 SEC — HIGH (ref 10–12.9)
RBC # BLD: 6.54 M/UL — HIGH (ref 3.8–5.2)
RBC # FLD: 17.5 % — HIGH (ref 10.3–14.5)
SODIUM SERPL-SCNC: 137 MMOL/L — SIGNIFICANT CHANGE UP (ref 135–145)
TROPONIN T, HIGH SENSITIVITY RESULT: 16 NG/L — SIGNIFICANT CHANGE UP (ref 0–51)
WBC # BLD: 7.86 K/UL — SIGNIFICANT CHANGE UP (ref 3.8–10.5)
WBC # FLD AUTO: 7.86 K/UL — SIGNIFICANT CHANGE UP (ref 3.8–10.5)

## 2020-05-03 PROCEDURE — 71045 X-RAY EXAM CHEST 1 VIEW: CPT | Mod: 26

## 2020-05-03 PROCEDURE — 99285 EMERGENCY DEPT VISIT HI MDM: CPT | Mod: CS

## 2020-05-03 PROCEDURE — 99233 SBSQ HOSP IP/OBS HIGH 50: CPT | Mod: CS,GC

## 2020-05-03 PROCEDURE — 93010 ELECTROCARDIOGRAM REPORT: CPT

## 2020-05-03 RX ORDER — ONDANSETRON 8 MG/1
4 TABLET, FILM COATED ORAL ONCE
Refills: 0 | Status: COMPLETED | OUTPATIENT
Start: 2020-05-03 | End: 2020-05-03

## 2020-05-03 RX ORDER — SODIUM CHLORIDE 9 MG/ML
250 INJECTION INTRAMUSCULAR; INTRAVENOUS; SUBCUTANEOUS ONCE
Refills: 0 | Status: COMPLETED | OUTPATIENT
Start: 2020-05-03 | End: 2020-05-03

## 2020-05-03 RX ADMIN — ONDANSETRON 4 MILLIGRAM(S): 8 TABLET, FILM COATED ORAL at 21:10

## 2020-05-03 RX ADMIN — SODIUM CHLORIDE 250 MILLILITER(S): 9 INJECTION INTRAMUSCULAR; INTRAVENOUS; SUBCUTANEOUS at 22:31

## 2020-05-03 NOTE — H&P ADULT - NSHPREVIEWOFSYSTEMS_GEN_ALL_CORE
REVIEW OF SYSTEMS:    CONSTITUTIONAL: No weakness, fevers or chills  EYES/ENT: No visual changes;  No vertigo or throat pain   NECK: No pain or stiffness  RESPIRATORY: No cough, wheezing, hemoptysis; No shortness of breath  CARDIOVASCULAR: No chest pain or palpitations  GASTROINTESTINAL: No abdominal or epigastric pain. No nausea, vomiting, or hematemesis; No diarrhea or constipation. No melena or hematochezia.  GENITOURINARY: No dysuria, frequency or hematuria  NEUROLOGICAL: No numbness or weakness  SKIN: No itching, rashes REVIEW OF SYSTEMS:    CONSTITUTIONAL: +fevers/chills   EYES/ENT: No visual changes;  No vertigo or throat pain   NECK: No pain or stiffness  RESPIRATORY: +cough   CARDIOVASCULAR: No chest pain or palpitations  GASTROINTESTINAL: +abdominal pain +nausea +vomiting +diarrhea   GENITOURINARY: No dysuria, frequency or hematuria  NEUROLOGICAL: No numbness or weakness  SKIN: No itching, rashes

## 2020-05-03 NOTE — ED ADULT NURSE NOTE - OBJECTIVE STATEMENT
Pt is a 87y Female c/o diarrhea, vomiting, cough, COVID19+ as of 5/1 at an urgent care. Pt PMHx stroke (baseline R sided weakness, Pt is still A&Ox3), hx of thrombectomy. Pt complaining of vomiting 2x today and having diarrhea every time she coughs. Contacted the pt's daughter over the phone. Pt's daughter Kristyn Michael LÓPEZ (works as an ICU RN at [employer]) called to get more of the story as to why the pt is here. Pt's daughter stated that she "placed an IV for my mom and gave a 500 mL NS bolus to her. I wanted to be able to treat her at home and not die in a hospital. I also placed her on Oxygen at home as my mom has an O2 sat of 82-89% on room air. I got the Oxygen tank from a friend." When asked if she did this based on a providers suggestion or on her own free will Kristyn stated "I did it on my own free will. I just wanted my mom to stay safe any get better at home and when I realized I couldn't get her better at home I decided to send her to you [[facility]]." I informed Kristyn that MD Farooq or MD Garcia would follow up with her. Pt sat 92% on RA here at [facility], but pt placed on 2L NC for comfort sat 97%. Pt states that she is "comfortable and was sent here by her daughter because her daughter couldn't take care of her at home".

## 2020-05-03 NOTE — ED PROVIDER NOTE - NS ED ROS FT
Limited ROS. Patient poor historian  GENERAL: No fever   EYES: no change in vision  HEENT: no trouble swallowing or speaking  CARDIAC: no chest pain   PULMONARY: noSOB  GI: nausea, vomiting, diarrhea  : No changes in urination  SKIN: no rashes  NEURO: no headach  MSK: myalgias

## 2020-05-03 NOTE — H&P ADULT - PROBLEM SELECTOR PLAN 6
As per daughter A1C ~8. On Tresiba 12U and Metformin at home.  - Will hold off on Lantus for now as patient has decreased PO intake last glucose 200  - ISS AC/QHS  - Goal fingerstick 120-180   - Will check A1C in AM

## 2020-05-03 NOTE — ED PROVIDER NOTE - ATTENDING CONTRIBUTION TO CARE
87F, pmh htn, hld, DM, CVA, known COVID-19+ (tested last week), presents with myalgias, vomiting, diarrhea, weakness. Daughter is an RN, she has been checking home O2 sats, has noted them to be in 80s, has been supplementing with O2 which she is borrowing from a friend. She noted her to be more fatigued today and was concerned for dehydration, gave her a 500 ml bolus. Pt denies pain including cp, abd pain, denies sob.    PE: NAD, NCAT, MMM, Trachea midline, Normal conjunctiva, lungs CTAB, S1/S2 RRR, Normal perfusion, 2+ radial pulses bilat, Abdomen Soft, NTND, No rebound/guarding, No LE edema, No deformity of extremities, No rashes,  No focal motor or sensory deficits.     VS with O2 sat 91-92 on RA at rest. Exam unremarkable. Sx most c/w known covid-19 infection. Obtain labs, cxr. Discuss with daugther. Re-eval. - Fernie Landry MD

## 2020-05-03 NOTE — H&P ADULT - ATTENDING COMMENTS
I have precepted this case with house staff and agree with resident note above and have edited it where appropriate.  As per hospital policy, my physical exam has been deferred amidst COVID-19 outbreak. Physical exam from ER Provider note reviewed.   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 diff c/b acute hypoxic respiratory failure 2/2 COVID.   Care to be assumed by day hospitalist at 8 am.  Known covid+ now with predominantly GI sx; imaging confirms b/l opacities. Given presentation with very high procalcitonin, there is concern for either occult or superimposed bacterial process. Currently hemodynamically stable and s/p multiple o/p courses of abx, so seems reasonable to hold further abx (as source unclear) and continue noted w/u.  f/u gi pcr, cdiff. f/u cta c/a/p. if clinically deteriorates can start broad spec abx.   This patient was assigned to me by the hospitalist in charge; my involvement in this case has consisted of the initial history, physical, chart review, and management plan.  This patient was previously unknown to me.

## 2020-05-03 NOTE — ED ADULT NURSE NOTE - NSIMPLEMENTINTERV_GEN_ALL_ED
Implemented All Universal Safety Interventions:  [place] to call system. Call bell, personal items and telephone within reach. Instruct patient to call for assistance. Room bathroom lighting operational. Non-slip footwear when patient is off stretcher. Physically safe environment: no spills, clutter or unnecessary equipment. Stretcher in lowest position, wheels locked, appropriate side rails in place.

## 2020-05-03 NOTE — H&P ADULT - HISTORY OF PRESENT ILLNESS
Ms. 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    In the ED:  Vitals: 99F /87  RR 20 SpO2 92% on RA  Labs: Hgb 15.7, Lymphopenia, Bands 2.6%, D-dimer 521, AST 61, ALT 65, Procalcitonin 48 Lactate 3.3  Imaging: CXR bilateral patchy opacities  Interventions: 250cc bolus of NS x 1, Zofran 4mg IVP x 1 Ms. 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 Patient is A/O x 3 but a poor historian. Focus on her cough that has been going on. Spoke to daughter over the phone. Patient has been having GI symptoms for about a week. Went to urgent care this past Sunday and tested positive for COVID. Was given Azithromycin which she completed for 5 days. Due to her symptoms not improving the daughter who is an RN at [employer] called back the urgent care and had Augmentin prescribed which she took for the last 2 days. The diarrhea she is having is very loose and watery/mucus but not foul-smelling. Daughter doesn't think she has C. diff. She also has had a productive cough with clear sputum that worsens her diarrhea. Today she had an episode of projective vomiting NBNB, which prompted to give the patient a 500cc bolus of NS. Daughter has been monitoring vital signs she was in the 90s on 3L NC and then last night went down to 80% and increased to 5L NC. Daughter feels guilty as she had symptoms of COVID but tested negative, and feels she transmitted the virus to her mother. Also has had fevers as high as 104.5?. Mild headaches. Today complained of abdominal pain that was diffuse. Has had no chest pain, shortness of breath or dysuria.     In the ED:  Vitals: 99F /87  RR 20 SpO2 92% on RA  Labs: Hgb 15.7, Lymphopenia, Bands 2.6%, D-dimer 521, AST 61, ALT 65, Procalcitonin 48 Lactate 3.3  Imaging: CXR bilateral patchy opacities  Interventions: 250cc bolus of NS x 1, Zofran 4mg IVP x 1

## 2020-05-03 NOTE — ED ADULT TRIAGE NOTE - CHIEF COMPLAINT QUOTE
weakness/diarrhea +covid weakness/diarrhea +covid  [phone]

## 2020-05-03 NOTE — H&P ADULT - PROBLEM SELECTOR PLAN 5
Reportedly had abdominal pain today. Elevated transaminases slightly. Lactate 3.3. Abdominal exam is benign. No bloody BMs. Low suspicion for ischemic gut.  - CT A/P w/ IV Contrast  - Zofran PRN Q6H for nausea/vomiting  - IVF hydration as above   - Monitor electrolytes Reportedly had abdominal pain today. Elevated transaminases slightly. Lactate 3.3. Abdominal exam is benign. No bloody BMs. Low suspicion for ischemic gut.  - CTA A/P w/ IV Contrast  - Zofran PRN Q6H for nausea/vomiting  - IVF hydration as above   - Monitor electrolytes

## 2020-05-03 NOTE — H&P ADULT - PROBLEM SELECTOR PLAN 7
History of basilar artery thrombosis in 2018 s/p tPA. No residual deficits.  - Continue ASA 81mg QD  - Continue Lipitor 80mg QD

## 2020-05-03 NOTE — H&P ADULT - NSICDXPASTMEDICALHX_GEN_ALL_CORE_FT
PAST MEDICAL HISTORY:  CVA (cerebral vascular accident)     HLD (hyperlipidemia)     HTN (hypertension) PAST MEDICAL HISTORY:  CVA (cerebral vascular accident)     Glaucoma     HTN (hypertension)     Type 2 diabetes mellitus

## 2020-05-03 NOTE — H&P ADULT - PROBLEM SELECTOR PLAN 3
Tachycardic with fevers to 104.5 at home. Bandemia of 2.6% Procalcitonin 48 unclear if this is falsely elevated due to dehydration but nonetheless remains elevated. Could be C. diff? Or underlying superimposed bacterial infection possible PNA? Also has abdominal pain. Lactate 3.3. Low suspicion for ischemic gut. Picture complicated by COVID PNA.   - BCx, UA ordered  - Consider CT chest/abdomen/pelvis with IV contrast to rule out occult infection   - Currently afebrile will monitor off antibiotics, hemodynamically stable  - Will repeat lactate, elevated lactate 2/2 to dehydration given hemoconcentration Tachycardic with fevers to 104.5 at home. Bandemia of 2.6% Procalcitonin 48 unclear if this is falsely elevated due to dehydration but nonetheless remains elevated. Could be C. diff? Or underlying superimposed bacterial infection possible PNA? Also has abdominal pain. Lactate 3.3. Low suspicion for ischemic gut. Picture complicated by COVID PNA.   - BCx, UA ordered  - Consider CTA chest/abdomen/pelvis with IV contrast to rule out occult infection   - Currently afebrile will monitor off antibiotics, hemodynamically stable  - Will repeat lactate, elevated lactate 2/2 to dehydration given hemoconcentration

## 2020-05-03 NOTE — ED PROVIDER NOTE - OBJECTIVE STATEMENT
87F with pmh HTN, HLD, DM, CVA presenting COVID+ (tested last week) with 1 week hx of diffuse myalgias, vomiting, diarrhea. At home per daughter intermittently hypoxic to 80s requiring O2 which she obtained. Daughter who works as nurse also gave her 500cc saline today for fluid loss. No report of fever, cp, sob.

## 2020-05-03 NOTE — ED PROVIDER NOTE - PHYSICAL EXAMINATION
GEN: NAD, awake, well appearing  HEENT: NCAT, MMM, normal conjunctiva, perrl  CHEST/LUNGS: Non-tachypneic, CTAB, bilateral breath sounds  CARDIAC: tachycardic, s1s2, normal perfusion, no peripheral edema  ABDOMEN: Soft, NTND, No rebound/guarding  MSK: No joint tenderness, no gross deformity of extremities  SKIN: No rashes, no petechiae, no vesicles  NEURO: no focal motor or sensory deficits

## 2020-05-03 NOTE — H&P ADULT - NSHPLABSRESULTS_GEN_ALL_CORE
EKG Sinus Tachycardia  QTc 438 TWI V2  CXR personally reviewed by me EKG personally reviewed Sinus Tachycardia  QTc 438 TWI V2  CXR personally reviewed by me  Labs personally reviewed

## 2020-05-03 NOTE — H&P ADULT - NSHPPHYSICALEXAM_GEN_ALL_CORE
Vital Signs Last 24 Hrs  T(C): 36.9 (03 May 2020 22:35), Max: 37.2 (03 May 2020 19:32)  T(F): 98.5 (03 May 2020 22:35), Max: 99 (03 May 2020 19:32)  HR: 94 (03 May 2020 22:35) (94 - 113)  BP: 149/83 (03 May 2020 22:35) (149/83 - 165/87)  BP(mean): 101 (03 May 2020 22:35) (93 - 101)  RR: 18 (03 May 2020 22:35) (18 - 20)  SpO2: 98% (03 May 2020 22:35) (92% - 98%)    PHYSICAL EXAM:  GENERAL: NAD, well-groomed, well-developed  HEAD: Atraumatic, Normocephalic  EYES: EOMI, PERRLA, conjunctiva and sclera clear  ENMT: No tonsillar erythema, exudates, or enlargement; Moist mucous membranes  NECK: Supple, No JVD, Normal Thyroid  NERVOUS SYSTEM: Alert & Oriented X3, Good concentration; Motor Strength 5/5 B/L upper and lower extremities  CHEST/LUNG: Clear to auscultation bilaterally; No rales, rhonchi, wheezing, or rubs  HEART: Regular rate and rhythm; S1 and S2; No murmurs, rubs, or gallops  ABDOMEN: Soft, Nontender, Nondistended: Bowel sounds present  EXTREMITIES: 2+ Peripheral Pulses, No clubbing, cyanosis, or edema  LYMPH: No lymphadenopathy noted  SKIN: No rashes or lesions Vital Signs Last 24 Hrs  T(C): 36.9 (03 May 2020 22:35), Max: 37.2 (03 May 2020 19:32)  T(F): 98.5 (03 May 2020 22:35), Max: 99 (03 May 2020 19:32)  HR: 94 (03 May 2020 22:35) (94 - 113)  BP: 149/83 (03 May 2020 22:35) (149/83 - 165/87)  BP(mean): 101 (03 May 2020 22:35) (93 - 101)  RR: 18 (03 May 2020 22:35) (18 - 20)  SpO2: 98% (03 May 2020 22:35) (92% - 98%)    PHYSICAL EXAM:  GENERAL: NAD, on 2L NC SpO2 96%  HEAD: Atraumatic, Normocephalic  EYES: EOMI, conjunctiva and sclera clear  ENMT: Wearing surgical mask  NECK: Supple, No JVD  NERVOUS SYSTEM: Alert & Oriented X3 Motor Strength 5/5 B/L upper and lower extremities  CHEST/LUNG: No increased WOB. No tachypnea.   HEART: Not auscultated  ABDOMEN: Soft, Nontender, Nondistended  : No CVA tenderness, no suprapubic tenderness  EXTREMITIES: 2+ Peripheral Pulses, No  cyanosis, or edema  LYMPH: No lymphadenopathy noted  SKIN: No rashes or lesions As per hospital policy, my physical exam has been deferred amidst COVID-19 outbreak. Physical exam from ER Provider note reviewed.

## 2020-05-03 NOTE — H&P ADULT - PROBLEM SELECTOR PLAN 1
Reportedly desatting at home as low 80s requiring O2/NC. Currently SpO2 high 90s on NC. Likely 2/2 to COVID PNA.  - Management as below

## 2020-05-03 NOTE — H&P ADULT - PROBLEM SELECTOR PLAN 4
Having multiple episodes of loose diarrhea with mucus. Antibiotics started after diarrhea happening.   - Will check GI PCR and C. diff   - Monitor electrolytes   - Dehydrated will give NS @75cc/hr for 10 hours

## 2020-05-03 NOTE — ED PROVIDER NOTE - CLINICAL SUMMARY MEDICAL DECISION MAKING FREE TEXT BOX
87F presenting with viral syndrome likely 2/2 COVID+ status with myalgias, nausea, vomiting, diarrhea. Abd benign. patient well appearing. Will obtain screening labs, cxr. Mildly hypoxic to 92% on RA with no signs of respiratory distress at this time.

## 2020-05-03 NOTE — H&P ADULT - PROBLEM SELECTOR PLAN 2
Information given on ketogenic/IF diet with exercise
COVID+ with bilateral patchy opacities on CXR. Currently on 2L NC SpO2 98%.  - Will need to speak with ID regarding a candidate for remdesavir vs. IL-6 vs. plasma  - Ferritin 499, CRP 8.45 D-dimer > 500  - Lovenox 40 SQ QD  - Will check CK, LDH in AM   - Daughter does not wish for patient to be intubated however will discuss with brother regarding code status

## 2020-05-03 NOTE — H&P ADULT - ASSESSMENT
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 diff c/b acute hypoxic respiratory failure 2/2 COVID.

## 2020-05-04 DIAGNOSIS — R65.10 SYSTEMIC INFLAMMATORY RESPONSE SYNDROME (SIRS) OF NON-INFECTIOUS ORIGIN WITHOUT ACUTE ORGAN DYSFUNCTION: ICD-10-CM

## 2020-05-04 DIAGNOSIS — I10 ESSENTIAL (PRIMARY) HYPERTENSION: ICD-10-CM

## 2020-05-04 DIAGNOSIS — J96.00 ACUTE RESPIRATORY FAILURE, UNSPECIFIED WHETHER WITH HYPOXIA OR HYPERCAPNIA: ICD-10-CM

## 2020-05-04 DIAGNOSIS — E11.49 TYPE 2 DIABETES MELLITUS WITH OTHER DIABETIC NEUROLOGICAL COMPLICATION: ICD-10-CM

## 2020-05-04 DIAGNOSIS — I63.9 CEREBRAL INFARCTION, UNSPECIFIED: ICD-10-CM

## 2020-05-04 DIAGNOSIS — R19.7 DIARRHEA, UNSPECIFIED: ICD-10-CM

## 2020-05-04 DIAGNOSIS — R11.2 NAUSEA WITH VOMITING, UNSPECIFIED: ICD-10-CM

## 2020-05-04 DIAGNOSIS — J96.91 RESPIRATORY FAILURE, UNSPECIFIED WITH HYPOXIA: ICD-10-CM

## 2020-05-04 DIAGNOSIS — E11.9 TYPE 2 DIABETES MELLITUS WITHOUT COMPLICATIONS: ICD-10-CM

## 2020-05-04 LAB
A1C WITH ESTIMATED AVERAGE GLUCOSE RESULT: 8.4 % — HIGH (ref 4–5.6)
ALBUMIN SERPL ELPH-MCNC: 3 G/DL — LOW (ref 3.3–5)
ALP SERPL-CCNC: 90 U/L — SIGNIFICANT CHANGE UP (ref 40–120)
ALT FLD-CCNC: 83 U/L — HIGH (ref 10–45)
ANION GAP SERPL CALC-SCNC: 12 MMOL/L — SIGNIFICANT CHANGE UP (ref 5–17)
APPEARANCE UR: CLEAR — SIGNIFICANT CHANGE UP
AST SERPL-CCNC: 93 U/L — HIGH (ref 10–40)
BILIRUB SERPL-MCNC: 0.5 MG/DL — SIGNIFICANT CHANGE UP (ref 0.2–1.2)
BILIRUB UR-MCNC: NEGATIVE — SIGNIFICANT CHANGE UP
BUN SERPL-MCNC: 6 MG/DL — LOW (ref 7–23)
CALCIUM SERPL-MCNC: 8.9 MG/DL — SIGNIFICANT CHANGE UP (ref 8.4–10.5)
CHLORIDE SERPL-SCNC: 101 MMOL/L — SIGNIFICANT CHANGE UP (ref 96–108)
CK SERPL-CCNC: 71 U/L — SIGNIFICANT CHANGE UP (ref 25–170)
CO2 SERPL-SCNC: 25 MMOL/L — SIGNIFICANT CHANGE UP (ref 22–31)
COLOR SPEC: SIGNIFICANT CHANGE UP
CREAT SERPL-MCNC: 0.45 MG/DL — LOW (ref 0.5–1.3)
DIFF PNL FLD: NEGATIVE — SIGNIFICANT CHANGE UP
ESTIMATED AVERAGE GLUCOSE: 194 MG/DL — HIGH (ref 68–114)
FERRITIN SERPL-MCNC: 499 NG/ML — HIGH (ref 15–150)
GLUCOSE BLDC GLUCOMTR-MCNC: 132 MG/DL — HIGH (ref 70–99)
GLUCOSE BLDC GLUCOMTR-MCNC: 167 MG/DL — HIGH (ref 70–99)
GLUCOSE BLDC GLUCOMTR-MCNC: 177 MG/DL — HIGH (ref 70–99)
GLUCOSE SERPL-MCNC: 128 MG/DL — HIGH (ref 70–99)
GLUCOSE UR QL: SIGNIFICANT CHANGE UP
HCT VFR BLD CALC: 46 % — HIGH (ref 34.5–45)
HGB BLD-MCNC: 14.3 G/DL — SIGNIFICANT CHANGE UP (ref 11.5–15.5)
KETONES UR-MCNC: NEGATIVE — SIGNIFICANT CHANGE UP
LACTATE BLDV-MCNC: 2.8 MMOL/L — HIGH (ref 0.7–2)
LDH SERPL L TO P-CCNC: 352 U/L — HIGH (ref 50–242)
LEUKOCYTE ESTERASE UR-ACNC: NEGATIVE — SIGNIFICANT CHANGE UP
MAGNESIUM SERPL-MCNC: 1.8 MG/DL — SIGNIFICANT CHANGE UP (ref 1.6–2.6)
MCHC RBC-ENTMCNC: 23.9 PG — LOW (ref 27–34)
MCHC RBC-ENTMCNC: 31.1 GM/DL — LOW (ref 32–36)
MCV RBC AUTO: 76.9 FL — LOW (ref 80–100)
NITRITE UR-MCNC: NEGATIVE — SIGNIFICANT CHANGE UP
NRBC # BLD: 0 /100 WBCS — SIGNIFICANT CHANGE UP (ref 0–0)
PH UR: 7 — SIGNIFICANT CHANGE UP (ref 5–8)
PHOSPHATE SERPL-MCNC: 2.4 MG/DL — LOW (ref 2.5–4.5)
PLATELET # BLD AUTO: 244 K/UL — SIGNIFICANT CHANGE UP (ref 150–400)
POTASSIUM SERPL-MCNC: 4.2 MMOL/L — SIGNIFICANT CHANGE UP (ref 3.5–5.3)
POTASSIUM SERPL-SCNC: 4.2 MMOL/L — SIGNIFICANT CHANGE UP (ref 3.5–5.3)
PROCALCITONIN SERPL-MCNC: 41.79 NG/ML — HIGH (ref 0.02–0.1)
PROT SERPL-MCNC: 7.2 G/DL — SIGNIFICANT CHANGE UP (ref 6–8.3)
PROT UR-MCNC: NEGATIVE — SIGNIFICANT CHANGE UP
RBC # BLD: 5.98 M/UL — HIGH (ref 3.8–5.2)
RBC # FLD: 16 % — HIGH (ref 10.3–14.5)
SODIUM SERPL-SCNC: 138 MMOL/L — SIGNIFICANT CHANGE UP (ref 135–145)
SP GR SPEC: 1.01 — LOW (ref 1.01–1.02)
UROBILINOGEN FLD QL: SIGNIFICANT CHANGE UP
WBC # BLD: 5.73 K/UL — SIGNIFICANT CHANGE UP (ref 3.8–10.5)
WBC # FLD AUTO: 5.73 K/UL — SIGNIFICANT CHANGE UP (ref 3.8–10.5)

## 2020-05-04 PROCEDURE — 74177 CT ABD & PELVIS W/CONTRAST: CPT | Mod: 26

## 2020-05-04 PROCEDURE — 71275 CT ANGIOGRAPHY CHEST: CPT | Mod: 26

## 2020-05-04 PROCEDURE — 99233 SBSQ HOSP IP/OBS HIGH 50: CPT | Mod: CS

## 2020-05-04 PROCEDURE — 93010 ELECTROCARDIOGRAM REPORT: CPT

## 2020-05-04 RX ORDER — PIPERACILLIN AND TAZOBACTAM 4; .5 G/20ML; G/20ML
3.38 INJECTION, POWDER, LYOPHILIZED, FOR SOLUTION INTRAVENOUS ONCE
Refills: 0 | Status: COMPLETED | OUTPATIENT
Start: 2020-05-04 | End: 2020-05-04

## 2020-05-04 RX ORDER — ATORVASTATIN CALCIUM 80 MG/1
80 TABLET, FILM COATED ORAL AT BEDTIME
Refills: 0 | Status: DISCONTINUED | OUTPATIENT
Start: 2020-05-04 | End: 2020-05-15

## 2020-05-04 RX ORDER — ASPIRIN/CALCIUM CARB/MAGNESIUM 324 MG
81 TABLET ORAL DAILY
Refills: 0 | Status: DISCONTINUED | OUTPATIENT
Start: 2020-05-04 | End: 2020-05-15

## 2020-05-04 RX ORDER — NYSTATIN 500MM UNIT
500000 POWDER (EA) MISCELLANEOUS
Refills: 0 | Status: DISCONTINUED | OUTPATIENT
Start: 2020-05-04 | End: 2020-05-15

## 2020-05-04 RX ORDER — INSULIN LISPRO 100/ML
VIAL (ML) SUBCUTANEOUS
Refills: 0 | Status: DISCONTINUED | OUTPATIENT
Start: 2020-05-04 | End: 2020-05-15

## 2020-05-04 RX ORDER — VANCOMYCIN HCL 1 G
1000 VIAL (EA) INTRAVENOUS EVERY 12 HOURS
Refills: 0 | Status: DISCONTINUED | OUTPATIENT
Start: 2020-05-05 | End: 2020-05-05

## 2020-05-04 RX ORDER — ONDANSETRON 8 MG/1
4 TABLET, FILM COATED ORAL EVERY 6 HOURS
Refills: 0 | Status: DISCONTINUED | OUTPATIENT
Start: 2020-05-04 | End: 2020-05-15

## 2020-05-04 RX ORDER — HYDROXYCHLOROQUINE SULFATE 200 MG
800 TABLET ORAL EVERY 24 HOURS
Refills: 0 | Status: COMPLETED | OUTPATIENT
Start: 2020-05-04 | End: 2020-05-04

## 2020-05-04 RX ORDER — PIPERACILLIN AND TAZOBACTAM 4; .5 G/20ML; G/20ML
3.38 INJECTION, POWDER, LYOPHILIZED, FOR SOLUTION INTRAVENOUS EVERY 8 HOURS
Refills: 0 | Status: DISCONTINUED | OUTPATIENT
Start: 2020-05-04 | End: 2020-05-09

## 2020-05-04 RX ORDER — VANCOMYCIN HCL 1 G
VIAL (EA) INTRAVENOUS
Refills: 0 | Status: DISCONTINUED | OUTPATIENT
Start: 2020-05-04 | End: 2020-05-05

## 2020-05-04 RX ORDER — ENOXAPARIN SODIUM 100 MG/ML
40 INJECTION SUBCUTANEOUS DAILY
Refills: 0 | Status: DISCONTINUED | OUTPATIENT
Start: 2020-05-04 | End: 2020-05-15

## 2020-05-04 RX ORDER — INSULIN LISPRO 100/ML
VIAL (ML) SUBCUTANEOUS AT BEDTIME
Refills: 0 | Status: DISCONTINUED | OUTPATIENT
Start: 2020-05-04 | End: 2020-05-15

## 2020-05-04 RX ORDER — GLUCAGON INJECTION, SOLUTION 0.5 MG/.1ML
1 INJECTION, SOLUTION SUBCUTANEOUS ONCE
Refills: 0 | Status: DISCONTINUED | OUTPATIENT
Start: 2020-05-04 | End: 2020-05-15

## 2020-05-04 RX ORDER — VANCOMYCIN HCL 1 G
VIAL (EA) INTRAVENOUS
Refills: 0 | Status: DISCONTINUED | OUTPATIENT
Start: 2020-05-04 | End: 2020-05-04

## 2020-05-04 RX ORDER — LATANOPROST 0.05 MG/ML
1 SOLUTION/ DROPS OPHTHALMIC; TOPICAL AT BEDTIME
Refills: 0 | Status: DISCONTINUED | OUTPATIENT
Start: 2020-05-04 | End: 2020-05-15

## 2020-05-04 RX ORDER — DEXTROSE 50 % IN WATER 50 %
25 SYRINGE (ML) INTRAVENOUS ONCE
Refills: 0 | Status: DISCONTINUED | OUTPATIENT
Start: 2020-05-04 | End: 2020-05-15

## 2020-05-04 RX ORDER — ATENOLOL 25 MG/1
25 TABLET ORAL DAILY
Refills: 0 | Status: DISCONTINUED | OUTPATIENT
Start: 2020-05-04 | End: 2020-05-15

## 2020-05-04 RX ORDER — SODIUM CHLORIDE 9 MG/ML
1000 INJECTION INTRAMUSCULAR; INTRAVENOUS; SUBCUTANEOUS
Refills: 0 | Status: DISCONTINUED | OUTPATIENT
Start: 2020-05-04 | End: 2020-05-15

## 2020-05-04 RX ORDER — HYDROXYCHLOROQUINE SULFATE 200 MG
TABLET ORAL
Refills: 0 | Status: DISCONTINUED | OUTPATIENT
Start: 2020-05-04 | End: 2020-05-06

## 2020-05-04 RX ORDER — ACETAMINOPHEN 500 MG
650 TABLET ORAL EVERY 6 HOURS
Refills: 0 | Status: DISCONTINUED | OUTPATIENT
Start: 2020-05-04 | End: 2020-05-15

## 2020-05-04 RX ORDER — SODIUM CHLORIDE 9 MG/ML
1000 INJECTION, SOLUTION INTRAVENOUS
Refills: 0 | Status: DISCONTINUED | OUTPATIENT
Start: 2020-05-04 | End: 2020-05-15

## 2020-05-04 RX ORDER — HYDROXYCHLOROQUINE SULFATE 200 MG
400 TABLET ORAL EVERY 24 HOURS
Refills: 0 | Status: DISCONTINUED | OUTPATIENT
Start: 2020-05-05 | End: 2020-05-06

## 2020-05-04 RX ORDER — VANCOMYCIN HCL 1 G
1000 VIAL (EA) INTRAVENOUS ONCE
Refills: 0 | Status: COMPLETED | OUTPATIENT
Start: 2020-05-04 | End: 2020-05-04

## 2020-05-04 RX ORDER — DEXTROSE 50 % IN WATER 50 %
12.5 SYRINGE (ML) INTRAVENOUS ONCE
Refills: 0 | Status: DISCONTINUED | OUTPATIENT
Start: 2020-05-04 | End: 2020-05-15

## 2020-05-04 RX ORDER — LOSARTAN POTASSIUM 100 MG/1
100 TABLET, FILM COATED ORAL DAILY
Refills: 0 | Status: DISCONTINUED | OUTPATIENT
Start: 2020-05-04 | End: 2020-05-06

## 2020-05-04 RX ORDER — DEXTROSE 50 % IN WATER 50 %
15 SYRINGE (ML) INTRAVENOUS ONCE
Refills: 0 | Status: DISCONTINUED | OUTPATIENT
Start: 2020-05-04 | End: 2020-05-15

## 2020-05-04 RX ADMIN — ATENOLOL 25 MILLIGRAM(S): 25 TABLET ORAL at 01:09

## 2020-05-04 RX ADMIN — Medication 800 MILLIGRAM(S): at 15:13

## 2020-05-04 RX ADMIN — Medication 500000 UNIT(S): at 17:25

## 2020-05-04 RX ADMIN — Medication 62.5 MILLIMOLE(S): at 11:25

## 2020-05-04 RX ADMIN — Medication 500000 UNIT(S): at 11:26

## 2020-05-04 RX ADMIN — PIPERACILLIN AND TAZOBACTAM 25 GRAM(S): 4; .5 INJECTION, POWDER, LYOPHILIZED, FOR SOLUTION INTRAVENOUS at 22:16

## 2020-05-04 RX ADMIN — Medication 500000 UNIT(S): at 23:11

## 2020-05-04 RX ADMIN — Medication 500000 UNIT(S): at 05:44

## 2020-05-04 RX ADMIN — SODIUM CHLORIDE 75 MILLILITER(S): 9 INJECTION INTRAMUSCULAR; INTRAVENOUS; SUBCUTANEOUS at 00:50

## 2020-05-04 RX ADMIN — PIPERACILLIN AND TAZOBACTAM 200 GRAM(S): 4; .5 INJECTION, POWDER, LYOPHILIZED, FOR SOLUTION INTRAVENOUS at 13:50

## 2020-05-04 RX ADMIN — Medication 81 MILLIGRAM(S): at 11:26

## 2020-05-04 RX ADMIN — PIPERACILLIN AND TAZOBACTAM 25 GRAM(S): 4; .5 INJECTION, POWDER, LYOPHILIZED, FOR SOLUTION INTRAVENOUS at 13:51

## 2020-05-04 RX ADMIN — Medication 1: at 17:24

## 2020-05-04 RX ADMIN — ENOXAPARIN SODIUM 40 MILLIGRAM(S): 100 INJECTION SUBCUTANEOUS at 11:26

## 2020-05-04 RX ADMIN — Medication 200 MILLIGRAM(S): at 15:45

## 2020-05-04 RX ADMIN — Medication 1: at 12:40

## 2020-05-04 RX ADMIN — Medication 250 MILLIGRAM(S): at 13:50

## 2020-05-04 RX ADMIN — ATORVASTATIN CALCIUM 80 MILLIGRAM(S): 80 TABLET, FILM COATED ORAL at 22:16

## 2020-05-04 RX ADMIN — LATANOPROST 1 DROP(S): 0.05 SOLUTION/ DROPS OPHTHALMIC; TOPICAL at 23:20

## 2020-05-04 RX ADMIN — LOSARTAN POTASSIUM 100 MILLIGRAM(S): 100 TABLET, FILM COATED ORAL at 05:44

## 2020-05-04 NOTE — PROGRESS NOTE ADULT - SUBJECTIVE AND OBJECTIVE BOX
Patient is a 87y old  Female who presents with a chief complaint of Diarrhea (03 May 2020 22:56)    Authored by Dr. Nestor Rueda pager number [phone]    SUBJECTIVE / OVERNIGHT EVENTS:  Patient c/o SOB, dry cough, chills. No cp, abdominal pain , N/V/D    MEDICATIONS  (STANDING):  aspirin  chewable 81 milliGRAM(s) Oral daily  ATENolol  Tablet 25 milliGRAM(s) Oral daily  atorvastatin 80 milliGRAM(s) Oral at bedtime  dextrose 5%. 1000 milliLiter(s) (50 mL/Hr) IV Continuous <Continuous>  dextrose 50% Injectable 12.5 Gram(s) IV Push once  dextrose 50% Injectable 25 Gram(s) IV Push once  dextrose 50% Injectable 25 Gram(s) IV Push once  enoxaparin Injectable 40 milliGRAM(s) SubCutaneous daily  insulin lispro (HumaLOG) corrective regimen sliding scale   SubCutaneous three times a day before meals  insulin lispro (HumaLOG) corrective regimen sliding scale   SubCutaneous at bedtime  latanoprost 0.005% Ophthalmic Solution 1 Drop(s) Both EYES at bedtime  losartan 100 milliGRAM(s) Oral daily  nystatin    Suspension 286261 Unit(s) Oral four times a day  sodium chloride 0.9%. 1000 milliLiter(s) (75 mL/Hr) IV Continuous <Continuous>  sodium phosphate IVPB 15 milliMole(s) IV Intermittent once    MEDICATIONS  (PRN):  acetaminophen   Tablet .. 650 milliGRAM(s) Oral every 6 hours PRN Temp greater or equal to 38C (100.4F), Mild Pain (1 - 3)  dextrose 40% Gel 15 Gram(s) Oral once PRN Blood Glucose LESS THAN 70 milliGRAM(s)/deciliter  glucagon  Injectable 1 milliGRAM(s) IntraMuscular once PRN Glucose LESS THAN 70 milligrams/deciliter  guaiFENesin   Syrup  (Sugar-Free) 200 milliGRAM(s) Oral every 6 hours PRN Cough  ondansetron Injectable 4 milliGRAM(s) IV Push every 6 hours PRN Nausea and/or Vomiting      Vital Signs Last 24 Hrs  T(C): 37.2 (04 May 2020 08:30), Max: 37.4 (04 May 2020 06:34)  T(F): 99 (04 May 2020 08:30), Max: 99.4 (04 May 2020 06:34)  HR: 80 (04 May 2020 08:30) (74 - 113)  BP: 172/80 (04 May 2020 08:30) (149/83 - 178/80)  BP(mean): 101 (03 May 2020 22:35) (93 - 101)  RR: 18 (04 May 2020 08:30) (18 - 20)  SpO2: 91% (04 May 2020 08:30) (91% - 98%)  CAPILLARY BLOOD GLUCOSE      POCT Blood Glucose.: 95 mg/dL (04 May 2020 08:05)    I&O's Summary    03 May 2020 07:01  -  04 May 2020 07:00  --------------------------------------------------------  IN: 450 mL / OUT: 550 mL / NET: -100 mL        PHYSICAL EXAM:  GENERAL: NAD, well-developed  EYES: EOMI, PERRLA, conjunctiva and sclera clear  CHEST/LUNG: Clear to auscultation bilaterally; No wheeze  HEART: Regular rate and rhythm; No murmurs, rubs, or gallops  ABDOMEN: Soft, Nontender, Nondistended; Bowel sounds present  EXTREMITIES:  2+ Peripheral Pulses, No clubbing, cyanosis, or edema  PSYCH: AAOx3  NEUROLOGY: non-focal  SKIN: No rashes or lesions    LABS:                        14.3   5.73  )-----------( 244      ( 04 May 2020 06:56 )             46.0     05-04    138  |  101  |  6<L>  ----------------------------<  128<H>  4.2   |  25  |  0.45<L>    Ca    8.9      04 May 2020 06:56  Phos  2.4     05-04  Mg     1.8     05-04    TPro  7.2  /  Alb  3.0<L>  /  TBili  0.5  /  DBili  x   /  AST  93<H>  /  ALT  83<H>  /  AlkPhos  90  05-04    PT/INR - ( 03 May 2020 21:10 )   PT: 13.9 sec;   INR: 1.21 ratio         PTT - ( 03 May 2020 21:10 )  PTT:29.2 sec    CARDIAC MARKERS ( 04 May 2020 06:56 )  x     / x     / 71 U/L / x     / x                  RADIOLOGY & ADDITIONAL TESTS:    Imaging Personally Reviewed:    Consultant(s) Notes Reviewed:      Care Discussed with Consultants/Other Providers: Patient is a 87y old  Female who presents with a chief complaint of Diarrhea (03 May 2020 22:56)    Authored by Dr. Nestor Rueda pager number [phone]    SUBJECTIVE / OVERNIGHT EVENTS:  Patient c/o SOB, dry cough.  Mild epigastric pain when coughing.  Mild nausea, no vomiting or diarrhea.      MEDICATIONS  (STANDING):  aspirin  chewable 81 milliGRAM(s) Oral daily  ATENolol  Tablet 25 milliGRAM(s) Oral daily  atorvastatin 80 milliGRAM(s) Oral at bedtime  dextrose 5%. 1000 milliLiter(s) (50 mL/Hr) IV Continuous <Continuous>  dextrose 50% Injectable 12.5 Gram(s) IV Push once  dextrose 50% Injectable 25 Gram(s) IV Push once  dextrose 50% Injectable 25 Gram(s) IV Push once  enoxaparin Injectable 40 milliGRAM(s) SubCutaneous daily  insulin lispro (HumaLOG) corrective regimen sliding scale   SubCutaneous three times a day before meals  insulin lispro (HumaLOG) corrective regimen sliding scale   SubCutaneous at bedtime  latanoprost 0.005% Ophthalmic Solution 1 Drop(s) Both EYES at bedtime  losartan 100 milliGRAM(s) Oral daily  nystatin    Suspension 896839 Unit(s) Oral four times a day  sodium chloride 0.9%. 1000 milliLiter(s) (75 mL/Hr) IV Continuous <Continuous>  sodium phosphate IVPB 15 milliMole(s) IV Intermittent once    MEDICATIONS  (PRN):  acetaminophen   Tablet .. 650 milliGRAM(s) Oral every 6 hours PRN Temp greater or equal to 38C (100.4F), Mild Pain (1 - 3)  dextrose 40% Gel 15 Gram(s) Oral once PRN Blood Glucose LESS THAN 70 milliGRAM(s)/deciliter  glucagon  Injectable 1 milliGRAM(s) IntraMuscular once PRN Glucose LESS THAN 70 milligrams/deciliter  guaiFENesin   Syrup  (Sugar-Free) 200 milliGRAM(s) Oral every 6 hours PRN Cough  ondansetron Injectable 4 milliGRAM(s) IV Push every 6 hours PRN Nausea and/or Vomiting      Vital Signs Last 24 Hrs  T(C): 37.2 (04 May 2020 08:30), Max: 37.4 (04 May 2020 06:34)  T(F): 99 (04 May 2020 08:30), Max: 99.4 (04 May 2020 06:34)  HR: 80 (04 May 2020 08:30) (74 - 113)  BP: 172/80 (04 May 2020 08:30) (149/83 - 178/80)  BP(mean): 101 (03 May 2020 22:35) (93 - 101)  RR: 18 (04 May 2020 08:30) (18 - 20)  SpO2: 91% (04 May 2020 08:30) (91% - 98%)  CAPILLARY BLOOD GLUCOSE      POCT Blood Glucose.: 95 mg/dL (04 May 2020 08:05)    I&O's Summary    03 May 2020 07:01  -  04 May 2020 07:00  --------------------------------------------------------  IN: 450 mL / OUT: 550 mL / NET: -100 mL    PHYSICAL EXAM:  GENERAL: NAD, well-developed  EYES: EOMI, PERRLA, conjunctiva and sclera clear  CHEST/LUNG: Course bibasilar crackles R>L.  On 2L O2 via NC.  Mild increased work of breathing.   HEART: Regular rate and rhythm; No murmurs, rubs, or gallops  ABDOMEN: Soft, Nontender to palpation, Nondistended; Bowel sounds present.    EXTREMITIES:  2+ Peripheral Pulses, No clubbing, cyanosis, or edema  PSYCH: AAOx3  NEUROLOGY: non-focal  SKIN: No rashes or lesions    LABS:                        14.3   5.73  )-----------( 244      ( 04 May 2020 06:56 )             46.0     05-04    138  |  101  |  6<L>  ----------------------------<  128<H>  4.2   |  25  |  0.45<L>    Ca    8.9      04 May 2020 06:56  Phos  2.4     05-04  Mg     1.8     05-04    TPro  7.2  /  Alb  3.0<L>  /  TBili  0.5  /  DBili  x   /  AST  93<H>  /  ALT  83<H>  /  AlkPhos  90  05-04    PT/INR - ( 03 May 2020 21:10 )   PT: 13.9 sec;   INR: 1.21 ratio   PTT - ( 03 May 2020 21:10 )  PTT:29.2 sec    COVID-19 LABS  COVID + 04/26/20 as outpatient  D-Dimer Assay, Quantitative: 521 ng/mL DDU <H> (05-03)  Ferritin, Serum: 499 ng/mL <H> (05-03)  C-Reactive Protein, Serum: 8.45 mg/dL <H> (05-03)  Auto Lymphocyte #: 0.34 K/uL <L> (05-03)    Pro-Calcitonin (Superimposed bacterial infection)   Procalcitonin, Serum: 48.08 ng/mL <H> (05-03)    Imaging:  Xray Chest 1 View- PORTABLE-Urgent (05.03.20 @ 21:29)   Impression: 1.  Bilateral patchy opacities consistent with known COVID19 pneumonia.  DISCRETE X-RAY DATA:  Percent of LEFT lung opacification: 34-66%  Percent of RIGHT lung opacification: 1-33%  Change in lung opacification from most recent x-ray (<=3 days): No Prior  Change from prior dated 3 or more days (same admission): No Prior    CT Angio Chest w/ IV Cont (05.04.20 @ 10:00)   Chest:  1.  No pulmonary embolus, however, the segmental and subsegmental vessels are not well evaluated secondary to respiratory motion.  2.  Bilateral opacities most pronounced the left upper lobe can occur in the setting of atypical viral infection/lung injury.    Abdomen and pelvis:  1.  No bowel obstruction.  2.  Bilateral renal lesions are most compatible with cysts.  3.  The appendix is normal.    Meds Received  Being enrolled in Famotidine trial

## 2020-05-04 NOTE — PROGRESS NOTE ADULT - PROBLEM SELECTOR PLAN 1
COVID + at urgent care facility 04/26/20  S/P Azithromycin x 5 days (for superimposed bacterial PNA)?   -Being evaluated for famotidine trial.   Febrile as outpatient, Tmax 104.5F  -Required 5L O2 NC on ambulation on admission, now on 2L O2 NC.

## 2020-05-04 NOTE — PHYSICAL THERAPY INITIAL EVALUATION ADULT - ADDITIONAL COMMENTS
PTA pt resided with daughter in a private home with 6 entry steps and bed on 2nd floor( 12 steps up). Pt was independent in ambulation with a rolling walker and ADL's.

## 2020-05-04 NOTE — PROGRESS NOTE ADULT - PROBLEM SELECTOR PLAN 4
-F/U C.diff  -Non-bloody diarrhea x 1 week.   -F/U stool studies  -CT Abd/pelvis w./o evidence of acute abdomen.  -Liquid diet, advance as tolerated.

## 2020-05-04 NOTE — PROVIDER CONTACT NOTE (OTHER) - SITUATION
pt admitted to [address] [place] [address] from ED for COVID. /72. pt has pmh HTN pt admitted to [address] [place] [address] from ED for COVID. /82. pt has pmh HTN

## 2020-05-04 NOTE — CONSULT NOTE ADULT - ASSESSMENT
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 diff c/b acute hypoxic respiratory failure 2/2 COVID.

## 2020-05-04 NOTE — CHART NOTE - NSCHARTNOTEFT_GEN_A_CORE
Patient approached by study team regarding eligibility for inclusion famotidine clinical trial for treatment of COVID-19. Physician explained all requirements and components of the clinical trial including potential risks and benefits. All questions and concerns were answered.     Patient consented with use of  (#     ). Patient provided informed consent. Patient was given a signed copy of the consent form for their records.    Patient randomized to clinical trial.   Category Severe Patient approached by study team regarding eligibility for inclusion in famotidine clinical trial for treatment of COVID-19. Physician explained all requirements and components of the clinical trial including potential risks and benefits. All questions and concerns were answered.     Spoke to daughter, Kristyn Olivia, who helped interpret the consent and she ensured that the patient understood the consent as well as all components of the trial including potential risks and benefits. All questions and concerns were answered.  Patient provided informed consent. Patient was given a signed copy of the consent form for their records.    Patient randomized to clinical trial.   Category, Status: Nasal cannula, Severe

## 2020-05-04 NOTE — PROGRESS NOTE ADULT - ASSESSMENT
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     S/p COVID + at urgent care 04/26/20 s/p azithromycin x 5 days.

## 2020-05-04 NOTE — PHYSICAL THERAPY INITIAL EVALUATION ADULT - GENERAL OBSERVATIONS, REHAB EVAL
Pt received supine in bed in NAD, +IV Lock, +O2 4L NC, +primafit. BZ=841. Pt AOx3, pleasant and cooperative.

## 2020-05-04 NOTE — CONSULT NOTE ADULT - PROBLEM SELECTOR RECOMMENDATION 9
COVID infection   Plaquenil  IV abx   Change to LOvenox bid   trend markers   supplemental oxygen   Albuterol inhalers

## 2020-05-04 NOTE — CONSULT NOTE ADULT - SUBJECTIVE AND OBJECTIVE BOX
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 Patient is A/O x 3 but a poor historian. Focus on her cough that has been going on. Spoke to daughter over the phone. Patient has been having GI symptoms for about a week. Went to urgent care this past Sunday and tested positive for COVID. Was given Azithromycin which she completed for 5 days. Due to her symptoms not improving the daughter who is an RN at [employer] called back the urgent care and had Augmentin prescribed which she took for the last 2 days. The diarrhea she is having is very loose and watery/mucus but not foul-smelling. Daughter doesn't think she has C. diff. She also has had a productive cough with clear sputum that worsens her diarrhea. Today she had an episode of projective vomiting NBNB, which prompted to give the patient a 500cc bolus of NS. Daughter has been monitoring vital signs she was in the 90s on 3L NC and then last night went down to 80% and increased to 5L NC. Daughter feels guilty as she had symptoms of COVID but tested negative, and feels she transmitted the virus to her mother. Also has had fevers as high as 104.5?. Mild headaches. Today complained of abdominal pain that was diffuse. Has had no chest pain, shortness of breath or dysuria.     In the ED:  Vitals: 99F /87  RR 20 SpO2 92% on RA  Labs: Hgb 15.7, Lymphopenia, Bands 2.6%, D-dimer 521, AST 61, ALT 65, Procalcitonin 48 Lactate 3.3  Imaging: CXR bilateral patchy opacities  Interventions: 250cc bolus of NS x 1, Zofran 4mg IVP x 1        PAST MEDICAL & SURGICAL HISTORY:  Type 2 diabetes mellitus  Glaucoma  CVA (cerebral vascular accident)  HTN (hypertension)  No significant past surgical history          MEDICATIONS:  aspirin  chewable 81 milliGRAM(s) Oral daily  ATENolol  Tablet 25 milliGRAM(s) Oral daily  enoxaparin Injectable 40 milliGRAM(s) SubCutaneous daily  losartan 100 milliGRAM(s) Oral daily    hydroxychloroquine   Oral   nystatin    Suspension [id number] Unit(s) Oral four times a day  piperacillin/tazobactam IVPB.. 3.375 Gram(s) IV Intermittent every 8 hours  vancomycin  IVPB        guaiFENesin   Syrup  (Sugar-Free) 200 milliGRAM(s) Oral every 6 hours PRN    acetaminophen   Tablet .. 650 milliGRAM(s) Oral every 6 hours PRN  ondansetron Injectable 4 milliGRAM(s) IV Push every 6 hours PRN      atorvastatin 80 milliGRAM(s) Oral at bedtime  dextrose 40% Gel 15 Gram(s) Oral once PRN  dextrose 50% Injectable 12.5 Gram(s) IV Push once  dextrose 50% Injectable 25 Gram(s) IV Push once  dextrose 50% Injectable 25 Gram(s) IV Push once  glucagon  Injectable 1 milliGRAM(s) IntraMuscular once PRN  insulin lispro (HumaLOG) corrective regimen sliding scale   SubCutaneous three times a day before meals  insulin lispro (HumaLOG) corrective regimen sliding scale   SubCutaneous at bedtime    dextrose 5%. 1000 milliLiter(s) IV Continuous <Continuous>  latanoprost 0.005% Ophthalmic Solution 1 Drop(s) Both EYES at bedtime  sodium chloride 0.9%. 1000 milliLiter(s) IV Continuous <Continuous>      FAMILY HISTORY:  No pertinent family history in first degree relatives      SOCIAL HISTORY:    [ ] Non-smoker  [ ] Smoker  [ ] Alcohol    Allergies    No Known Allergies    Intolerances    	    REVIEW OF SYSTEMS:  CONSTITUTIONAL: No fever, weight loss,+ fatigue  EYES: No eye pain, visual disturbances, or discharge  ENMT:  No difficulty hearing, tinnitus, vertigo; No sinus or throat pain  NECK: No pain or stiffness  RESPIRATORY: No cough, wheezing, chills or hemoptysis; No Shortness of Breath  CARDIOVASCULAR: No chest pain, palpitations, passing out, dizziness, or leg swelling  GASTROINTESTINAL: No abdominal or epigastric pain. No nausea, vomiting, or hematemesis; + diarrhea or constipation. No melena or hematochezia.  GENITOURINARY: No dysuria, frequency, hematuria, or incontinence  NEUROLOGICAL: No headaches, memory loss, loss of strength, numbness, or tremors  SKIN: No itching, burning, rashes, or lesions   LYMPH Nodes: No enlarged glands  ENDOCRINE: No heat or cold intolerance; No hair loss  MUSCULOSKELETAL: No joint pain or swelling; No muscle, back, or extremity pain  PSYCHIATRIC: No depression, anxiety, mood swings, or difficulty sleeping  HEME/LYMPH: No easy bruising, or bleeding gums  ALLERY AND IMMUNOLOGIC: No hives or eczema	    [ ] All others negative	  [ ] Unable to obtain    PHYSICAL EXAM:  T(C): 37.2 (05-04-20 @ 08:30), Max: 37.4 (05-04-20 @ 06:34)  HR: 80 (05-04-20 @ 08:30) (74 - 113)  BP: 172/80 (05-04-20 @ 08:30) (149/83 - 178/80)  RR: 18 (05-04-20 @ 08:30) (18 - 20)  SpO2: 91% (05-04-20 @ 08:30) (91% - 98%)  Wt(kg): --  I&O's Summary    03 May 2020 07:01  -  04 May 2020 07:00  --------------------------------------------------------  IN: 450 mL / OUT: 550 mL / NET: -100 mL    04 May 2020 07:01  -  04 May 2020 17:27  --------------------------------------------------------  IN: 1100 mL / OUT: 0 mL / NET: 1100 mL        Appearance: NAD 2 liters NC   HEENT:   Dry  oral mucosa, PERRL, EOMI	  Lymphatic: No lymphadenopathy  Cardiovascular: Normal S1 S2, No JVD, No murmurs, No edema  Respiratory: Decreased bs 	  Psychiatry: A & O x 3, Mood & affect appropriate  Gastrointestinal:  Soft, Non-tender, + BS	  Skin: No rashes, No ecchymoses, No cyanosis	  Neurologic: Non-focal  Extremities: Normal range of motion, No clubbing, cyanosis or edema  Vascular: Peripheral pulses palpable 2+ bilaterally    TELEMETRY: 	    ECG:  	  RADIOLOGY:  < from: Xray Chest 1 View- PORTABLE-Urgent (05.03.20 @ 21:29) >    EXAM:  XR CHEST PORTABLE URGENT 1V                            PROCEDURE DATE:  05/03/2020            INTERPRETATION:    Examination: XR CHEST URGENT    History: Diarrhea. Known COVID positive.    Comparison: None available.    Findings:    Implantable loop recorder is in place.  Heart size is within normal limits.  Patchy bilateral opacities, more prominent in the left mid and lower lung.  No acute osseous abnormality.      Impression:  1.  Bilateral patchy opacities consistent with known COVID19 pneumonia.      DISCRETE X-RAY DATA:  Percent of LEFT lung opacification: 34-66%  Percent of RIGHT lung opacification: 1-33%  Change in lung opacification from most recent x-ray (<=3 days): No Prior  Change from prior dated 3 or more days (same admission): No Prior                  DEVIN MULLEN M.D., RADIOLOGY RESIDENT  This document has been electronically signed.  VICENTA SMALLWOOD M.D., ATTENDING RADIOLOGIST  This document has been electronically signed. May  3 2020  9:55PM                < end of copied text >    OTHER: 	  	  LABS:	 	    CARDIAC MARKERS:      Troponin T, High Sensitivity (05.03.20 @ 21:09)    Troponin T, High Sensitivity Result: 16: Rapid upward or downward changes in high-sensitivity troponin levels  suggest acute myocardial injury. Renal impairment may cause sustained  troponin elevations.  Normal: <6 - 14 ng/L  Indeterminate: 15-51 ng/L  Elevated: > 51 ng/L  See http://labs/test/TROPTHS on the [facility] intranet for more  information ng/L          D-Dimer Assay, Quantitative (05.03.20 @ 21:10)    D-Dimer Assay, Quantitative: 521: D-Dimer result less than 230 ng/mL DDU correlates with the absence  of thrombosis in a patient with a low and moderate       pre-test probability of thrombosis.  1 DDU is approximately equal to  2 ng/mL FEU (previous units). ng/mL DDU       C-Reactive Protein, Serum (05.03.20 @ 21:56)    C-Reactive Protein, Serum: 8.45 mg/dL                       14.3   5.73  )-----------( 244      ( 04 May 2020 06:56 )             46.0     05-04    138  |  101  |  6<L>  ----------------------------<  128<H>  4.2   |  25  |  0.45<L>    Ca    8.9      04 May 2020 06:56  Phos  2.4     05-04  Mg     1.8     05-04    TPro  7.2  /  Alb  3.0<L>  /  TBili  0.5  /  DBili  x   /  AST  93<H>  /  ALT  83<H>  /  AlkPhos  90  05-04    proBNP: Serum Pro-Brain Natriuretic Peptide: 122 pg/mL (05-03 @ 21:09)    Lipid Profile:   HgA1c:   TSH:

## 2020-05-04 NOTE — PHYSICAL THERAPY INITIAL EVALUATION ADULT - PERTINENT HX OF CURRENT PROBLEM, REHAB EVAL
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 S/p COVID + at urgent care 04/26/20 s/p azithromycin x 5 days. Respiratory failure with hypoxia, COVID-19 virus infection, SIRS, Diarrhea, Nausea & vomiting,  Type 2 diabetes mellitus, CVA

## 2020-05-04 NOTE — PROGRESS NOTE ADULT - PROBLEM SELECTOR PLAN 3
-Unclear source COVID vs. other superimposed bacterial PNA with left lung consolidation.   -Has non-bloody diarrhea with N/V.

## 2020-05-04 NOTE — PROGRESS NOTE ADULT - PROBLEM SELECTOR PLAN 7
At home on 100 losartan.  BP here in 160s 170s.  Will monitor clinically and if BP >160 persistent, will add PRN hydralazine as do not want to precipitously drop BP.

## 2020-05-04 NOTE — PROGRESS NOTE ADULT - PROBLEM SELECTOR PLAN 2
Acute hypoxic respiratory failure 2* COVID vs. superimposed bacterial PNA.  -Procal elevated, will repeat and likely start on broad spectrum antibiotics.

## 2020-05-04 NOTE — PHYSICAL THERAPY INITIAL EVALUATION ADULT - CRITERIA FOR SKILLED THERAPEUTIC INTERVENTIONS
risk reduction/prevention/rehab potential/functional limitations in following categories/anticipated discharge recommendation/predicted duration of therapy intervention

## 2020-05-05 LAB
ALBUMIN SERPL ELPH-MCNC: 2.7 G/DL — LOW (ref 3.3–5)
ALP SERPL-CCNC: 80 U/L — SIGNIFICANT CHANGE UP (ref 40–120)
ALT FLD-CCNC: 95 U/L — HIGH (ref 10–45)
ANION GAP SERPL CALC-SCNC: 13 MMOL/L — SIGNIFICANT CHANGE UP (ref 5–17)
AST SERPL-CCNC: 83 U/L — HIGH (ref 10–40)
BILIRUB SERPL-MCNC: 0.7 MG/DL — SIGNIFICANT CHANGE UP (ref 0.2–1.2)
BUN SERPL-MCNC: 7 MG/DL — SIGNIFICANT CHANGE UP (ref 7–23)
CALCIUM SERPL-MCNC: 9.1 MG/DL — SIGNIFICANT CHANGE UP (ref 8.4–10.5)
CHLORIDE SERPL-SCNC: 98 MMOL/L — SIGNIFICANT CHANGE UP (ref 96–108)
CO2 SERPL-SCNC: 24 MMOL/L — SIGNIFICANT CHANGE UP (ref 22–31)
CREAT SERPL-MCNC: 0.81 MG/DL — SIGNIFICANT CHANGE UP (ref 0.5–1.3)
GLUCOSE BLDC GLUCOMTR-MCNC: 130 MG/DL — HIGH (ref 70–99)
GLUCOSE BLDC GLUCOMTR-MCNC: 143 MG/DL — HIGH (ref 70–99)
GLUCOSE BLDC GLUCOMTR-MCNC: 154 MG/DL — HIGH (ref 70–99)
GLUCOSE BLDC GLUCOMTR-MCNC: 185 MG/DL — HIGH (ref 70–99)
GLUCOSE SERPL-MCNC: 217 MG/DL — HIGH (ref 70–99)
HCT VFR BLD CALC: 48.3 % — HIGH (ref 34.5–45)
HGB BLD-MCNC: 15 G/DL — SIGNIFICANT CHANGE UP (ref 11.5–15.5)
MCHC RBC-ENTMCNC: 24.3 PG — LOW (ref 27–34)
MCHC RBC-ENTMCNC: 31.1 GM/DL — LOW (ref 32–36)
MCV RBC AUTO: 78.3 FL — LOW (ref 80–100)
NRBC # BLD: 0 /100 WBCS — SIGNIFICANT CHANGE UP (ref 0–0)
PLATELET # BLD AUTO: 203 K/UL — SIGNIFICANT CHANGE UP (ref 150–400)
POTASSIUM SERPL-MCNC: 4.2 MMOL/L — SIGNIFICANT CHANGE UP (ref 3.5–5.3)
POTASSIUM SERPL-SCNC: 4.2 MMOL/L — SIGNIFICANT CHANGE UP (ref 3.5–5.3)
PROCALCITONIN SERPL-MCNC: 24.2 NG/ML — HIGH (ref 0.02–0.1)
PROT SERPL-MCNC: 7.3 G/DL — SIGNIFICANT CHANGE UP (ref 6–8.3)
RBC # BLD: 6.17 M/UL — HIGH (ref 3.8–5.2)
RBC # FLD: 16.3 % — HIGH (ref 10.3–14.5)
SODIUM SERPL-SCNC: 135 MMOL/L — SIGNIFICANT CHANGE UP (ref 135–145)
WBC # BLD: 5.58 K/UL — SIGNIFICANT CHANGE UP (ref 3.8–10.5)
WBC # FLD AUTO: 5.58 K/UL — SIGNIFICANT CHANGE UP (ref 3.8–10.5)

## 2020-05-05 PROCEDURE — 99233 SBSQ HOSP IP/OBS HIGH 50: CPT | Mod: CS,GC

## 2020-05-05 PROCEDURE — 93010 ELECTROCARDIOGRAM REPORT: CPT

## 2020-05-05 PROCEDURE — 99222 1ST HOSP IP/OBS MODERATE 55: CPT | Mod: CS,GC

## 2020-05-05 RX ADMIN — Medication 250 MILLIGRAM(S): at 04:30

## 2020-05-05 RX ADMIN — Medication 81 MILLIGRAM(S): at 11:57

## 2020-05-05 RX ADMIN — ATORVASTATIN CALCIUM 80 MILLIGRAM(S): 80 TABLET, FILM COATED ORAL at 22:27

## 2020-05-05 RX ADMIN — LATANOPROST 1 DROP(S): 0.05 SOLUTION/ DROPS OPHTHALMIC; TOPICAL at 22:30

## 2020-05-05 RX ADMIN — Medication 500000 UNIT(S): at 11:57

## 2020-05-05 RX ADMIN — ATENOLOL 25 MILLIGRAM(S): 25 TABLET ORAL at 05:30

## 2020-05-05 RX ADMIN — LOSARTAN POTASSIUM 100 MILLIGRAM(S): 100 TABLET, FILM COATED ORAL at 05:30

## 2020-05-05 RX ADMIN — Medication 400 MILLIGRAM(S): at 11:57

## 2020-05-05 RX ADMIN — Medication 500000 UNIT(S): at 16:18

## 2020-05-05 RX ADMIN — Medication 500000 UNIT(S): at 05:30

## 2020-05-05 RX ADMIN — Medication 100 MILLIGRAM(S): at 22:29

## 2020-05-05 RX ADMIN — ENOXAPARIN SODIUM 40 MILLIGRAM(S): 100 INJECTION SUBCUTANEOUS at 11:57

## 2020-05-05 RX ADMIN — Medication 1: at 11:56

## 2020-05-05 RX ADMIN — PIPERACILLIN AND TAZOBACTAM 25 GRAM(S): 4; .5 INJECTION, POWDER, LYOPHILIZED, FOR SOLUTION INTRAVENOUS at 05:31

## 2020-05-05 RX ADMIN — PIPERACILLIN AND TAZOBACTAM 25 GRAM(S): 4; .5 INJECTION, POWDER, LYOPHILIZED, FOR SOLUTION INTRAVENOUS at 22:28

## 2020-05-05 RX ADMIN — Medication 100 MILLIGRAM(S): at 13:22

## 2020-05-05 RX ADMIN — PIPERACILLIN AND TAZOBACTAM 25 GRAM(S): 4; .5 INJECTION, POWDER, LYOPHILIZED, FOR SOLUTION INTRAVENOUS at 13:22

## 2020-05-05 NOTE — PROGRESS NOTE ADULT - PROBLEM SELECTOR PLAN 4
-F/U C.diff-->Still not back, will follow up.   -Non-bloody diarrhea x 1 week.   -F/U stool studies  -CT Abd/pelvis w./o evidence of acute abdomen.  -Liquid diet, advance as tolerated.

## 2020-05-05 NOTE — PROGRESS NOTE ADULT - SUBJECTIVE AND OBJECTIVE BOX
Patient is a 87y old  Female who presents with a chief complaint of Diarrhea (03 May 2020 22:56)    Authored by Dr. Nestor Rueda pager number [phone]    SUBJECTIVE / OVERNIGHT EVENTS:  Patient c/o SOB and dry cough but improved compared to yesterday.  Mild epigastric pain when coughing.  No N/V/D. Slept well overnight.     MEDICATIONS  (STANDING):  aspirin  chewable 81 milliGRAM(s) Oral daily  ATENolol  Tablet 25 milliGRAM(s) Oral daily  atorvastatin 80 milliGRAM(s) Oral at bedtime  dextrose 5%. 1000 milliLiter(s) (50 mL/Hr) IV Continuous <Continuous>  dextrose 50% Injectable 12.5 Gram(s) IV Push once  dextrose 50% Injectable 25 Gram(s) IV Push once  dextrose 50% Injectable 25 Gram(s) IV Push once  enoxaparin Injectable 40 milliGRAM(s) SubCutaneous daily  famotidine vs placebo IVPB () 120 milliGRAM(s) IV Intermittent every 8 hours  hydroxychloroquine   Oral   hydroxychloroquine 400 milliGRAM(s) Oral every 24 hours  insulin lispro (HumaLOG) corrective regimen sliding scale   SubCutaneous three times a day before meals  insulin lispro (HumaLOG) corrective regimen sliding scale   SubCutaneous at bedtime  latanoprost 0.005% Ophthalmic Solution 1 Drop(s) Both EYES at bedtime  losartan 100 milliGRAM(s) Oral daily  nystatin    Suspension [id number] Unit(s) Oral four times a day  piperacillin/tazobactam IVPB.. 3.375 Gram(s) IV Intermittent every 8 hours  sodium chloride 0.9%. 1000 milliLiter(s) (75 mL/Hr) IV Continuous <Continuous>  vancomycin  IVPB      vancomycin  IVPB 1000 milliGRAM(s) IV Intermittent every 12 hours    MEDICATIONS  (PRN):  acetaminophen   Tablet .. 650 milliGRAM(s) Oral every 6 hours PRN Temp greater or equal to 38C (100.4F), Mild Pain (1 - 3)  dextrose 40% Gel 15 Gram(s) Oral once PRN Blood Glucose LESS THAN 70 milliGRAM(s)/deciliter  glucagon  Injectable 1 milliGRAM(s) IntraMuscular once PRN Glucose LESS THAN 70 milligrams/deciliter  guaiFENesin   Syrup  (Sugar-Free) 200 milliGRAM(s) Oral every 6 hours PRN Cough  ondansetron Injectable 4 milliGRAM(s) IV Push every 6 hours PRN Nausea and/or Vomiting    OBJECTIVE  Vital Signs Last 24 Hrs  T(C): 37.4 (05 May 2020 05:54), Max: 38.2 (04 May 2020 17:47)  T(F): 99.3 (05 May 2020 05:54), Max: 100.7 (04 May 2020 17:47)  HR: 89 (05 May 2020 05:54) (72 - 89)  BP: 158/66 (05 May 2020 05:54) (147/68 - 158/66)  BP(mean): --  RR: 19 (05 May 2020 05:54) (19 - 21)  SpO2: 93% (05 May 2020 05:54) (93% - 97%)    05-04-20 @ 07:01  -  05-05-20 @ 07:00  --------------------------------------------------------  IN: 1750 mL / OUT: 1350 mL / NET: 400 mL    PHYSICAL EXAM:  GENERAL: NAD, well-developed  EYES: EOMI, PERRLA, conjunctiva and sclera clear  CHEST/LUNG: Course bibasilar crackles R>L.  On 4L O2 via NC.  Mild increased work of breathing, dry non-productive cough.  HEART: Regular rate and rhythm; No murmurs, rubs, or gallops  ABDOMEN: Soft, Nontender to palpation, Nondistended; Bowel sounds present.    EXTREMITIES:  2+ Peripheral Pulses, No clubbing, cyanosis, or edema  PSYCH: AAOx3  SKIN: No rashes or lesions    LABS:                        14.3   5.73  )-----------( 244      ( 04 May 2020 06:56 )             46.0     05-04    138  |  101  |  6<L>  ----------------------------<  128<H>  4.2   |  25  |  0.45<L>    Ca    8.9      04 May 2020 06:56  Phos  2.4     05-04  Mg     1.8     05-04    TPro  7.2  /  Alb  3.0<L>  /  TBili  0.5  /  DBili  x   /  AST  93<H>  /  ALT  83<H>  /  AlkPhos  90  05-04            COVID-19 DATA  COVID + 04/26/20 as outpatient  D-Dimer Assay, Quantitative: 521 ng/mL DDU <H> (05-03)  Ferritin, Serum: 499 ng/mL <H> (05-03)  C-Reactive Protein, Serum: 8.45 mg/dL <H> (05-03)  Auto Lymphocyte #: 0.34 K/uL <L> (05-03)    Pro-Calcitonin (Superimposed bacterial infection)   Procalcitonin, Serum: 41.79 ng/mL <H> (05-04)  Procalcitonin, Serum: 48.08 ng/mL <H> (05-03)    Imaging:  Xray Chest 1 View- PORTABLE-Urgent (05.03.20 @ 21:29)   Impression: 1.  Bilateral patchy opacities consistent with known COVID19 pneumonia.  DISCRETE X-RAY DATA:  Percent of LEFT lung opacification: 34-66%  Percent of RIGHT lung opacification: 1-33%  Change in lung opacification from most recent x-ray (<=3 days): No Prior  Change from prior dated 3 or more days (same admission): No Prior    CT Angio Chest w/ IV Cont (05.04.20 @ 10:00)   Chest:  1.  No pulmonary embolus, however, the segmental and subsegmental vessels are not well evaluated secondary to respiratory motion.  2.  Bilateral opacities most pronounced the left upper lobe can occur in the setting of atypical viral infection/lung injury.    Abdomen and pelvis:  1.  No bowel obstruction.  2.  Bilateral renal lesions are most compatible with cysts.  3.  The appendix is normal.    Meds Received  Azithromycin x 5 days  Famotidine + Hydroxychloroquine (05/04/2020)

## 2020-05-05 NOTE — CONSULT NOTE ADULT - ATTENDING COMMENTS
Patient seen and examined with Dr. Newman    I agree with his history exam and plans as noted above  She reports feeling better with decreased GI upset and stable breathing at this point, dry cough    Patient with COVID-19+ pneumonia and concern for superimposed bacterial infection based upon elevated pro-calcitonin level  Exam notable ability to answer questions in full sentences without becoming SOB  diminished BS bilat  Cor- reg  abd- soft    Continue supportive care and airborne/contact isolation for COVID  Would discontinue the Vancomycin  Can complete a short five day course of Zosyn      Jai Maldonado MD  [phone]  After 5pm/weekends [phone]
Advanced care planning was discussed with patient and family.  Advanced care planning forms were reviewed and discussed as appropriate.  Differential diagnosis and plan of care discussed with patient after the evaluation.   Pain assessed and judicious use of narcotics when appropriate was discussed.  Importance of Fall prevention discussed.  Counseling on Smoking and Alcohol cessation was offered when appropriate.  Counseling on Diet, exercise, and medication compliance was done.

## 2020-05-05 NOTE — CONSULT NOTE ADULT - SUBJECTIVE AND OBJECTIVE BOX
Patient is a 87y old  Female who presents with a chief complaint of Diarrhea (05 May 2020 10:44)    HPI:  Ms. 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 Patient is A/O x 3 but a poor historian. Focus on her cough that has been going on. Spoke to daughter over the phone. Patient has been having GI symptoms for about a week. Went to urgent care this past  and tested positive for COVID. Was given Azithromycin which she completed for 5 days. Due to her symptoms not improving the daughter who is an RN at [employer] called back the urgent care and had Augmentin prescribed which she took for the last 2 days. The diarrhea she is having is very loose and watery/mucus but not foul-smelling. Daughter doesn't think she has C. diff. She also has had a productive cough with clear sputum that worsens her diarrhea. Today she had an episode of projective vomiting NBNB, which prompted to give the patient a 500cc bolus of NS. Daughter has been monitoring vital signs she was in the 90s on 3L NC and then last night went down to 80% and increased to 5L NC. Daughter feels guilty as she had symptoms of COVID but tested negative, and feels she transmitted the virus to her mother. Also has had fevers as high as 104.5?. Mild headaches. Today complained of abdominal pain that was diffuse. Has had no chest pain, shortness of breath or dysuria.     In the ED:  Vitals: 99F /87  RR 20 SpO2 92% on RA  Labs: Hgb 15.7, Lymphopenia, Bands 2.6%, D-dimer 521, AST 61, ALT 65, Procalcitonin 48 Lactate 3.3  Imaging: CXR bilateral patchy opacities  Interventions: 250cc bolus of NS x 1, Zofran 4mg IVP x 1 (03 May 2020 22:56)     prior hospital charts reviewed [  ]  primary team notes reviewed [  ]  other consultant notes reviewed [  ]  PAST MEDICAL & SURGICAL HISTORY:  Type 2 diabetes mellitus  Glaucoma  CVA (cerebral vascular accident)  HTN (hypertension)  No significant past surgical history    Allergies  No Known Allergies    ANTIMICROBIALS (past 90 days)  MEDICATIONS  (STANDING):    hydroxychloroquine   800 milliGRAM(s) Oral (20 @ 15:13)    hydroxychloroquine   400 milliGRAM(s) Oral (20 @ 11:57)    nystatin    Suspension   [id number] Unit(s) Oral (20 @ 11:57)   [id number] Unit(s) Oral (20 @ 05:30)   [id number] Unit(s) Oral (20 @ 23:11)   [id number] Unit(s) Oral (20 @ 17:25)   [id number] Unit(s) Oral (20 @ 11:26)   [id number] Unit(s) Oral (20 @ 05:44)    piperacillin/tazobactam IVPB.   200 mL/Hr IV Intermittent (20 @ 13:50)    piperacillin/tazobactam IVPB..   25 mL/Hr IV Intermittent (20 @ 13:22)   25 mL/Hr IV Intermittent (20 @ 05:31)   25 mL/Hr IV Intermittent (20 @ 22:16)   25 mL/Hr IV Intermittent (20 @ 13:51)    vancomycin  IVPB   250 mL/Hr IV Intermittent (20 @ 13:50)    vancomycin  IVPB   250 mL/Hr IV Intermittent (20 @ 04:30)      ANTIMICROBIALS:    hydroxychloroquine    hydroxychloroquine 400 every 24 hours  nystatin    Suspension [id number] four times a day  piperacillin/tazobactam IVPB.. 3.375 every 8 hours  vancomycin  IVPB    vancomycin  IVPB 1000 every 12 hours    OTHER MEDS: MEDICATIONS  (STANDING):  acetaminophen   Tablet .. 650 every 6 hours PRN  aspirin  chewable 81 daily  ATENolol  Tablet 25 daily  atorvastatin 80 at bedtime  benzonatate 100 three times a day  dextrose 40% Gel 15 once PRN  dextrose 50% Injectable 12.5 once  dextrose 50% Injectable 25 once  dextrose 50% Injectable 25 once  enoxaparin Injectable 40 daily  glucagon  Injectable 1 once PRN  guaiFENesin   Syrup  (Sugar-Free) 200 every 6 hours PRN  insulin lispro (HumaLOG) corrective regimen sliding scale  three times a day before meals  insulin lispro (HumaLOG) corrective regimen sliding scale  at bedtime  losartan 100 daily  ondansetron Injectable 4 every 6 hours PRN    SOCIAL HISTORY:   hx smoking  non-smoker    FAMILY HISTORY:  No pertinent family history in first degree relatives    REVIEW OF SYSTEMS  [  ] ROS unobtainable because:    [  ] All other systems negative except as noted below:	    Constitutional:  [ ] fever [ ] chills  [ ] weight loss  [ ] weakness  Skin:  [ ] rash [ ] phlebitis	  Eyes: [ ] icterus [ ] pain  [ ] discharge	  ENMT: [ ] sore throat  [ ] thrush [ ] ulcers [ ] exudates  Respiratory: [ ] dyspnea [ ] hemoptysis [ ] cough [ ] sputum	  Cardiovascular:  [ ] chest pain [ ] palpitations [ ] edema	  Gastrointestinal:  [ ] nausea [ ] vomiting [ ] diarrhea [ ] constipation [ ] pain	  Genitourinary:  [ ] dysuria [ ] frequency [ ] hematuria [ ] discharge [ ] flank pain  [ ] incontinence  Musculoskeletal:  [ ] myalgias [ ] arthralgias [ ] arthritis  [ ] back pain  Neurological:  [ ] headache [ ] seizures  [ ] confusion/altered mental status  Psychiatric:  [ ] anxiety [ ] depression	  Hematology/Lymphatics:  [ ] lymphadenopathy  Endocrine:  [ ] adrenal [ ] thyroid  Allergic/Immunologic:	 [ ] transplant [ ] seasonal    Vital Signs Last 24 Hrs  T(F): 99 (20 @ 13:19), Max: 100.7 (20 @ 17:47)  Vital Signs Last 24 Hrs  HR: 64 (20 @ 13:19) (64 - 89)  BP: 155/71 (20 @ 13:19) (147/68 - 158/66)  RR: 19 (20 @ 13:19)  SpO2: 98% (20 @ 13:19) (93% - 98%)  Wt(kg): --    EXAM:  Constitutional: Not in acute distress  Eyes: pupils bilaterally reactive to light. No icterus.  Oral cavity: Clear, no lesions  Neck: No neck vein distension noted  RS: Chest clear to auscultation bilaterally. No wheeze/rhonchi/crepitations.  CVS: S1, S2 heard. Regular rate and rhythm. No murmurs/rubs/gallops.  Abdomen: Soft. No guarding/rigidity/tenderness.  : No acute abnormalities  Extremities: Warm. No pedal edema  Skin: No lesions noted  Vascular: No evidence of phlebitis  Neuro: Alert, oriented to time/place/person                          15.0   5.58  )-----------( 203      ( 05 May 2020 10:41 )             48.3     05-05    135  |  98  |  7   ----------------------------<  217<H>  4.2   |  24  |  0.81    Ca    9.1      05 May 2020 10:41  Phos  2.4     05-04  Mg     1.8     05-04    TPro  7.3  /  Alb  2.7<L>  /  TBili  0.7  /  DBili  x   /  AST  83<H>  /  ALT  95<H>  /  AlkPhos  80  05-05    Urinalysis Basic - ( 04 May 2020 06:27 )    Color: Light Yellow / Appearance: Clear / S.008 / pH: x  Gluc: x / Ketone: Negative  / Bili: Negative / Urobili: <2 mg/dL   Blood: x / Protein: Negative / Nitrite: Negative   Leuk Esterase: Negative / RBC: x / WBC x   Sq Epi: x / Non Sq Epi: x / Bacteria: x    MICROBIOLOGY:  Culture - Blood (collected 04 May 2020 11:11)  Source: .Blood Blood-Venous  Preliminary Report (05 May 2020 12:02):    No growth to date.    Culture - Blood (collected 04 May 2020 11:11)  Source: .Blood Blood-Peripheral  Preliminary Report (05 May 2020 12:02):    No growth to date.                    RADIOLOGY:  imaging below personally reviewed  < from: CT Angio Chest w/ IV Cont (20 @ 10:00) >  IMPRESSION:   Chest:  1.  No pulmonary embolus, however, the segmental and subsegmental vessels are not well evaluated secondary to respiratory motion.  2.  Bilateral opacities most pronounced the left upper lobe can occur in the setting of atypical viral infection/lung injury.    Abdomen and pelvis:  1.  No bowel obstruction.  2.  Bilateral renal lesions are most compatible with cysts.  3.  The appendix is normal.    < end of copied text >    < from: Xray Chest 1 View- PORTABLE-Urgent (20 @ 21:29) >  Impression:  Bilateral patchy opacities consistent with known COVID19 pneumonia.  < end of copied text >      OTHER TESTS: Patient is a 87y old  Female who presents with a chief complaint of Diarrhea (05 May 2020 10:44)    HPI:  Ms. 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 Patient is A/O x 3 but a poor historian. Focus on her cough that has been going on. Spoke to daughter over the phone. Patient has been having GI symptoms for about a week. Went to urgent care this past  and tested positive for COVID. Was given Azithromycin which she completed for 5 days. Due to her symptoms not improving the daughter who is an RN at [employer] called back the urgent care and had Augmentin prescribed which she took for the last 2 days. The diarrhea she is having is very loose and watery/mucus but not foul-smelling. Daughter doesn't think she has C. diff. She also has had a productive cough with clear sputum that worsens her diarrhea. Today she had an episode of projective vomiting NBNB, which prompted to give the patient a 500cc bolus of NS. Daughter has been monitoring vital signs she was in the 90s on 3L NC and then last night went down to 80% and increased to 5L NC. Daughter feels guilty as she had symptoms of COVID but tested negative, and feels she transmitted the virus to her mother. Also has had fevers as high as 104.5?. Mild headaches. Today complained of abdominal pain that was diffuse. Has had no chest pain, shortness of breath or dysuria.     In the ED:  Vitals: 99F /87  RR 20 SpO2 92% on RA  Labs: Hgb 15.7, Lymphopenia, Bands 2.6%, D-dimer 521, AST 61, ALT 65, Procalcitonin 48 Lactate 3.3  Imaging: CXR bilateral patchy opacities  Interventions: 250cc bolus of NS x 1, Zofran 4mg IVP x 1 (03 May 2020 22:56)  ===========  - ID consulted for possible bacterial process  - pt assessed at bedside. Alert, awake, not in acute distress  - denied abdominal pain. Reports improvement in cough and diarrhea    prior hospital charts reviewed [x]  primary team notes reviewed [x]  other consultant notes reviewed [x]    PAST MEDICAL & SURGICAL HISTORY:  Type 2 diabetes mellitus  Glaucoma  CVA (cerebral vascular accident)  HTN (hypertension)  No significant past surgical history    Allergies  No Known Allergies    ANTIMICROBIALS (past 90 days)  MEDICATIONS  (STANDING):    hydroxychloroquine  nystatin    Suspension  piperacillin/tazobactam IVPB.  vancomycin  IVPB    ANTIMICROBIALS:    hydroxychloroquine    hydroxychloroquine 400 every 24 hours  nystatin    Suspension [id number] four times a day  piperacillin/tazobactam IVPB.. 3.375 every 8 hours  vancomycin  IVPB    vancomycin  IVPB 1000 every 12 hours    OTHER MEDS: MEDICATIONS  (STANDING):  acetaminophen   Tablet .. 650 every 6 hours PRN  aspirin  chewable 81 daily  ATENolol  Tablet 25 daily  atorvastatin 80 at bedtime  benzonatate 100 three times a day  dextrose 40% Gel 15 once PRN  dextrose 50% Injectable 12.5 once  dextrose 50% Injectable 25 once  dextrose 50% Injectable 25 once  enoxaparin Injectable 40 daily  glucagon  Injectable 1 once PRN  guaiFENesin   Syrup  (Sugar-Free) 200 every 6 hours PRN  insulin lispro (HumaLOG) corrective regimen sliding scale  three times a day before meals  insulin lispro (HumaLOG) corrective regimen sliding scale  at bedtime  losartan 100 daily  ondansetron Injectable 4 every 6 hours PRN    SOCIAL HISTORY:  - Daughter is a RN at [employer]    FAMILY HISTORY:  No pertinent family history in first degree relatives    REVIEW OF SYSTEMS  [  ] ROS unobtainable because:    [x] All other systems negative except as noted below:	  Constitutional:  [x] fever [ ] chills  [ ] weight loss  [ ] weakness  Skin:  [ ] rash [ ] phlebitis	  Eyes: [ ] icterus [ ] pain  [ ] discharge	  ENMT: [ ] sore throat  [ ] thrush [ ] ulcers [ ] exudates  Respiratory: [ ] dyspnea [ ] hemoptysis [x] cough [ ] sputum	  Cardiovascular:  [ ] chest pain [ ] palpitations [ ] edema	  Gastrointestinal:  [ ] nausea [ ] vomiting [ ] diarrhea [ ] constipation [ ] pain	  Genitourinary:  [ ] dysuria [ ] frequency [ ] hematuria [ ] discharge [ ] flank pain  [ ] incontinence  Musculoskeletal:  [ ] myalgias [ ] arthralgias [ ] arthritis  [ ] back pain  Neurological:  [ ] headache [ ] seizures  [ ] confusion/altered mental status  Psychiatric:  [ ] anxiety [ ] depression	  Hematology/Lymphatics:  [ ] lymphadenopathy  Endocrine:  [ ] adrenal [ ] thyroid  Allergic/Immunologic:	 [ ] transplant [ ] seasonal    Vital Signs Last 24 Hrs  T(F): 99 (20 @ 13:19), Max: 100.7 (20 @ 17:47)  Vital Signs Last 24 Hrs  HR: 64 (20 @ 13:19) (64 - 89)  BP: 155/71 (20 @ 13:19) (147/68 - 158/66)  RR: 19 (20 @ 13:19)  SpO2: 98% (20 @ 13:19) (93% - 98%)  Wt(kg): --    EXAM:  Constitutional: Not in acute distress. On NC 4l/min  Eyes: pupils bilaterally reactive to light. No icterus.  Oral cavity: Clear, no lesions  Neck: No neck vein distension noted  RS: Chest clear to auscultation bilaterally. No wheeze/rhonchi/crepitations.  CVS: S1, S2 heard. Regular rate and rhythm. No murmurs/rubs/gallops.  Abdomen: Soft. No guarding/rigidity/tenderness. No CVA tenderness  : Ext urinary catheter noted  Extremities: Warm. No pedal edema  Skin: No lesions noted  Vascular: No evidence of phlebitis  Neuro: Alert, oriented to time/place/person                          15.0   5.58  )-----------( 203      ( 05 May 2020 10:41 )             48.3     05-    135  |  98  |  7   ----------------------------<  217<H>  4.2   |  24  |  0.81    Ca    9.1      05 May 2020 10:41  Phos  2.4     05-04  Mg     1.8     05-04    TPro  7.3  /  Alb  2.7<L>  /  TBili  0.7  /  DBili  x   /  AST  83<H>  /  ALT  95<H>  /  AlkPhos  80  05-05    Urinalysis Basic - ( 04 May 2020 06:27 )    Color: Light Yellow / Appearance: Clear / S.008 / pH: x  Gluc: x / Ketone: Negative  / Bili: Negative / Urobili: <2 mg/dL   Blood: x / Protein: Negative / Nitrite: Negative   Leuk Esterase: Negative / RBC: x / WBC x   Sq Epi: x / Non Sq Epi: x / Bacteria: x    MICROBIOLOGY:  Culture - Blood (collected 04 May 2020 11:11)  Source: .Blood Blood-Venous  Preliminary Report (05 May 2020 12:02):    No growth to date.    Culture - Blood (collected 04 May 2020 11:11)  Source: .Blood Blood-Peripheral  Preliminary Report (05 May 2020 12:02):    No growth to date.      RADIOLOGY:  imaging below personally reviewed  < from: CT Angio Chest w/ IV Cont (20 @ 10:00) >  IMPRESSION:   Chest:  1.  No pulmonary embolus, however, the segmental and subsegmental vessels are not well evaluated secondary to respiratory motion.  2.  Bilateral opacities most pronounced the left upper lobe can occur in the setting of atypical viral infection/lung injury.    Abdomen and pelvis:  1.  No bowel obstruction.  2.  Bilateral renal lesions are most compatible with cysts.  3.  The appendix is normal.    < end of copied text >    < from: Xray Chest 1 View- PORTABLE-Urgent (20 @ 21:29) >  Impression:  Bilateral patchy opacities consistent with known COVID19 pneumonia.  < end of copied text >      OTHER TESTS:

## 2020-05-05 NOTE — PROGRESS NOTE ADULT - PROBLEM SELECTOR PLAN 7
At home on 100 losartan.  BP here in 160s 170s.  Will monitor clinically and if BP >160 persistent, will add PRN hydralazine as do not want to precipitously drop BP.  Appreciate Cards C/S At home on 100 losartan.  BP here in 160s 170s.  Will monitor clinically and if BP >160 persistent, will add PRN hydralazine as do not want to precipitously drop BP.  Appreciate Cards C/S- Hydralazine PRN added

## 2020-05-05 NOTE — PROGRESS NOTE ADULT - PROBLEM SELECTOR PLAN 3
-Unclear source COVID vs. other superimposed bacterial PNA with left lung consolidation.   -Has non-bloody diarrhea with N/V-->no longer c/o this.   -CT C/A/P w/o clear evidence of abdominal/pelivic infection-->presumed bacterial PNA. -Unclear source COVID vs. other superimposed bacterial PNA with left lung consolidation.   -Had non-bloody diarrhea with N/V-->no longer c/o this.   -CT C/A/P w/o clear evidence of abdominal/pelivic infection-->presumed bacterial PNA.

## 2020-05-05 NOTE — PROGRESS NOTE ADULT - SUBJECTIVE AND OBJECTIVE BOX
Subjective: Patient seen and examined. No new events except as noted.   feels ok   no cp   low grade fever   breathing stable       REVIEW OF SYSTEMS:    CONSTITUTIONAL: +weakness, +fevers or chills  EYES/ENT: No visual changes;  No vertigo or throat pain   NECK: No pain or stiffness  RESPIRATORY: No cough, wheezing, hemoptysis; No shortness of breath  CARDIOVASCULAR: No chest pain or palpitations  GASTROINTESTINAL: No abdominal or epigastric pain. No nausea, vomiting, or hematemesis; No diarrhea or constipation. No melena or hematochezia.  GENITOURINARY: No dysuria, frequency or hematuria  NEUROLOGICAL: No numbness or weakness  SKIN: No itching, burning, rashes, or lesions   All other review of systems is negative unless indicated above.    MEDICATIONS:  MEDICATIONS  (STANDING):  aspirin  chewable 81 milliGRAM(s) Oral daily  ATENolol  Tablet 25 milliGRAM(s) Oral daily  atorvastatin 80 milliGRAM(s) Oral at bedtime  benzonatate 100 milliGRAM(s) Oral three times a day  dextrose 5%. 1000 milliLiter(s) (50 mL/Hr) IV Continuous <Continuous>  dextrose 50% Injectable 12.5 Gram(s) IV Push once  dextrose 50% Injectable 25 Gram(s) IV Push once  dextrose 50% Injectable 25 Gram(s) IV Push once  enoxaparin Injectable 40 milliGRAM(s) SubCutaneous daily  famotidine vs placebo IVPB () 120 milliGRAM(s) IV Intermittent every 8 hours  hydroxychloroquine   Oral   hydroxychloroquine 400 milliGRAM(s) Oral every 24 hours  insulin lispro (HumaLOG) corrective regimen sliding scale   SubCutaneous three times a day before meals  insulin lispro (HumaLOG) corrective regimen sliding scale   SubCutaneous at bedtime  latanoprost 0.005% Ophthalmic Solution 1 Drop(s) Both EYES at bedtime  losartan 100 milliGRAM(s) Oral daily  nystatin    Suspension 027155 Unit(s) Oral four times a day  piperacillin/tazobactam IVPB.. 3.375 Gram(s) IV Intermittent every 8 hours  sodium chloride 0.9%. 1000 milliLiter(s) (75 mL/Hr) IV Continuous <Continuous>  vancomycin  IVPB      vancomycin  IVPB 1000 milliGRAM(s) IV Intermittent every 12 hours      PHYSICAL EXAM:  T(C): 37.4 (05-05-20 @ 05:54), Max: 38.2 (05-04-20 @ 17:47)  HR: 89 (05-05-20 @ 05:54) (72 - 89)  BP: 158/66 (05-05-20 @ 05:54) (147/68 - 158/66)  RR: 19 (05-05-20 @ 05:54) (19 - 21)  SpO2: 93% (05-05-20 @ 05:54) (93% - 97%)  Wt(kg): --  I&O's Summary    04 May 2020 07:01  -  05 May 2020 07:00  --------------------------------------------------------  IN: 1750 mL / OUT: 1350 mL / NET: 400 mL    05 May 2020 07:01  -  05 May 2020 10:45  --------------------------------------------------------  IN: 360 mL / OUT: 650 mL / NET: -290 mL          Appearance: Normal	  HEENT:   Normal oral mucosa, PERRL, EOMI	  Lymphatic: No lymphadenopathy , no edema  Cardiovascular: Normal S1 S2, No JVD, No murmurs , Peripheral pulses palpable 2+ bilaterally  Respiratory: Decreased bs   Gastrointestinal:  Soft, Non-tender, + BS	  Skin: No rashes, No ecchymoses, No cyanosis, warm to touch  Musculoskeletal: Normal range of motion, normal strength  Psychiatry:  Mood & affect appropriate  Ext: No edema      LABS:    CARDIAC MARKERS:  CARDIAC MARKERS ( 04 May 2020 06:56 )  x     / x     / 71 U/L / x     / x                                    14.3   5.73  )-----------( 244      ( 04 May 2020 06:56 )             46.0     05-04    138  |  101  |  6<L>  ----------------------------<  128<H>  4.2   |  25  |  0.45<L>    Ca    8.9      04 May 2020 06:56  Phos  2.4     05-04  Mg     1.8     05-04    TPro  7.2  /  Alb  3.0<L>  /  TBili  0.5  /  DBili  x   /  AST  93<H>  /  ALT  83<H>  /  AlkPhos  90  05-04    proBNP:   Lipid Profile:   HgA1c:   TSH:               TELEMETRY: 	    ECG:  	  RADIOLOGY:   DIAGNOSTIC TESTING:  [ ] Echocardiogram:  [ ]  Catheterization:  [ ] Stress Test:    OTHER:

## 2020-05-05 NOTE — PROGRESS NOTE ADULT - PROBLEM SELECTOR PLAN 2
Acute hypoxic respiratory failure 2* COVID vs. superimposed bacterial PNA.  -Procal elevated, repeat still high likely started on Zosyn + Vancomycin 05/04/20  -De-escalate Vanco if MRSA (-).   -De-escalate Zosyn if more specific bacterial source found.

## 2020-05-05 NOTE — CONSULT NOTE ADULT - ASSESSMENT
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  P/w worsening diarrhea, N&V, cough with clear sputum, ?fevers X1 week PTA and abdominal pain X1 day PTA.  In ED, SpO2 92% reported on RA. Labs revealed lymphopenia with mild transaminitis. Covid-19 positive 4/27.  ID consulted for possible superadded bacterial infection  =========  Covid-19 infection in diabetic (HbA1c 8.4)  - bandemia with elevated Procalcitonin seen on admission - however, low clinical suspicion for bacterial infection  - pt is hemodynamically stable, no other focal s/o infection. CT C/A/P showed b/l GGO with no intra-abdominal focus. UA negative  - would recommend holding vancomycin and continuing Zosyn, pending blood cx results    RECOMMENDATIONS:  - discontinue Vancomycin  - continue Zosyn 3.375g IV q8h  - f/u MRSA PCR, blood cx  - can continue HCQ per primary team to complete 5 days  - also enrolled in Famotidine trial - continue per protocol of IM team  - Continue supplemental O2 and supportive care per primary team  - Continue Contact and Airborne Isolation precautions    PETEY Atkinson, MD  Fellow, Infectious Diseases  Pager: [phone]  After 5pm and on Weekends: Call [phone]

## 2020-05-05 NOTE — PROGRESS NOTE ADULT - PROBLEM SELECTOR PLAN 1
COVID + at urgent care facility 04/26/20  -Febrile as outpatient, Tmax 104.5F.  Tmax overnight 100.7F  -Required 5L O2 NC on ambulation on admission, now on 4L O2 NC up from 2L  -S/P Azithromycin x 5 days (for superimposed bacterial PNA)?   -Started on Famotidine + Hydroxychloroquine 05/04/2020    Plan:   -Continue Famotidine + Hydroxychloroquine per trial protocol.   -Trend CRP, Ferritin, D-dimer QOD.   -Possibly convalescent plasma candidate? COVID + at urgent care facility 04/26/20  -Febrile as outpatient, Tmax 104.5F.  Tmax overnight 100.7F  -Required 5L O2 NC on ambulation on admission, now on 4L O2 NC up from 2L  -S/P Azithromycin x 5 days (for superimposed bacterial PNA)?   -Started on Famotidine + Hydroxychloroquine 05/04/2020  -Will C/S ID    Plan:   -Continue Famotidine + Hydroxychloroquine per trial protocol.   -Trend CRP, Ferritin, D-dimer QOD.   -C/S ID  -Tessalon pearls and Robitussin PRN for cough.   -Possibly convalescent plasma candidate? COVID + at urgent care facility 04/26/20  -Febrile as outpatient, Tmax 104.5F.  Tmax overnight 100.7F  -Required 5L O2 NC on ambulation on admission, now on 4L O2 NC up from 2L  -S/P Azithromycin x 5 days (for superimposed bacterial PNA)?   -Started on Famotidine + Hydroxychloroquine 05/04/2020  -Will C/S ID  -PT with D-dimer elevation, but no evidence of PE on CTA PE study.  Based on algorithm with Cr Cl of 87 and BMI <30, PT currently on Lovenox 40mg daily.  While appreciate higher risk of PE in age population and per D-dimer from cardiology, for now will keep on lower dose and get F/U LE dopplers.     Plan:   -Continue Famotidine + Hydroxychloroquine per trial protocol.   -Trend CRP, Ferritin, D-dimer QOD.   -C/S ID  -Tessalon pearls and Robitussin PRN for cough.   -Possibly convalescent plasma candidate?

## 2020-05-05 NOTE — PROGRESS NOTE ADULT - PROBLEM SELECTOR PLAN 1
COVID infection   Plaquenil  IV abx   segmental and subsegental arteries not visualized on CT. Recheck Dimers and if trending higher, would Change to LOvenox bid   trend markers   supplemental oxygen   Albuterol inhalers.

## 2020-05-06 LAB
ALBUMIN SERPL ELPH-MCNC: 2.4 G/DL — LOW (ref 3.3–5)
ALP SERPL-CCNC: 70 U/L — SIGNIFICANT CHANGE UP (ref 40–120)
ALT FLD-CCNC: 62 U/L — HIGH (ref 10–45)
ANION GAP SERPL CALC-SCNC: 14 MMOL/L — SIGNIFICANT CHANGE UP (ref 5–17)
ANION GAP SERPL CALC-SCNC: 21 MMOL/L — HIGH (ref 5–17)
AST SERPL-CCNC: 54 U/L — HIGH (ref 10–40)
BASOPHILS # BLD AUTO: 0.05 K/UL — SIGNIFICANT CHANGE UP (ref 0–0.2)
BASOPHILS NFR BLD AUTO: 0.9 % — SIGNIFICANT CHANGE UP (ref 0–2)
BILIRUB SERPL-MCNC: 0.6 MG/DL — SIGNIFICANT CHANGE UP (ref 0.2–1.2)
BLD GP AB SCN SERPL QL: NEGATIVE — SIGNIFICANT CHANGE UP
BUN SERPL-MCNC: 11 MG/DL — SIGNIFICANT CHANGE UP (ref 7–23)
BUN SERPL-MCNC: 14 MG/DL — SIGNIFICANT CHANGE UP (ref 7–23)
CALCIUM SERPL-MCNC: 8.3 MG/DL — LOW (ref 8.4–10.5)
CALCIUM SERPL-MCNC: 9.2 MG/DL — SIGNIFICANT CHANGE UP (ref 8.4–10.5)
CHLORIDE SERPL-SCNC: 91 MMOL/L — LOW (ref 96–108)
CHLORIDE SERPL-SCNC: 97 MMOL/L — SIGNIFICANT CHANGE UP (ref 96–108)
CO2 SERPL-SCNC: 22 MMOL/L — SIGNIFICANT CHANGE UP (ref 22–31)
CO2 SERPL-SCNC: 25 MMOL/L — SIGNIFICANT CHANGE UP (ref 22–31)
CREAT SERPL-MCNC: 1.32 MG/DL — HIGH (ref 0.5–1.3)
CREAT SERPL-MCNC: 1.49 MG/DL — HIGH (ref 0.5–1.3)
CRP SERPL-MCNC: 6.6 MG/DL — HIGH (ref 0–0.4)
D DIMER BLD IA.RAPID-MCNC: 582 NG/ML DDU — HIGH
EOSINOPHIL # BLD AUTO: 0.05 K/UL — SIGNIFICANT CHANGE UP (ref 0–0.5)
EOSINOPHIL NFR BLD AUTO: 0.9 % — SIGNIFICANT CHANGE UP (ref 0–6)
FERRITIN SERPL-MCNC: 433 NG/ML — HIGH (ref 15–150)
GLUCOSE BLDC GLUCOMTR-MCNC: 135 MG/DL — HIGH (ref 70–99)
GLUCOSE BLDC GLUCOMTR-MCNC: 145 MG/DL — HIGH (ref 70–99)
GLUCOSE BLDC GLUCOMTR-MCNC: 164 MG/DL — HIGH (ref 70–99)
GLUCOSE BLDC GLUCOMTR-MCNC: 190 MG/DL — HIGH (ref 70–99)
GLUCOSE BLDC GLUCOMTR-MCNC: 267 MG/DL — HIGH (ref 70–99)
GLUCOSE SERPL-MCNC: 213 MG/DL — HIGH (ref 70–99)
GLUCOSE SERPL-MCNC: 489 MG/DL — CRITICAL HIGH (ref 70–99)
HCT VFR BLD CALC: 42.6 % — SIGNIFICANT CHANGE UP (ref 34.5–45)
HGB BLD-MCNC: 13.8 G/DL — SIGNIFICANT CHANGE UP (ref 11.5–15.5)
LYMPHOCYTES # BLD AUTO: 1.41 K/UL — SIGNIFICANT CHANGE UP (ref 1–3.3)
LYMPHOCYTES # BLD AUTO: 25.2 % — SIGNIFICANT CHANGE UP (ref 13–44)
MAGNESIUM SERPL-MCNC: 1.7 MG/DL — SIGNIFICANT CHANGE UP (ref 1.6–2.6)
MCHC RBC-ENTMCNC: 24.6 PG — LOW (ref 27–34)
MCHC RBC-ENTMCNC: 32.4 GM/DL — SIGNIFICANT CHANGE UP (ref 32–36)
MCV RBC AUTO: 75.9 FL — LOW (ref 80–100)
MONOCYTES # BLD AUTO: 0.54 K/UL — SIGNIFICANT CHANGE UP (ref 0–0.9)
MONOCYTES NFR BLD AUTO: 9.6 % — SIGNIFICANT CHANGE UP (ref 2–14)
MRSA PCR RESULT.: SIGNIFICANT CHANGE UP
NEUTROPHILS # BLD AUTO: 3.3 K/UL — SIGNIFICANT CHANGE UP (ref 1.8–7.4)
NEUTROPHILS NFR BLD AUTO: 59.1 % — SIGNIFICANT CHANGE UP (ref 43–77)
PLATELET # BLD AUTO: 298 K/UL — SIGNIFICANT CHANGE UP (ref 150–400)
POTASSIUM SERPL-MCNC: 3.9 MMOL/L — SIGNIFICANT CHANGE UP (ref 3.5–5.3)
POTASSIUM SERPL-MCNC: 4.8 MMOL/L — SIGNIFICANT CHANGE UP (ref 3.5–5.3)
POTASSIUM SERPL-SCNC: 3.9 MMOL/L — SIGNIFICANT CHANGE UP (ref 3.5–5.3)
POTASSIUM SERPL-SCNC: 4.8 MMOL/L — SIGNIFICANT CHANGE UP (ref 3.5–5.3)
PROT SERPL-MCNC: 6.9 G/DL — SIGNIFICANT CHANGE UP (ref 6–8.3)
RBC # BLD: 5.61 M/UL — HIGH (ref 3.8–5.2)
RBC # FLD: 15.8 % — HIGH (ref 10.3–14.5)
RH IG SCN BLD-IMP: POSITIVE — SIGNIFICANT CHANGE UP
S AUREUS DNA NOSE QL NAA+PROBE: SIGNIFICANT CHANGE UP
SODIUM SERPL-SCNC: 134 MMOL/L — LOW (ref 135–145)
SODIUM SERPL-SCNC: 136 MMOL/L — SIGNIFICANT CHANGE UP (ref 135–145)
WBC # BLD: 5.58 K/UL — SIGNIFICANT CHANGE UP (ref 3.8–10.5)
WBC # FLD AUTO: 5.58 K/UL — SIGNIFICANT CHANGE UP (ref 3.8–10.5)

## 2020-05-06 PROCEDURE — 99233 SBSQ HOSP IP/OBS HIGH 50: CPT | Mod: CS,GC

## 2020-05-06 PROCEDURE — 99232 SBSQ HOSP IP/OBS MODERATE 35: CPT | Mod: CS,GC

## 2020-05-06 PROCEDURE — 93010 ELECTROCARDIOGRAM REPORT: CPT

## 2020-05-06 RX ORDER — HYDRALAZINE HCL 50 MG
10 TABLET ORAL THREE TIMES A DAY
Refills: 0 | Status: DISCONTINUED | OUTPATIENT
Start: 2020-05-06 | End: 2020-05-11

## 2020-05-06 RX ADMIN — Medication 100 MILLIGRAM(S): at 14:54

## 2020-05-06 RX ADMIN — ATENOLOL 25 MILLIGRAM(S): 25 TABLET ORAL at 06:26

## 2020-05-06 RX ADMIN — Medication 10 MILLIGRAM(S): at 21:28

## 2020-05-06 RX ADMIN — Medication 1: at 17:59

## 2020-05-06 RX ADMIN — LATANOPROST 1 DROP(S): 0.05 SOLUTION/ DROPS OPHTHALMIC; TOPICAL at 21:34

## 2020-05-06 RX ADMIN — Medication 1: at 22:26

## 2020-05-06 RX ADMIN — ENOXAPARIN SODIUM 40 MILLIGRAM(S): 100 INJECTION SUBCUTANEOUS at 11:46

## 2020-05-06 RX ADMIN — Medication 500000 UNIT(S): at 05:31

## 2020-05-06 RX ADMIN — PIPERACILLIN AND TAZOBACTAM 25 GRAM(S): 4; .5 INJECTION, POWDER, LYOPHILIZED, FOR SOLUTION INTRAVENOUS at 14:49

## 2020-05-06 RX ADMIN — PIPERACILLIN AND TAZOBACTAM 25 GRAM(S): 4; .5 INJECTION, POWDER, LYOPHILIZED, FOR SOLUTION INTRAVENOUS at 05:31

## 2020-05-06 RX ADMIN — Medication 100 MILLIGRAM(S): at 05:32

## 2020-05-06 RX ADMIN — PIPERACILLIN AND TAZOBACTAM 25 GRAM(S): 4; .5 INJECTION, POWDER, LYOPHILIZED, FOR SOLUTION INTRAVENOUS at 21:28

## 2020-05-06 RX ADMIN — Medication 500000 UNIT(S): at 00:31

## 2020-05-06 RX ADMIN — Medication 200 MILLIGRAM(S): at 06:32

## 2020-05-06 RX ADMIN — LOSARTAN POTASSIUM 100 MILLIGRAM(S): 100 TABLET, FILM COATED ORAL at 06:26

## 2020-05-06 RX ADMIN — Medication 500000 UNIT(S): at 11:46

## 2020-05-06 RX ADMIN — Medication 100 MILLIGRAM(S): at 21:28

## 2020-05-06 RX ADMIN — Medication 650 MILLIGRAM(S): at 21:29

## 2020-05-06 RX ADMIN — Medication 10 MILLIGRAM(S): at 14:50

## 2020-05-06 RX ADMIN — Medication 81 MILLIGRAM(S): at 11:45

## 2020-05-06 RX ADMIN — ATORVASTATIN CALCIUM 80 MILLIGRAM(S): 80 TABLET, FILM COATED ORAL at 21:28

## 2020-05-06 NOTE — PROVIDER CONTACT NOTE (CRITICAL VALUE NOTIFICATION) - ASSESSMENT
Pt blood glucose from [facility] 489. Pt has no s/s of hyperglycemia. Point of care fingerstick was 135

## 2020-05-06 NOTE — PROGRESS NOTE ADULT - SUBJECTIVE AND OBJECTIVE BOX
Follow Up: Covid-19, possible superadded infection    Interval History/ROS:Patient is a 87y old  Female who presents with a chief complaint of Diarrhea (06 May 2020 10:11)  - no acute events reported overnight  - Tmax 100.4 in past 24 hours  - pt assessed at bedside. Reports persistent cough    Allergies    No Known Allergies    Intolerances        ANTIMICROBIALS:  nystatin    Suspension 730224 four times a day  piperacillin/tazobactam IVPB.. 3.375 every 8 hours      OTHER MEDS:  acetaminophen   Tablet .. 650 milliGRAM(s) Oral every 6 hours PRN  aspirin  chewable 81 milliGRAM(s) Oral daily  ATENolol  Tablet 25 milliGRAM(s) Oral daily  atorvastatin 80 milliGRAM(s) Oral at bedtime  benzonatate 100 milliGRAM(s) Oral three times a day  dextrose 40% Gel 15 Gram(s) Oral once PRN  dextrose 5%. 1000 milliLiter(s) IV Continuous <Continuous>  dextrose 50% Injectable 12.5 Gram(s) IV Push once  dextrose 50% Injectable 25 Gram(s) IV Push once  dextrose 50% Injectable 25 Gram(s) IV Push once  enoxaparin Injectable 40 milliGRAM(s) SubCutaneous daily  glucagon  Injectable 1 milliGRAM(s) IntraMuscular once PRN  guaiFENesin   Syrup  (Sugar-Free) 200 milliGRAM(s) Oral every 6 hours PRN  hydrALAZINE 10 milliGRAM(s) Oral three times a day  insulin lispro (HumaLOG) corrective regimen sliding scale   SubCutaneous three times a day before meals  insulin lispro (HumaLOG) corrective regimen sliding scale   SubCutaneous at bedtime  latanoprost 0.005% Ophthalmic Solution 1 Drop(s) Both EYES at bedtime  ondansetron Injectable 4 milliGRAM(s) IV Push every 6 hours PRN  sodium chloride 0.9%. 1000 milliLiter(s) IV Continuous <Continuous>      Vital Signs Last 24 Hrs  T(C): 37.5 (06 May 2020 09:37), Max: 38 (05 May 2020 20:17)  T(F): 99.5 (06 May 2020 09:37), Max: 100.4 (05 May 2020 20:17)  HR: 64 (06 May 2020 09:37) (64 - 75)  BP: 170/70 (06 May 2020 09:37) (156/77 - 170/70)  BP(mean): --  RR: 20 (06 May 2020 09:37) (18 - 20)  SpO2: 95% (06 May 2020 09:37) (90% - 95%)    EXAM:  Constitutional: Not in acute distress. On NC 4l/min  Eyes: No icterus.  Oral cavity: Clear, no lesions  Neck: No neck vein distension noted  RS: Coarse breath sounds bilaterally. No wheeze/rhonchi.  CVS: S1, S2 heard. Regular rate and rhythm. No murmurs/rubs/gallops.  Abdomen: Soft. No guarding/rigidity/tenderness.  : Ext urinary catheter noted  Extremities: Warm. No pedal edema  Skin: No lesions noted  Vascular: No evidence of phlebitis  Neuro: Alert, oriented to time/place/person                        13.8   5.58  )-----------( 298      ( 06 May 2020 08:45 )             42.6       05-06    136  |  97  |  14  ----------------------------<  213<H>  4.8   |  25  |  1.49<H>    Ca    9.2      06 May 2020 10:43  Mg     1.7     05-06    TPro  6.9  /  Alb  2.4<L>  /  TBili  0.6  /  DBili  x   /  AST  54<H>  /  ALT  62<H>  /  AlkPhos  70  05-06          MICROBIOLOGY:Culture Results:   No growth to date. (05-04 @ 11:11)  Culture Results:   No growth to date. (05-04 @ 11:11)    MRSA/MSSA PCR (05.05.20 @ 14:33)    MRSA PCR Result.: Lor

## 2020-05-06 NOTE — PROGRESS NOTE ADULT - PROBLEM SELECTOR PLAN 1
COVID + at urgent care facility 04/26/20  -Febrile as outpatient, Tmax 104.5F.  Tmax overnight 100.4F  -Required 5L O2 NC on ambulation on admission, now on 4L O2 NC   -S/P Azithromycin x 5 days (for superimposed bacterial PNA)   -Started on Famotidine + Hydroxychloroquine 05/04/2020  -Appreciate ID c/s  -PT with D-dimer elevation, but no evidence of PE on CTA PE study.  Based on algorithm with Cr Cl of 87 and BMI <30, PT currently on Lovenox 40mg daily.  While appreciate higher risk of PE in age population and per D-dimer from cardiology, for now will keep on lower dose.  Will trend D-dimer and consider higher dosing if needed.      Plan:   -Continue Famotidine + Hydroxychloroquine per trial protocol.   -Trend CRP, Ferritin, D-dimer QOD.   -Tessalon pearls and Robitussin PRN for cough.   -Possibly convalescent plasma candidate if not dual enrolled in famotidine trial and if still not improving.   -Trend LFT as slight uptrend since starting hydroxychloroquine. COVID + at urgent care facility 04/26/20  -Febrile as outpatient, Tmax 104.5F.  Tmax overnight 100.4F  -Required 5L O2 NC on ambulation on admission, now on 4L O2 NC   -S/P Azithromycin x 5 days (for superimposed bacterial PNA)   -Started on Famotidine + Hydroxychloroquine 05/04/2020  -Appreciate ID c/s  -PT with D-dimer elevation, but no evidence of PE on CTA PE study.  Based on algorithm with Cr Cl of 87 and BMI <30, PT currently on Lovenox 40mg daily.  While appreciate higher risk of PE in age population and per D-dimer from cardiology, for now will keep on lower dose.  Will trend D-dimer and consider higher dosing if needed.      Plan:   -Continue Famotidine + Hydroxychloroquine per trial protocol.   -Trend CRP, Ferritin, D-dimer QOD.   -Tessalon pearls and Robitussin PRN for cough.   -Possibly convalescent plasma candidate if not dual enrolled in famotidine trial and if still not improving.   -Trend LFT as slight uptrend since starting hydroxychloroquine.  -Weaning trial

## 2020-05-06 NOTE — PROGRESS NOTE ADULT - SUBJECTIVE AND OBJECTIVE BOX
Subjective: Patient seen and examined. No new events except as noted.   feels ok   no cp or sob       REVIEW OF SYSTEMS:    CONSTITUTIONAL: No weakness, fevers or chills  EYES/ENT: No visual changes;  No vertigo or throat pain   NECK: No pain or stiffness  RESPIRATORY: No cough, wheezing, hemoptysis; No shortness of breath  CARDIOVASCULAR: No chest pain or palpitations  GASTROINTESTINAL: No abdominal or epigastric pain. No nausea, vomiting, or hematemesis; No diarrhea or constipation. No melena or hematochezia.  GENITOURINARY: No dysuria, frequency or hematuria  NEUROLOGICAL: No numbness or weakness  SKIN: No itching, burning, rashes, or lesions   All other review of systems is negative unless indicated above.    MEDICATIONS:  MEDICATIONS  (STANDING):  aspirin  chewable 81 milliGRAM(s) Oral daily  ATENolol  Tablet 25 milliGRAM(s) Oral daily  atorvastatin 80 milliGRAM(s) Oral at bedtime  benzonatate 100 milliGRAM(s) Oral three times a day  dextrose 5%. 1000 milliLiter(s) (50 mL/Hr) IV Continuous <Continuous>  dextrose 50% Injectable 12.5 Gram(s) IV Push once  dextrose 50% Injectable 25 Gram(s) IV Push once  dextrose 50% Injectable 25 Gram(s) IV Push once  enoxaparin Injectable 40 milliGRAM(s) SubCutaneous daily  famotidine vs placebo IVPB () 120 milliGRAM(s) IV Intermittent every 8 hours  hydroxychloroquine   Oral   hydroxychloroquine 400 milliGRAM(s) Oral every 24 hours  insulin lispro (HumaLOG) corrective regimen sliding scale   SubCutaneous three times a day before meals  insulin lispro (HumaLOG) corrective regimen sliding scale   SubCutaneous at bedtime  latanoprost 0.005% Ophthalmic Solution 1 Drop(s) Both EYES at bedtime  losartan 100 milliGRAM(s) Oral daily  nystatin    Suspension [id number] Unit(s) Oral four times a day  piperacillin/tazobactam IVPB.. 3.375 Gram(s) IV Intermittent every 8 hours  sodium chloride 0.9%. 1000 milliLiter(s) (75 mL/Hr) IV Continuous <Continuous>      PHYSICAL EXAM:  T(C): 37.5 (05-06-20 @ 09:37), Max: 38 (05-05-20 @ 20:17)  HR: 64 (05-06-20 @ 09:37) (64 - 76)  BP: 170/70 (05-06-20 @ 09:37) (153/77 - 170/70)  RR: 20 (05-06-20 @ 09:37) (18 - 20)  SpO2: 95% (05-06-20 @ 09:37) (90% - 98%)  Wt(kg): --  I&O's Summary    05 May 2020 07:01  -  06 May 2020 07:00  --------------------------------------------------------  IN: 1380 mL / OUT: 1950 mL / NET: -570 mL    06 May 2020 07:01  -  06 May 2020 10:11  --------------------------------------------------------  IN: 0 mL / OUT: 300 mL / NET: -300 mL          Appearance: Normal	  HEENT:   Normal oral mucosa, PERRL, EOMI	  Lymphatic: No lymphadenopathy , no edema  Cardiovascular: Normal S1 S2, No JVD, No murmurs , Peripheral pulses palpable 2+ bilaterally  Respiratory: Lungs clear to auscultation, normal effort 	  Gastrointestinal:  Soft, Non-tender, + BS	  Skin: No rashes, No ecchymoses, No cyanosis, warm to touch  Musculoskeletal: Normal range of motion, normal strength  Psychiatry:  Mood & affect appropriate  Ext: No edema      LABS:    CARDIAC MARKERS:  CARDIAC MARKERS ( 04 May 2020 06:56 )  x     / x     / 71 U/L / x     / x                                    13.8   5.58  )-----------( 298      ( 06 May 2020 08:45 )             42.6     05-06    134<L>  |  91<L>  |  11  ----------------------------<  489<HH>  3.9   |  22  |  1.32<H>    Ca    8.3<L>      06 May 2020 08:45  Mg     1.7     05-06    TPro  6.9  /  Alb  2.4<L>  /  TBili  0.6  /  DBili  x   /  AST  54<H>  /  ALT  62<H>  /  AlkPhos  70  05-06    proBNP:   Lipid Profile:   HgA1c:   TSH:     Negative          TELEMETRY: 	    ECG:  	  RADIOLOGY:   DIAGNOSTIC TESTING:  [ ] Echocardiogram:  [ ]  Catheterization:  [ ] Stress Test:    OTHER:

## 2020-05-06 NOTE — PROGRESS NOTE ADULT - SUBJECTIVE AND OBJECTIVE BOX
Patient is a 87y old  Female who presents with a chief complaint of Diarrhea (03 May 2020 22:56)    Authored by Dr. Nestor Rueda pager number [phone]    SUBJECTIVE / OVERNIGHT EVENTS:    MEDICATIONS  (STANDING):  aspirin  chewable 81 milliGRAM(s) Oral daily  ATENolol  Tablet 25 milliGRAM(s) Oral daily  atorvastatin 80 milliGRAM(s) Oral at bedtime  benzonatate 100 milliGRAM(s) Oral three times a day  dextrose 5%. 1000 milliLiter(s) (50 mL/Hr) IV Continuous <Continuous>  dextrose 50% Injectable 12.5 Gram(s) IV Push once  dextrose 50% Injectable 25 Gram(s) IV Push once  dextrose 50% Injectable 25 Gram(s) IV Push once  enoxaparin Injectable 40 milliGRAM(s) SubCutaneous daily  famotidine vs placebo IVPB () 120 milliGRAM(s) IV Intermittent every 8 hours  hydroxychloroquine   Oral   hydroxychloroquine 400 milliGRAM(s) Oral every 24 hours  insulin lispro (HumaLOG) corrective regimen sliding scale   SubCutaneous three times a day before meals  insulin lispro (HumaLOG) corrective regimen sliding scale   SubCutaneous at bedtime  latanoprost 0.005% Ophthalmic Solution 1 Drop(s) Both EYES at bedtime  losartan 100 milliGRAM(s) Oral daily  nystatin    Suspension 374409 Unit(s) Oral four times a day  piperacillin/tazobactam IVPB.. 3.375 Gram(s) IV Intermittent every 8 hours  sodium chloride 0.9%. 1000 milliLiter(s) (75 mL/Hr) IV Continuous <Continuous>    MEDICATIONS  (PRN):  acetaminophen   Tablet .. 650 milliGRAM(s) Oral every 6 hours PRN Temp greater or equal to 38C (100.4F), Mild Pain (1 - 3)  dextrose 40% Gel 15 Gram(s) Oral once PRN Blood Glucose LESS THAN 70 milliGRAM(s)/deciliter  glucagon  Injectable 1 milliGRAM(s) IntraMuscular once PRN Glucose LESS THAN 70 milligrams/deciliter  guaiFENesin   Syrup  (Sugar-Free) 200 milliGRAM(s) Oral every 6 hours PRN Cough  ondansetron Injectable 4 milliGRAM(s) IV Push every 6 hours PRN Nausea and/or Vomiting    OBJECTIVE  Vital Signs Last 24 Hrs  T(C): 36.9 (06 May 2020 06:19), Max: 38 (05 May 2020 20:17)  T(F): 98.4 (06 May 2020 06:19), Max: 100.4 (05 May 2020 20:17)  HR: 72 (06 May 2020 06:19) (64 - 76)  BP: 159/78 (06 May 2020 06:19) (153/77 - 159/78)  BP(mean): --  RR: 18 (06 May 2020 06:19) (18 - 19)  SpO2: 90% (06 May 2020 06:19) (90% - 98%)    05-05-20 @ 07:01  -  05-06-20 @ 07:00  --------------------------------------------------------  IN: 1380 mL / OUT: 1950 mL / NET: -570 mL    PHYSICAL EXAM:  GENERAL: NAD, well-developed  EYES: EOMI, PERRLA, conjunctiva and sclera clear  CHEST/LUNG: Course bibasilar crackles R>L.  On 4L O2 via NC.  Mild increased work of breathing, dry non-productive cough.  HEART: Regular rate and rhythm; No murmurs, rubs, or gallops  ABDOMEN: Soft, Nontender to palpation, Nondistended; Bowel sounds present.    EXTREMITIES:  2+ Peripheral Pulses, No clubbing, cyanosis, or edema  PSYCH: AAOx3  SKIN: No rashes or lesions    LABS:                        15.0   5.58  )-----------( 203      ( 05 May 2020 10:41 )             48.3   05-05    135  |  98  |  7   ----------------------------<  217<H>  4.2   |  24  |  0.81    Ca    9.1      05 May 2020 10:41    TPro  7.3  /  Alb  2.7<L>  /  TBili  0.7  /  DBili  x   /  AST  83<H>  /  ALT  95<H>  /  AlkPhos  80  05-05     COVID-19 DATA  COVID + 04/26/20 as outpatient  D-Dimer Assay, Quantitative: 521 ng/mL DDU <H> (05-03)  Ferritin, Serum: 499 ng/mL <H> (05-03)  C-Reactive Protein, Serum: 8.45 mg/dL <H> (05-03)  Auto Lymphocyte #: 0.34 K/uL <L> (05-03)    Pro-Calcitonin (Superimposed bacterial infection)   Procalcitonin, Serum: 24.20 ng/mL <H> (05-05)  Procalcitonin, Serum: 41.79 ng/mL <H> (05-04)  Procalcitonin, Serum: 48.08 ng/mL <H> (05-03)    Imaging:  Xray Chest 1 View- PORTABLE-Urgent (05.03.20 @ 21:29)   Impression: 1.  Bilateral patchy opacities consistent with known COVID19 pneumonia.  DISCRETE X-RAY DATA:  Percent of LEFT lung opacification: 34-66%  Percent of RIGHT lung opacification: 1-33%  Change in lung opacification from most recent x-ray (<=3 days): No Prior  Change from prior dated 3 or more days (same admission): No Prior    CT Angio Chest w/ IV Cont (05.04.20 @ 10:00)   Chest:  1.  No pulmonary embolus, however, the segmental and subsegmental vessels are not well evaluated secondary to respiratory motion.  2.  Bilateral opacities most pronounced the left upper lobe can occur in the setting of atypical viral infection/lung injury.    Abdomen and pelvis:  1.  No bowel obstruction.  2.  Bilateral renal lesions are most compatible with cysts.  3.  The appendix is normal.    Meds Received  Azithromycin x 5 days as outpatient  Famotidine + Hydroxychloroquine (05/04/2020) Patient is a 87y old  Female who presents with a chief complaint of Diarrhea (03 May 2020 22:56)    Authored by Dr. Nestor Rueda pager number [phone]    SUBJECTIVE / OVERNIGHT EVENTS: No acute overnight events, continues to endorse dry cough.  Reports improved SOB.      MEDICATIONS  (STANDING):  aspirin  chewable 81 milliGRAM(s) Oral daily  ATENolol  Tablet 25 milliGRAM(s) Oral daily  atorvastatin 80 milliGRAM(s) Oral at bedtime  benzonatate 100 milliGRAM(s) Oral three times a day  dextrose 5%. 1000 milliLiter(s) (50 mL/Hr) IV Continuous <Continuous>  dextrose 50% Injectable 12.5 Gram(s) IV Push once  dextrose 50% Injectable 25 Gram(s) IV Push once  dextrose 50% Injectable 25 Gram(s) IV Push once  enoxaparin Injectable 40 milliGRAM(s) SubCutaneous daily  famotidine vs placebo IVPB () 120 milliGRAM(s) IV Intermittent every 8 hours  hydroxychloroquine   Oral   hydroxychloroquine 400 milliGRAM(s) Oral every 24 hours  insulin lispro (HumaLOG) corrective regimen sliding scale   SubCutaneous three times a day before meals  insulin lispro (HumaLOG) corrective regimen sliding scale   SubCutaneous at bedtime  latanoprost 0.005% Ophthalmic Solution 1 Drop(s) Both EYES at bedtime  losartan 100 milliGRAM(s) Oral daily  nystatin    Suspension 915384 Unit(s) Oral four times a day  piperacillin/tazobactam IVPB.. 3.375 Gram(s) IV Intermittent every 8 hours  sodium chloride 0.9%. 1000 milliLiter(s) (75 mL/Hr) IV Continuous <Continuous>    MEDICATIONS  (PRN):  acetaminophen   Tablet .. 650 milliGRAM(s) Oral every 6 hours PRN Temp greater or equal to 38C (100.4F), Mild Pain (1 - 3)  dextrose 40% Gel 15 Gram(s) Oral once PRN Blood Glucose LESS THAN 70 milliGRAM(s)/deciliter  glucagon  Injectable 1 milliGRAM(s) IntraMuscular once PRN Glucose LESS THAN 70 milligrams/deciliter  guaiFENesin   Syrup  (Sugar-Free) 200 milliGRAM(s) Oral every 6 hours PRN Cough  ondansetron Injectable 4 milliGRAM(s) IV Push every 6 hours PRN Nausea and/or Vomiting    OBJECTIVE  Vital Signs Last 24 Hrs  T(C): 36.9 (06 May 2020 06:19), Max: 38 (05 May 2020 20:17)  T(F): 98.4 (06 May 2020 06:19), Max: 100.4 (05 May 2020 20:17)  HR: 72 (06 May 2020 06:19) (64 - 76)  BP: 159/78 (06 May 2020 06:19) (153/77 - 159/78)  BP(mean): --  RR: 18 (06 May 2020 06:19) (18 - 19)  SpO2: 90% (06 May 2020 06:19) (90% - 98%)    05-05-20 @ 07:01  -  05-06-20 @ 07:00  --------------------------------------------------------  IN: 1380 mL / OUT: 1950 mL / NET: -570 mL    PHYSICAL EXAM:  GENERAL: NAD, well-developed  EYES: EOMI, PERRLA, conjunctiva and sclera clear  CHEST/LUNG: Improving but persistent bibasilar crackles.  Frequent dry cough, provoked by speaking.  Able to inhale up to 700 on incentive spirometry.  Breathing comfortably on 4L O2 via NC.  Persistent mild increased work of breathing.  HEART: Regular rate and rhythm; No murmurs, rubs, or gallops  ABDOMEN: Soft, Nontender to palpation, Nondistended; Bowel sounds present.    EXTREMITIES:  2+ Peripheral Pulses, No clubbing, cyanosis, or edema  PSYCH: AAOx3  SKIN: No rashes or lesions    LABS:                        13.8   5.58  )-----------( 298      ( 06 May 2020 08:45 )             42.6   05-05    135  |  98  |  7   ----------------------------<  217<H>  4.2   |  24  |  0.81    Ca    9.1      05 May 2020 10:41    TPro  7.3  /  Alb  2.7<L>  /  TBili  0.7  /  DBili  x   /  AST  83<H>  /  ALT  95<H>  /  AlkPhos  80  05-05    COVID-19 DATA  COVID + 04/26/20 as outpatient    D-Dimer Assay, Quantitative: 582 ng/mL DDU <H> (05-06)  D-Dimer Assay, Quantitative: 521 ng/mL DDU <H> (05-03)    Ferritin, Serum: 499 ng/mL <H> (05-03)    C-Reactive Protein, Serum: 8.45 mg/dL <H> (05-03)    Auto Lymphocyte #: 0.34 K/uL <L> (05-03)    Pro-Calcitonin (Superimposed bacterial infection)   Procalcitonin, Serum: 24.20 ng/mL <H> (05-05)  Procalcitonin, Serum: 41.79 ng/mL <H> (05-04)  Procalcitonin, Serum: 48.08 ng/mL <H> (05-03)    Imaging:  Xray Chest 1 View- PORTABLE-Urgent (05.03.20 @ 21:29)   Impression: 1.  Bilateral patchy opacities consistent with known COVID19 pneumonia.  DISCRETE X-RAY DATA:  Percent of LEFT lung opacification: 34-66%  Percent of RIGHT lung opacification: 1-33%  Change in lung opacification from most recent x-ray (<=3 days): No Prior  Change from prior dated 3 or more days (same admission): No Prior    CT Angio Chest w/ IV Cont (05.04.20 @ 10:00)   Chest:  1.  No pulmonary embolus, however, the segmental and subsegmental vessels are not well evaluated secondary to respiratory motion.  2.  Bilateral opacities most pronounced the left upper lobe can occur in the setting of atypical viral infection/lung injury.    Abdomen and pelvis:  1.  No bowel obstruction.  2.  Bilateral renal lesions are most compatible with cysts.  3.  The appendix is normal.    Meds Received  Azithromycin x 5 days as outpatient  Famotidine + Hydroxychloroquine (05/04/2020)

## 2020-05-06 NOTE — PROGRESS NOTE ADULT - ASSESSMENT
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     S/p COVID + at urgent care 04/26/20 s/p azithromycin x 5 days.

## 2020-05-06 NOTE — PROGRESS NOTE ADULT - ASSESSMENT
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  P/w worsening diarrhea, N&V, cough with clear sputum, ?fevers X1 week PTA and abdominal pain X1 day PTA.  In ED, SpO2 92% reported on RA. Labs revealed lymphopenia with mild transaminitis. Covid-19 positive 4/27.  ID consulted for possible superadded bacterial infection  =========  Covid-19 infection in diabetic (HbA1c 8.4)  - bandemia with elevated Procalcitonin seen on admission - however, low clinical suspicion for bacterial infection  - pt is hemodynamically stable, no other focal s/o infection. CT C/A/P showed b/l GGO with no intra-abdominal focus. UA negative  - blood cx show NGTD. MRSA PCR negative  - continue Zosyn for now - anticipate 5 day course    RECOMMENDATIONS:  1. Possible super-added bacterial pneumonia  - continue Zosyn 3.375g IV q8h - anticipate a total of 5 days  - f/u blood cx  ------------  2. Covid-19  - can continue HCQ per primary team to complete 5 days  - also enrolled in Famotidine trial - continue per protocol of IM team  - Continue supplemental O2 and supportive care per primary team  - Continue Contact and Airborne Isolation precautions    PETEY Atkinson, MD  Fellow, Infectious Diseases  Pager: [phone]  After 5pm and on Weekends: Call [phone]

## 2020-05-06 NOTE — PROGRESS NOTE ADULT - PROBLEM SELECTOR PLAN 3
-Unclear source COVID vs. other superimposed bacterial PNA with left lung consolidation.   -Had non-bloody diarrhea with N/V-->no longer c/o this.   -CT C/A/P w/o clear evidence of abdominal/pelivic infection-->presumed bacterial PNA.

## 2020-05-06 NOTE — PROGRESS NOTE ADULT - PROBLEM SELECTOR PLAN 2
Acute hypoxic respiratory failure 2* COVID vs. superimposed bacterial PNA.  -Procal elevated, repeat still high but was downtrending.  Was started on Zosyn + Vancomycin 05/04/20 empirically.  ID c/s and less likely suspicion for superimposed bacterial PNA.   -MRSA (-)-->Vanco d/c 05/05/20  -Zosyn 05/04/20 x 5 days.  De-escalate Zosyn if more specific bacterial source found.

## 2020-05-06 NOTE — PROGRESS NOTE ADULT - PROBLEM SELECTOR PLAN 7
At home on 100 losartan.  BP here in 160s 170s.    PRN hydralazine per orders.   Appreciate Cards C/S- Hydralazine PRN added

## 2020-05-06 NOTE — PROGRESS NOTE ADULT - PROBLEM SELECTOR PLAN 4
-Diarrhea likely 2* COVID, less likelihood of C-diff.    -Non-bloody diarrhea x 1 week.   -CT Abd/pelvis w./o evidence of acute abdomen.  -Liquid diet, advance as tolerated. -Diarrhea likely 2* COVID, less likelihood of C-diff.    -Non-bloody diarrhea x 1 week.   -CT Abd/pelvis w./o evidence of acute abdomen.  -Liquid diet, advance as tolerated-->trial to soft diet.

## 2020-05-07 DIAGNOSIS — N17.9 ACUTE KIDNEY FAILURE, UNSPECIFIED: ICD-10-CM

## 2020-05-07 LAB
ALBUMIN SERPL ELPH-MCNC: 2.5 G/DL — LOW (ref 3.3–5)
ALP SERPL-CCNC: 90 U/L — SIGNIFICANT CHANGE UP (ref 40–120)
ALT FLD-CCNC: 60 U/L — HIGH (ref 10–45)
ANION GAP SERPL CALC-SCNC: 13 MMOL/L — SIGNIFICANT CHANGE UP (ref 5–17)
ANION GAP SERPL CALC-SCNC: 18 MMOL/L — HIGH (ref 5–17)
APPEARANCE UR: CLEAR — SIGNIFICANT CHANGE UP
APPEARANCE UR: CLEAR — SIGNIFICANT CHANGE UP
AST SERPL-CCNC: 59 U/L — HIGH (ref 10–40)
BACTERIA # UR AUTO: NEGATIVE — SIGNIFICANT CHANGE UP
BACTERIA # UR AUTO: NEGATIVE — SIGNIFICANT CHANGE UP
BASOPHILS # BLD AUTO: 0 K/UL — SIGNIFICANT CHANGE UP (ref 0–0.2)
BASOPHILS NFR BLD AUTO: 0 % — SIGNIFICANT CHANGE UP (ref 0–2)
BILIRUB SERPL-MCNC: 0.7 MG/DL — SIGNIFICANT CHANGE UP (ref 0.2–1.2)
BILIRUB UR-MCNC: NEGATIVE — SIGNIFICANT CHANGE UP
BILIRUB UR-MCNC: NEGATIVE — SIGNIFICANT CHANGE UP
BLD GP AB SCN SERPL QL: NEGATIVE — SIGNIFICANT CHANGE UP
BUN SERPL-MCNC: 17 MG/DL — SIGNIFICANT CHANGE UP (ref 7–23)
BUN SERPL-MCNC: 19 MG/DL — SIGNIFICANT CHANGE UP (ref 7–23)
CALCIUM SERPL-MCNC: 8.5 MG/DL — SIGNIFICANT CHANGE UP (ref 8.4–10.5)
CALCIUM SERPL-MCNC: 9 MG/DL — SIGNIFICANT CHANGE UP (ref 8.4–10.5)
CHLORIDE SERPL-SCNC: 97 MMOL/L — SIGNIFICANT CHANGE UP (ref 96–108)
CHLORIDE SERPL-SCNC: 99 MMOL/L — SIGNIFICANT CHANGE UP (ref 96–108)
CO2 SERPL-SCNC: 20 MMOL/L — LOW (ref 22–31)
CO2 SERPL-SCNC: 25 MMOL/L — SIGNIFICANT CHANGE UP (ref 22–31)
COLOR SPEC: SIGNIFICANT CHANGE UP
COLOR SPEC: YELLOW — SIGNIFICANT CHANGE UP
CREAT SERPL-MCNC: 1.51 MG/DL — HIGH (ref 0.5–1.3)
CREAT SERPL-MCNC: 1.57 MG/DL — HIGH (ref 0.5–1.3)
CRP SERPL-MCNC: 6.7 UG/ML — SIGNIFICANT CHANGE UP (ref 0–40)
DIFF PNL FLD: NEGATIVE — SIGNIFICANT CHANGE UP
DIFF PNL FLD: NEGATIVE — SIGNIFICANT CHANGE UP
EOSINOPHIL # BLD AUTO: 0.12 K/UL — SIGNIFICANT CHANGE UP (ref 0–0.5)
EOSINOPHIL NFR BLD AUTO: 1.8 % — SIGNIFICANT CHANGE UP (ref 0–6)
EPI CELLS # UR: 2 /HPF — SIGNIFICANT CHANGE UP
EPI CELLS # UR: 3 /HPF — SIGNIFICANT CHANGE UP (ref 0–5)
FERRITIN SERPL-MCNC: 510 NG/ML — HIGH (ref 15–150)
GIANT PLATELETS BLD QL SMEAR: PRESENT — SIGNIFICANT CHANGE UP
GLUCOSE BLDC GLUCOMTR-MCNC: 149 MG/DL — HIGH (ref 70–99)
GLUCOSE BLDC GLUCOMTR-MCNC: 195 MG/DL — HIGH (ref 70–99)
GLUCOSE BLDC GLUCOMTR-MCNC: 210 MG/DL — HIGH (ref 70–99)
GLUCOSE BLDC GLUCOMTR-MCNC: 214 MG/DL — HIGH (ref 70–99)
GLUCOSE SERPL-MCNC: 172 MG/DL — HIGH (ref 70–99)
GLUCOSE SERPL-MCNC: 194 MG/DL — HIGH (ref 70–99)
GLUCOSE UR QL: ABNORMAL
GLUCOSE UR QL: ABNORMAL
HCT VFR BLD CALC: 50 % — HIGH (ref 34.5–45)
HGB BLD-MCNC: 15.5 G/DL — SIGNIFICANT CHANGE UP (ref 11.5–15.5)
HYALINE CASTS # UR AUTO: 0 /LPF — SIGNIFICANT CHANGE UP (ref 0–7)
HYALINE CASTS # UR AUTO: 1 /LPF — SIGNIFICANT CHANGE UP (ref 0–2)
KETONES UR-MCNC: NEGATIVE — SIGNIFICANT CHANGE UP
KETONES UR-MCNC: NEGATIVE — SIGNIFICANT CHANGE UP
LEUKOCYTE ESTERASE UR-ACNC: NEGATIVE — SIGNIFICANT CHANGE UP
LEUKOCYTE ESTERASE UR-ACNC: NEGATIVE — SIGNIFICANT CHANGE UP
LYMPHOCYTES # BLD AUTO: 1.67 K/UL — SIGNIFICANT CHANGE UP (ref 1–3.3)
LYMPHOCYTES # BLD AUTO: 25 % — SIGNIFICANT CHANGE UP (ref 13–44)
MAGNESIUM SERPL-MCNC: 2 MG/DL — SIGNIFICANT CHANGE UP (ref 1.6–2.6)
MAGNESIUM SERPL-MCNC: 2.1 MG/DL — SIGNIFICANT CHANGE UP (ref 1.6–2.6)
MANUAL SMEAR VERIFICATION: SIGNIFICANT CHANGE UP
MCHC RBC-ENTMCNC: 23.8 PG — LOW (ref 27–34)
MCHC RBC-ENTMCNC: 31 GM/DL — LOW (ref 32–36)
MCV RBC AUTO: 76.9 FL — LOW (ref 80–100)
MONOCYTES # BLD AUTO: 1.61 K/UL — HIGH (ref 0–0.9)
MONOCYTES NFR BLD AUTO: 24.1 % — HIGH (ref 2–14)
MYELOCYTES NFR BLD: 0.9 % — HIGH (ref 0–0)
NEUTROPHILS # BLD AUTO: 3.21 K/UL — SIGNIFICANT CHANGE UP (ref 1.8–7.4)
NEUTROPHILS NFR BLD AUTO: 48.2 % — SIGNIFICANT CHANGE UP (ref 43–77)
NITRITE UR-MCNC: NEGATIVE — SIGNIFICANT CHANGE UP
NITRITE UR-MCNC: NEGATIVE — SIGNIFICANT CHANGE UP
PH UR: 6 — SIGNIFICANT CHANGE UP (ref 5–8)
PH UR: 6.5 — SIGNIFICANT CHANGE UP (ref 5–8)
PHOSPHATE SERPL-MCNC: 2.4 MG/DL — LOW (ref 2.5–4.5)
PHOSPHATE SERPL-MCNC: 3.4 MG/DL — SIGNIFICANT CHANGE UP (ref 2.5–4.5)
PLAT MORPH BLD: ABNORMAL
PLATELET # BLD AUTO: 424 K/UL — HIGH (ref 150–400)
POTASSIUM SERPL-MCNC: 4.1 MMOL/L — SIGNIFICANT CHANGE UP (ref 3.5–5.3)
POTASSIUM SERPL-MCNC: 5.8 MMOL/L — HIGH (ref 3.5–5.3)
POTASSIUM SERPL-SCNC: 4.1 MMOL/L — SIGNIFICANT CHANGE UP (ref 3.5–5.3)
POTASSIUM SERPL-SCNC: 5.8 MMOL/L — HIGH (ref 3.5–5.3)
PROT SERPL-MCNC: 7.7 G/DL — SIGNIFICANT CHANGE UP (ref 6–8.3)
PROT UR-MCNC: ABNORMAL
PROT UR-MCNC: ABNORMAL
RBC # BLD: 6.5 M/UL — HIGH (ref 3.8–5.2)
RBC # FLD: 17.2 % — HIGH (ref 10.3–14.5)
RBC BLD AUTO: SIGNIFICANT CHANGE UP
RBC CASTS # UR COMP ASSIST: 3 /HPF — SIGNIFICANT CHANGE UP (ref 0–4)
RBC CASTS # UR COMP ASSIST: 4 /HPF — SIGNIFICANT CHANGE UP (ref 0–4)
RH IG SCN BLD-IMP: POSITIVE — SIGNIFICANT CHANGE UP
SODIUM SERPL-SCNC: 135 MMOL/L — SIGNIFICANT CHANGE UP (ref 135–145)
SODIUM SERPL-SCNC: 137 MMOL/L — SIGNIFICANT CHANGE UP (ref 135–145)
SP GR SPEC: 1.01 — SIGNIFICANT CHANGE UP (ref 1.01–1.02)
SP GR SPEC: 1.02 — SIGNIFICANT CHANGE UP (ref 1.01–1.02)
UROBILINOGEN FLD QL: NEGATIVE — SIGNIFICANT CHANGE UP
UROBILINOGEN FLD QL: SIGNIFICANT CHANGE UP
WBC # BLD: 6.67 K/UL — SIGNIFICANT CHANGE UP (ref 3.8–10.5)
WBC # FLD AUTO: 6.67 K/UL — SIGNIFICANT CHANGE UP (ref 3.8–10.5)
WBC UR QL: 4 /HPF — SIGNIFICANT CHANGE UP (ref 0–5)
WBC UR QL: 6 /HPF — HIGH (ref 0–5)

## 2020-05-07 PROCEDURE — 99233 SBSQ HOSP IP/OBS HIGH 50: CPT | Mod: CS,GC

## 2020-05-07 PROCEDURE — 93010 ELECTROCARDIOGRAM REPORT: CPT

## 2020-05-07 PROCEDURE — 93970 EXTREMITY STUDY: CPT | Mod: 26,CS

## 2020-05-07 PROCEDURE — 99232 SBSQ HOSP IP/OBS MODERATE 35: CPT | Mod: CS,GC

## 2020-05-07 RX ORDER — INSULIN GLARGINE 100 [IU]/ML
10 INJECTION, SOLUTION SUBCUTANEOUS AT BEDTIME
Refills: 0 | Status: DISCONTINUED | OUTPATIENT
Start: 2020-05-07 | End: 2020-05-13

## 2020-05-07 RX ORDER — HYDRALAZINE HCL 50 MG
5 TABLET ORAL ONCE
Refills: 0 | Status: DISCONTINUED | OUTPATIENT
Start: 2020-05-07 | End: 2020-05-07

## 2020-05-07 RX ADMIN — PIPERACILLIN AND TAZOBACTAM 25 GRAM(S): 4; .5 INJECTION, POWDER, LYOPHILIZED, FOR SOLUTION INTRAVENOUS at 15:11

## 2020-05-07 RX ADMIN — PIPERACILLIN AND TAZOBACTAM 25 GRAM(S): 4; .5 INJECTION, POWDER, LYOPHILIZED, FOR SOLUTION INTRAVENOUS at 21:19

## 2020-05-07 RX ADMIN — Medication 2: at 12:38

## 2020-05-07 RX ADMIN — Medication 2: at 07:58

## 2020-05-07 RX ADMIN — Medication 10 MILLIGRAM(S): at 21:19

## 2020-05-07 RX ADMIN — Medication 500000 UNIT(S): at 11:58

## 2020-05-07 RX ADMIN — PIPERACILLIN AND TAZOBACTAM 25 GRAM(S): 4; .5 INJECTION, POWDER, LYOPHILIZED, FOR SOLUTION INTRAVENOUS at 04:28

## 2020-05-07 RX ADMIN — Medication 500000 UNIT(S): at 05:07

## 2020-05-07 RX ADMIN — Medication 100 MILLIGRAM(S): at 21:19

## 2020-05-07 RX ADMIN — Medication 10 MILLIGRAM(S): at 04:28

## 2020-05-07 RX ADMIN — Medication 100 MILLIGRAM(S): at 11:58

## 2020-05-07 RX ADMIN — ATENOLOL 25 MILLIGRAM(S): 25 TABLET ORAL at 04:28

## 2020-05-07 RX ADMIN — LATANOPROST 1 DROP(S): 0.05 SOLUTION/ DROPS OPHTHALMIC; TOPICAL at 21:21

## 2020-05-07 RX ADMIN — INSULIN GLARGINE 10 UNIT(S): 100 INJECTION, SOLUTION SUBCUTANEOUS at 21:25

## 2020-05-07 RX ADMIN — Medication 100 MILLIGRAM(S): at 04:28

## 2020-05-07 RX ADMIN — Medication 81 MILLIGRAM(S): at 11:57

## 2020-05-07 RX ADMIN — Medication 500000 UNIT(S): at 17:25

## 2020-05-07 RX ADMIN — Medication 1: at 17:25

## 2020-05-07 RX ADMIN — ATORVASTATIN CALCIUM 80 MILLIGRAM(S): 80 TABLET, FILM COATED ORAL at 21:19

## 2020-05-07 RX ADMIN — ENOXAPARIN SODIUM 40 MILLIGRAM(S): 100 INJECTION SUBCUTANEOUS at 11:57

## 2020-05-07 NOTE — PROGRESS NOTE ADULT - SUBJECTIVE AND OBJECTIVE BOX
Follow Up: Covid-19, possible superadded bacterial pneumonia      Interval History/ROS:Patient is a 87y old  Female who presents with a chief complaint of Diarrhea (07 May 2020 10:40)  - Tmax 100.5 in past 24 hours  - reports worsened cough today    Allergies    No Known Allergies    Intolerances        ANTIMICROBIALS:  nystatin    Suspension 145878 four times a day  piperacillin/tazobactam IVPB.. 3.375 every 8 hours      OTHER MEDS:  acetaminophen   Tablet .. 650 milliGRAM(s) Oral every 6 hours PRN  aspirin  chewable 81 milliGRAM(s) Oral daily  ATENolol  Tablet 25 milliGRAM(s) Oral daily  atorvastatin 80 milliGRAM(s) Oral at bedtime  benzonatate 100 milliGRAM(s) Oral three times a day  dextrose 40% Gel 15 Gram(s) Oral once PRN  dextrose 5%. 1000 milliLiter(s) IV Continuous <Continuous>  dextrose 50% Injectable 12.5 Gram(s) IV Push once  dextrose 50% Injectable 25 Gram(s) IV Push once  dextrose 50% Injectable 25 Gram(s) IV Push once  enoxaparin Injectable 40 milliGRAM(s) SubCutaneous daily  glucagon  Injectable 1 milliGRAM(s) IntraMuscular once PRN  guaiFENesin   Syrup  (Sugar-Free) 200 milliGRAM(s) Oral every 6 hours PRN  hydrALAZINE 10 milliGRAM(s) Oral three times a day  insulin glargine Injectable (LANTUS) 10 Unit(s) SubCutaneous at bedtime  insulin lispro (HumaLOG) corrective regimen sliding scale   SubCutaneous three times a day before meals  insulin lispro (HumaLOG) corrective regimen sliding scale   SubCutaneous at bedtime  latanoprost 0.005% Ophthalmic Solution 1 Drop(s) Both EYES at bedtime  ondansetron Injectable 4 milliGRAM(s) IV Push every 6 hours PRN  sodium chloride 0.9%. 1000 milliLiter(s) IV Continuous <Continuous>      Vital Signs Last 24 Hrs  T(C): 36.9 (07 May 2020 13:15), Max: 38.1 (06 May 2020 21:26)  T(F): 98.5 (07 May 2020 13:15), Max: 100.5 (06 May 2020 21:26)  HR: 71 (07 May 2020 13:15) (71 - 87)  BP: 152/78 (07 May 2020 13:15) (139/74 - 152/78)  BP(mean): --  RR: 22 (07 May 2020 13:15) (20 - 24)  SpO2: 94% (07 May 2020 13:15) (92% - 96%)    EXAM:  Constitutional: Not in acute distress. On NC 4l/min  Eyes: No icterus. Pupils b/l reactive to light.  Oral cavity: Clear, no lesions  Neck: No neck vein distension noted  RS: Chest clear to auscultation bilaterally. No wheeze/rhonchi/crepitations.  CVS: S1, S2 heard. Regular rate and rhythm. No murmurs/rubs/gallops.  Abdomen: Soft. No guarding/rigidity/tenderness.  : No acute abnormalities  Extremities: Warm. No pedal edema  Skin: No lesions noted  Vascular: No evidence of phlebitis  Neuro: Alert, oriented to time/place/person                        15.5   6.67  )-----------( 424      ( 07 May 2020 07:11 )             50.0       05-07    135  |  97  |  19  ----------------------------<  194<H>  5.8<H>   |  20<L>  |  1.57<H>    Ca    9.0      07 May 2020 07:11  Phos  3.4     05-07  Mg     2.1     05-07    TPro  7.7  /  Alb  2.5<L>  /  TBili  0.7  /  DBili  x   /  AST  59<H>  /  ALT  60<H>  /  AlkPhos  90  05-07      Urinalysis Basic - ( 06 May 2020 23:47 )    Color: Yellow / Appearance: Clear / S.015 / pH: x  Gluc: x / Ketone: Negative  / Bili: Negative / Urobili: <2 mg/dL   Blood: x / Protein: 30 mg/dL / Nitrite: Negative   Leuk Esterase: Negative / RBC: 4 /HPF / WBC 4 /HPF   Sq Epi: x / Non Sq Epi: 3 /HPF / Bacteria: Negative        MICROBIOLOGY:Culture Results:   No growth to date. ( @ 11:11)  Culture Results:   No growth to date. ( @ 11:11)

## 2020-05-07 NOTE — PROGRESS NOTE ADULT - PROBLEM SELECTOR PLAN 1
COVID + at urgent care facility 04/26/20. Febrile to Tmax 104.5F and hypoxic on ambulation, requiring 5L. S/P Azithromycin x 5 days (for superimposed bacterial PNA),     - Fevers trending down (100.5 last 24 hr), now on 4L NC.   - ID following, appreciate recs.  - Therapy     - Famotidine + Hydroxychloroquine (started 05/04/2020)     - con't 5 day course zosyn for possible superimposed bacterial PNA  - Supportive     -Tessalon pearls and Robitussin PRN for cough.   - D-dimer elevation, no PE on CTA PE study.  Based on algorithm with Cr Cl of 87 and BMI <30, PT currently on Lovenox 40mg daily.  While appreciate higher risk of PE in age population and per D-dimer from cardiology, for now will keep on lower dose.  Will trend D-dimer and consider higher dosing if needed.      Plan:  -con't HCQ/famotidine, zosyn, supportive care  -Trend CRP, Ferritin, D-dimer QOD if progressive worsening    -Possibly convalescent plasma candidate if not dual enrolled in famotidine trial and if still not improving.   -Trend LFT as slight uptrend since starting hydroxychloroquine.  -Weaning trial COVID + at urgent care facility 04/26/20. Febrile to Tmax 104.5F and hypoxic on ambulation, requiring 5L. S/P Azithromycin x 5 days (for superimposed bacterial PNA),     - Fevers trending down (100.5 last 24 hr), now on 4L NC.   - ID following, appreciate recs.  - Therapy     - s/p Famotidine + Hydroxychloroquine (started 05/04/2020)     - pt is candidate for plasma-type/screen and consent today.     - con't 5 day course zosyn for possible superimposed bacterial PNA  - Supportive     -Tessalon pearls and Robitussin PRN for cough.   - D-dimer elevation, no PE on CTA PE study.  Based on algorithm with Cr Cl of 87 and BMI <30, PT currently on Lovenox 40mg daily.  While appreciate higher risk of PE in age population and per D-dimer from cardiology, for now will keep on lower dose.  Will trend D-dimer and consider higher dosing if needed.    -Trend CRP, Ferritin, D-dimer QOD if progressive worsening  -Trend LFT as slight uptrend since starting hydroxychloroquine.

## 2020-05-07 NOTE — PROGRESS NOTE ADULT - SUBJECTIVE AND OBJECTIVE BOX
Subjective: Patient seen and examined. No new events except as noted.   feels ok     REVIEW OF SYSTEMS:    CONSTITUTIONAL:+ weakness, fevers or chills  EYES/ENT: No visual changes;  No vertigo or throat pain   NECK: No pain or stiffness  RESPIRATORY: No cough, wheezing, hemoptysis; No shortness of breath  CARDIOVASCULAR: No chest pain or palpitations  GASTROINTESTINAL: No abdominal or epigastric pain. No nausea, vomiting, or hematemesis; No diarrhea or constipation. No melena or hematochezia.  GENITOURINARY: No dysuria, frequency or hematuria  NEUROLOGICAL: No numbness or weakness  SKIN: No itching, burning, rashes, or lesions   All other review of systems is negative unless indicated above.    MEDICATIONS:  MEDICATIONS  (STANDING):  aspirin  chewable 81 milliGRAM(s) Oral daily  ATENolol  Tablet 25 milliGRAM(s) Oral daily  atorvastatin 80 milliGRAM(s) Oral at bedtime  benzonatate 100 milliGRAM(s) Oral three times a day  dextrose 5%. 1000 milliLiter(s) (50 mL/Hr) IV Continuous <Continuous>  dextrose 50% Injectable 12.5 Gram(s) IV Push once  dextrose 50% Injectable 25 Gram(s) IV Push once  dextrose 50% Injectable 25 Gram(s) IV Push once  enoxaparin Injectable 40 milliGRAM(s) SubCutaneous daily  hydrALAZINE 10 milliGRAM(s) Oral three times a day  insulin lispro (HumaLOG) corrective regimen sliding scale   SubCutaneous three times a day before meals  insulin lispro (HumaLOG) corrective regimen sliding scale   SubCutaneous at bedtime  latanoprost 0.005% Ophthalmic Solution 1 Drop(s) Both EYES at bedtime  nystatin    Suspension 633204 Unit(s) Oral four times a day  piperacillin/tazobactam IVPB.. 3.375 Gram(s) IV Intermittent every 8 hours  sodium chloride 0.9%. 1000 milliLiter(s) (75 mL/Hr) IV Continuous <Continuous>      PHYSICAL EXAM:  T(C): 37.1 (05-07-20 @ 04:26), Max: 38.1 (05-06-20 @ 21:26)  HR: 85 (05-07-20 @ 04:26) (85 - 87)  BP: 139/74 (05-07-20 @ 04:26) (139/74 - 145/70)  RR: 24 (05-07-20 @ 04:26) (20 - 24)  SpO2: 92% (05-07-20 @ 04:26) (92% - 96%)  Wt(kg): --  I&O's Summary    06 May 2020 07:01  -  07 May 2020 07:00  --------------------------------------------------------  IN: 850 mL / OUT: 400 mL / NET: 450 mL    07 May 2020 07:01  -  07 May 2020 10:40  --------------------------------------------------------  IN: 360 mL / OUT: 250 mL / NET: 110 mL          Appearance: Normal	  HEENT:   Normal oral mucosa, PERRL, EOMI	  Lymphatic: No lymphadenopathy , no edema  Cardiovascular: Normal S1 S2, No JVD, No murmurs , Peripheral pulses palpable 2+ bilaterally  Respiratory: Lungs clear to auscultation, normal effort 	  Gastrointestinal:  Soft, Non-tender, + BS	  Skin: No rashes, No ecchymoses, No cyanosis, warm to touch  Musculoskeletal: Normal range of motion, normal strength  Psychiatry:  Mood & affect appropriate  Ext: No edema      LABS:    CARDIAC MARKERS:                                15.5   6.67  )-----------( 424      ( 07 May 2020 07:11 )             50.0     05-07    135  |  97  |  19  ----------------------------<  194<H>  5.8<H>   |  20<L>  |  1.57<H>    Ca    9.0      07 May 2020 07:11  Phos  3.4     05-07  Mg     2.1     05-07    TPro  7.7  /  Alb  2.5<L>  /  TBili  0.7  /  DBili  x   /  AST  59<H>  /  ALT  60<H>  /  AlkPhos  90  05-07    proBNP:   Lipid Profile:   HgA1c:   TSH:               TELEMETRY: 	    ECG:  	  RADIOLOGY:   DIAGNOSTIC TESTING:  [ ] Echocardiogram:  [ ]  Catheterization:  [ ] Stress Test:    OTHER:

## 2020-05-07 NOTE — PROGRESS NOTE ADULT - PROBLEM SELECTOR PLAN 1
COVID infection   completed Plaquenil  IV abx   segmental and subsegental arteries not visualized on CT. Recheck Dimers and if trending higher, would Change to LOvenox bid   trend markers   supplemental oxygen   Albuterol inhalers.

## 2020-05-07 NOTE — PROGRESS NOTE ADULT - ASSESSMENT
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  P/w worsening diarrhea, N&V, cough with clear sputum, ?fevers X1 week PTA and abdominal pain X1 day PTA.  In ED, SpO2 92% reported on RA. Labs revealed lymphopenia with mild transaminitis. Covid-19 positive 4/27.  ID consulted for possible superadded bacterial infection  =========  Covid-19 infection in diabetic (HbA1c 8.4)  - bandemia with elevated Procalcitonin seen on admission - however, low clinical suspicion for bacterial infection  - pt is hemodynamically stable, no other focal s/o infection. CT C/A/P showed b/l GGO with no intra-abdominal focus. UA negative  - blood cx show NGTD. MRSA PCR negative  - continue Zosyn for now - anticipate 5 day course    RECOMMENDATIONS:  1. Possible super-added bacterial pneumonia  - continue Zosyn 3.375g IV q8h - anticipate a total of 5 days  - f/u blood cx  ------------  2. 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  P/w worsening diarrhea, N&V, cough with clear sputum, ?fevers X1 week PTA and abdominal pain X1 day PTA.  In ED, SpO2 92% reported on RA. Labs revealed lymphopenia with mild transaminitis. Covid-19 positive 4/27.  ID consulted for possible superadded bacterial infection  =========  Covid-19 infection in diabetic (HbA1c 8.4)  - bandemia with elevated Procalcitonin seen on admission - however, low clinical suspicion for bacterial infection  - pt is hemodynamically stable, no other focal s/o infection. CT C/A/P showed b/l GGO with no intra-abdominal focus. UA negative  - blood cx show NGTD. MRSA PCR negative  - continue Zosyn for now - anticipate 5 day course    RECOMMENDATIONS:  1. Possible super-added bacterial pneumonia  - continue Zosyn 3.375g IV q8h - anticipate a total of 5 days  - check sputum cx (if feasible)  - f/u blood cx  ------------  2. Covid-19  - can continue HCQ per primary team to complete 5 days  - enrolled in convalescent plasma trial  - Continue supplemental O2 and supportive care per primary team  - Continue Contact and Airborne Isolation precautions    PETEY Atkinson MD  Fellow, Infectious Diseases  Pager: [phone]  After 5pm and on Weekends: Call [phone]

## 2020-05-07 NOTE — PROGRESS NOTE ADULT - PROBLEM SELECTOR PLAN 6
Cr BL 0.8 on admission. Possibly pre renal in setting of dehydration. Cr uptrending, 1.57 today. Associated metabolic acidosis and hyperK.  -no hydronephrosis on CT A/P 5/4.  -EKG for hyperK  -consider urine lytes  -No volume overload on exam. May need fluid bolus. Cr BL 0.8 on admission. Possibly pre renal in setting of dehydration. Cr uptrending, 1.57 today.  -no hydronephrosis on CT A/P 5/4.  -hyperK false 2/2 hemolysis.  -f/u urine lytes  -No volume overload on exam. May need fluid bolus, being cautious in setting of COVID PNA/AHRF. Will reevaluate tomorrow.

## 2020-05-07 NOTE — PROGRESS NOTE ADULT - PROBLEM SELECTOR PLAN 2
Acute hypoxic respiratory failure 2* COVID vs. superimposed bacterial PNA.  -Procal elevated, repeat still high but was downtrending.  Was started on Zosyn + Vancomycin 05/04/20 empirically.  ID c/s and less likely suspicion for superimposed bacterial PNA.   -MRSA (-)-->Vanco d/c 05/05/20  -Zosyn 05/04/20 x 5 days.  De-escalate Zosyn if more specific bacterial source found.  -SaO2 >92% on 4L, stable. Acute hypoxic respiratory failure 2* COVID vs. superimposed bacterial PNA.  -Procal elevated, repeat still high but was downtrending.  Was started on Zosyn + Vancomycin 05/04/20 empirically.  ID c/s and less likely suspicion for superimposed bacterial PNA.   -MRSA (-)-->Vanco d/c 05/05/20  -Zosyn 05/04/20 x 5 days.  De-escalate Zosyn if more specific bacterial source found.  -SaO2 >92% on 4L, stable.  -Weaning trial

## 2020-05-07 NOTE — PROGRESS NOTE ADULT - PROBLEM SELECTOR PLAN 7
Insulin sliding scale  Finger sticks q6h +qAC and qHS Insulin sliding scale  Finger sticks q6h +qAC and qHS  -starting lantus 10u in setting of persistent POCT >200

## 2020-05-07 NOTE — CHART NOTE - NSCHARTNOTEFT_GEN_A_CORE
The trial, [id number]: Expanded Access to Convalescent Plasma for the Treatment of Patients with COVID-19, was discussed with the patient daughter, Kristyn Duncan. Consent was witnessed by research coordinator Viky Baez.     During the consent process, the following was discussed:     •	The fact that the study involves research  •	The study schedule and procedures involved  •	The main risks of the study, and the fact that all risks may not be known at this time  •	New information that may affect the subject’s willingness to continue on the study will be presented as soon as it is available  •	Benefits of participating  •	Alternatives to participating  •	Confidentiality   •	Compensation for research-related injury  •	Contacts for questions about the study or their rights while on the study  •	The fact that the subject’s participation is voluntary – they can refuse or withdraw at any time without penalty or loss of benefits.    The patient’s LAR was given ample time to ask questions. All questions were answered to their satisfaction.    I personally discussed the convalescent plasma trial with the LAR (daughter), Kristyn Duncan. I reviewed the risks and benefits, and emailed a copy of the consent form to the LAR. She signed and returned the consent form, and a fully executed copy was then emailed back to the LAR.    Informed consent was obtained prior to any study procedures being performed. A copy of the signed consent and supplemental documentation was given to the subject.    Eligibility Criteria:   Patient has laboratory confirmed diagnosis of infection with SARS-CoV-2 on 04/27/2020 and admitted to an acute care facility for treatment of COVID-19 complications.     Patient has severe COVID-19 with the following symptoms:   - Dyspnea  - Blood oxygen saturation = 93%     ABO was performed on 05/06/2020 and patient blood type confirmed as B+.     I have confirmed all eligibility are met for this patient to participate.     Convalescent plasma order has been completed, pending plasma from blood bank.    Shmuel Gramajo MD  COVID Plasma Trial  [phone]

## 2020-05-07 NOTE — PROGRESS NOTE ADULT - PROBLEM SELECTOR PLAN 4
RESOLVED  -Non bloody Diarrhea likely 2* COVID, less likelihood of C-diff.    -CT Abd/pelvis w./o evidence of acute abdomen.  -Tolerating soft diet. Advance as tolerated.

## 2020-05-07 NOTE — CHART NOTE - NSCHARTNOTEFT_GEN_A_CORE
Clinical status and management plan discussed with patient's daughter. Patient consented for convalescent plasma. Starting lantus 10u. Monitoring renal function. Questions and concerns were addressed.

## 2020-05-07 NOTE — PROGRESS NOTE ADULT - PROBLEM SELECTOR PLAN 3
-COVID vs. other superimposed bacterial PNA with left lung consolidation vs diarrhea/n/v (resolved.)  -CT C/A/P w/o clear evidence of abdominal/pelivic infection-->presumed bacterial PNA.  -Blood cx 5/4 NGTD  -con't zosyn -COVID vs. other superimposed bacterial PNA with left lung consolidation vs diarrhea/n/v (resolved.)  -CT C/A/P w/o clear evidence of abdominal/pelvic infection-->presumed bacterial PNA.  -Blood cx 5/4 NGTD  -con't zosyn

## 2020-05-07 NOTE — PROGRESS NOTE ADULT - SUBJECTIVE AND OBJECTIVE BOX
Authored by Dr. Benson, pager number [phone]. After 7pm, page [phone].    Patient is a 87y old  Female who presents with a chief complaint of Diarrhea (06 May 2020 14:22)      SUBJECTIVE / OVERNIGHT EVENTS:  Patient feels improved. Is tolerating diet. No n/v/d or abdominal pain.    MEDICATIONS  (STANDING):  aspirin  chewable 81 milliGRAM(s) Oral daily  ATENolol  Tablet 25 milliGRAM(s) Oral daily  atorvastatin 80 milliGRAM(s) Oral at bedtime  benzonatate 100 milliGRAM(s) Oral three times a day  dextrose 5%. 1000 milliLiter(s) (50 mL/Hr) IV Continuous <Continuous>  dextrose 50% Injectable 12.5 Gram(s) IV Push once  dextrose 50% Injectable 25 Gram(s) IV Push once  dextrose 50% Injectable 25 Gram(s) IV Push once  enoxaparin Injectable 40 milliGRAM(s) SubCutaneous daily  hydrALAZINE 10 milliGRAM(s) Oral three times a day  insulin lispro (HumaLOG) corrective regimen sliding scale   SubCutaneous three times a day before meals  insulin lispro (HumaLOG) corrective regimen sliding scale   SubCutaneous at bedtime  latanoprost 0.005% Ophthalmic Solution 1 Drop(s) Both EYES at bedtime  nystatin    Suspension 122902 Unit(s) Oral four times a day  piperacillin/tazobactam IVPB.. 3.375 Gram(s) IV Intermittent every 8 hours  sodium chloride 0.9%. 1000 milliLiter(s) (75 mL/Hr) IV Continuous <Continuous>    MEDICATIONS  (PRN):  acetaminophen   Tablet .. 650 milliGRAM(s) Oral every 6 hours PRN Temp greater or equal to 38C (100.4F), Mild Pain (1 - 3)  dextrose 40% Gel 15 Gram(s) Oral once PRN Blood Glucose LESS THAN 70 milliGRAM(s)/deciliter  glucagon  Injectable 1 milliGRAM(s) IntraMuscular once PRN Glucose LESS THAN 70 milligrams/deciliter  guaiFENesin   Syrup  (Sugar-Free) 200 milliGRAM(s) Oral every 6 hours PRN Cough  ondansetron Injectable 4 milliGRAM(s) IV Push every 6 hours PRN Nausea and/or Vomiting      Vital Signs Last 24 Hrs  T(C): 37.1 (07 May 2020 04:26), Max: 38.1 (06 May 2020 21:26)  T(F): 98.8 (07 May 2020 04:26), Max: 100.5 (06 May 2020 21:26)  HR: 85 (07 May 2020 04:26) (85 - 87)  BP: 139/74 (07 May 2020 04:26) (139/74 - 145/70)  BP(mean): --  RR: 24 (07 May 2020 04:26) (20 - 24)  SpO2: 92% (07 May 2020 04:) (92% - 96%)  CAPILLARY BLOOD GLUCOSE      POCT Blood Glucose.: 214 mg/dL (07 May 2020 07:57)  POCT Blood Glucose.: 267 mg/dL (06 May 2020 21:59)  POCT Blood Glucose.: 164 mg/dL (06 May 2020 17:26)  POCT Blood Glucose.: 145 mg/dL (06 May 2020 12:05)    I&O's Summary    06 May 2020 07:  -  07 May 2020 07:00  --------------------------------------------------------  IN: 850 mL / OUT: 400 mL / NET: 450 mL    07 May 2020 07:01  -  07 May 2020 10:18  --------------------------------------------------------  IN: 360 mL / OUT: 250 mL / NET: 110 mL        PHYSICAL EXAM:  GENERAL: NAD.  CHEST/LUNG: Breathing unlabored on 4L NC.  HEART: Regular rate and rhythm  ABDOMEN: Soft, Nontender, Nondistended  EXTREMITIES:  2+ Peripheral Pulses No cyanosis or edema  PSYCH: Normal affect.    LABS:                        15.5   6.67  )-----------( 424      ( 07 May 2020 07:11 )             50.0     05-07    135  |  97  |  19  ----------------------------<  194<H>  5.8<H>   |  20<L>  |  1.57<H>    Ca    9.0      07 May 2020 07:11  Phos  3.4     05-  Mg     2.1     -    TPro  7.7  /  Alb  2.5<L>  /  TBili  0.7  /  DBili  x   /  AST  59<H>  /  ALT  60<H>  /  AlkPhos  90  -            Urinalysis Basic - ( 06 May 2020 23:47 )    Color: Yellow / Appearance: Clear / S.015 / pH: x  Gluc: x / Ketone: Negative  / Bili: Negative / Urobili: <2 mg/dL   Blood: x / Protein: 30 mg/dL / Nitrite: Negative   Leuk Esterase: Negative / RBC: 4 /HPF / WBC 4 /HPF   Sq Epi: x / Non Sq Epi: 3 /HPF / Bacteria: Negative            RADIOLOGY & ADDITIONAL TESTS:    Imaging Personally Reviewed:    Consultant(s) Notes Reviewed:      Care Discussed with Consultants/Other Providers:

## 2020-05-07 NOTE — PROGRESS NOTE ADULT - PROBLEM SELECTOR PLAN 8
At home on 100 losartan.  BP here in 160s 170s. Holding in setting of YEIMI.  -Hydralazine standing and PRN

## 2020-05-08 LAB
ALBUMIN SERPL ELPH-MCNC: 3.1 G/DL — LOW (ref 3.3–5)
ALP SERPL-CCNC: 80 U/L — SIGNIFICANT CHANGE UP (ref 40–120)
ALT FLD-CCNC: 43 U/L — SIGNIFICANT CHANGE UP (ref 10–45)
ANION GAP SERPL CALC-SCNC: 14 MMOL/L — SIGNIFICANT CHANGE UP (ref 5–17)
AST SERPL-CCNC: 36 U/L — SIGNIFICANT CHANGE UP (ref 10–40)
BILIRUB SERPL-MCNC: 0.7 MG/DL — SIGNIFICANT CHANGE UP (ref 0.2–1.2)
BUN SERPL-MCNC: 15 MG/DL — SIGNIFICANT CHANGE UP (ref 7–23)
CALCIUM SERPL-MCNC: 9.5 MG/DL — SIGNIFICANT CHANGE UP (ref 8.4–10.5)
CHLORIDE SERPL-SCNC: 99 MMOL/L — SIGNIFICANT CHANGE UP (ref 96–108)
CO2 SERPL-SCNC: 25 MMOL/L — SIGNIFICANT CHANGE UP (ref 22–31)
CREAT SERPL-MCNC: 1.45 MG/DL — HIGH (ref 0.5–1.3)
CULTURE RESULTS: SIGNIFICANT CHANGE UP
GLUCOSE BLDC GLUCOMTR-MCNC: 130 MG/DL — HIGH (ref 70–99)
GLUCOSE BLDC GLUCOMTR-MCNC: 145 MG/DL — HIGH (ref 70–99)
GLUCOSE BLDC GLUCOMTR-MCNC: 161 MG/DL — HIGH (ref 70–99)
GLUCOSE BLDC GLUCOMTR-MCNC: 172 MG/DL — HIGH (ref 70–99)
GLUCOSE SERPL-MCNC: 153 MG/DL — HIGH (ref 70–99)
HCT VFR BLD CALC: 46 % — HIGH (ref 34.5–45)
HGB BLD-MCNC: 14.3 G/DL — SIGNIFICANT CHANGE UP (ref 11.5–15.5)
MAGNESIUM SERPL-MCNC: 2.2 MG/DL — SIGNIFICANT CHANGE UP (ref 1.6–2.6)
MCHC RBC-ENTMCNC: 23.8 PG — LOW (ref 27–34)
MCHC RBC-ENTMCNC: 31.1 GM/DL — LOW (ref 32–36)
MCV RBC AUTO: 76.5 FL — LOW (ref 80–100)
NRBC # BLD: 0 /100 WBCS — SIGNIFICANT CHANGE UP (ref 0–0)
PLATELET # BLD AUTO: 468 K/UL — HIGH (ref 150–400)
POTASSIUM SERPL-MCNC: 4 MMOL/L — SIGNIFICANT CHANGE UP (ref 3.5–5.3)
POTASSIUM SERPL-SCNC: 4 MMOL/L — SIGNIFICANT CHANGE UP (ref 3.5–5.3)
PROT SERPL-MCNC: 7.7 G/DL — SIGNIFICANT CHANGE UP (ref 6–8.3)
RBC # BLD: 6.01 M/UL — HIGH (ref 3.8–5.2)
RBC # FLD: 15.5 % — HIGH (ref 10.3–14.5)
SARS-COV-2 RNA SPEC QL NAA+PROBE: DETECTED
SODIUM SERPL-SCNC: 138 MMOL/L — SIGNIFICANT CHANGE UP (ref 135–145)
SPECIMEN SOURCE: SIGNIFICANT CHANGE UP
WBC # BLD: 6.71 K/UL — SIGNIFICANT CHANGE UP (ref 3.8–10.5)
WBC # FLD AUTO: 6.71 K/UL — SIGNIFICANT CHANGE UP (ref 3.8–10.5)

## 2020-05-08 PROCEDURE — 99232 SBSQ HOSP IP/OBS MODERATE 35: CPT | Mod: CS,GC

## 2020-05-08 PROCEDURE — 93010 ELECTROCARDIOGRAM REPORT: CPT

## 2020-05-08 PROCEDURE — 99233 SBSQ HOSP IP/OBS HIGH 50: CPT | Mod: CS,GC

## 2020-05-08 RX ADMIN — PIPERACILLIN AND TAZOBACTAM 25 GRAM(S): 4; .5 INJECTION, POWDER, LYOPHILIZED, FOR SOLUTION INTRAVENOUS at 12:31

## 2020-05-08 RX ADMIN — ATENOLOL 25 MILLIGRAM(S): 25 TABLET ORAL at 04:48

## 2020-05-08 RX ADMIN — Medication 500000 UNIT(S): at 12:06

## 2020-05-08 RX ADMIN — Medication 81 MILLIGRAM(S): at 12:06

## 2020-05-08 RX ADMIN — ENOXAPARIN SODIUM 40 MILLIGRAM(S): 100 INJECTION SUBCUTANEOUS at 12:07

## 2020-05-08 RX ADMIN — Medication 500000 UNIT(S): at 17:07

## 2020-05-08 RX ADMIN — Medication 500000 UNIT(S): at 23:40

## 2020-05-08 RX ADMIN — Medication 1: at 17:06

## 2020-05-08 RX ADMIN — ATORVASTATIN CALCIUM 80 MILLIGRAM(S): 80 TABLET, FILM COATED ORAL at 23:40

## 2020-05-08 RX ADMIN — PIPERACILLIN AND TAZOBACTAM 25 GRAM(S): 4; .5 INJECTION, POWDER, LYOPHILIZED, FOR SOLUTION INTRAVENOUS at 04:48

## 2020-05-08 RX ADMIN — Medication 100 MILLIGRAM(S): at 04:48

## 2020-05-08 RX ADMIN — Medication 500000 UNIT(S): at 04:48

## 2020-05-08 RX ADMIN — Medication 100 MILLIGRAM(S): at 12:08

## 2020-05-08 RX ADMIN — Medication 500000 UNIT(S): at 01:53

## 2020-05-08 RX ADMIN — Medication 10 MILLIGRAM(S): at 12:11

## 2020-05-08 RX ADMIN — Medication 10 MILLIGRAM(S): at 04:48

## 2020-05-08 RX ADMIN — Medication 100 MILLIGRAM(S): at 23:40

## 2020-05-08 RX ADMIN — INSULIN GLARGINE 10 UNIT(S): 100 INJECTION, SOLUTION SUBCUTANEOUS at 23:41

## 2020-05-08 RX ADMIN — LATANOPROST 1 DROP(S): 0.05 SOLUTION/ DROPS OPHTHALMIC; TOPICAL at 23:44

## 2020-05-08 RX ADMIN — PIPERACILLIN AND TAZOBACTAM 25 GRAM(S): 4; .5 INJECTION, POWDER, LYOPHILIZED, FOR SOLUTION INTRAVENOUS at 23:40

## 2020-05-08 RX ADMIN — Medication 10 MILLIGRAM(S): at 23:40

## 2020-05-08 NOTE — PROGRESS NOTE ADULT - SUBJECTIVE AND OBJECTIVE BOX
Follow Up: Covid-19, possible superadded bacterial pneumonia    Interval History/ROS:Patient is a 87y old  Female who presents with a chief complaint of Diarrhea (08 May 2020 12:39)  - Tmax 100.8 in past 24 hours  - pt assessed at bedside, denied any acute complaints    Allergies    No Known Allergies    Intolerances        ANTIMICROBIALS:  nystatin    Suspension 362118 four times a day  piperacillin/tazobactam IVPB.. 3.375 every 8 hours      OTHER MEDS:  acetaminophen   Tablet .. 650 milliGRAM(s) Oral every 6 hours PRN  aspirin  chewable 81 milliGRAM(s) Oral daily  ATENolol  Tablet 25 milliGRAM(s) Oral daily  atorvastatin 80 milliGRAM(s) Oral at bedtime  benzonatate 100 milliGRAM(s) Oral three times a day  dextrose 40% Gel 15 Gram(s) Oral once PRN  dextrose 5%. 1000 milliLiter(s) IV Continuous <Continuous>  dextrose 50% Injectable 12.5 Gram(s) IV Push once  dextrose 50% Injectable 25 Gram(s) IV Push once  dextrose 50% Injectable 25 Gram(s) IV Push once  enoxaparin Injectable 40 milliGRAM(s) SubCutaneous daily  glucagon  Injectable 1 milliGRAM(s) IntraMuscular once PRN  guaiFENesin   Syrup  (Sugar-Free) 200 milliGRAM(s) Oral every 6 hours PRN  hydrALAZINE 10 milliGRAM(s) Oral three times a day  insulin glargine Injectable (LANTUS) 10 Unit(s) SubCutaneous at bedtime  insulin lispro (HumaLOG) corrective regimen sliding scale   SubCutaneous three times a day before meals  insulin lispro (HumaLOG) corrective regimen sliding scale   SubCutaneous at bedtime  latanoprost 0.005% Ophthalmic Solution 1 Drop(s) Both EYES at bedtime  ondansetron Injectable 4 milliGRAM(s) IV Push every 6 hours PRN  sodium chloride 0.9%. 1000 milliLiter(s) IV Continuous <Continuous>      Vital Signs Last 24 Hrs  T(C): 36.7 (08 May 2020 13:29), Max: 38.2 (07 May 2020 22:18)  T(F): 98 (08 May 2020 13:29), Max: 100.8 (07 May 2020 22:18)  HR: 72 (08 May 2020 13:29) (71 - 89)  BP: 146/85 (08 May 2020 13:29) (144/68 - 178/80)  BP(mean): --  RR: 22 (08 May 2020 13:29) (20 - 22)  SpO2: 95% (08 May 2020 13:29) (91% - 100%)    EXAM:  Constitutional: Not in acute distress. On NC 4l/min  Eyes: No icterus. Pupils b/l reactive to light.  Oral cavity: Clear, no lesions  Neck: No neck vein distension noted  RS: Chest clear to auscultation bilaterally. No wheeze/rhonchi/crepitations.  CVS: S1, S2 heard. Regular rate and rhythm. No murmurs/rubs/gallops.  Abdomen: Soft. No guarding/rigidity/tenderness.  : No acute abnormalities  Extremities: Warm. No pedal edema  Skin: No lesions noted  Vascular: No evidence of phlebitis  Neuro: Alert, oriented to time/place/person                        14.3   6.71  )-----------( 468      ( 08 May 2020 07:06 )             46.0       05-08    138  |  99  |  15  ----------------------------<  153<H>  4.0   |  25  |  1.45<H>    Ca    9.5      08 May 2020 07:06  Phos  2.4     05-07  Mg     2.2     05-08    TPro  7.7  /  Alb  3.1<L>  /  TBili  0.7  /  DBili  x   /  AST  36  /  ALT  43  /  AlkPhos  80  05-08      Urinalysis Basic - ( 07 May 2020 22:13 )    Color: Light Yellow / Appearance: Clear / S.016 / pH: x  Gluc: x / Ketone: Negative  / Bili: Negative / Urobili: Negative   Blood: x / Protein: 30 mg/dL / Nitrite: Negative   Leuk Esterase: Negative / RBC: 3 /hpf / WBC 6 /HPF   Sq Epi: x / Non Sq Epi: 2 /hpf / Bacteria: Negative        MICROBIOLOGY:Culture Results:   No growth to date. ( @ 11:11)  Culture Results:   No growth to date. ( @ 11:11)      RADIOLOGY:  < from: [facility] Duplex Lower Ext Vein Scan, Bilat (20 @ 12:36) >  No evidence of deep venous thrombosis in either proximal lower extremity.  < end of copied text >

## 2020-05-08 NOTE — PROGRESS NOTE ADULT - PROBLEM SELECTOR PLAN 6
Cr BL 0.8 on admission. Possibly pre renal in setting of dehydration. Cr still elevated but at 1.45 down from 1.57 05/07  -no hydronephrosis on CT A/P 5/4.  -hyperK false 2/2 hemolysis.  -f/u urine lytes  -No volume overload on exam. May need fluid bolus, being cautious in setting of COVID PNA/AHRF. Will reevaluate tomorrow.

## 2020-05-08 NOTE — PROGRESS NOTE ADULT - PROBLEM SELECTOR PLAN 1
COVID + at urgent care facility 04/26/20. Febrile to Tmax 104.5F and hypoxic on ambulation, requiring 5L. S/P Azithromycin x 5 days (for superimposed bacterial PNA),   - Fevers persistent but downtrending (100.8 last 24 hr), now on 4L NC.   - ID following, appreciate recs.  - Therapy     - s/p Famotidine + Hydroxychloroquine (started 05/04/2020)     - s/p Convalescent plasma x 1 (05/07/2020     - con't 5 day course zosyn for possible superimposed bacterial PNA  - Supportive     -Tessalon pearls and Robitussin PRN for cough.   - D-dimer elevation, no PE on CTA PE study.  Based on algorithm with Cr Cl of 87 and BMI <30, PT currently on Lovenox 40mg daily.  While appreciate higher risk of PE in age population and per D-dimer from cardiology, for now will keep on lower dose.  Will trend D-dimer and consider higher dosing if needed.    -Trend CRP, Ferritin, D-dimer QOD if progressive worsening  -Trend LFT as slight uptrend since starting hydroxychloroquine.

## 2020-05-08 NOTE — PROGRESS NOTE ADULT - ASSESSMENT
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  P/w worsening diarrhea, N&V, cough with clear sputum, ?fevers X1 week PTA and abdominal pain X1 day PTA.  In ED, SpO2 92% reported on RA. Labs revealed lymphopenia with mild transaminitis. Covid-19 positive 4/27.  ID consulted for possible superadded bacterial infection  =========  Covid-19 infection in diabetic (HbA1c 8.4)  - bandemia with elevated Procalcitonin seen on admission - however, low clinical suspicion for bacterial infection  - pt is hemodynamically stable, no other focal s/o infection. CT C/A/P showed b/l GGO with no intra-abdominal focus. UA negative  - blood cx show NGTD. MRSA PCR negative  - continue Zosyn for now - anticipate 5 day course    RECOMMENDATIONS:  1. Possible super-added bacterial pneumonia  - continue Zosyn 3.375g IV q8h - anticipate a total of 5 days  - check sputum cx (if feasible)  - f/u blood cx  ------------  2. Covid-19  - enrolled in convalescent plasma trial  - Continue supplemental O2 and supportive care per primary team  - Continue Contact and Airborne Isolation precautions    PETEY Atkinson, MD  Fellow, Infectious Diseases  Pager: [phone]  After 5pm and on Weekends: Call [phone]

## 2020-05-08 NOTE — PROGRESS NOTE ADULT - PROBLEM SELECTOR PLAN 3
-COVID vs. other superimposed bacterial PNA with left lung consolidation vs diarrhea/n/v (resolved.)  -CT C/A/P w/o clear evidence of abdominal/pelvic infection-->presumed bacterial PNA.  -Blood cx 5/4 NGTD  -con't zosyn until 05/09/2020 (total 5 day course)

## 2020-05-08 NOTE — PROGRESS NOTE ADULT - PROBLEM SELECTOR PLAN 2
Acute hypoxic respiratory failure 2* COVID vs. less likely superimposed bacterial PNA.  -Procal elevated, repeat still high but was downtrending.  Was started on Zosyn + Vancomycin 05/04/20 empirically.  ID c/s and less likely suspicion for superimposed bacterial PNA.   -MRSA (-)-->Vanco d/c 05/05/20  -Zosyn 05/04/20 x 5 days.  De-escalate Zosyn if more specific bacterial source found.  -SaO2 >91% on 4L, stable but also not improving.    -Weaning trial 1L per 1 hr.  If SpO2 >88% and no respiratory distress, can continue weaning trial.

## 2020-05-08 NOTE — PROGRESS NOTE ADULT - SUBJECTIVE AND OBJECTIVE BOX
Subjective: Patient seen and examined. No new events except as noted.     REVIEW OF SYSTEMS:    CONSTITUTIONAL: + weakness, fevers or chills  EYES/ENT: No visual changes;  No vertigo or throat pain   NECK: No pain or stiffness  RESPIRATORY: No cough, wheezing, hemoptysis; No shortness of breath  CARDIOVASCULAR: No chest pain or palpitations  GASTROINTESTINAL: No abdominal or epigastric pain. No nausea, vomiting, or hematemesis; No diarrhea or constipation. No melena or hematochezia.  GENITOURINARY: No dysuria, frequency or hematuria  NEUROLOGICAL: No numbness or weakness  SKIN: No itching, burning, rashes, or lesions   All other review of systems is negative unless indicated above.    MEDICATIONS:  MEDICATIONS  (STANDING):  aspirin  chewable 81 milliGRAM(s) Oral daily  ATENolol  Tablet 25 milliGRAM(s) Oral daily  atorvastatin 80 milliGRAM(s) Oral at bedtime  benzonatate 100 milliGRAM(s) Oral three times a day  dextrose 5%. 1000 milliLiter(s) (50 mL/Hr) IV Continuous <Continuous>  dextrose 50% Injectable 12.5 Gram(s) IV Push once  dextrose 50% Injectable 25 Gram(s) IV Push once  dextrose 50% Injectable 25 Gram(s) IV Push once  enoxaparin Injectable 40 milliGRAM(s) SubCutaneous daily  hydrALAZINE 10 milliGRAM(s) Oral three times a day  insulin glargine Injectable (LANTUS) 10 Unit(s) SubCutaneous at bedtime  insulin lispro (HumaLOG) corrective regimen sliding scale   SubCutaneous three times a day before meals  insulin lispro (HumaLOG) corrective regimen sliding scale   SubCutaneous at bedtime  latanoprost 0.005% Ophthalmic Solution 1 Drop(s) Both EYES at bedtime  nystatin    Suspension 914299 Unit(s) Oral four times a day  piperacillin/tazobactam IVPB.. 3.375 Gram(s) IV Intermittent every 8 hours  sodium chloride 0.9%. 1000 milliLiter(s) (75 mL/Hr) IV Continuous <Continuous>      PHYSICAL EXAM:  T(C): 36.8 (05-08-20 @ 04:46), Max: 38.2 (05-07-20 @ 22:18)  HR: 80 (05-08-20 @ 10:14) (71 - 89)  BP: 174/74 (05-08-20 @ 10:14) (144/68 - 178/80)  RR: 22 (05-08-20 @ 04:46) (20 - 22)  SpO2: 100% (05-08-20 @ 10:14) (91% - 100%)  Wt(kg): --  I&O's Summary    07 May 2020 07:01  -  08 May 2020 07:00  --------------------------------------------------------  IN: 940 mL / OUT: 1100 mL / NET: -160 mL          Appearance: Normal	  HEENT:   Normal oral mucosa, PERRL, EOMI	  Lymphatic: No lymphadenopathy , no edema  Cardiovascular: Normal S1 S2, No JVD, No murmurs , Peripheral pulses palpable 2+ bilaterally  Respiratory: Lungs clear to auscultation, normal effort 	  Gastrointestinal:  Soft, Non-tender, + BS	  Skin: No rashes, No ecchymoses, No cyanosis, warm to touch  Musculoskeletal: Normal range of motion, normal strength  Psychiatry:  Mood & affect appropriate  Ext: No edema      LABS:    CARDIAC MARKERS:                                14.3   6.71  )-----------( 468      ( 08 May 2020 07:06 )             46.0     05-08    138  |  99  |  15  ----------------------------<  153<H>  4.0   |  25  |  1.45<H>    Ca    9.5      08 May 2020 07:06  Phos  2.4     05-07  Mg     2.2     05-08    TPro  7.7  /  Alb  3.1<L>  /  TBili  0.7  /  DBili  x   /  AST  36  /  ALT  43  /  AlkPhos  80  05-08    proBNP:   Lipid Profile:   HgA1c:   TSH:     D-Dimer Assay, Quantitative (05.06.20 @ 08:46)    D-Dimer Assay, Quantitative: 582: D-Dimer result less than 230 ng/mL DDU correlates with the absence  of thrombosis in a patient with a low and moderate       pre-test probability of thrombosis.  1 DDU is approximately equal to  2 ng/mL FEU (previous units). ng/mL DDU              TELEMETRY: 	    ECG:  	  RADIOLOGY:   DIAGNOSTIC TESTING:  [ ] Echocardiogram:  [ ]  Catheterization:  [ ] Stress Test:    OTHER:

## 2020-05-08 NOTE — CHART NOTE - NSCHARTNOTEFT_GEN_A_CORE
Nutrition Initial Assessment    Nutrition Consult Received: Yes [   ]  No [ X  ]    Reason for Initial Nutrition Assessment: length of stay     Source of Information: Unable to conduct a fact to face interview due to limited contact restrictions related to pt's medical condition and isolation precautions. Information obtained from a phone call with the pt and from the EMR.    Admitting Diagnosis: 87y Female admitted for COVID-19 virus infection    PAST MEDICAL & SURGICAL HISTORY:  Type 2 diabetes mellitus  Glaucoma  CVA (cerebral vascular accident)  HTN (hypertension)  No significant past surgical history      Subjective Information: Pt reports having a poor appetite due to loss of taste. Pt states that everything she eats tastes like flour. She reports some nausea and diarrhea PTA which also affected her appetite; has since resolved. Pt denies any chewing/swallowing difficulty, self-feeding difficulty, nausea/vomiting, constipation, or diarrhea. Pt denies any known food allergies or intolerances. Pt reports micronutrient supplementation at home but is unable to recall which ones. Pt reports a UBW of 137lbs; consistence with dosing weight.     Education: Reviewed importance of monitoring blood sugars at home. Pt states that she checks it everyday but does strictly follow a diet. Reviewed carbohydrate sources and encouraged portion control of carbohydrates. Educated pt on methods of adding flavor to food such as sodium free flavoring tips. Pt amenable to nutrition supplements.       GI Issues: none noted at this time    Current Nutrition Order: Diet, Dysphagia 3 Soft-Thin Liquids:   Consistent Carbohydrate {No Snacks} (CSTCHO) (05-06-20 @ 09:40)    PO Intake:   Good (%) [   ]    Fair (50-75%) [   ]    Poor (<50%) [  X ]    Skin Integrity: no pressure injury documented     Labs:   05-08 Na138 mmol/L Glu 153 mg/dL<H> K+ 4.0 mmol/L Cr  1.45 mg/dL<H> BUN 15 mg/dL Phos n/a   Alb 3.1 g/dL<L> PAB n/a           POCT Blood Glucose.: 145 mg/dL (05-08-20 @ 08:12)  POCT Blood Glucose.: 149 mg/dL (05-07-20 @ 21:21)  POCT Blood Glucose.: 195 mg/dL (05-07-20 @ 17:15)  POCT Blood Glucose.: 210 mg/dL (05-07-20 @ 12:00)    Medications:  MEDICATIONS  (STANDING):  aspirin  chewable 81 milliGRAM(s) Oral daily  ATENolol  Tablet 25 milliGRAM(s) Oral daily  atorvastatin 80 milliGRAM(s) Oral at bedtime  benzonatate 100 milliGRAM(s) Oral three times a day  dextrose 5%. 1000 milliLiter(s) (50 mL/Hr) IV Continuous <Continuous>  dextrose 50% Injectable 12.5 Gram(s) IV Push once  dextrose 50% Injectable 25 Gram(s) IV Push once  dextrose 50% Injectable 25 Gram(s) IV Push once  enoxaparin Injectable 40 milliGRAM(s) SubCutaneous daily  hydrALAZINE 10 milliGRAM(s) Oral three times a day  insulin glargine Injectable (LANTUS) 10 Unit(s) SubCutaneous at bedtime  insulin lispro (HumaLOG) corrective regimen sliding scale   SubCutaneous three times a day before meals  insulin lispro (HumaLOG) corrective regimen sliding scale   SubCutaneous at bedtime  latanoprost 0.005% Ophthalmic Solution 1 Drop(s) Both EYES at bedtime  nystatin    Suspension 969928 Unit(s) Oral four times a day  piperacillin/tazobactam IVPB.. 3.375 Gram(s) IV Intermittent every 8 hours  sodium chloride 0.9%. 1000 milliLiter(s) (75 mL/Hr) IV Continuous <Continuous>    MEDICATIONS  (PRN):  acetaminophen   Tablet .. 650 milliGRAM(s) Oral every 6 hours PRN Temp greater or equal to 38C (100.4F), Mild Pain (1 - 3)  dextrose 40% Gel 15 Gram(s) Oral once PRN Blood Glucose LESS THAN 70 milliGRAM(s)/deciliter  glucagon  Injectable 1 milliGRAM(s) IntraMuscular once PRN Glucose LESS THAN 70 milligrams/deciliter  guaiFENesin   Syrup  (Sugar-Free) 200 milliGRAM(s) Oral every 6 hours PRN Cough  ondansetron Injectable 4 milliGRAM(s) IV Push every 6 hours PRN Nausea and/or Vomiting    Admitted Anthropometrics:    Height (cm): 157.5 (05-04-20 @ 08:30)  Weight (kg): 62.3 (05-04-20 @ 08:30)  BMI (kg/m2): 25.1 (05-04-20 @ 08:30)    Nutrition Focused Physical Exam: Unable to complete due to limited isolation contact precautions at this time.     Estimated Energy Needs (25-30 kcal/kg): 1558-1829kcal/day  Estimated Protein Needs ( 1-1.2g/kg): 62-75g protein/day  Based on weight of: 62.3kg (dosing)    [ X ] Nutrition Diagnosis: Inadequate oral intake related to decreased appetite in setting of ageusia as evidenced by reported PO intake <50%  [  ] No active nutrition diagnosis at this time  [  ] Current medical condition precludes nutrition intervention    Goal: Pt will meet >75% of estimated nutritional needs during hospital stay.     Nutrition Interventions: Glucerna Shake (220kcal, 10g protein/serving)     Recommendations:  1) Continue current diet as tolerated; consistency deferred to speech.  2) Recommend Glucerna 1x daily to promote adequate energy intake and glycemic control. Provider made aware.   3) Continue to encourage po intake and obtain/honor food preferences as able. Food preferences noted and updated on [facility]ORD.   4) Monitor fingersticks.  5) Monitor PO intake, diet tolerance, weight trends, labs, and skin integrity.         RD to follow-up per protocol.    Heather Abad RD pager# [phone]

## 2020-05-08 NOTE — PROGRESS NOTE ADULT - SUBJECTIVE AND OBJECTIVE BOX
Authored by Dr. Rueda, pager number [phone]. After 7pm, page [phone].    Patient is a 87y old  Female who presents with a chief complaint of Diarrhea (06 May 2020 14:22)    SUBJECTIVE / OVERNIGHT EVENTS: Overnight, PT was mildly confused at about 21:30.  Was evaluated once again and had improved w/o intervention apart from re-orientation.  This morning, has no new or worsening complaints, does not endorse any confusion.   Continues to report dry non-productive cough.  Continues to spike fevers in last 24 hrs.     MEDICATIONS  (STANDING):  aspirin  chewable 81 milliGRAM(s) Oral daily  ATENolol  Tablet 25 milliGRAM(s) Oral daily  atorvastatin 80 milliGRAM(s) Oral at bedtime  benzonatate 100 milliGRAM(s) Oral three times a day  dextrose 5%. 1000 milliLiter(s) (50 mL/Hr) IV Continuous <Continuous>  dextrose 50% Injectable 12.5 Gram(s) IV Push once  dextrose 50% Injectable 25 Gram(s) IV Push once  dextrose 50% Injectable 25 Gram(s) IV Push once  enoxaparin Injectable 40 milliGRAM(s) SubCutaneous daily  hydrALAZINE 10 milliGRAM(s) Oral three times a day  insulin glargine Injectable (LANTUS) 10 Unit(s) SubCutaneous at bedtime  insulin lispro (HumaLOG) corrective regimen sliding scale   SubCutaneous three times a day before meals  insulin lispro (HumaLOG) corrective regimen sliding scale   SubCutaneous at bedtime  latanoprost 0.005% Ophthalmic Solution 1 Drop(s) Both EYES at bedtime  nystatin    Suspension 290530 Unit(s) Oral four times a day  piperacillin/tazobactam IVPB.. 3.375 Gram(s) IV Intermittent every 8 hours  sodium chloride 0.9%. 1000 milliLiter(s) (75 mL/Hr) IV Continuous <Continuous>    MEDICATIONS  (PRN):  acetaminophen   Tablet .. 650 milliGRAM(s) Oral every 6 hours PRN Temp greater or equal to 38C (100.4F), Mild Pain (1 - 3)  dextrose 40% Gel 15 Gram(s) Oral once PRN Blood Glucose LESS THAN 70 milliGRAM(s)/deciliter  glucagon  Injectable 1 milliGRAM(s) IntraMuscular once PRN Glucose LESS THAN 70 milligrams/deciliter  guaiFENesin   Syrup  (Sugar-Free) 200 milliGRAM(s) Oral every 6 hours PRN Cough  ondansetron Injectable 4 milliGRAM(s) IV Push every 6 hours PRN Nausea and/or Vomiting    Vital Signs Last 24 Hrs  T(C): 36.8 (08 May 2020 04:46), Max: 38.2 (07 May 2020 22:18)  T(F): 98.2 (08 May 2020 04:46), Max: 100.8 (07 May 2020 22:18)  HR: 80 (08 May 2020 10:14) (71 - 89)  BP: 174/74 (08 May 2020 10:14) (144/68 - 178/80)  BP(mean): --  RR: 22 (08 May 2020 04:46) (20 - 22)  SpO2: 100% (08 May 2020 10:14) (91% - 100%)      20 @ 07:01  -  20 @ 07:00  --------------------------------------------------------  IN: 940 mL / OUT: 1100 mL / NET: -160 mL      PHYSICAL EXAM:  GENERAL: NAD.  CHEST/LUNG: Breathing unlabored on 4L NC, continues to have non-productive cough.   HEART: Regular rate and rhythm  ABDOMEN: Soft, Nontender, Nondistended  EXTREMITIES:  2+ Peripheral Pulses No cyanosis or edema  PSYCH: Normal affect.  Neuro: AAO x4, attention and language intact.  Can follow 2 step crossed commands. 4/5 strength throughout.     LABS:                        14.3   6.71  )-----------( 468      ( 08 May 2020 07:06 )             46.0     05-08    138  |  99  |  15  ----------------------------<  153<H>  4.0   |  25  |  1.45<H>    Ca    9.5      08 May 2020 07:06  Phos  2.4     05-07  Mg     2.2     05-08    TPro  7.7  /  Alb  3.1<L>  /  TBili  0.7  /  DBili  x   /  AST  36  /  ALT  43  /  AlkPhos  80  05-08             Urinalysis Basic - ( 06 May 2020 23:47 )  Color: Yellow / Appearance: Clear / S.015 / pH: x  Gluc: x / Ketone: Negative  / Bili: Negative / Urobili: <2 mg/dL   Blood: x / Protein: 30 mg/dL / Nitrite: Negative   Leuk Esterase: Negative / RBC: 4 /HPF / WBC 4 /HPF   Sq Epi: x / Non Sq Epi: 3 /HPF / Bacteria: Negative        COVID-19 LABS  20 COVID-PCR Nasal Swab + as outpatient.  20 COVID-PCR Nasal Swab repeated (per Infection control instruction)-Results pending.     D-Dimer Assay, Quantitative: 582 ng/mL DDU <H> ()  D-Dimer Assay, Quantitative: 521 ng/mL DDU <H> ()    Ferritin, Serum: 510 ng/mL <H> ()  Ferritin, Serum: 433 ng/mL <H> ()  Ferritin, Serum: 499 ng/mL <H> ()    C-Reactive Protein, Serum: 6.70 ug/mL ()  C-Reactive Protein, Serum: 6.60 mg/dL <H> ()  C-Reactive Protein, Serum: 8.45 mg/dL <H> ()    Auto Lymphocyte #: 1.67 K/uL ()  Auto Lymphocyte #: 1.41 K/uL ()  Auto Lymphocyte #: 0.34 K/uL <L> ()    Pro-Calcitonin (Superimposed bacterial infection)   Procalcitonin, Serum: 24.20 ng/mL <H> ()  Procalcitonin, Serum: 41.79 ng/mL <H> ()  Procalcitonin, Serum: 48.08 ng/mL <H> ()    Meds Received  Azithromycin as outpatient x 5 days.   Hydroxychloroquine + Famotidine - (Un-enrolled 2* YEIMI)  Convalescent plasma 20

## 2020-05-08 NOTE — PROGRESS NOTE ADULT - PROBLEM SELECTOR PLAN 7
Insulin sliding scale  Finger sticks q6h +qAC and qHS  -starting lantus 10u in setting of persistent POCT >200

## 2020-05-09 LAB
ALBUMIN SERPL ELPH-MCNC: 2.6 G/DL — LOW (ref 3.3–5)
ALP SERPL-CCNC: 75 U/L — SIGNIFICANT CHANGE UP (ref 40–120)
ALT FLD-CCNC: 40 U/L — SIGNIFICANT CHANGE UP (ref 10–45)
ANION GAP SERPL CALC-SCNC: 14 MMOL/L — SIGNIFICANT CHANGE UP (ref 5–17)
APTT BLD: 28.9 SEC — SIGNIFICANT CHANGE UP (ref 27.5–36.3)
AST SERPL-CCNC: 36 U/L — SIGNIFICANT CHANGE UP (ref 10–40)
BILIRUB SERPL-MCNC: 0.8 MG/DL — SIGNIFICANT CHANGE UP (ref 0.2–1.2)
BUN SERPL-MCNC: 14 MG/DL — SIGNIFICANT CHANGE UP (ref 7–23)
CALCIUM SERPL-MCNC: 8.9 MG/DL — SIGNIFICANT CHANGE UP (ref 8.4–10.5)
CHLORIDE SERPL-SCNC: 99 MMOL/L — SIGNIFICANT CHANGE UP (ref 96–108)
CO2 SERPL-SCNC: 25 MMOL/L — SIGNIFICANT CHANGE UP (ref 22–31)
CREAT SERPL-MCNC: 1.16 MG/DL — SIGNIFICANT CHANGE UP (ref 0.5–1.3)
CRP SERPL-MCNC: 4.58 UG/ML — SIGNIFICANT CHANGE UP (ref 0–40)
CULTURE RESULTS: SIGNIFICANT CHANGE UP
CULTURE RESULTS: SIGNIFICANT CHANGE UP
D DIMER BLD IA.RAPID-MCNC: 333 NG/ML DDU — HIGH
FERRITIN SERPL-MCNC: 430 NG/ML — HIGH (ref 15–150)
GLUCOSE BLDC GLUCOMTR-MCNC: 141 MG/DL — HIGH (ref 70–99)
GLUCOSE BLDC GLUCOMTR-MCNC: 149 MG/DL — HIGH (ref 70–99)
GLUCOSE BLDC GLUCOMTR-MCNC: 188 MG/DL — HIGH (ref 70–99)
GLUCOSE BLDC GLUCOMTR-MCNC: 196 MG/DL — HIGH (ref 70–99)
GLUCOSE SERPL-MCNC: 152 MG/DL — HIGH (ref 70–99)
HCT VFR BLD CALC: 44.4 % — SIGNIFICANT CHANGE UP (ref 34.5–45)
HGB BLD-MCNC: 13.9 G/DL — SIGNIFICANT CHANGE UP (ref 11.5–15.5)
INR BLD: 1.2 RATIO — HIGH (ref 0.88–1.16)
MAGNESIUM SERPL-MCNC: 2.1 MG/DL — SIGNIFICANT CHANGE UP (ref 1.6–2.6)
MCHC RBC-ENTMCNC: 23.9 PG — LOW (ref 27–34)
MCHC RBC-ENTMCNC: 31.3 GM/DL — LOW (ref 32–36)
MCV RBC AUTO: 76.4 FL — LOW (ref 80–100)
NRBC # BLD: 0 /100 WBCS — SIGNIFICANT CHANGE UP (ref 0–0)
PHOSPHATE SERPL-MCNC: 2.6 MG/DL — SIGNIFICANT CHANGE UP (ref 2.5–4.5)
PLATELET # BLD AUTO: 473 K/UL — HIGH (ref 150–400)
POTASSIUM SERPL-MCNC: 3.7 MMOL/L — SIGNIFICANT CHANGE UP (ref 3.5–5.3)
POTASSIUM SERPL-SCNC: 3.7 MMOL/L — SIGNIFICANT CHANGE UP (ref 3.5–5.3)
PROT SERPL-MCNC: 7.3 G/DL — SIGNIFICANT CHANGE UP (ref 6–8.3)
PROTHROM AB SERPL-ACNC: 13.8 SEC — HIGH (ref 10–13.1)
RBC # BLD: 5.81 M/UL — HIGH (ref 3.8–5.2)
RBC # FLD: 15.4 % — HIGH (ref 10.3–14.5)
SODIUM SERPL-SCNC: 138 MMOL/L — SIGNIFICANT CHANGE UP (ref 135–145)
SPECIMEN SOURCE: SIGNIFICANT CHANGE UP
SPECIMEN SOURCE: SIGNIFICANT CHANGE UP
WBC # BLD: 6.49 K/UL — SIGNIFICANT CHANGE UP (ref 3.8–10.5)
WBC # FLD AUTO: 6.49 K/UL — SIGNIFICANT CHANGE UP (ref 3.8–10.5)

## 2020-05-09 PROCEDURE — 99233 SBSQ HOSP IP/OBS HIGH 50: CPT | Mod: CS,GC

## 2020-05-09 RX ORDER — REMDESIVIR 5 MG/ML
200 INJECTION INTRAVENOUS EVERY 24 HOURS
Refills: 0 | Status: COMPLETED | OUTPATIENT
Start: 2020-05-09 | End: 2020-05-09

## 2020-05-09 RX ORDER — REMDESIVIR 5 MG/ML
INJECTION INTRAVENOUS
Refills: 0 | Status: COMPLETED | OUTPATIENT
Start: 2020-05-09 | End: 2020-05-13

## 2020-05-09 RX ORDER — REMDESIVIR 5 MG/ML
100 INJECTION INTRAVENOUS EVERY 24 HOURS
Refills: 0 | Status: COMPLETED | OUTPATIENT
Start: 2020-05-10 | End: 2020-05-13

## 2020-05-09 RX ADMIN — Medication 1: at 12:38

## 2020-05-09 RX ADMIN — Medication 100 MILLIGRAM(S): at 14:14

## 2020-05-09 RX ADMIN — Medication 10 MILLIGRAM(S): at 21:23

## 2020-05-09 RX ADMIN — Medication 100 MILLIGRAM(S): at 07:02

## 2020-05-09 RX ADMIN — Medication 10 MILLIGRAM(S): at 14:14

## 2020-05-09 RX ADMIN — Medication 500000 UNIT(S): at 11:59

## 2020-05-09 RX ADMIN — INSULIN GLARGINE 10 UNIT(S): 100 INJECTION, SOLUTION SUBCUTANEOUS at 22:49

## 2020-05-09 RX ADMIN — Medication 100 MILLIGRAM(S): at 21:23

## 2020-05-09 RX ADMIN — Medication 10 MILLIGRAM(S): at 07:04

## 2020-05-09 RX ADMIN — Medication 500000 UNIT(S): at 07:04

## 2020-05-09 RX ADMIN — PIPERACILLIN AND TAZOBACTAM 25 GRAM(S): 4; .5 INJECTION, POWDER, LYOPHILIZED, FOR SOLUTION INTRAVENOUS at 07:04

## 2020-05-09 RX ADMIN — ATORVASTATIN CALCIUM 80 MILLIGRAM(S): 80 TABLET, FILM COATED ORAL at 21:22

## 2020-05-09 RX ADMIN — LATANOPROST 1 DROP(S): 0.05 SOLUTION/ DROPS OPHTHALMIC; TOPICAL at 22:43

## 2020-05-09 RX ADMIN — Medication 200 MILLIGRAM(S): at 12:00

## 2020-05-09 RX ADMIN — ENOXAPARIN SODIUM 40 MILLIGRAM(S): 100 INJECTION SUBCUTANEOUS at 11:59

## 2020-05-09 RX ADMIN — Medication 500000 UNIT(S): at 17:19

## 2020-05-09 RX ADMIN — REMDESIVIR 500 MILLIGRAM(S): 5 INJECTION INTRAVENOUS at 15:07

## 2020-05-09 RX ADMIN — Medication 81 MILLIGRAM(S): at 11:59

## 2020-05-09 RX ADMIN — ATENOLOL 25 MILLIGRAM(S): 25 TABLET ORAL at 07:01

## 2020-05-09 NOTE — PROGRESS NOTE ADULT - SUBJECTIVE AND OBJECTIVE BOX
Subjective: Patient seen and examined. No new events except as noted.   started on Remdesivir   +cough     REVIEW OF SYSTEMS:    CONSTITUTIONAL:+ weakness, fevers or chills  EYES/ENT: No visual changes;  No vertigo or throat pain   NECK: No pain or stiffness  RESPIRATORY: + cough, wheezing, hemoptysis; +shortness of breath  CARDIOVASCULAR: No chest pain or palpitations  GASTROINTESTINAL: No abdominal or epigastric pain. No nausea, vomiting, or hematemesis; No diarrhea or constipation. No melena or hematochezia.  GENITOURINARY: No dysuria, frequency or hematuria  NEUROLOGICAL: No numbness or weakness  SKIN: No itching, burning, rashes, or lesions   All other review of systems is negative unless indicated above.    MEDICATIONS:  MEDICATIONS  (STANDING):  aspirin  chewable 81 milliGRAM(s) Oral daily  ATENolol  Tablet 25 milliGRAM(s) Oral daily  atorvastatin 80 milliGRAM(s) Oral at bedtime  benzonatate 100 milliGRAM(s) Oral three times a day  dextrose 5%. 1000 milliLiter(s) (50 mL/Hr) IV Continuous <Continuous>  dextrose 50% Injectable 12.5 Gram(s) IV Push once  dextrose 50% Injectable 25 Gram(s) IV Push once  dextrose 50% Injectable 25 Gram(s) IV Push once  enoxaparin Injectable 40 milliGRAM(s) SubCutaneous daily  hydrALAZINE 10 milliGRAM(s) Oral three times a day  insulin glargine Injectable (LANTUS) 10 Unit(s) SubCutaneous at bedtime  insulin lispro (HumaLOG) corrective regimen sliding scale   SubCutaneous three times a day before meals  insulin lispro (HumaLOG) corrective regimen sliding scale   SubCutaneous at bedtime  latanoprost 0.005% Ophthalmic Solution 1 Drop(s) Both EYES at bedtime  nystatin    Suspension 288460 Unit(s) Oral four times a day  remdesivir  IVPB   IV Intermittent   sodium chloride 0.9%. 1000 milliLiter(s) (75 mL/Hr) IV Continuous <Continuous>      PHYSICAL EXAM:  T(C): 37.6 (05-09-20 @ 20:24), Max: 37.6 (05-09-20 @ 20:24)  HR: 87 (05-09-20 @ 20:24) (73 - 88)  BP: 168/70 (05-09-20 @ 20:24) (145/72 - 168/70)  RR: 19 (05-09-20 @ 20:24) (18 - 19)  SpO2: 92% (05-09-20 @ 20:24) (92% - 96%)  Wt(kg): --  I&O's Summary    08 May 2020 07:01  -  09 May 2020 07:00  --------------------------------------------------------  IN: 420 mL / OUT: 250 mL / NET: 170 mL    09 May 2020 07:01  -  09 May 2020 22:23  --------------------------------------------------------  IN: 890 mL / OUT: 1225 mL / NET: -335 mL          Appearance: NAD	  HEENT:  Dry  oral mucosa, PERRL, EOMI	  Lymphatic: No lymphadenopathy , no edema  Cardiovascular: Normal S1 S2, No JVD, No murmurs , Peripheral pulses palpable 2+ bilaterally  Respiratory: decreased bs   Gastrointestinal:  Soft, Non-tender, + BS	  Skin: No rashes, No ecchymoses, No cyanosis, warm to touch  Musculoskeletal: Normal range of motion, normal strength  Psychiatry:  Mood & affect appropriate  Ext: No edema      LABS:    CARDIAC MARKERS:                                13.9   6.49  )-----------( 473      ( 09 May 2020 06:00 )             44.4     05-09    138  |  99  |  14  ----------------------------<  152<H>  3.7   |  25  |  1.16    Ca    8.9      09 May 2020 06:00  Phos  2.6     05-09  Mg     2.1     05-09    TPro  7.3  /  Alb  2.6<L>  /  TBili  0.8  /  DBili  x   /  AST  36  /  ALT  40  /  AlkPhos  75  05-09    proBNP:   Lipid Profile:   HgA1c:   TSH:     1  0          TELEMETRY: 	    ECG:  	  RADIOLOGY:   DIAGNOSTIC TESTING:  [ ] Echocardiogram:  [ ]  Catheterization:  [ ] Stress Test:    OTHER:

## 2020-05-09 NOTE — PROGRESS NOTE ADULT - PROBLEM SELECTOR PLAN 1
COVID infection   started on Remdesivir  completed Plaquenil  trend markers   supplemental oxygen   Albuterol inhalers.

## 2020-05-09 NOTE — PROGRESS NOTE ADULT - SUBJECTIVE AND OBJECTIVE BOX
Authored by Dr. Rueda, pager number [phone]. After 7pm, page [phone].    Patient is a 87y old  Female who presents with a chief complaint of Diarrhea (06 May 2020 14:22)    SUBJECTIVE / OVERNIGHT EVENTS: Overnight, no acute events.  PT w/o new or worsening complaints this AM, but Continues to report dry non-productive cough.  Afebrile x last 24 hrs    MEDICATIONS  (STANDING):  aspirin  chewable 81 milliGRAM(s) Oral daily  ATENolol  Tablet 25 milliGRAM(s) Oral daily  atorvastatin 80 milliGRAM(s) Oral at bedtime  benzonatate 100 milliGRAM(s) Oral three times a day  dextrose 5%. 1000 milliLiter(s) (50 mL/Hr) IV Continuous <Continuous>  dextrose 50% Injectable 12.5 Gram(s) IV Push once  dextrose 50% Injectable 25 Gram(s) IV Push once  dextrose 50% Injectable 25 Gram(s) IV Push once  enoxaparin Injectable 40 milliGRAM(s) SubCutaneous daily  hydrALAZINE 10 milliGRAM(s) Oral three times a day  insulin glargine Injectable (LANTUS) 10 Unit(s) SubCutaneous at bedtime  insulin lispro (HumaLOG) corrective regimen sliding scale   SubCutaneous three times a day before meals  insulin lispro (HumaLOG) corrective regimen sliding scale   SubCutaneous at bedtime  latanoprost 0.005% Ophthalmic Solution 1 Drop(s) Both EYES at bedtime  nystatin    Suspension 249431 Unit(s) Oral four times a day  remdesivir  IVPB   IV Intermittent   sodium chloride 0.9%. 1000 milliLiter(s) (75 mL/Hr) IV Continuous <Continuous>    MEDICATIONS  (PRN):  acetaminophen   Tablet .. 650 milliGRAM(s) Oral every 6 hours PRN Temp greater or equal to 38C (100.4F), Mild Pain (1 - 3)  dextrose 40% Gel 15 Gram(s) Oral once PRN Blood Glucose LESS THAN 70 milliGRAM(s)/deciliter  glucagon  Injectable 1 milliGRAM(s) IntraMuscular once PRN Glucose LESS THAN 70 milligrams/deciliter  guaiFENesin   Syrup  (Sugar-Free) 200 milliGRAM(s) Oral every 6 hours PRN Cough  ondansetron Injectable 4 milliGRAM(s) IV Push every 6 hours PRN Nausea and/or Vomiting    Vital Signs Last 24 Hrs  T(C): 36.7 (09 May 2020 05:26), Max: 37.9 (08 May 2020 22:09)  T(F): 98 (09 May 2020 05:26), Max: 100.2 (08 May 2020 22:09)  HR: 82 (09 May 2020 05:26) (82 - 88)  BP: 153/74 (09 May 2020 05:26) (153/74 - 175/83)  BP(mean): --  RR: 18 (09 May 2020 05:26) (18 - 22)  SpO2: 94% (09 May 2020 05:26) (94% - 100%)      05-08-20 @ 07:01  -  05-09-20 @ 07:00  --------------------------------------------------------  IN: 420 mL / OUT: 250 mL / NET: 170 mL    05-09-20 @ 07:01  -  05-09-20 @ 14:08  --------------------------------------------------------  IN: 0 mL / OUT: 800 mL / NET: -800 mL    PHYSICAL EXAM:  GENERAL: NAD.  CHEST/LUNG: Breathing unlabored on 4L NC, continues to have non-productive cough.   HEART: Regular rate and rhythm  ABDOMEN: Soft, Nontender, Nondistended  EXTREMITIES:  2+ Peripheral Pulses No cyanosis or edema  PSYCH: Normal affect.  Neuro: AAO x4, attention and language intact.  Can follow 2 step crossed commands. 4/5 strength throughout.     LABS:                        13.9   6.49  )-----------( 473      ( 09 May 2020 06:00 )             44.4   05-09    138  |  99  |  14  ----------------------------<  152<H>  3.7   |  25  |  1.16    Ca    8.9      09 May 2020 06:00  Phos  2.6     05-09  Mg     2.1     05-09    TPro  7.3  /  Alb  2.6<L>  /  TBili  0.8  /  DBili  x   /  AST  36  /  ALT  40  /  AlkPhos  75  05-09    PT/INR - ( 09 May 2020 08:24 )   PT: 13.8 sec;   INR: 1.20 ratio PTT - ( 09 May 2020 08:24 )  PTT:28.9 sec    COVID-19 LABS  04/26/20 COVID-PCR Nasal Swab + as outpatient.  05/08/20 COVID-PCR Nasal Swab +     D-Dimer Assay, Quantitative: 333 ng/mL DDU <H> (05-09)  D-Dimer Assay, Quantitative: 582 ng/mL DDU <H> (05-06)  D-Dimer Assay, Quantitative: 521 ng/mL DDU <H> (05-03)    Ferritin, Serum: 430 ng/mL <H> (05-09)  Ferritin, Serum: 510 ng/mL <H> (05-07)  Ferritin, Serum: 433 ng/mL <H> (05-06)  Ferritin, Serum: 499 ng/mL <H> (05-03)    C-Reactive Protein, Serum: 4.58 ug/mL (05-09)  C-Reactive Protein, Serum: 6.70 ug/mL (05-07)  C-Reactive Protein, Serum: 6.60 mg/dL <H> (05-06)  C-Reactive Protein, Serum: 8.45 mg/dL <H> (05-03)    Auto Lymphocyte #: 1.67 K/uL (05-07)  Auto Lymphocyte #: 1.41 K/uL (05-06)  Auto Lymphocyte #: 0.34 K/uL <L> (05-03)    Pro-Calcitonin (Superimposed bacterial infection)   Procalcitonin, Serum: 24.20 ng/mL <H> (05-05)  Procalcitonin, Serum: 41.79 ng/mL <H> (05-04)  Procalcitonin, Serum: 48.08 ng/mL <H> (05-03)    Meds Received  Azithromycin as outpatient x 5 days.   Hydroxychloroquine + Famotidine 05/04-05/06 (Un-enrolled 2* YEIMI)  Convalescent plasma x 1 dose 05/07/20  Discussed with ID and will start remdesivir x 5 days under [facility] 05/09-05/13

## 2020-05-09 NOTE — PROGRESS NOTE ADULT - ASSESSMENT
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     S/p COVID + at urgent care 04/26/20 s/p azithromycin x 5 days, famotidine + HCG x 2 days, convalescent plasma x1

## 2020-05-09 NOTE — PROGRESS NOTE ADULT - PROBLEM SELECTOR PLAN 2
Acute hypoxic respiratory failure 2* COVID vs. less likely superimposed bacterial PNA.  -Procal elevated, repeat still high but was downtrending.  Was started on Zosyn + Vancomycin 05/04/20 empirically.  ID c/s and less likely suspicion for superimposed bacterial PNA.   -MRSA (-)-->Vanco d/c 05/05/20  -Zosyn 05/04/20 x 5 days.  Discontinued 05/09  -SaO2 >91% on 4L, stable but also not improving.    -Weaning trial 1L per 1 hr.  If SpO2 >88% and no respiratory distress, can continue weaning trial.

## 2020-05-09 NOTE — PROGRESS NOTE ADULT - PROBLEM SELECTOR PLAN 1
COVID + at urgent care facility 04/26/20, repeat COVID-19 nasal PCR 05/08 +.  On admission, febrile to Tmax 104.5F and hypoxic on ambulation, requiring 5L.   S/P Azithromycin x 5 days (for superimposed bacterial PNA) as outpatient.  S/P Famotidine + Hydroxychloroquine x 2 days    S/P Convalescent plasma x1 (05/07/20)   D/W patient, family, ID and pharmacy regarding emergency authorization use and will start.  Daily CMP monitoring for LFT and renal function given recent YEIMI.   - Afebrile in last 24 hrs.  Still on 4L NC at rest and with ambulation.    - ID following, appreciate recs.  - Supportive     -Tessalon pearls and Robitussin PRN for cough.   - D-dimer elevation, no PE on CTA PE study.  Based on algorithm with Cr Cl of 87 and BMI <30, PT currently on Lovenox 40mg daily.  While appreciate higher risk of PE in age population and per D-dimer from cardiology, for now will keep on lower dose.  Will trend D-dimer and consider higher dosing if needed.    -Trend CRP, Ferritin, D-dimer QOD if progressive worsening  -Trend LFT as slight uptrend since starting hydroxychloroquine.

## 2020-05-10 LAB
ALBUMIN SERPL ELPH-MCNC: 2.9 G/DL — LOW (ref 3.3–5)
ALP SERPL-CCNC: 73 U/L — SIGNIFICANT CHANGE UP (ref 40–120)
ALT FLD-CCNC: 33 U/L — SIGNIFICANT CHANGE UP (ref 10–45)
ANION GAP SERPL CALC-SCNC: 13 MMOL/L — SIGNIFICANT CHANGE UP (ref 5–17)
AST SERPL-CCNC: 28 U/L — SIGNIFICANT CHANGE UP (ref 10–40)
BILIRUB SERPL-MCNC: 0.5 MG/DL — SIGNIFICANT CHANGE UP (ref 0.2–1.2)
BUN SERPL-MCNC: 15 MG/DL — SIGNIFICANT CHANGE UP (ref 7–23)
CALCIUM SERPL-MCNC: 9.3 MG/DL — SIGNIFICANT CHANGE UP (ref 8.4–10.5)
CHLORIDE SERPL-SCNC: 100 MMOL/L — SIGNIFICANT CHANGE UP (ref 96–108)
CO2 SERPL-SCNC: 27 MMOL/L — SIGNIFICANT CHANGE UP (ref 22–31)
CREAT SERPL-MCNC: 0.94 MG/DL — SIGNIFICANT CHANGE UP (ref 0.5–1.3)
GLUCOSE BLDC GLUCOMTR-MCNC: 119 MG/DL — HIGH (ref 70–99)
GLUCOSE BLDC GLUCOMTR-MCNC: 143 MG/DL — HIGH (ref 70–99)
GLUCOSE BLDC GLUCOMTR-MCNC: 198 MG/DL — HIGH (ref 70–99)
GLUCOSE BLDC GLUCOMTR-MCNC: 225 MG/DL — HIGH (ref 70–99)
GLUCOSE SERPL-MCNC: 172 MG/DL — HIGH (ref 70–99)
HCT VFR BLD CALC: 45.5 % — HIGH (ref 34.5–45)
HGB BLD-MCNC: 14.1 G/DL — SIGNIFICANT CHANGE UP (ref 11.5–15.5)
MAGNESIUM SERPL-MCNC: 2 MG/DL — SIGNIFICANT CHANGE UP (ref 1.6–2.6)
MCHC RBC-ENTMCNC: 23.9 PG — LOW (ref 27–34)
MCHC RBC-ENTMCNC: 31 GM/DL — LOW (ref 32–36)
MCV RBC AUTO: 77.1 FL — LOW (ref 80–100)
NRBC # BLD: 0 /100 WBCS — SIGNIFICANT CHANGE UP (ref 0–0)
PHOSPHATE SERPL-MCNC: 2.4 MG/DL — LOW (ref 2.5–4.5)
PLATELET # BLD AUTO: 474 K/UL — HIGH (ref 150–400)
POTASSIUM SERPL-MCNC: 4.3 MMOL/L — SIGNIFICANT CHANGE UP (ref 3.5–5.3)
POTASSIUM SERPL-SCNC: 4.3 MMOL/L — SIGNIFICANT CHANGE UP (ref 3.5–5.3)
PROT SERPL-MCNC: 7.3 G/DL — SIGNIFICANT CHANGE UP (ref 6–8.3)
RBC # BLD: 5.9 M/UL — HIGH (ref 3.8–5.2)
RBC # FLD: 15.6 % — HIGH (ref 10.3–14.5)
SODIUM SERPL-SCNC: 140 MMOL/L — SIGNIFICANT CHANGE UP (ref 135–145)
WBC # BLD: 7.09 K/UL — SIGNIFICANT CHANGE UP (ref 3.8–10.5)
WBC # FLD AUTO: 7.09 K/UL — SIGNIFICANT CHANGE UP (ref 3.8–10.5)

## 2020-05-10 PROCEDURE — 99232 SBSQ HOSP IP/OBS MODERATE 35: CPT | Mod: CS,GC

## 2020-05-10 PROCEDURE — 93010 ELECTROCARDIOGRAM REPORT: CPT

## 2020-05-10 RX ADMIN — ENOXAPARIN SODIUM 40 MILLIGRAM(S): 100 INJECTION SUBCUTANEOUS at 11:43

## 2020-05-10 RX ADMIN — Medication 10 MILLIGRAM(S): at 23:10

## 2020-05-10 RX ADMIN — Medication 500000 UNIT(S): at 04:39

## 2020-05-10 RX ADMIN — Medication 10 MILLIGRAM(S): at 15:06

## 2020-05-10 RX ADMIN — Medication 500000 UNIT(S): at 23:24

## 2020-05-10 RX ADMIN — INSULIN GLARGINE 10 UNIT(S): 100 INJECTION, SOLUTION SUBCUTANEOUS at 22:30

## 2020-05-10 RX ADMIN — Medication 100 MILLIGRAM(S): at 04:39

## 2020-05-10 RX ADMIN — Medication 10 MILLIGRAM(S): at 04:39

## 2020-05-10 RX ADMIN — Medication 2: at 11:43

## 2020-05-10 RX ADMIN — Medication 500000 UNIT(S): at 11:42

## 2020-05-10 RX ADMIN — ATENOLOL 25 MILLIGRAM(S): 25 TABLET ORAL at 04:39

## 2020-05-10 RX ADMIN — Medication 500000 UNIT(S): at 01:30

## 2020-05-10 RX ADMIN — ATORVASTATIN CALCIUM 80 MILLIGRAM(S): 80 TABLET, FILM COATED ORAL at 23:10

## 2020-05-10 RX ADMIN — REMDESIVIR 500 MILLIGRAM(S): 5 INJECTION INTRAVENOUS at 15:22

## 2020-05-10 RX ADMIN — Medication 100 MILLIGRAM(S): at 23:10

## 2020-05-10 RX ADMIN — Medication 500000 UNIT(S): at 17:17

## 2020-05-10 RX ADMIN — Medication 100 MILLIGRAM(S): at 15:07

## 2020-05-10 RX ADMIN — LATANOPROST 1 DROP(S): 0.05 SOLUTION/ DROPS OPHTHALMIC; TOPICAL at 23:24

## 2020-05-10 RX ADMIN — Medication 81 MILLIGRAM(S): at 11:42

## 2020-05-10 NOTE — PROGRESS NOTE ADULT - PROBLEM SELECTOR PLAN 2
Acute hypoxic respiratory failure 2* COVID vs. less likely superimposed bacterial PNA.  -Procal elevated, repeat still high but was downtrending.  Was started on Zosyn + Vancomycin 05/04/20 empirically.  ID c/s and less likely suspicion for superimposed bacterial PNA.   -MRSA (-)-->Vanco d/c 05/05/20  -Zosyn 05/04/20 x 5 days.  Discontinued 05/09  -SaO2 >91% on 4L, stable but also not improving.    -Weaning trial 1L per 1 hr.  If SpO2 >88% and no respiratory distress, can continue weaning trial.  -D/W ID, no further concern for bacterial superimposed infection at this time.

## 2020-05-10 NOTE — PROGRESS NOTE ADULT - PROBLEM SELECTOR PLAN 8
At home on 100 losartan.  BP here in 160s 170s. Holding in setting of YEIMI.  -Hydralazine standing and PRN  -Losartan was on hold due to YEIMI.  Will consider switching back once done with remdesivir as now Cr holding steady.

## 2020-05-10 NOTE — PROGRESS NOTE ADULT - SUBJECTIVE AND OBJECTIVE BOX
Authored by Dr. Rueda, pager number [phone]. After 7pm, page [phone].    Patient is a 87y old  Female who presents with a chief complaint of Diarrhea (06 May 2020 14:22)    SUBJECTIVE / OVERNIGHT EVENTS: Overnight, no acute events.  PT w/o new or worsening complaints this AM, but Continues to report dry non-productive cough.  Afebrile x last 24 hrs.  Doing well now on remdesivir trial.  Improved tolerating diet.    MEDICATIONS  (STANDING):  aspirin  chewable 81 milliGRAM(s) Oral daily  ATENolol  Tablet 25 milliGRAM(s) Oral daily  atorvastatin 80 milliGRAM(s) Oral at bedtime  benzonatate 100 milliGRAM(s) Oral three times a day  dextrose 5%. 1000 milliLiter(s) (50 mL/Hr) IV Continuous <Continuous>  dextrose 50% Injectable 12.5 Gram(s) IV Push once  dextrose 50% Injectable 25 Gram(s) IV Push once  dextrose 50% Injectable 25 Gram(s) IV Push once  enoxaparin Injectable 40 milliGRAM(s) SubCutaneous daily  hydrALAZINE 10 milliGRAM(s) Oral three times a day  insulin glargine Injectable (LANTUS) 10 Unit(s) SubCutaneous at bedtime  insulin lispro (HumaLOG) corrective regimen sliding scale   SubCutaneous three times a day before meals  insulin lispro (HumaLOG) corrective regimen sliding scale   SubCutaneous at bedtime  latanoprost 0.005% Ophthalmic Solution 1 Drop(s) Both EYES at bedtime  nystatin    Suspension 158518 Unit(s) Oral four times a day  remdesivir  IVPB   IV Intermittent   remdesivir  IVPB 100 milliGRAM(s) IV Intermittent every 24 hours  sodium chloride 0.9%. 1000 milliLiter(s) (75 mL/Hr) IV Continuous <Continuous>    MEDICATIONS  (PRN):  acetaminophen   Tablet .. 650 milliGRAM(s) Oral every 6 hours PRN Temp greater or equal to 38C (100.4F), Mild Pain (1 - 3)  dextrose 40% Gel 15 Gram(s) Oral once PRN Blood Glucose LESS THAN 70 milliGRAM(s)/deciliter  glucagon  Injectable 1 milliGRAM(s) IntraMuscular once PRN Glucose LESS THAN 70 milligrams/deciliter  guaiFENesin   Syrup  (Sugar-Free) 200 milliGRAM(s) Oral every 6 hours PRN Cough  ondansetron Injectable 4 milliGRAM(s) IV Push every 6 hours PRN Nausea and/or Vomiting    Vital Signs Last 24 Hrs  T(C): 36.8 (10 May 2020 05:41), Max: 37.6 (09 May 2020 20:24)  T(F): 98.3 (10 May 2020 05:41), Max: 99.7 (09 May 2020 20:24)  HR: 87 (10 May 2020 05:41) (73 - 87)  BP: 152/67 (10 May 2020 06:41) (145/72 - 168/70)  BP(mean): --  RR: 18 (10 May 2020 05:41) (18 - 19)  SpO2: 94% (10 May 2020 05:41) (92% - 96%)      05-09-20 @ 07:01  -  05-10-20 @ 07:00  --------------------------------------------------------  IN: 890 mL / OUT: 2250 mL / NET: -1360 mL    05-10-20 @ 07:01  -  05-10-20 @ 13:52  --------------------------------------------------------  IN: 0 mL / OUT: 100 mL / NET: -100 mL    PHYSICAL EXAM:  GENERAL: NAD.  CHEST/LUNG: Breathing unlabored on 4L NC, continues to have non-productive cough.   HEART: Regular rate and rhythm  ABDOMEN: Soft, Nontender, Nondistended  EXTREMITIES:  2+ Peripheral Pulses No cyanosis or edema  PSYCH: Normal affect.  Neuro: AAO x4, attention and language intact.  Can follow 2 step crossed commands. 4/5 strength throughout.     LABS:                        14.1   7.09  )-----------( 474      ( 10 May 2020 09:44 )             45.5   05-10    140  |  100  |  15  ----------------------------<  172<H>  4.3   |  27  |  0.94    Ca    9.3      10 May 2020 09:44  Phos  2.4     05-10  Mg     2.0     05-10    TPro  7.3  /  Alb  2.9<L>  /  TBili  0.5  /  DBili  x   /  AST  28  /  ALT  33  /  AlkPhos  73  05-10             PT/INR - ( 09 May 2020 08:24 )   PT: 13.8 sec;   INR: 1.20 ratio  PTT - ( 09 May 2020 08:24 )  PTT:28.9 sec    COVID-19 LABS  04/26/20 COVID-PCR Nasal Swab + as outpatient.  05/08/20 COVID-PCR Nasal Swab +     D-Dimer Assay, Quantitative: 333 ng/mL DDU <H> (05-09)  D-Dimer Assay, Quantitative: 582 ng/mL DDU <H> (05-06)  D-Dimer Assay, Quantitative: 521 ng/mL DDU <H> (05-03)    Ferritin, Serum: 430 ng/mL <H> (05-09)  Ferritin, Serum: 510 ng/mL <H> (05-07)  Ferritin, Serum: 433 ng/mL <H> (05-06)  Ferritin, Serum: 499 ng/mL <H> (05-03)    C-Reactive Protein, Serum: 4.58 ug/mL (05-09)  C-Reactive Protein, Serum: 6.70 ug/mL (05-07)  C-Reactive Protein, Serum: 6.60 mg/dL <H> (05-06)  C-Reactive Protein, Serum: 8.45 mg/dL <H> (05-03)    Auto Lymphocyte #: 1.67 K/uL (05-07)  Auto Lymphocyte #: 1.41 K/uL (05-06)  Auto Lymphocyte #: 0.34 K/uL <L> (05-03)    Pro-Calcitonin (Superimposed bacterial infection)   Procalcitonin, Serum: 24.20 ng/mL <H> (05-05)  Procalcitonin, Serum: 41.79 ng/mL <H> (05-04)  Procalcitonin, Serum: 48.08 ng/mL <H> (05-03)    CT Angio Chest w/ IV Cont (05.04.20 @ 10:00)   Chest:  1.  No pulmonary embolus, however, the segmental and subsegmental vessels are not well evaluated secondary to respiratory motion.  2.  Bilateral opacities most pronounced the left upper lobe can occur in the setting of atypical viral infection/lung injury.    Abdomen and pelvis:  1.  No bowel obstruction.  2.  Bilateral renal lesions are most compatible with cysts.  3.  The appendix is normal.    Meds Received  Azithromycin as outpatient x 5 days.   Hydroxychloroquine + Famotidine 05/04-05/06 (Un-enrolled 2* YEIMI)  Convalescent plasma x 1 dose 05/07/20  Remdesivir under EAU 05/09-05/13

## 2020-05-10 NOTE — PROGRESS NOTE ADULT - PROBLEM SELECTOR PLAN 7
Insulin sliding scale  Finger sticks q6h +qAC and qHS  -Started lantus 10u in setting of persistent POCT >200

## 2020-05-10 NOTE — PROGRESS NOTE ADULT - PROBLEM SELECTOR PLAN 6
Cr BL 0.8 on admission. Possibly pre renal in setting of dehydration. Cr now returning to normal.   -no hydronephrosis on CT A/P 5/4.  -hyperK false 2/2 hemolysis.  -No volume overload on exam.

## 2020-05-10 NOTE — PROGRESS NOTE ADULT - PROBLEM SELECTOR PLAN 1
COVID infection   on Remdesivir  completed Plaquenil  trend markers   supplemental oxygen   Albuterol inhalers.

## 2020-05-10 NOTE — PROGRESS NOTE ADULT - SUBJECTIVE AND OBJECTIVE BOX
Subjective: Patient seen and examined. No new events except as noted.   resting comfortably   no cp or sob     REVIEW OF SYSTEMS:    CONSTITUTIONAL: + weakness, fevers or chills  EYES/ENT: No visual changes;  No vertigo or throat pain   NECK: No pain or stiffness  RESPIRATORY: No cough, wheezing, hemoptysis; No shortness of breath  CARDIOVASCULAR: No chest pain or palpitations  GASTROINTESTINAL: No abdominal or epigastric pain. No nausea, vomiting, or hematemesis; No diarrhea or constipation. No melena or hematochezia.  GENITOURINARY: No dysuria, frequency or hematuria  NEUROLOGICAL: No numbness or weakness  SKIN: No itching, burning, rashes, or lesions   All other review of systems is negative unless indicated above.    MEDICATIONS:  MEDICATIONS  (STANDING):  aspirin  chewable 81 milliGRAM(s) Oral daily  ATENolol  Tablet 25 milliGRAM(s) Oral daily  atorvastatin 80 milliGRAM(s) Oral at bedtime  benzonatate 100 milliGRAM(s) Oral three times a day  dextrose 5%. 1000 milliLiter(s) (50 mL/Hr) IV Continuous <Continuous>  dextrose 50% Injectable 12.5 Gram(s) IV Push once  dextrose 50% Injectable 25 Gram(s) IV Push once  dextrose 50% Injectable 25 Gram(s) IV Push once  enoxaparin Injectable 40 milliGRAM(s) SubCutaneous daily  hydrALAZINE 10 milliGRAM(s) Oral three times a day  insulin glargine Injectable (LANTUS) 10 Unit(s) SubCutaneous at bedtime  insulin lispro (HumaLOG) corrective regimen sliding scale   SubCutaneous three times a day before meals  insulin lispro (HumaLOG) corrective regimen sliding scale   SubCutaneous at bedtime  latanoprost 0.005% Ophthalmic Solution 1 Drop(s) Both EYES at bedtime  nystatin    Suspension 612800 Unit(s) Oral four times a day  remdesivir  IVPB   IV Intermittent   remdesivir  IVPB 100 milliGRAM(s) IV Intermittent every 24 hours  sodium chloride 0.9%. 1000 milliLiter(s) (75 mL/Hr) IV Continuous <Continuous>      PHYSICAL EXAM:  T(C): 36.8 (05-10-20 @ 05:41), Max: 37.6 (05-09-20 @ 20:24)  HR: 87 (05-10-20 @ 05:41) (73 - 87)  BP: 152/67 (05-10-20 @ 06:41) (145/72 - 168/70)  RR: 18 (05-10-20 @ 05:41) (18 - 19)  SpO2: 94% (05-10-20 @ 05:41) (92% - 96%)  Wt(kg): --  I&O's Summary    09 May 2020 07:01  -  10 May 2020 07:00  --------------------------------------------------------  IN: 890 mL / OUT: 2250 mL / NET: -1360 mL    10 May 2020 07:01  -  10 May 2020 10:03  --------------------------------------------------------  IN: 0 mL / OUT: 100 mL / NET: -100 mL          Appearance: NAD	  HEENT:  Dry  oral mucosa, PERRL, EOMI	  Lymphatic: No lymphadenopathy , no edema  Cardiovascular: Normal S1 S2, No JVD, No murmurs , Peripheral pulses palpable 2+ bilaterally  Respiratory: decreased bs   Gastrointestinal:  Soft, Non-tender, + BS	  Skin: No rashes, No ecchymoses, No cyanosis, warm to touch  Musculoskeletal: Normal range of motion, normal strength  Psychiatry:  Mood & affect appropriate  Ext: No edema      LABS:    CARDIAC MARKERS:                                14.1   7.09  )-----------( 474      ( 10 May 2020 09:44 )             45.5     05-09    138  |  99  |  14  ----------------------------<  152<H>  3.7   |  25  |  1.16    Ca    8.9      09 May 2020 06:00  Phos  2.6     05-09  Mg     2.1     05-09    TPro  7.3  /  Alb  2.6<L>  /  TBili  0.8  /  DBili  x   /  AST  36  /  ALT  40  /  AlkPhos  75  05-09    proBNP:   Lipid Profile:   HgA1c:   TSH:               TELEMETRY: 	    ECG:  	  RADIOLOGY:   DIAGNOSTIC TESTING:  [ ] Echocardiogram:  [ ]  Catheterization:  [ ] Stress Test:    OTHER:

## 2020-05-10 NOTE — PROGRESS NOTE ADULT - PROBLEM SELECTOR PLAN 3
-COVID vs. other superimposed bacterial PNA with left lung consolidation vs diarrhea/n/v (resolved.)  -CT C/A/P w/o clear evidence of abdominal/pelvic infection-->presumed bacterial PNA.  -Blood cx 5/4 NGTD  -con't zosyn until 05/09/2020 (total 5 day course) -COVID vs. other superimposed bacterial PNA with left lung consolidation vs diarrhea/n/v (resolved.)  -CT C/A/P w/o clear evidence of abdominal/pelvic infection-->presumed bacterial PNA.  -Blood cx 5/4 NGTD  - s/p zosyn until 05/09/2020 (total 5 day course)

## 2020-05-10 NOTE — PROGRESS NOTE ADULT - ASSESSMENT
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     S/p COVID + at urgent care 04/26/20 s/p azithromycin x 5 days, famotidine + HCQ x 2 days, convalescent plasma x1 now on remdesivir

## 2020-05-11 LAB
ALBUMIN SERPL ELPH-MCNC: 2.7 G/DL — LOW (ref 3.3–5)
ALP SERPL-CCNC: 69 U/L — SIGNIFICANT CHANGE UP (ref 40–120)
ALT FLD-CCNC: 26 U/L — SIGNIFICANT CHANGE UP (ref 10–45)
ANION GAP SERPL CALC-SCNC: 15 MMOL/L — SIGNIFICANT CHANGE UP (ref 5–17)
AST SERPL-CCNC: 24 U/L — SIGNIFICANT CHANGE UP (ref 10–40)
BASOPHILS # BLD AUTO: 0.06 K/UL — SIGNIFICANT CHANGE UP (ref 0–0.2)
BASOPHILS NFR BLD AUTO: 0.9 % — SIGNIFICANT CHANGE UP (ref 0–2)
BILIRUB SERPL-MCNC: 0.5 MG/DL — SIGNIFICANT CHANGE UP (ref 0.2–1.2)
BUN SERPL-MCNC: 13 MG/DL — SIGNIFICANT CHANGE UP (ref 7–23)
CALCIUM SERPL-MCNC: 9.3 MG/DL — SIGNIFICANT CHANGE UP (ref 8.4–10.5)
CHLORIDE SERPL-SCNC: 103 MMOL/L — SIGNIFICANT CHANGE UP (ref 96–108)
CO2 SERPL-SCNC: 24 MMOL/L — SIGNIFICANT CHANGE UP (ref 22–31)
CREAT SERPL-MCNC: 0.83 MG/DL — SIGNIFICANT CHANGE UP (ref 0.5–1.3)
CRP SERPL-MCNC: 3.09 MG/DL — HIGH (ref 0–0.4)
D DIMER BLD IA.RAPID-MCNC: 481 NG/ML DDU — HIGH
EOSINOPHIL # BLD AUTO: 0 K/UL — SIGNIFICANT CHANGE UP (ref 0–0.5)
EOSINOPHIL NFR BLD AUTO: 0 % — SIGNIFICANT CHANGE UP (ref 0–6)
FERRITIN SERPL-MCNC: 404 NG/ML — HIGH (ref 15–150)
GLUCOSE BLDC GLUCOMTR-MCNC: 174 MG/DL — HIGH (ref 70–99)
GLUCOSE BLDC GLUCOMTR-MCNC: 186 MG/DL — HIGH (ref 70–99)
GLUCOSE BLDC GLUCOMTR-MCNC: 187 MG/DL — HIGH (ref 70–99)
GLUCOSE BLDC GLUCOMTR-MCNC: 191 MG/DL — HIGH (ref 70–99)
GLUCOSE SERPL-MCNC: 170 MG/DL — HIGH (ref 70–99)
HCT VFR BLD CALC: 43.5 % — SIGNIFICANT CHANGE UP (ref 34.5–45)
HGB BLD-MCNC: 14.5 G/DL — SIGNIFICANT CHANGE UP (ref 11.5–15.5)
LYMPHOCYTES # BLD AUTO: 1.23 K/UL — SIGNIFICANT CHANGE UP (ref 1–3.3)
LYMPHOCYTES # BLD AUTO: 17.2 % — SIGNIFICANT CHANGE UP (ref 13–44)
MAGNESIUM SERPL-MCNC: 1.8 MG/DL — SIGNIFICANT CHANGE UP (ref 1.6–2.6)
MCHC RBC-ENTMCNC: 25.3 PG — LOW (ref 27–34)
MCHC RBC-ENTMCNC: 33.3 GM/DL — SIGNIFICANT CHANGE UP (ref 32–36)
MCV RBC AUTO: 75.9 FL — LOW (ref 80–100)
MONOCYTES # BLD AUTO: 1.36 K/UL — HIGH (ref 0–0.9)
MONOCYTES NFR BLD AUTO: 19 % — HIGH (ref 2–14)
NEUTROPHILS # BLD AUTO: 4.5 K/UL — SIGNIFICANT CHANGE UP (ref 1.8–7.4)
NEUTROPHILS NFR BLD AUTO: 62.9 % — SIGNIFICANT CHANGE UP (ref 43–77)
PHOSPHATE SERPL-MCNC: 2.8 MG/DL — SIGNIFICANT CHANGE UP (ref 2.5–4.5)
PLATELET # BLD AUTO: 469 K/UL — HIGH (ref 150–400)
POTASSIUM SERPL-MCNC: 3.5 MMOL/L — SIGNIFICANT CHANGE UP (ref 3.5–5.3)
POTASSIUM SERPL-SCNC: 3.5 MMOL/L — SIGNIFICANT CHANGE UP (ref 3.5–5.3)
PROT SERPL-MCNC: 7.1 G/DL — SIGNIFICANT CHANGE UP (ref 6–8.3)
RBC # BLD: 5.73 M/UL — HIGH (ref 3.8–5.2)
RBC # FLD: 17 % — HIGH (ref 10.3–14.5)
SODIUM SERPL-SCNC: 142 MMOL/L — SIGNIFICANT CHANGE UP (ref 135–145)
WBC # BLD: 7.15 K/UL — SIGNIFICANT CHANGE UP (ref 3.8–10.5)
WBC # FLD AUTO: 7.15 K/UL — SIGNIFICANT CHANGE UP (ref 3.8–10.5)

## 2020-05-11 PROCEDURE — 99232 SBSQ HOSP IP/OBS MODERATE 35: CPT | Mod: CS,GC

## 2020-05-11 PROCEDURE — 93010 ELECTROCARDIOGRAM REPORT: CPT

## 2020-05-11 PROCEDURE — 99233 SBSQ HOSP IP/OBS HIGH 50: CPT | Mod: CS,GC

## 2020-05-11 RX ORDER — LOSARTAN POTASSIUM 100 MG/1
100 TABLET, FILM COATED ORAL DAILY
Refills: 0 | Status: DISCONTINUED | OUTPATIENT
Start: 2020-05-11 | End: 2020-05-15

## 2020-05-11 RX ADMIN — Medication 500000 UNIT(S): at 18:15

## 2020-05-11 RX ADMIN — Medication 81 MILLIGRAM(S): at 11:32

## 2020-05-11 RX ADMIN — Medication 500000 UNIT(S): at 05:02

## 2020-05-11 RX ADMIN — Medication 10 MILLIGRAM(S): at 06:12

## 2020-05-11 RX ADMIN — ATENOLOL 25 MILLIGRAM(S): 25 TABLET ORAL at 06:12

## 2020-05-11 RX ADMIN — Medication 100 MILLIGRAM(S): at 13:31

## 2020-05-11 RX ADMIN — Medication 100 MILLIGRAM(S): at 22:17

## 2020-05-11 RX ADMIN — ATORVASTATIN CALCIUM 80 MILLIGRAM(S): 80 TABLET, FILM COATED ORAL at 22:17

## 2020-05-11 RX ADMIN — LOSARTAN POTASSIUM 100 MILLIGRAM(S): 100 TABLET, FILM COATED ORAL at 18:19

## 2020-05-11 RX ADMIN — Medication 500000 UNIT(S): at 23:54

## 2020-05-11 RX ADMIN — Medication 100 MILLIGRAM(S): at 05:02

## 2020-05-11 RX ADMIN — ENOXAPARIN SODIUM 40 MILLIGRAM(S): 100 INJECTION SUBCUTANEOUS at 11:32

## 2020-05-11 RX ADMIN — LATANOPROST 1 DROP(S): 0.05 SOLUTION/ DROPS OPHTHALMIC; TOPICAL at 22:17

## 2020-05-11 RX ADMIN — Medication 1: at 13:39

## 2020-05-11 RX ADMIN — INSULIN GLARGINE 10 UNIT(S): 100 INJECTION, SOLUTION SUBCUTANEOUS at 22:16

## 2020-05-11 RX ADMIN — REMDESIVIR 500 MILLIGRAM(S): 5 INJECTION INTRAVENOUS at 15:25

## 2020-05-11 RX ADMIN — Medication 1: at 18:14

## 2020-05-11 RX ADMIN — Medication 500000 UNIT(S): at 11:31

## 2020-05-11 RX ADMIN — Medication 1: at 08:08

## 2020-05-11 NOTE — PROGRESS NOTE ADULT - PROBLEM SELECTOR PLAN 1
COVID + at urgent care facility 04/26/20, repeat COVID-19 nasal PCR 05/08 +.  On admission, febrile to Tmax 104.5F and hypoxic on ambulation, requiring 5L.   - S/P Azithromycin x 5 days (for superimposed bacterial PNA) as outpatient.  - S/P Famotidine + Hydroxychloroquine x 2 days    - S/P Convalescent plasma x1 (05/07/20)   - D/W patient, family, ID and pharmacy regarding emergency authorization use and will start.  Daily CMP monitoring for LFT and renal function given recent YEIMI.   - Afebrile in last 48 hrs.  Still on 4L NC at rest and with ambulation.    - ID following, appreciate recs.  - Supportive care     -Tessalon pearls and Robitussin PRN for cough.   - D-dimer elevation, no PE on CTA PE study.  Based on algorithm with Cr Cl of 87 and BMI <30, PT currently on Lovenox 40mg daily.  While appreciate higher risk of PE in age population and per D-dimer from cardiology, for now will keep on lower dose.  Will trend D-dimer and consider higher dosing if needed.    - Trend CRP, Ferritin, D-dimer QOD if progressive worsening  - Trend LFT as slight uptrend since starting hydroxychloroquine.

## 2020-05-11 NOTE — PROGRESS NOTE ADULT - SUBJECTIVE AND OBJECTIVE BOX
Keila Benson MD [facility]  PGY-1 Internal Medicine  [facility] Pager: [phone] | NS: [phone]    Patient is a 87y old  Female who presents with a chief complaint of Diarrhea (06 May 2020 14:22)    SUBJECTIVE / OVERNIGHT EVENTS: Overnight, no acute events.  No acute complaints besides dry non-productive cough.  Afebrile since 5/8.  Doing well now on remdesivir trial.  Improved tolerating diet.      MEDICATIONS  (STANDING):  aspirin  chewable 81 milliGRAM(s) Oral daily  ATENolol  Tablet 25 milliGRAM(s) Oral daily  atorvastatin 80 milliGRAM(s) Oral at bedtime  benzonatate 100 milliGRAM(s) Oral three times a day  dextrose 5%. 1000 milliLiter(s) (50 mL/Hr) IV Continuous <Continuous>  dextrose 50% Injectable 12.5 Gram(s) IV Push once  dextrose 50% Injectable 25 Gram(s) IV Push once  dextrose 50% Injectable 25 Gram(s) IV Push once  enoxaparin Injectable 40 milliGRAM(s) SubCutaneous daily  hydrALAZINE 10 milliGRAM(s) Oral three times a day  insulin glargine Injectable (LANTUS) 10 Unit(s) SubCutaneous at bedtime  insulin lispro (HumaLOG) corrective regimen sliding scale   SubCutaneous three times a day before meals  insulin lispro (HumaLOG) corrective regimen sliding scale   SubCutaneous at bedtime  latanoprost 0.005% Ophthalmic Solution 1 Drop(s) Both EYES at bedtime  nystatin    Suspension 081409 Unit(s) Oral four times a day  remdesivir  IVPB   IV Intermittent   remdesivir  IVPB 100 milliGRAM(s) IV Intermittent every 24 hours  sodium chloride 0.9%. 1000 milliLiter(s) (75 mL/Hr) IV Continuous <Continuous>    MEDICATIONS  (PRN):  acetaminophen   Tablet .. 650 milliGRAM(s) Oral every 6 hours PRN Temp greater or equal to 38C (100.4F), Mild Pain (1 - 3)  dextrose 40% Gel 15 Gram(s) Oral once PRN Blood Glucose LESS THAN 70 milliGRAM(s)/deciliter  glucagon  Injectable 1 milliGRAM(s) IntraMuscular once PRN Glucose LESS THAN 70 milligrams/deciliter  guaiFENesin   Syrup  (Sugar-Free) 200 milliGRAM(s) Oral every 6 hours PRN Cough  ondansetron Injectable 4 milliGRAM(s) IV Push every 6 hours PRN Nausea and/or Vomiting    Allergies    No Known Allergies    Intolerances        VITAL SIGNS:  ICU Vital Signs Last 24 Hrs  T(C): 36.7 (11 May 2020 06:08), Max: 37.1 (10 May 2020 22:13)  T(F): 98 (11 May 2020 06:08), Max: 98.8 (10 May 2020 22:13)  HR: 90 (11 May 2020 06:08) (76 - 90)  BP: 151/71 (11 May 2020 06:08) (151/71 - 160/73)  BP(mean): --  ABP: --  ABP(mean): --  RR: 18 (11 May 2020 06:08) (18 - 18)  SpO2: 94% (11 May 2020 06:08) (94% - 95%)      PHYSICAL EXAM:  GENERAL: NAD.  CHEST/LUNG: Breathing unlabored on 4L NC, continues to have non-productive cough.   HEART: Regular rate and rhythm  ABDOMEN: Soft, Nontender, Nondistended  EXTREMITIES:  2+ Peripheral Pulses No cyanosis or edema  PSYCH: Normal affect.  Neuro: AAO x4, attention and language intact.  Can follow 2 step crossed commands. 4/5 strength throughout.     LABS:                        14.5   7.15  )-----------( 469      ( 11 May 2020 06:19 )             43.5                         14.1   7.09  )-----------( 474      ( 10 May 2020 09:44 )             45.5                         13.9   6.49  )-----------( 473      ( 09 May 2020 06:00 )             44.4           05-11    142  |  103  |  13  ----------------------------<  170<H>  3.5   |  24  |  0.83  05-10    140  |  100  |  15  ----------------------------<  172<H>  4.3   |  27  |  0.94  05-09    138  |  99  |  14  ----------------------------<  152<H>  3.7   |  25  |  1.16    Ca    9.3      11 May 2020 06:19  Ca    9.3      10 May 2020 09:44  Ca    8.9      09 May 2020 06:00  Phos  2.8     05-11  Mg     1.8     05-11    TPro  7.1  /  Alb  2.7<L>  /  TBili  0.5  /  DBili  x   /  AST  24  /  ALT  26  /  AlkPhos  69  05-11  TPro  7.3  /  Alb  2.9<L>  /  TBili  0.5  /  DBili  x   /  AST  28  /  ALT  33  /  AlkPhos  73  05-10  TPro  7.3  /  Alb  2.6<L>  /  TBili  0.8  /  DBili  x   /  AST  36  /  ALT  40  /  AlkPhos  75  05-09        CAPILLARY BLOOD GLUCOSE      POCT Blood Glucose.: 186 mg/dL (11 May 2020 08:03)  POCT Blood Glucose.: 198 mg/dL (10 May 2020 21:41)  POCT Blood Glucose.: 119 mg/dL (10 May 2020 16:55)  POCT Blood Glucose.: 225 mg/dL (10 May 2020 11:37)      COVID-19 LABS  T(C): 36.7 (05-11-20 @ 06:08), Max: 37.1 (05-10-20 @ 22:13)  RR: 18 (05-11-20 @ 06:08) (18 - 18)  SpO2: 94% (05-11-20 @ 06:08) (94% - 95%)    COVID-19 PCR: Detected (05-08)      D-Dimer Assay, Quantitative: 481 ng/mL DDU <H> (05-11)  D-Dimer Assay, Quantitative: 333 ng/mL DDU <H> (05-09)  D-Dimer Assay, Quantitative: 582 ng/mL DDU <H> (05-06)  D-Dimer Assay, Quantitative: 521 ng/mL DDU <H> (05-03)      Ferritin, Serum: 430 ng/mL <H> (05-09)  Ferritin, Serum: 510 ng/mL <H> (05-07)  Ferritin, Serum: 433 ng/mL <H> (05-06)  Ferritin, Serum: 499 ng/mL <H> (05-03)      C-Reactive Protein, Serum: 3.09 mg/dL <H> (05-11)  C-Reactive Protein, Serum: 4.58 ug/mL (05-09)  C-Reactive Protein, Serum: 6.70 ug/mL (05-07)  C-Reactive Protein, Serum: 6.60 mg/dL <H> (05-06)  C-Reactive Protein, Serum: 8.45 mg/dL <H> (05-03)      Auto Lymphocyte #: 1.23 K/uL (05-11)  Auto Lymphocyte #: 1.67 K/uL (05-07)  Auto Lymphocyte #: 1.41 K/uL (05-06)      Pro-Calcitonin (Superimposed bacterial infection)   Procalcitonin, Serum: 24.20 ng/mL <H> (05-05)  Procalcitonin, Serum: 41.79 ng/mL <H> (05-04)  Procalcitonin, Serum: 48.08 ng/mL <H> (05-03)      Imaging:        Meds Received            IMAGING: Radiology personally reviewed    CT Angio Chest w/ IV Cont (05.04.20 @ 10:00)   Chest:  1.  No pulmonary embolus, however, the segmental and subsegmental vessels are not well evaluated secondary to respiratory motion.  2.  Bilateral opacities most pronounced the left upper lobe can occur in the setting of atypical viral infection/lung injury.    Abdomen and pelvis:  1.  No bowel obstruction.  2.  Bilateral renal lesions are most compatible with cysts.  3.  The appendix is normal.    Meds Received  Azithromycin as outpatient x 5 days.   Hydroxychloroquine + Famotidine 05/04-05/06 (Un-enrolled 2* YEIMI)  Convalescent plasma x 1 dose 05/07/20  Remdesivir under EAU 05/09-05/13    5/10 EKG: QTC Calculation(Bezet) 419 ms

## 2020-05-11 NOTE — PROGRESS NOTE ADULT - SUBJECTIVE AND OBJECTIVE BOX
Follow Up: Covid-19,     Interval History/ROS:Patient is a 87y old  Female who presents with a chief complaint of Diarrhea (11 May 2020 10:59)  - no acute events reported overnight  - patient assessed at bedside. Denied any acute complaints. On NC 2l/min    Allergies    No Known Allergies    Intolerances        ANTIMICROBIALS:  nystatin    Suspension 191245 four times a day      OTHER MEDS:  acetaminophen   Tablet .. 650 milliGRAM(s) Oral every 6 hours PRN  aspirin  chewable 81 milliGRAM(s) Oral daily  ATENolol  Tablet 25 milliGRAM(s) Oral daily  atorvastatin 80 milliGRAM(s) Oral at bedtime  benzonatate 100 milliGRAM(s) Oral three times a day  dextrose 40% Gel 15 Gram(s) Oral once PRN  dextrose 5%. 1000 milliLiter(s) IV Continuous <Continuous>  dextrose 50% Injectable 12.5 Gram(s) IV Push once  dextrose 50% Injectable 25 Gram(s) IV Push once  dextrose 50% Injectable 25 Gram(s) IV Push once  enoxaparin Injectable 40 milliGRAM(s) SubCutaneous daily  glucagon  Injectable 1 milliGRAM(s) IntraMuscular once PRN  guaiFENesin   Syrup  (Sugar-Free) 200 milliGRAM(s) Oral every 6 hours PRN  insulin glargine Injectable (LANTUS) 10 Unit(s) SubCutaneous at bedtime  insulin lispro (HumaLOG) corrective regimen sliding scale   SubCutaneous three times a day before meals  insulin lispro (HumaLOG) corrective regimen sliding scale   SubCutaneous at bedtime  latanoprost 0.005% Ophthalmic Solution 1 Drop(s) Both EYES at bedtime  losartan 100 milliGRAM(s) Oral daily  ondansetron Injectable 4 milliGRAM(s) IV Push every 6 hours PRN  remdesivir  IVPB   IV Intermittent   remdesivir  IVPB 100 milliGRAM(s) IV Intermittent every 24 hours  sodium chloride 0.9%. 1000 milliLiter(s) IV Continuous <Continuous>      Vital Signs Last 24 Hrs  T(C): 36.8 (11 May 2020 12:24), Max: 37.1 (10 May 2020 22:13)  T(F): 98.2 (11 May 2020 12:24), Max: 98.8 (10 May 2020 22:13)  HR: 90 (11 May 2020 12:24) (83 - 90)  BP: 155/75 (11 May 2020 12:24) (151/71 - 160/73)  BP(mean): --  RR: 18 (11 May 2020 12:24) (18 - 18)  SpO2: 93% (11 May 2020 12:24) (93% - 95%)    EXAM:  Constitutional: Not in acute distress. On NC 2l/min  Eyes: No icterus.  Oral cavity: Clear, no lesions  Neck: No neck vein distension noted  RS: Chest clear to auscultation bilaterally. No wheeze/rhonchi/crepitations.  CVS: S1, S2 heard. Regular rate and rhythm. No murmurs/rubs/gallops.  Abdomen: Soft. No guarding/rigidity/tenderness.  : No acute abnormalities  Extremities: Warm. No pedal edema  Skin: No lesions noted  Vascular: No evidence of phlebitis  Neuro: Alert, oriented to time/place/person                        14.5   7.15  )-----------( 469      ( 11 May 2020 06:19 )             43.5       05-11    142  |  103  |  13  ----------------------------<  170<H>  3.5   |  24  |  0.83    Ca    9.3      11 May 2020 06:19  Phos  2.8     05-11  Mg     1.8     05-11    TPro  7.1  /  Alb  2.7<L>  /  TBili  0.5  /  DBili  x   /  AST  24  /  ALT  26  /  AlkPhos  69  05-11          MICROBIOLOGY:Culture Results:   <10,000 CFU/mL Normal Urogenital Evelyn (05-08 @ 00:46)

## 2020-05-11 NOTE — PROGRESS NOTE ADULT - PROBLEM SELECTOR PLAN 3
-COVID vs. other superimposed bacterial PNA with left lung consolidation vs diarrhea/n/v (resolved.)  - CT C/A/P w/o clear evidence of abdominal/pelvic infection-->presumed bacterial PNA.  - Blood cx 5/4 NGTD  - s/p zosyn until 05/09/2020 (total 5 day course)

## 2020-05-11 NOTE — PROGRESS NOTE ADULT - ASSESSMENT
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  P/w worsening diarrhea, N&V, cough with clear sputum, ?fevers X1 week PTA and abdominal pain X1 day PTA.  In ED, SpO2 92% reported on RA. Labs revealed lymphopenia with mild transaminitis. Covid-19 positive 4/27.  ID consulted for possible superadded bacterial infection  =========  Covid-19 infection in diabetic (HbA1c 8.4)  - bandemia with elevated Procalcitonin seen on admission - however, low clinical suspicion for bacterial infection  - blood cx show NGTD. MRSA PCR negative. Completed 5 day course of Zosyn    RECOMMENDATIONS:  1. Possible super-added bacterial pneumonia  - completed Zosyn for total of 5 days  ------------  2. Covid-19  - enrolled in convalescent plasma trial  - on Remdesivir per primary team - recommend 5 day course. Monitor CrCl while on Remdesivir  - Continue supplemental O2 and supportive care per primary team  - Continue Contact and Airborne Isolation precautions    PETEY tAkinson MD  Fellow, Infectious Diseases  Pager: [phone]  After 5pm and on Weekends: Call [phone]

## 2020-05-11 NOTE — PROGRESS NOTE ADULT - SUBJECTIVE AND OBJECTIVE BOX
Subjective: Patient seen and examined. No new events except as noted.   feels ok   on NC 3 liters       REVIEW OF SYSTEMS:    CONSTITUTIONAL:+ weakness, fevers or chills  EYES/ENT: No visual changes;  No vertigo or throat pain   NECK: No pain or stiffness  RESPIRATORY: No cough, wheezing, hemoptysis; No shortness of breath  CARDIOVASCULAR: No chest pain or palpitations  GASTROINTESTINAL: No abdominal or epigastric pain. No nausea, vomiting, or hematemesis; No diarrhea or constipation. No melena or hematochezia.  GENITOURINARY: No dysuria, frequency or hematuria  NEUROLOGICAL: No numbness or weakness  SKIN: No itching, burning, rashes, or lesions   All other review of systems is negative unless indicated above.    MEDICATIONS:  MEDICATIONS  (STANDING):  aspirin  chewable 81 milliGRAM(s) Oral daily  ATENolol  Tablet 25 milliGRAM(s) Oral daily  atorvastatin 80 milliGRAM(s) Oral at bedtime  benzonatate 100 milliGRAM(s) Oral three times a day  dextrose 5%. 1000 milliLiter(s) (50 mL/Hr) IV Continuous <Continuous>  dextrose 50% Injectable 12.5 Gram(s) IV Push once  dextrose 50% Injectable 25 Gram(s) IV Push once  dextrose 50% Injectable 25 Gram(s) IV Push once  enoxaparin Injectable 40 milliGRAM(s) SubCutaneous daily  hydrALAZINE 10 milliGRAM(s) Oral three times a day  insulin glargine Injectable (LANTUS) 10 Unit(s) SubCutaneous at bedtime  insulin lispro (HumaLOG) corrective regimen sliding scale   SubCutaneous three times a day before meals  insulin lispro (HumaLOG) corrective regimen sliding scale   SubCutaneous at bedtime  latanoprost 0.005% Ophthalmic Solution 1 Drop(s) Both EYES at bedtime  nystatin    Suspension [id number] Unit(s) Oral four times a day  remdesivir  IVPB   IV Intermittent   remdesivir  IVPB 100 milliGRAM(s) IV Intermittent every 24 hours  sodium chloride 0.9%. 1000 milliLiter(s) (75 mL/Hr) IV Continuous <Continuous>      PHYSICAL EXAM:  T(C): 36.7 (05-11-20 @ 06:08), Max: 37.1 (05-10-20 @ 22:13)  HR: 90 (05-11-20 @ 06:08) (76 - 90)  BP: 151/71 (05-11-20 @ 06:08) (151/71 - 160/73)  RR: 18 (05-11-20 @ 06:08) (18 - 18)  SpO2: 94% (05-11-20 @ 06:08) (94% - 95%)  Wt(kg): --  I&O's Summary    10 May 2020 07:01  -  11 May 2020 07:00  --------------------------------------------------------  IN: 630 mL / OUT: 1700 mL / NET: -1070 mL    11 May 2020 07:01  -  11 May 2020 11:00  --------------------------------------------------------  IN: 240 mL / OUT: 0 mL / NET: 240 mL          Appearance: Normal	  HEENT:   Normal oral mucosa, PERRL, EOMI	  Lymphatic: No lymphadenopathy , no edema  Cardiovascular: Normal S1 S2, No JVD, No murmurs , Peripheral pulses palpable 2+ bilaterally  Respiratory: Decreased bs 	  Gastrointestinal:  Soft, Non-tender, + BS	  Skin: No rashes, No ecchymoses, No cyanosis, warm to touch  Musculoskeletal: Normal range of motion, normal strength  Psychiatry:  Mood & affect appropriate  Ext: No edema      LABS:    CARDIAC MARKERS:                                14.5   7.15  )-----------( 469      ( 11 May 2020 06:19 )             43.5     05-11    142  |  103  |  13  ----------------------------<  170<H>  3.5   |  24  |  0.83    Ca    9.3      11 May 2020 06:19  Phos  2.8     05-11  Mg     1.8     05-11    TPro  7.1  /  Alb  2.7<L>  /  TBili  0.5  /  DBili  x   /  AST  24  /  ALT  26  /  AlkPhos  69  05-11    proBNP:   Lipid Profile:   HgA1c:   TSH:             TELEMETRY: 	    ECG:  	  RADIOLOGY:   DIAGNOSTIC TESTING:  [ ] Echocardiogram:  [ ]  Catheterization:  [ ] Stress Test:    OTHER:

## 2020-05-11 NOTE — PROGRESS NOTE ADULT - PROBLEM SELECTOR PLAN 2
Acute hypoxic respiratory failure 2* COVID vs. less likely superimposed bacterial PNA.  - Procal elevated, repeat still high but was downtrending.  Was started on Zosyn + Vancomycin 05/04/20 empirically.  ID c/s and less likely suspicion for superimposed bacterial PNA.   - MRSA (-)-->Vanco d/c 05/05/20  - Zosyn 05/04/20 x 5 days.  Discontinued 05/09  - SaO2 >91% on 4L, stable but also not improving.    - Weaning trial 1L per 1 hr.  If SpO2 >88% and no respiratory distress, can continue weaning trial.  - D/W ID, no further concern for bacterial superimposed infection at this time.

## 2020-05-11 NOTE — PROGRESS NOTE ADULT - PROBLEM SELECTOR PLAN 1
COVID infection   on Remdesivir  completed Plaquenil  trend markers   supplemental oxygen   Albuterol inhalers. Incentive spirometry

## 2020-05-11 NOTE — PROGRESS NOTE ADULT - ASSESSMENT
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     S/p COVID + at urgent care 04/26/20 s/p azithromycin x 5 days, famotidine + HCQ x 2 days, convalescent plasma x1 now on remdesivir

## 2020-05-12 ENCOUNTER — TRANSCRIPTION ENCOUNTER (OUTPATIENT)
Age: 85
End: 2020-05-12

## 2020-05-12 LAB
ALBUMIN SERPL ELPH-MCNC: 3 G/DL — LOW (ref 3.3–5)
ALP SERPL-CCNC: 78 U/L — SIGNIFICANT CHANGE UP (ref 40–120)
ALT FLD-CCNC: 25 U/L — SIGNIFICANT CHANGE UP (ref 10–45)
ANION GAP SERPL CALC-SCNC: 13 MMOL/L — SIGNIFICANT CHANGE UP (ref 5–17)
AST SERPL-CCNC: 23 U/L — SIGNIFICANT CHANGE UP (ref 10–40)
BILIRUB SERPL-MCNC: 0.5 MG/DL — SIGNIFICANT CHANGE UP (ref 0.2–1.2)
BUN SERPL-MCNC: 20 MG/DL — SIGNIFICANT CHANGE UP (ref 7–23)
CALCIUM SERPL-MCNC: 9.7 MG/DL — SIGNIFICANT CHANGE UP (ref 8.4–10.5)
CHLORIDE SERPL-SCNC: 101 MMOL/L — SIGNIFICANT CHANGE UP (ref 96–108)
CO2 SERPL-SCNC: 26 MMOL/L — SIGNIFICANT CHANGE UP (ref 22–31)
CREAT SERPL-MCNC: 0.79 MG/DL — SIGNIFICANT CHANGE UP (ref 0.5–1.3)
GLUCOSE BLDC GLUCOMTR-MCNC: 168 MG/DL — HIGH (ref 70–99)
GLUCOSE BLDC GLUCOMTR-MCNC: 186 MG/DL — HIGH (ref 70–99)
GLUCOSE BLDC GLUCOMTR-MCNC: 194 MG/DL — HIGH (ref 70–99)
GLUCOSE BLDC GLUCOMTR-MCNC: 241 MG/DL — HIGH (ref 70–99)
GLUCOSE SERPL-MCNC: 314 MG/DL — HIGH (ref 70–99)
POTASSIUM SERPL-MCNC: 3.3 MMOL/L — LOW (ref 3.5–5.3)
POTASSIUM SERPL-SCNC: 3.3 MMOL/L — LOW (ref 3.5–5.3)
PROT SERPL-MCNC: 7.4 G/DL — SIGNIFICANT CHANGE UP (ref 6–8.3)
SODIUM SERPL-SCNC: 140 MMOL/L — SIGNIFICANT CHANGE UP (ref 135–145)

## 2020-05-12 PROCEDURE — 93010 ELECTROCARDIOGRAM REPORT: CPT

## 2020-05-12 PROCEDURE — 99231 SBSQ HOSP IP/OBS SF/LOW 25: CPT | Mod: CS,GC

## 2020-05-12 PROCEDURE — 99232 SBSQ HOSP IP/OBS MODERATE 35: CPT | Mod: CS,GC

## 2020-05-12 RX ORDER — POTASSIUM CHLORIDE 20 MEQ
20 PACKET (EA) ORAL ONCE
Refills: 0 | Status: COMPLETED | OUTPATIENT
Start: 2020-05-12 | End: 2020-05-12

## 2020-05-12 RX ADMIN — Medication 500000 UNIT(S): at 17:32

## 2020-05-12 RX ADMIN — Medication 100 MILLIGRAM(S): at 05:43

## 2020-05-12 RX ADMIN — Medication 100 MILLIGRAM(S): at 13:28

## 2020-05-12 RX ADMIN — Medication 1: at 17:31

## 2020-05-12 RX ADMIN — Medication 100 MILLIGRAM(S): at 21:21

## 2020-05-12 RX ADMIN — ATORVASTATIN CALCIUM 80 MILLIGRAM(S): 80 TABLET, FILM COATED ORAL at 21:21

## 2020-05-12 RX ADMIN — Medication 500000 UNIT(S): at 12:02

## 2020-05-12 RX ADMIN — REMDESIVIR 500 MILLIGRAM(S): 5 INJECTION INTRAVENOUS at 14:58

## 2020-05-12 RX ADMIN — LATANOPROST 1 DROP(S): 0.05 SOLUTION/ DROPS OPHTHALMIC; TOPICAL at 22:15

## 2020-05-12 RX ADMIN — Medication 1: at 08:25

## 2020-05-12 RX ADMIN — Medication 2: at 12:19

## 2020-05-12 RX ADMIN — Medication 81 MILLIGRAM(S): at 12:03

## 2020-05-12 RX ADMIN — Medication 500000 UNIT(S): at 05:42

## 2020-05-12 RX ADMIN — LOSARTAN POTASSIUM 100 MILLIGRAM(S): 100 TABLET, FILM COATED ORAL at 05:42

## 2020-05-12 RX ADMIN — ATENOLOL 25 MILLIGRAM(S): 25 TABLET ORAL at 05:43

## 2020-05-12 RX ADMIN — INSULIN GLARGINE 10 UNIT(S): 100 INJECTION, SOLUTION SUBCUTANEOUS at 21:59

## 2020-05-12 RX ADMIN — ENOXAPARIN SODIUM 40 MILLIGRAM(S): 100 INJECTION SUBCUTANEOUS at 12:02

## 2020-05-12 RX ADMIN — Medication 20 MILLIEQUIVALENT(S): at 12:05

## 2020-05-12 NOTE — PROGRESS NOTE ADULT - PROBLEM SELECTOR PLAN 1
COVID + at urgent care facility 04/26/20, repeat COVID-19 nasal PCR 05/08 +.  On admission, febrile to Tmax 104.5F and hypoxic on ambulation, requiring 5L.   - now on remdesivir (5/9- tentative 5/13).  - S/P Azithromycin x 5 days (for superimposed bacterial PNA) as outpatient.  - S/P Famotidine + Hydroxychloroquine x 2 days    - S/P Convalescent plasma x1 (05/07/20)   - D/W patient, family, ID and pharmacy regarding emergency authorization use and will start.  Daily CMP monitoring for LFT and renal function given recent YEIMI.   - Afebrile in last 48 hrs.  Still on 4L NC at rest and with ambulation.    - ID following, appreciate recs.  - Supportive care     -Tessalon pearls and Robitussin PRN for cough.   - D-dimer elevation, no PE on CTA PE study.  Based on algorithm with Cr Cl of 87 and BMI <30, PT currently on Lovenox 40mg daily.  While appreciate higher risk of PE in age population and per D-dimer from cardiology, for now will keep on lower dose.  Will trend D-dimer and consider higher dosing if needed.    - Trend CRP, Ferritin, D-dimer QOD if progressive worsening

## 2020-05-12 NOTE — PROGRESS NOTE ADULT - PROBLEM SELECTOR PLAN 8
At home on 100 losartan.  BP here in 160s 170s. Holding in setting of YEIMI.  -Hydralazine standing and PRN  -Losartan was on hold due to YEIMI.  Will consider switching back once done with remdesivir as now Cr holding steady. At home on 100 losartan.  BP here in 160s 170s. Holding in setting of YEIMI.  -Hydralazine standing and PRN  -Losartan restarted 5/11 100mg BID

## 2020-05-12 NOTE — PROGRESS NOTE ADULT - PROBLEM SELECTOR PLAN 6
Cr BL 0.8 on admission. Possibly pre renal in setting of dehydration. Cr now returning to normal.   -no hydronephrosis on CT A/P 5/4.  -hyperK false 2/2 hemolysis.  -No volume overload on exam. RESOLVED  - Cr BL 0.8 on admission. Possibly pre renal in setting of dehydration. Cr now returning to normal.   -no hydronephrosis on CT A/P 5/4.  -hyperK false 2/2 hemolysis.  -No volume overload on exam.

## 2020-05-12 NOTE — DISCHARGE NOTE PROVIDER - NSDCMRMEDTOKEN_GEN_ALL_CORE_FT
aspirin 81 mg oral tablet: 1 tab(s) orally once a day  atenolol 25 mg oral tablet: 1 tab(s) orally once a day  atorvastatin 80 mg oral tablet: 1 tab(s) orally once a day  losartan 100 mg oral tablet: 1 tab(s) orally once a day  metFORMIN 500 mg oral tablet: 1 tab(s) orally 2 times a day  Travatan 0.004% ophthalmic solution: 1 drop(s) to each affected eye once a day (in the evening)  Tresiba 100 units/mL subcutaneous solution: 12 unit(s) subcutaneous once a day (at bedtime) 3:1 Commode: 1 each   aspirin 81 mg oral tablet: 1 tab(s) orally once a day  atenolol 25 mg oral tablet: 1 tab(s) orally once a day  atorvastatin 80 mg oral tablet: 1 tab(s) orally once a day  losartan 100 mg oral tablet: 1 tab(s) orally once a day  metFORMIN 500 mg oral tablet: 1 tab(s) orally 2 times a day  Travatan 0.004% ophthalmic solution: 1 drop(s) to each affected eye once a day (in the evening)  Tresiba 100 units/mL subcutaneous solution: 12 unit(s) subcutaneous once a day (at bedtime)  Wheelchair: 1 each 3:1 Commode: 1 each   aspirin 81 mg oral tablet: 1 tab(s) orally once a day  atenolol 25 mg oral tablet: 1 tab(s) orally once a day  atorvastatin 80 mg oral tablet: 1 tab(s) orally once a day  losartan 100 mg oral tablet: 1 tab(s) orally once a day  metFORMIN 500 mg oral tablet: 1 tab(s) orally 2 times a day  rivaroxaban 10 mg oral tablet: 1 tab(s) orally once a day   Transport Wheelchair: 1 each   Travatan 0.004% ophthalmic solution: 1 drop(s) to each affected eye once a day (in the evening)  Tresiba 100 units/mL subcutaneous solution: 12 unit(s) subcutaneous once a day (at bedtime) 3:1 Commode: 1 each   acetaminophen 325 mg oral tablet: 2 tab(s) orally every 6 hours, As needed, Temp greater or equal to 38C (100.4F), Mild Pain (1 - 3)  aspirin 81 mg oral tablet: 1 tab(s) orally once a day  atenolol 25 mg oral tablet: 1 tab(s) orally once a day  atorvastatin 80 mg oral tablet: 1 tab(s) orally once a day  benzonatate 100 mg oral capsule: 1 cap(s) orally 3 times a day  guaiFENesin 100 mg/5 mL oral liquid: 10 milliliter(s) orally every 6 hours, As needed, Cough  losartan 100 mg oral tablet: 1 tab(s) orally once a day  metFORMIN 500 mg oral tablet: 1 tab(s) orally 2 times a day  rivaroxaban 10 mg oral tablet: 1 tab(s) orally once a day   Transport Wheelchair: 1 each   Travatan 0.004% ophthalmic solution: 1 drop(s) to each affected eye once a day (in the evening)  Tresiba 100 units/mL subcutaneous solution: 12 unit(s) subcutaneous once a day (at bedtime)

## 2020-05-12 NOTE — PROGRESS NOTE ADULT - ASSESSMENT
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  P/w worsening diarrhea, N&V, cough with clear sputum, ?fevers X1 week PTA and abdominal pain X1 day PTA.  In ED, SpO2 92% reported on RA. Labs revealed lymphopenia with mild transaminitis. Covid-19 positive 4/27.  ID consulted for possible superadded bacterial infection  =========  Covid-19 infection in diabetic (HbA1c 8.4)  - bandemia with elevated Procalcitonin seen on admission - however, low clinical suspicion for bacterial infection  - blood cx show NGTD. MRSA PCR negative. Completed 5 day course of Zosyn    RECOMMENDATIONS:  1. Possible super-added bacterial pneumonia  - completed Zosyn for total of 5 days  ------------  2. Covid-19  - enrolled in convalescent plasma trial  - on Remdesivir per primary team - recommend 5 day course. Monitor CrCl and LFTs daily while on Remdesivir  - Continue supplemental O2 and supportive care per primary team  - Continue Contact and Airborne Isolation precautions    ID service will sign off. Please reconsult as needed.    PETEY Atkinson MD  Fellow, Infectious Diseases  Pager: [phone]  After 5pm and on Weekends: Call [phone]

## 2020-05-12 NOTE — DISCHARGE NOTE PROVIDER - CARE PROVIDER_API CALL
GEGE GALEANA  [facility]  Phone: [phone]  Fax: [phone]  Established Patient  Follow Up Time:     Ancelmo Sheth ()  Cardiology; Internal Medicine  [address] [place] [address], [address]  [place] [address]  Phone: [phone]  Fax: [phone]  Established Patient  Follow Up Time:

## 2020-05-12 NOTE — PROGRESS NOTE ADULT - SUBJECTIVE AND OBJECTIVE BOX
Follow Up: Covid-19    Interval History/ROS:Patient is a 87y old  Female who presents with a chief complaint of Diarrhea (12 May 2020 10:35)  - no acute events reported overnight  - patient assessed at bedside. In chair today    Allergies    No Known Allergies    Intolerances        ANTIMICROBIALS:  nystatin    Suspension [id number] four times a day      OTHER MEDS:  acetaminophen   Tablet .. 650 milliGRAM(s) Oral every 6 hours PRN  aspirin  chewable 81 milliGRAM(s) Oral daily  ATENolol  Tablet 25 milliGRAM(s) Oral daily  atorvastatin 80 milliGRAM(s) Oral at bedtime  benzonatate 100 milliGRAM(s) Oral three times a day  dextrose 40% Gel 15 Gram(s) Oral once PRN  dextrose 5%. 1000 milliLiter(s) IV Continuous <Continuous>  dextrose 50% Injectable 12.5 Gram(s) IV Push once  dextrose 50% Injectable 25 Gram(s) IV Push once  dextrose 50% Injectable 25 Gram(s) IV Push once  enoxaparin Injectable 40 milliGRAM(s) SubCutaneous daily  glucagon  Injectable 1 milliGRAM(s) IntraMuscular once PRN  guaiFENesin   Syrup  (Sugar-Free) 200 milliGRAM(s) Oral every 6 hours PRN  insulin glargine Injectable (LANTUS) 10 Unit(s) SubCutaneous at bedtime  insulin lispro (HumaLOG) corrective regimen sliding scale   SubCutaneous three times a day before meals  insulin lispro (HumaLOG) corrective regimen sliding scale   SubCutaneous at bedtime  latanoprost 0.005% Ophthalmic Solution 1 Drop(s) Both EYES at bedtime  losartan 100 milliGRAM(s) Oral daily  ondansetron Injectable 4 milliGRAM(s) IV Push every 6 hours PRN  remdesivir  IVPB   IV Intermittent   remdesivir  IVPB 100 milliGRAM(s) IV Intermittent every 24 hours  sodium chloride 0.9%. 1000 milliLiter(s) IV Continuous <Continuous>      Vital Signs Last 24 Hrs  T(C): 36.7 (12 May 2020 08:59), Max: 37.4 (11 May 2020 21:41)  T(F): 98 (12 May 2020 08:59), Max: 99.3 (11 May 2020 21:41)  HR: 80 (12 May 2020 10:30) (80 - 93)  BP: 186/77 (12 May 2020 10:30) (156/68 - 186/77)  BP(mean): --  RR: 18 (12 May 2020 08:59) (18 - 18)  SpO2: 95% (12 May 2020 10:30) (95% - 97%)    EXAM:  Constitutional: Not in acute distress. On NC 2l/min  Eyes: No icterus.  Oral cavity: Clear, no lesions  Neck: No neck vein distension noted  RS: Coarse breath sounds bilaterally. No wheeze/rhonchi.  CVS: S1, S2 heard. Regular rate and rhythm. No murmurs/rubs/gallops.  Abdomen: Soft. No guarding/rigidity/tenderness.  : No acute abnormalities  Extremities: Warm. No pedal edema  Skin: No lesions noted  Vascular: No evidence of phlebitis  Neuro: Alert, oriented to time/place/person                        14.5   7.15  )-----------( 469      ( 11 May 2020 06:19 )             43.5       05-12    140  |  101  |  20  ----------------------------<  314<H>  3.3<L>   |  26  |  0.79    Ca    9.7      12 May 2020 11:29  Phos  2.8     05-11  Mg     1.8     05-11    TPro  7.4  /  Alb  3.0<L>  /  TBili  0.5  /  DBili  x   /  AST  23  /  ALT  25  /  AlkPhos  78  05-12          MICROBIOLOGY:Culture Results:   <10,000 CFU/mL Normal Urogenital Evelyn (05-08 @ 00:46)

## 2020-05-12 NOTE — PROGRESS NOTE ADULT - SUBJECTIVE AND OBJECTIVE BOX
Keila Benson MD [facility]  PGY-1 Internal Medicine  [facility] Pager: [phone] | NS: [phone]    Patient is a 87y old  Female who presents with a chief complaint of Diarrhea (06 May 2020 14:22)    SUBJECTIVE / OVERNIGHT EVENTS: Overnight, no acute events.  No acute complaints besides dry non-productive cough.  On 2L NC-RA since overnight satting 93-96% . Doing well now on remdesivir trial.  Improved tolerating diet.      MEDICATIONS  (STANDING):  aspirin  chewable 81 milliGRAM(s) Oral daily  ATENolol  Tablet 25 milliGRAM(s) Oral daily  atorvastatin 80 milliGRAM(s) Oral at bedtime  benzonatate 100 milliGRAM(s) Oral three times a day  dextrose 5%. 1000 milliLiter(s) (50 mL/Hr) IV Continuous <Continuous>  dextrose 50% Injectable 12.5 Gram(s) IV Push once  dextrose 50% Injectable 25 Gram(s) IV Push once  dextrose 50% Injectable 25 Gram(s) IV Push once  enoxaparin Injectable 40 milliGRAM(s) SubCutaneous daily  insulin glargine Injectable (LANTUS) 10 Unit(s) SubCutaneous at bedtime  insulin lispro (HumaLOG) corrective regimen sliding scale   SubCutaneous three times a day before meals  insulin lispro (HumaLOG) corrective regimen sliding scale   SubCutaneous at bedtime  latanoprost 0.005% Ophthalmic Solution 1 Drop(s) Both EYES at bedtime  losartan 100 milliGRAM(s) Oral daily  nystatin    Suspension 502854 Unit(s) Oral four times a day  remdesivir  IVPB   IV Intermittent   remdesivir  IVPB 100 milliGRAM(s) IV Intermittent every 24 hours  sodium chloride 0.9%. 1000 milliLiter(s) (75 mL/Hr) IV Continuous <Continuous>    MEDICATIONS  (PRN):  acetaminophen   Tablet .. 650 milliGRAM(s) Oral every 6 hours PRN Temp greater or equal to 38C (100.4F), Mild Pain (1 - 3)  dextrose 40% Gel 15 Gram(s) Oral once PRN Blood Glucose LESS THAN 70 milliGRAM(s)/deciliter  glucagon  Injectable 1 milliGRAM(s) IntraMuscular once PRN Glucose LESS THAN 70 milligrams/deciliter  guaiFENesin   Syrup  (Sugar-Free) 200 milliGRAM(s) Oral every 6 hours PRN Cough  ondansetron Injectable 4 milliGRAM(s) IV Push every 6 hours PRN Nausea and/or Vomiting    Allergies    No Known Allergies    Intolerances        VITAL SIGNS:  ICU Vital Signs Last 24 Hrs  T(C): 37.2 (12 May 2020 05:40), Max: 37.4 (11 May 2020 21:41)  T(F): 98.9 (12 May 2020 05:40), Max: 99.3 (11 May 2020 21:41)  HR: 86 (12 May 2020 05:40) (84 - 90)  BP: 164/80 (12 May 2020 05:40) (155/75 - 164/80)  BP(mean): --  ABP: --  ABP(mean): --  RR: 18 (12 May 2020 05:40) (18 - 18)  SpO2: 95% (12 May 2020 05:40) (93% - 96%)      PHYSICAL EXAM:  GENERAL: NAD.  CHEST/LUNG: Breathing unlabored on 2L NC-RA, continues to have non-productive cough.   HEART: Regular rate and rhythm  ABDOMEN: Soft, Nontender, Nondistended  EXTREMITIES:  2+ Peripheral Pulses No cyanosis or edema  PSYCH: Normal affect.  Neuro: AAO x4, attention and language intact.  Can follow 2 step crossed commands. 4/5 strength throughout.     LABS: no new labs 5/12                        14.5   7.15  )-----------( 469      ( 11 May 2020 06:19 )             43.5                         14.1   7.09  )-----------( 474      ( 10 May 2020 09:44 )             45.5                         13.9   6.49  )-----------( 473      ( 09 May 2020 06:00 )             44.4           05-11    142  |  103  |  13  ----------------------------<  170<H>  3.5   |  24  |  0.83  05-10    140  |  100  |  15  ----------------------------<  172<H>  4.3   |  27  |  0.94  05-09    138  |  99  |  14  ----------------------------<  152<H>  3.7   |  25  |  1.16    Ca    9.3      11 May 2020 06:19  Ca    9.3      10 May 2020 09:44  Ca    8.9      09 May 2020 06:00  Phos  2.8     05-11  Mg     1.8     05-11    TPro  7.1  /  Alb  2.7<L>  /  TBili  0.5  /  DBili  x   /  AST  24  /  ALT  26  /  AlkPhos  69  05-11  TPro  7.3  /  Alb  2.9<L>  /  TBili  0.5  /  DBili  x   /  AST  28  /  ALT  33  /  AlkPhos  73  05-10  TPro  7.3  /  Alb  2.6<L>  /  TBili  0.8  /  DBili  x   /  AST  36  /  ALT  40  /  AlkPhos  75  05-09        CAPILLARY BLOOD GLUCOSE      POCT Blood Glucose.: 186 mg/dL (11 May 2020 08:03)  POCT Blood Glucose.: 198 mg/dL (10 May 2020 21:41)  POCT Blood Glucose.: 119 mg/dL (10 May 2020 16:55)  POCT Blood Glucose.: 225 mg/dL (10 May 2020 11:37)      COVID-19 LABS  T(C): 36.7 (05-11-20 @ 06:08), Max: 37.1 (05-10-20 @ 22:13)  RR: 18 (05-11-20 @ 06:08) (18 - 18)  SpO2: 94% (05-11-20 @ 06:08) (94% - 95%)    COVID-19 PCR: Detected (05-08)      D-Dimer Assay, Quantitative: 481 ng/mL DDU <H> (05-11)  D-Dimer Assay, Quantitative: 333 ng/mL DDU <H> (05-09)  D-Dimer Assay, Quantitative: 582 ng/mL DDU <H> (05-06)  D-Dimer Assay, Quantitative: 521 ng/mL DDU <H> (05-03)      Ferritin, Serum: 430 ng/mL <H> (05-09)  Ferritin, Serum: 510 ng/mL <H> (05-07)  Ferritin, Serum: 433 ng/mL <H> (05-06)  Ferritin, Serum: 499 ng/mL <H> (05-03)      C-Reactive Protein, Serum: 3.09 mg/dL <H> (05-11)  C-Reactive Protein, Serum: 4.58 ug/mL (05-09)  C-Reactive Protein, Serum: 6.70 ug/mL (05-07)  C-Reactive Protein, Serum: 6.60 mg/dL <H> (05-06)  C-Reactive Protein, Serum: 8.45 mg/dL <H> (05-03)      Auto Lymphocyte #: 1.23 K/uL (05-11)  Auto Lymphocyte #: 1.67 K/uL (05-07)  Auto Lymphocyte #: 1.41 K/uL (05-06)      Pro-Calcitonin (Superimposed bacterial infection)   Procalcitonin, Serum: 24.20 ng/mL <H> (05-05)  Procalcitonin, Serum: 41.79 ng/mL <H> (05-04)  Procalcitonin, Serum: 48.08 ng/mL <H> (05-03)      Imaging:        Meds Received            IMAGING: Radiology personally reviewed    CT Angio Chest w/ IV Cont (05.04.20 @ 10:00)   Chest:  1.  No pulmonary embolus, however, the segmental and subsegmental vessels are not well evaluated secondary to respiratory motion.  2.  Bilateral opacities most pronounced the left upper lobe can occur in the setting of atypical viral infection/lung injury.    Abdomen and pelvis:  1.  No bowel obstruction.  2.  Bilateral renal lesions are most compatible with cysts.  3.  The appendix is normal.    Meds Received  Azithromycin as outpatient x 5 days.   Hydroxychloroquine + Famotidine 05/04-05/06 (Un-enrolled 2* YEIMI)  Convalescent plasma x 1 dose 05/07/20  Remdesivir under EAU 05/09-05/13    5/10 EKG: QTC Calculation(Bezet) 419 ms Keila Benson MD [facility]  PGY-1 Internal Medicine  [facility] Pager: [phone] | NS: [phone]    Patient is a 87y old  Female who presents with a chief complaint of Diarrhea (06 May 2020 14:22)    SUBJECTIVE / OVERNIGHT EVENTS: Overnight, no acute events. Titrating oxygen from 2L NC to RA today satting 93-96%. No acute complaints besides dry non-productive cough, improved 5/12.  Doing well now on remdesivir trial (5/9-5/13) to end tomorrow.  Improved tolerating diet.      MEDICATIONS  (STANDING):  aspirin  chewable 81 milliGRAM(s) Oral daily  ATENolol  Tablet 25 milliGRAM(s) Oral daily  atorvastatin 80 milliGRAM(s) Oral at bedtime  benzonatate 100 milliGRAM(s) Oral three times a day  dextrose 5%. 1000 milliLiter(s) (50 mL/Hr) IV Continuous <Continuous>  dextrose 50% Injectable 12.5 Gram(s) IV Push once  dextrose 50% Injectable 25 Gram(s) IV Push once  dextrose 50% Injectable 25 Gram(s) IV Push once  enoxaparin Injectable 40 milliGRAM(s) SubCutaneous daily  insulin glargine Injectable (LANTUS) 10 Unit(s) SubCutaneous at bedtime  insulin lispro (HumaLOG) corrective regimen sliding scale   SubCutaneous three times a day before meals  insulin lispro (HumaLOG) corrective regimen sliding scale   SubCutaneous at bedtime  latanoprost 0.005% Ophthalmic Solution 1 Drop(s) Both EYES at bedtime  losartan 100 milliGRAM(s) Oral daily  nystatin    Suspension 198510 Unit(s) Oral four times a day  remdesivir  IVPB   IV Intermittent   remdesivir  IVPB 100 milliGRAM(s) IV Intermittent every 24 hours  sodium chloride 0.9%. 1000 milliLiter(s) (75 mL/Hr) IV Continuous <Continuous>    MEDICATIONS  (PRN):  acetaminophen   Tablet .. 650 milliGRAM(s) Oral every 6 hours PRN Temp greater or equal to 38C (100.4F), Mild Pain (1 - 3)  dextrose 40% Gel 15 Gram(s) Oral once PRN Blood Glucose LESS THAN 70 milliGRAM(s)/deciliter  glucagon  Injectable 1 milliGRAM(s) IntraMuscular once PRN Glucose LESS THAN 70 milligrams/deciliter  guaiFENesin   Syrup  (Sugar-Free) 200 milliGRAM(s) Oral every 6 hours PRN Cough  ondansetron Injectable 4 milliGRAM(s) IV Push every 6 hours PRN Nausea and/or Vomiting    Allergies    No Known Allergies    Intolerances        VITAL SIGNS:  ICU Vital Signs Last 24 Hrs  T(C): 37.2 (12 May 2020 05:40), Max: 37.4 (11 May 2020 21:41)  T(F): 98.9 (12 May 2020 05:40), Max: 99.3 (11 May 2020 21:41)  HR: 86 (12 May 2020 05:40) (84 - 90)  BP: 164/80 (12 May 2020 05:40) (155/75 - 164/80)  BP(mean): --  ABP: --  ABP(mean): --  RR: 18 (12 May 2020 05:40) (18 - 18)  SpO2: 95% (12 May 2020 05:40) (93% - 96%)      PHYSICAL EXAM:  GENERAL: NAD.  CHEST/LUNG: Breathing unlabored on 2L NC-RA, continues to have non-productive cough.   HEART: Regular rate and rhythm  ABDOMEN: Soft, Nontender, Nondistended  EXTREMITIES:  2+ Peripheral Pulses No cyanosis or edema  PSYCH: Normal affect.  Neuro: AAO x4, attention and language intact.  Can follow 2 step crossed commands. 4/5 strength throughout.     LABS: no new labs 5/12, need CMP daily for remdesivir trial                        14.5   7.15  )-----------( 469      ( 11 May 2020 06:19 )             43.5                         14.1   7.09  )-----------( 474      ( 10 May 2020 09:44 )             45.5                         13.9   6.49  )-----------( 473      ( 09 May 2020 06:00 )             44.4           05-11    142  |  103  |  13  ----------------------------<  170<H>  3.5   |  24  |  0.83  05-10    140  |  100  |  15  ----------------------------<  172<H>  4.3   |  27  |  0.94  05-09    138  |  99  |  14  ----------------------------<  152<H>  3.7   |  25  |  1.16    Ca    9.3      11 May 2020 06:19  Ca    9.3      10 May 2020 09:44  Ca    8.9      09 May 2020 06:00  Phos  2.8     05-11  Mg     1.8     05-11    TPro  7.1  /  Alb  2.7<L>  /  TBili  0.5  /  DBili  x   /  AST  24  /  ALT  26  /  AlkPhos  69  05-11  TPro  7.3  /  Alb  2.9<L>  /  TBili  0.5  /  DBili  x   /  AST  28  /  ALT  33  /  AlkPhos  73  05-10  TPro  7.3  /  Alb  2.6<L>  /  TBili  0.8  /  DBili  x   /  AST  36  /  ALT  40  /  AlkPhos  75  05-09        CAPILLARY BLOOD GLUCOSE      POCT Blood Glucose.: 186 mg/dL (11 May 2020 08:03)  POCT Blood Glucose.: 198 mg/dL (10 May 2020 21:41)  POCT Blood Glucose.: 119 mg/dL (10 May 2020 16:55)  POCT Blood Glucose.: 225 mg/dL (10 May 2020 11:37)      COVID-19 LABS  T(C): 36.7 (05-11-20 @ 06:08), Max: 37.1 (05-10-20 @ 22:13)  RR: 18 (05-11-20 @ 06:08) (18 - 18)  SpO2: 94% (05-11-20 @ 06:08) (94% - 95%)    COVID-19 PCR: Detected (05-08)      D-Dimer Assay, Quantitative: 481 ng/mL DDU <H> (05-11)  D-Dimer Assay, Quantitative: 333 ng/mL DDU <H> (05-09)  D-Dimer Assay, Quantitative: 582 ng/mL DDU <H> (05-06)  D-Dimer Assay, Quantitative: 521 ng/mL DDU <H> (05-03)      Ferritin, Serum: 430 ng/mL <H> (05-09)  Ferritin, Serum: 510 ng/mL <H> (05-07)  Ferritin, Serum: 433 ng/mL <H> (05-06)  Ferritin, Serum: 499 ng/mL <H> (05-03)      C-Reactive Protein, Serum: 3.09 mg/dL <H> (05-11)  C-Reactive Protein, Serum: 4.58 ug/mL (05-09)  C-Reactive Protein, Serum: 6.70 ug/mL (05-07)  C-Reactive Protein, Serum: 6.60 mg/dL <H> (05-06)  C-Reactive Protein, Serum: 8.45 mg/dL <H> (05-03)      Auto Lymphocyte #: 1.23 K/uL (05-11)  Auto Lymphocyte #: 1.67 K/uL (05-07)  Auto Lymphocyte #: 1.41 K/uL (05-06)      Pro-Calcitonin (Superimposed bacterial infection)   Procalcitonin, Serum: 24.20 ng/mL <H> (05-05)  Procalcitonin, Serum: 41.79 ng/mL <H> (05-04)  Procalcitonin, Serum: 48.08 ng/mL <H> (05-03)      Imaging:        Meds Received            IMAGING: Radiology personally reviewed    CT Angio Chest w/ IV Cont (05.04.20 @ 10:00)   Chest:  1.  No pulmonary embolus, however, the segmental and subsegmental vessels are not well evaluated secondary to respiratory motion.  2.  Bilateral opacities most pronounced the left upper lobe can occur in the setting of atypical viral infection/lung injury.    Abdomen and pelvis:  1.  No bowel obstruction.  2.  Bilateral renal lesions are most compatible with cysts.  3.  The appendix is normal.    Meds Received  Azithromycin as outpatient x 5 days.   Hydroxychloroquine + Famotidine 05/04-05/06 (Un-enrolled 2* YEIMI)  Convalescent plasma x 1 dose 05/07/20  Remdesivir under EAU 05/09-05/13    5/10 EKG: QTC Calculation(Bezet) 419 ms

## 2020-05-12 NOTE — DISCHARGE NOTE PROVIDER - NSDCCPCAREPLAN_GEN_ALL_CORE_FT
PRINCIPAL DISCHARGE DIAGNOSIS  Diagnosis: COVID-19 virus infection  Assessment and Plan of Treatment: You came in with fever, shortness of breath, cough, and was found to be POSITIVE for the novel coronavirus (COVID-19). We treated you with supportive care, including oxygen supplementation; we also treated  you with the following medicines- Plaquenil, convalescent plasma, and remdesivir (anti-viral medicine). You are now feeling better, and ready to go home.  Upon discharge, you must self-quarantine for 14 days from the day you last tested positive (5/8/2020). Please wear a face mask if you are around other individuals. Try to avoid contact with house members, family, and friends for the duration of this quarantine. Please follow up with your primary care physician within 2-3 weeks of your discharge from [facility]. Please take all medications as prescribed. If you experience any worsening or recurrence of your symptoms, particularly worsening or high fever, shortness of breath, extreme fatigue, or bloody cough please call 9-1-1 immediately or report to the nearest Emergency Department. If you have any questions or concerns, please do not hesitate to call the hospital at [phone].        SECONDARY DISCHARGE DIAGNOSES  Diagnosis: Diarrhea  Assessment and Plan of Treatment: You came in with diarrhea, likely in the setting of the COVID-19 viral illness. This resolved while you were in the hospital. Please follow up with your primary care doctor within two-four weeks of discharge from the hospital. PRINCIPAL DISCHARGE DIAGNOSIS  Diagnosis: COVID-19 virus infection  Assessment and Plan of Treatment: You came in with fever, shortness of breath, cough, and was found to be POSITIVE for the novel coronavirus (COVID-19). We treated you with supportive care, including oxygen supplementation; we also treated  you with the following medicines- Plaquenil/Famotidine trial, convalescent plasma, and remdesivir (anti-viral medicine). Because of the severity of your illness, we treated you for a possible bacterial superinfection- you completed a 5d course of antibiotics (Zosyn (5/4-5/9) and 1 d vanc (5/4-5/5)). You are now feeling better, and ready to go home. Home oxygen has been set up for you as well. Finally, we have arranged for you to take RIVAROXABAN (xeralto), a new blood thinner to prevent any possible blood clots from forming, which have been noted to occur with COVID viral infection. Please take xeralto once a day for the next 30 days (1 month) as prophylaxis for blood clots.  Upon discharge, you must self-quarantine for 14 days from the day you last tested positive (5/8/2020); you were re-tested 5/14, which was negative for COVID. Please wear a face mask if you are around other individuals. Try to avoid contact with house members, family, and friends for the duration of this quarantine. Please follow up with your primary care physician within 2-3 weeks of your discharge from [facility]. Please take all medications as prescribed. If you experience any worsening or recurrence of your symptoms, particularly worsening or high fever, shortness of breath, extreme fatigue, or bloody cough please call 9-1-1 immediately or report to the nearest Emergency Department. Also call the hospital if you develop new symptoms such as blood in your stool or urine, as you were recently started on blood thinners as blood clot prophylaxis. If you have any questions or concerns, please do not hesitate to call the hospital at [phone].      SECONDARY DISCHARGE DIAGNOSES  Diagnosis: Weakness  Assessment and Plan of Treatment: You have generalized weakness from your severe viral infection and deconditioning. We have set up home physical therapy (PT), and home oxygen to assist with saturation with exertion. Please continue to work with PT and build your strength. In the meantime, use the wheelchair and commode as needed, which has been set up for you by the case management.    Diagnosis: Diarrhea  Assessment and Plan of Treatment: You came in with diarrhea, likely in the setting of the COVID-19 viral illness. This resolved while you were in the hospital. Please follow up with your primary care doctor within two-four weeks of discharge from the hospital.

## 2020-05-12 NOTE — DISCHARGE NOTE PROVIDER - NSDCFUADDAPPT_GEN_ALL_CORE_FT
Please follow up with your primary care doctor within two-four weeks of discharge from the hospital. If you do not have one, please call [phone] to set up a new PCP appointment, which may be done as telemedicine appointment.     Please follow up with your cardiologist within two-four weeks of discharge from the hospital to make sure your heart medications are optimized.

## 2020-05-12 NOTE — PHARMACOTHERAPY INTERVENTION NOTE - COMMENTS
Recommended daily comprehensive metabolic panel while on remdesivir to monitor LFTs and renal function.
Recommendation  -Stop Pepcid study drug and hydroxychloroquine - elevated SCr 1.32 & CrCl ~30    Sandra Buchanan, PharmD  #[id number]

## 2020-05-12 NOTE — PROGRESS NOTE ADULT - ASSESSMENT
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     S/p COVID + at urgent care 04/26/20 s/p azithromycin x 5 days, famotidine + HCQ x 2 days, convalescent plasma x1 now on remdesivir (5/9- tentative 5/13).

## 2020-05-12 NOTE — PROGRESS NOTE ADULT - SUBJECTIVE AND OBJECTIVE BOX
Subjective: Patient seen and examined. No new events except as noted.   feels ok   no cp or sob     REVIEW OF SYSTEMS:    CONSTITUTIONAL: No weakness, fevers or chills  EYES/ENT: No visual changes;  No vertigo or throat pain   NECK: No pain or stiffness  RESPIRATORY: No cough, wheezing, hemoptysis; No shortness of breath  CARDIOVASCULAR: No chest pain or palpitations  GASTROINTESTINAL: No abdominal or epigastric pain. No nausea, vomiting, or hematemesis; No diarrhea or constipation. No melena or hematochezia.  GENITOURINARY: No dysuria, frequency or hematuria  NEUROLOGICAL: No numbness or weakness  SKIN: No itching, burning, rashes, or lesions   All other review of systems is negative unless indicated above.    MEDICATIONS:  MEDICATIONS  (STANDING):  aspirin  chewable 81 milliGRAM(s) Oral daily  ATENolol  Tablet 25 milliGRAM(s) Oral daily  atorvastatin 80 milliGRAM(s) Oral at bedtime  benzonatate 100 milliGRAM(s) Oral three times a day  dextrose 5%. 1000 milliLiter(s) (50 mL/Hr) IV Continuous <Continuous>  dextrose 50% Injectable 12.5 Gram(s) IV Push once  dextrose 50% Injectable 25 Gram(s) IV Push once  dextrose 50% Injectable 25 Gram(s) IV Push once  enoxaparin Injectable 40 milliGRAM(s) SubCutaneous daily  insulin glargine Injectable (LANTUS) 10 Unit(s) SubCutaneous at bedtime  insulin lispro (HumaLOG) corrective regimen sliding scale   SubCutaneous three times a day before meals  insulin lispro (HumaLOG) corrective regimen sliding scale   SubCutaneous at bedtime  latanoprost 0.005% Ophthalmic Solution 1 Drop(s) Both EYES at bedtime  losartan 100 milliGRAM(s) Oral daily  nystatin    Suspension 297603 Unit(s) Oral four times a day  remdesivir  IVPB   IV Intermittent   remdesivir  IVPB 100 milliGRAM(s) IV Intermittent every 24 hours  sodium chloride 0.9%. 1000 milliLiter(s) (75 mL/Hr) IV Continuous <Continuous>      PHYSICAL EXAM:  T(C): 36.7 (05-12-20 @ 08:59), Max: 37.4 (05-11-20 @ 21:41)  HR: 93 (05-12-20 @ 08:59) (84 - 93)  BP: 158/63 (05-12-20 @ 08:59) (155/75 - 164/80)  RR: 18 (05-12-20 @ 08:59) (18 - 18)  SpO2: 97% (05-12-20 @ 08:59) (93% - 97%)  Wt(kg): --  I&O's Summary    11 May 2020 07:01  -  12 May 2020 07:00  --------------------------------------------------------  IN: 1250 mL / OUT: 1600 mL / NET: -350 mL    12 May 2020 07:01  -  12 May 2020 10:36  --------------------------------------------------------  IN: 240 mL / OUT: 300 mL / NET: -60 mL          Appearance: Normal	  HEENT:   Normal oral mucosa, PERRL, EOMI	  Lymphatic: No lymphadenopathy , no edema  Cardiovascular: Normal S1 S2, No JVD, No murmurs , Peripheral pulses palpable 2+ bilaterally  Respiratory: Lungs clear to auscultation, normal effort 	  Gastrointestinal:  Soft, Non-tender, + BS	  Skin: No rashes, No ecchymoses, No cyanosis, warm to touch  Musculoskeletal: Normal range of motion, normal strength  Psychiatry:  Mood & affect appropriate  Ext: No edema      LABS:    CARDIAC MARKERS:                                14.5   7.15  )-----------( 469      ( 11 May 2020 06:19 )             43.5     05-11    142  |  103  |  13  ----------------------------<  170<H>  3.5   |  24  |  0.83    Ca    9.3      11 May 2020 06:19  Phos  2.8     05-11  Mg     1.8     05-11    TPro  7.1  /  Alb  2.7<L>  /  TBili  0.5  /  DBili  x   /  AST  24  /  ALT  26  /  AlkPhos  69  05-11    proBNP:   Lipid Profile:   HgA1c:   TSH:             TELEMETRY: 	    ECG:  	  RADIOLOGY:   DIAGNOSTIC TESTING:  [ ] Echocardiogram:  [ ]  Catheterization:  [ ] Stress Test:    OTHER:

## 2020-05-12 NOTE — DISCHARGE NOTE PROVIDER - HOSPITAL COURSE
Ms. 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 Patient is A/O x 3 but a poor historian. Focus on her cough that has been going on. Spoke to daughter over the phone. Patient has been having GI symptoms for about a week. Went to urgent care this past Sunday and tested positive for COVID. Was given Azithromycin which she completed for 5 days. Due to her symptoms not improving the daughter who is an RN at [employer] called back the urgent care and had Augmentin prescribed which she took for the last 2 days. The diarrhea she is having is very loose and watery/mucus but not foul-smelling. Daughter doesn't think she has C. diff. She also has had a productive cough with clear sputum that worsens her diarrhea. Today she had an episode of projective vomiting NBNB, which prompted to give the patient a 500cc bolus of NS. Daughter has been monitoring vital signs she was in the 90s on 3L NC and then last night went down to 80% and increased to 5L NC. Daughter feels guilty as she had symptoms of COVID but tested negative, and feels she transmitted the virus to her mother. Also has had fevers as high as 104.5?. Mild headaches. Today complained of abdominal pain that was diffuse. Has had no chest pain, shortness of breath or dysuria.         In the ED:    Vitals: 99F /87  RR 20 SpO2 92% on RA    Labs: Hgb 15.7, Lymphopenia, Bands 2.6%, D-dimer 521, AST 61, ALT 65, Procalcitonin 48 Lactate 3.3    Imaging: CXR bilateral patchy opacities    Interventions: 250cc bolus of NS x 1, Zofran 4mg IVP x 1        Hospital Course:    #For the COVID infection: Pt received azithromycin as outpt x5d, famotidine/plaquenil x2d, convalescent plasma (5/7), and remdesivir (5/9- 5/13). Able to be weaned down oxygen to 2L NC/RA. Will need oxygen at home, on day of discharge too deconditioned so unable to do ambulatory O2. Tessalon perles/robitussin for symptom management. CTA was done that r/o'ed PE. Given lovenox 40mg qD.        #Possible bacterial superinfection: given elevated procal, completed a 5d course of Zosyn (5/4-5/9) and s/p 1 d vanc (5/4-5/5).         #Diarrhea likely 2/2 COVID: CT A/P did not have any evidence of acute abdomen, resolved during hospitalization.        #YEIMI: Pt also developed YEIMI (baseline 0.8) likely in the setting of dehydration, without evidence of hydronephrosis. CT A/P 5/4 did not show any acute kidney defects.         #HTN: Restarted patient hydralazine and losartan as BPs allowed. Ms. 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 Patient is A/O x 3 but a poor historian. Focus on her cough that has been going on. Spoke to daughter over the phone. Patient has been having GI symptoms for about a week. Went to urgent care this past Sunday and tested positive for COVID. Was given Azithromycin which she completed for 5 days. Due to her symptoms not improving the daughter who is an RN at [employer] called back the urgent care and had Augmentin prescribed which she took for the last 2 days. The diarrhea she is having is very loose and watery/mucus but not foul-smelling. Daughter doesn't think she has C. diff. She also has had a productive cough with clear sputum that worsens her diarrhea. Today she had an episode of projective vomiting NBNB, which prompted to give the patient a 500cc bolus of NS. Daughter has been monitoring vital signs she was in the 90s on 3L NC and then last night went down to 80% and increased to 5L NC. Daughter feels guilty as she had symptoms of COVID but tested negative, and feels she transmitted the virus to her mother. Also has had fevers as high as 104.5?. Mild headaches. Today complained of abdominal pain that was diffuse. Has had no chest pain, shortness of breath or dysuria.         In the ED:    Vitals: 99F /87  RR 20 SpO2 92% on RA    Labs: Hgb 15.7, Lymphopenia, Bands 2.6%, D-dimer 521, AST 61, ALT 65, Procalcitonin 48 Lactate 3.3    Imaging: CXR bilateral patchy opacities    Interventions: 250cc bolus of NS x 1, Zofran 4mg IVP x 1        Hospital Course:    #For the COVID infection: Pt received azithromycin as outpt x5d, famotidine/plaquenil x2d, convalescent plasma (5/7), and remdesivir (5/9- 5/13). Able to be weaned down oxygen to RA while at rest, requires 2L NC with any exertion. Will need oxygen at home, on day of discharge very deconditioned. Tessalon perles/robitussin for symptom management. CTA was done that r/o'ed PE. Given lovenox 40mg qD.        #Possible bacterial superinfection: given elevated procal, completed a 5d course of Zosyn (5/4-5/9) and s/p 1 d vanc (5/4-5/5).         #Diarrhea likely 2/2 COVID: CT A/P did not have any evidence of acute abdomen, resolved during hospitalization.        #YEIMI: Pt also developed YEIMI (baseline 0.8) likely in the setting of dehydration, without evidence of hydronephrosis. CT A/P 5/4 did not show any acute kidney defects.         #HTN: Restarted patient hydralazine and losartan as BPs allowed.        Prior to d/c, patient was COVID neg (5/14), okay to have hha.

## 2020-05-12 NOTE — DISCHARGE NOTE PROVIDER - PROVIDER TOKENS
PROVIDER:[TOKEN:[[id number]],ESTABLISHEDPATIENT:[T]],PROVIDER:[TOKEN:[[id number]],ESTABLISHEDPATIENT:[T]]

## 2020-05-13 ENCOUNTER — APPOINTMENT (OUTPATIENT)
Dept: NEUROLOGY | Facility: CLINIC | Age: 85
End: 2020-05-13

## 2020-05-13 LAB
GLUCOSE BLDC GLUCOMTR-MCNC: 134 MG/DL — HIGH (ref 70–99)
GLUCOSE BLDC GLUCOMTR-MCNC: 142 MG/DL — HIGH (ref 70–99)
GLUCOSE BLDC GLUCOMTR-MCNC: 164 MG/DL — HIGH (ref 70–99)
GLUCOSE BLDC GLUCOMTR-MCNC: 235 MG/DL — HIGH (ref 70–99)

## 2020-05-13 PROCEDURE — 93010 ELECTROCARDIOGRAM REPORT: CPT

## 2020-05-13 PROCEDURE — 99232 SBSQ HOSP IP/OBS MODERATE 35: CPT | Mod: CS,GC

## 2020-05-13 RX ORDER — INSULIN LISPRO 100/ML
5 VIAL (ML) SUBCUTANEOUS
Refills: 0 | Status: DISCONTINUED | OUTPATIENT
Start: 2020-05-13 | End: 2020-05-15

## 2020-05-13 RX ORDER — INSULIN GLARGINE 100 [IU]/ML
15 INJECTION, SOLUTION SUBCUTANEOUS AT BEDTIME
Refills: 0 | Status: DISCONTINUED | OUTPATIENT
Start: 2020-05-13 | End: 2020-05-15

## 2020-05-13 RX ADMIN — Medication 2: at 12:53

## 2020-05-13 RX ADMIN — LOSARTAN POTASSIUM 100 MILLIGRAM(S): 100 TABLET, FILM COATED ORAL at 05:20

## 2020-05-13 RX ADMIN — ATORVASTATIN CALCIUM 80 MILLIGRAM(S): 80 TABLET, FILM COATED ORAL at 21:32

## 2020-05-13 RX ADMIN — ENOXAPARIN SODIUM 40 MILLIGRAM(S): 100 INJECTION SUBCUTANEOUS at 13:54

## 2020-05-13 RX ADMIN — Medication 500000 UNIT(S): at 18:50

## 2020-05-13 RX ADMIN — Medication 500000 UNIT(S): at 00:26

## 2020-05-13 RX ADMIN — Medication 81 MILLIGRAM(S): at 13:53

## 2020-05-13 RX ADMIN — Medication 100 MILLIGRAM(S): at 05:20

## 2020-05-13 RX ADMIN — INSULIN GLARGINE 15 UNIT(S): 100 INJECTION, SOLUTION SUBCUTANEOUS at 21:39

## 2020-05-13 RX ADMIN — ATENOLOL 25 MILLIGRAM(S): 25 TABLET ORAL at 05:20

## 2020-05-13 RX ADMIN — Medication 100 MILLIGRAM(S): at 21:32

## 2020-05-13 RX ADMIN — Medication 500000 UNIT(S): at 05:20

## 2020-05-13 RX ADMIN — Medication 500000 UNIT(S): at 13:55

## 2020-05-13 RX ADMIN — Medication 5 UNIT(S): at 18:51

## 2020-05-13 RX ADMIN — Medication 100 MILLIGRAM(S): at 13:55

## 2020-05-13 RX ADMIN — Medication 1: at 08:59

## 2020-05-13 RX ADMIN — LATANOPROST 1 DROP(S): 0.05 SOLUTION/ DROPS OPHTHALMIC; TOPICAL at 21:32

## 2020-05-13 RX ADMIN — REMDESIVIR 500 MILLIGRAM(S): 5 INJECTION INTRAVENOUS at 14:35

## 2020-05-13 NOTE — CHART NOTE - NSCHARTNOTEFT_GEN_A_CORE
Nutrition Follow-up Note  Patient seen for: nutrition follow-up    Chart reviewed, events noted. 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 Diarrhea, nausea, YEIMI now resolved, on NC. Discharge planning.     Unable to conduct a face to face interview or nutrition-focused physical exam due to limited contact restrictions related to patient's medical condition and isolation precaution.     Source of Information: patient via phone call (using [facility]  for Marcela Barr ID [id number]), medical record, RN     Current Diet: Dysphagia 3 with thin liquids + carbohydrate consistent diet + Glucerna 1x per day     Subjective Information: RN reports pt with fair PO intake, consumed Glucerna shake and yogurt for breakfast. States pt has been eating ~ 50% of meals usually. Patient reports she consumed the chicken entree, soup and juice/tea for lunch, consumed most of it per report. per nursing flow sheets intake at lunch today was 50%. Pt reports she had been drinking Glucerna shakes. Denies any acute GI distress. Last BM 5/13. Patient with no nutrition-related questions or concerns at this time. Made aware RD remains available as needed.     Objective Information:  Weights: no new weights to assess   Skin: free of pressure injuries per nursing flow sheets   Edema: none     05-13 @ 06:42: Na 143, BUN 23, Cr 0.87, <H>, K+ 4.0, Phos 3.0, Mg 1.7, Alk Phos 77, ALT/SGPT 22, AST/SGOT 20, HbA1c --  POCT Blood Glucose.: 235 mg/dL (05-13-20 @ 12:37)  POCT Blood Glucose.: 164 mg/dL (05-13-20 @ 08:18)  POCT Blood Glucose.: 194 mg/dL (05-12-20 @ 21:35)  POCT Blood Glucose.: 186 mg/dL (05-12-20 @ 17:04)      Previous Nutrition Diagnosis:  Inadequate oral intake, on going however appears to be improving, addressed with supplements     New Nutrition Diagnosis: N/A    Nutrition Care Plan:  [x ] In progress   [ ] Achieved    Nutrition Interventions:  1. Continue current diet as tolerated.   2. Consider Glucerna shake to supplement PO intake. RD to add sugar-free Mighty Shake 1x for pt to try.   3. Obtain/honor food preferences as able.     Monitoring and Evaluation:   Continue to monitor Nutritional intake, Tolerance to diet prescription, weights, labs, skin integrity    RD remains available upon request and will follow up per protocol  Virginie Cortes RD, CDN, Pager # [phone] Nutrition Follow-up Note  Patient seen for: nutrition follow-up    Chart reviewed, events noted. 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 Diarrhea, nausea, YEIMI now resolved, on NC. Discharge planning.     Unable to conduct a face to face interview or nutrition-focused physical exam due to limited contact restrictions related to patient's medical condition and isolation precaution.     Source of Information: patient via phone call (using [facility]  for Marcela Barr ID [id number]), medical record, RN     Current Diet: Dysphagia 3 with thin liquids + carbohydrate consistent diet + Glucerna 1x per day     Subjective Information: RN reports pt with fair PO intake, consumed Glucerna shake and yogurt for breakfast. States pt has been eating ~ 50% of meals usually. Patient reports she consumed the chicken entree, soup and juice/tea for lunch, consumed most of it per report. per nursing flow sheets intake at lunch today was 50%. Pt reports she had been drinking Glucerna shakes. Denies any acute GI distress. Last BM 5/13. Patient with no nutrition-related questions or concerns at this time. Made aware RD remains available as needed.     Objective Information:  Weights: no new weights to assess   Skin: free of pressure injuries per nursing flow sheets   Edema: none     05-13 @ 06:42: Na 143, BUN 23, Cr 0.87, <H>, K+ 4.0, Phos 3.0, Mg 1.7, Alk Phos 77, ALT/SGPT 22, AST/SGOT 20, HbA1c --  POCT Blood Glucose.: 235 mg/dL (05-13-20 @ 12:37)  POCT Blood Glucose.: 164 mg/dL (05-13-20 @ 08:18)  POCT Blood Glucose.: 194 mg/dL (05-12-20 @ 21:35)  POCT Blood Glucose.: 186 mg/dL (05-12-20 @ 17:04)      Previous Nutrition Diagnosis:  Inadequate oral intake, on going however appears to be improving, addressed with supplements     New Nutrition Diagnosis: N/A    Nutrition Care Plan:  [x ] In progress   [ ] Achieved    Nutrition Interventions:  1. Continue current diet as tolerated.   2. Continue Glucerna shake to supplement PO intake. RD to add sugar-free Mighty Shake 1x for pt to try.   3. Obtain/honor food preferences as able.     Monitoring and Evaluation:   Continue to monitor Nutritional intake, Tolerance to diet prescription, weights, labs, skin integrity    RD remains available upon request and will follow up per protocol  Virginie Cortes RD, CDN, Pager # [phone]

## 2020-05-13 NOTE — PROGRESS NOTE ADULT - SUBJECTIVE AND OBJECTIVE BOX
Keila Benson MD [facility]  PGY-1 Internal Medicine  [facility] Pager: [phone] | NS: [phone]    Patient is a 87y old  Female who presents with a chief complaint of Diarrhea (06 May 2020 14:22)    SUBJECTIVE / OVERNIGHT EVENTS: Overnight, no acute events. On 2L NC to RA today satting 93-96%. Coughs improved.  Doing well now on remdesivir trial (5/9-5/13) to end today.  Pt unable to ambulate per PT notes, so will need home O2. Form filled in chart.    MEDICATIONS  (STANDING):  aspirin  chewable 81 milliGRAM(s) Oral daily  ATENolol  Tablet 25 milliGRAM(s) Oral daily  atorvastatin 80 milliGRAM(s) Oral at bedtime  benzonatate 100 milliGRAM(s) Oral three times a day  dextrose 5%. 1000 milliLiter(s) (50 mL/Hr) IV Continuous <Continuous>  dextrose 50% Injectable 12.5 Gram(s) IV Push once  dextrose 50% Injectable 25 Gram(s) IV Push once  dextrose 50% Injectable 25 Gram(s) IV Push once  enoxaparin Injectable 40 milliGRAM(s) SubCutaneous daily  insulin glargine Injectable (LANTUS) 10 Unit(s) SubCutaneous at bedtime  insulin lispro (HumaLOG) corrective regimen sliding scale   SubCutaneous three times a day before meals  insulin lispro (HumaLOG) corrective regimen sliding scale   SubCutaneous at bedtime  latanoprost 0.005% Ophthalmic Solution 1 Drop(s) Both EYES at bedtime  losartan 100 milliGRAM(s) Oral daily  nystatin    Suspension 998130 Unit(s) Oral four times a day  remdesivir  IVPB   IV Intermittent   remdesivir  IVPB 100 milliGRAM(s) IV Intermittent every 24 hours  sodium chloride 0.9%. 1000 milliLiter(s) (75 mL/Hr) IV Continuous <Continuous>    MEDICATIONS  (PRN):  acetaminophen   Tablet .. 650 milliGRAM(s) Oral every 6 hours PRN Temp greater or equal to 38C (100.4F), Mild Pain (1 - 3)  dextrose 40% Gel 15 Gram(s) Oral once PRN Blood Glucose LESS THAN 70 milliGRAM(s)/deciliter  glucagon  Injectable 1 milliGRAM(s) IntraMuscular once PRN Glucose LESS THAN 70 milligrams/deciliter  guaiFENesin   Syrup  (Sugar-Free) 200 milliGRAM(s) Oral every 6 hours PRN Cough  ondansetron Injectable 4 milliGRAM(s) IV Push every 6 hours PRN Nausea and/or Vomiting    Allergies    No Known Allergies    Intolerances        VITAL SIGNS:  ICU Vital Signs Last 24 Hrs  T(C): 36.3 (13 May 2020 04:23), Max: 36.7 (12 May 2020 08:59)  T(F): 97.4 (13 May 2020 04:23), Max: 98 (12 May 2020 08:59)  HR: 79 (13 May 2020 04:23) (72 - 93)  BP: 155/64 (13 May 2020 04:23) (153/89 - 186/77)  BP(mean): --  ABP: --  ABP(mean): --  RR: 18 (13 May 2020 04:23) (18 - 18)  SpO2: 96% (13 May 2020 04:23) (95% - 97%)    PHYSICAL EXAM:  GENERAL: NAD.  CHEST/LUNG: Breathing unlabored on 2L NC-RA, continues to have non-productive cough.   HEART: Regular rate and rhythm  ABDOMEN: Soft, Nontender, Nondistended  EXTREMITIES:  2+ Peripheral Pulses No cyanosis or edema  PSYCH: Normal affect.  Neuro: AAO x4, attention and language intact.  Can follow 2 step crossed commands. 4/5 strength throughout.     LABS: CMP daily for remdesivir trial             LABS:                        13.2   6.80  )-----------( 432      ( 13 May 2020 06:42 )             43.7                         14.5   7.15  )-----------( 469      ( 11 May 2020 06:19 )             43.5                         14.1   7.09  )-----------( 474      ( 10 May 2020 09:44 )             45.5           05-13    143  |  104  |  23  ----------------------------<  176<H>  4.0   |  29  |  0.87  05-12    140  |  101  |  20  ----------------------------<  314<H>  3.3<L>   |  26  |  0.79  05-11    142  |  103  |  13  ----------------------------<  170<H>  3.5   |  24  |  0.83    Ca    9.3      13 May 2020 06:42  Ca    9.7      12 May 2020 11:29  Ca    9.3      11 May 2020 06:19  Phos  3.0     05-13  Mg     1.7     05-13    TPro  7.3  /  Alb  2.5<L>  /  TBili  0.5  /  DBili  x   /  AST  20  /  ALT  22  /  AlkPhos  77  05-13  TPro  7.4  /  Alb  3.0<L>  /  TBili  0.5  /  DBili  x   /  AST  23  /  ALT  25  /  AlkPhos  78  05-12  TPro  7.1  /  Alb  2.7<L>  /  TBili  0.5  /  DBili  x   /  AST  24  /  ALT  26  /  AlkPhos  69  05-11        CAPILLARY BLOOD GLUCOSE      POCT Blood Glucose.: 194 mg/dL (12 May 2020 21:35)  POCT Blood Glucose.: 186 mg/dL (12 May 2020 17:04)  POCT Blood Glucose.: 241 mg/dL (12 May 2020 12:13)  POCT Blood Glucose.: 168 mg/dL (12 May 2020 08:11)      COVID-19 LABS  T(C): 36.3 (05-13-20 @ 04:23), Max: 36.7 (05-12-20 @ 08:59)  RR: 18 (05-13-20 @ 04:23) (18 - 18)  SpO2: 96% (05-13-20 @ 04:23) (95% - 97%)    COVID-19 PCR: Detected (05-08)      D-Dimer Assay, Quantitative: 481 ng/mL DDU <H> (05-11)  D-Dimer Assay, Quantitative: 333 ng/mL DDU <H> (05-09)  D-Dimer Assay, Quantitative: 582 ng/mL DDU <H> (05-06)  D-Dimer Assay, Quantitative: 521 ng/mL DDU <H> (05-03)      Ferritin, Serum: 404 ng/mL <H> (05-11)  Ferritin, Serum: 430 ng/mL <H> (05-09)  Ferritin, Serum: 510 ng/mL <H> (05-07)  Ferritin, Serum: 433 ng/mL <H> (05-06)  Ferritin, Serum: 499 ng/mL <H> (05-03)      C-Reactive Protein, Serum: 1.61 ug/mL (05-13)  C-Reactive Protein, Serum: 3.09 mg/dL <H> (05-11)  C-Reactive Protein, Serum: 4.58 ug/mL (05-09)  C-Reactive Protein, Serum: 6.70 ug/mL (05-07)  C-Reactive Protein, Serum: 6.60 mg/dL <H> (05-06)  C-Reactive Protein, Serum: 8.45 mg/dL <H> (05-03)      Auto Lymphocyte #: 1.23 K/uL (05-11)  Auto Lymphocyte #: 1.67 K/uL (05-07)  Auto Lymphocyte #: 1.41 K/uL (05-06)      Pro-Calcitonin (Superimposed bacterial infection)   Procalcitonin, Serum: 24.20 ng/mL <H> (05-05)  Procalcitonin, Serum: 41.79 ng/mL <H> (05-04)  Procalcitonin, Serum: 48.08 ng/mL <H> (05-03)      Imaging:        Meds Received            IMAGING: Radiology personally reviewed    CT Angio Chest w/ IV Cont (05.04.20 @ 10:00)   Chest:  1.  No pulmonary embolus, however, the segmental and subsegmental vessels are not well evaluated secondary to respiratory motion.  2.  Bilateral opacities most pronounced the left upper lobe can occur in the setting of atypical viral infection/lung injury.    Abdomen and pelvis:  1.  No bowel obstruction.  2.  Bilateral renal lesions are most compatible with cysts.  3.  The appendix is normal.    Meds Received  Azithromycin as outpatient x 5 days.   Hydroxychloroquine + Famotidine 05/04-05/06 (Un-enrolled 2* YEIMI)  Convalescent plasma x 1 dose 05/07/20  Remdesivir under EAU 05/09-05/13    5/10 EKG: QTC Calculation(Bezet) 419 ms Keila Benson MD [facility]  PGY-1 Internal Medicine  [facility] Pager: [phone] | NS: [phone]    Patient is a 87y old  Female who presents with a chief complaint of Diarrhea (06 May 2020 14:22)    SUBJECTIVE / OVERNIGHT EVENTS: Overnight, no acute events. Intermittently on 2L NC to RA today satting 93-96%. Cough improved.  Doing well now on remdesivir trial (5/9-5/13) to end today.  Pt unable to ambulate per PT notes, so will need home O2. Form filled in chart.    MEDICATIONS  (STANDING):  aspirin  chewable 81 milliGRAM(s) Oral daily  ATENolol  Tablet 25 milliGRAM(s) Oral daily  atorvastatin 80 milliGRAM(s) Oral at bedtime  benzonatate 100 milliGRAM(s) Oral three times a day  dextrose 5%. 1000 milliLiter(s) (50 mL/Hr) IV Continuous <Continuous>  dextrose 50% Injectable 12.5 Gram(s) IV Push once  dextrose 50% Injectable 25 Gram(s) IV Push once  dextrose 50% Injectable 25 Gram(s) IV Push once  enoxaparin Injectable 40 milliGRAM(s) SubCutaneous daily  insulin glargine Injectable (LANTUS) 10 Unit(s) SubCutaneous at bedtime  insulin lispro (HumaLOG) corrective regimen sliding scale   SubCutaneous three times a day before meals  insulin lispro (HumaLOG) corrective regimen sliding scale   SubCutaneous at bedtime  latanoprost 0.005% Ophthalmic Solution 1 Drop(s) Both EYES at bedtime  losartan 100 milliGRAM(s) Oral daily  nystatin    Suspension 922012 Unit(s) Oral four times a day  remdesivir  IVPB   IV Intermittent   remdesivir  IVPB 100 milliGRAM(s) IV Intermittent every 24 hours  sodium chloride 0.9%. 1000 milliLiter(s) (75 mL/Hr) IV Continuous <Continuous>    MEDICATIONS  (PRN):  acetaminophen   Tablet .. 650 milliGRAM(s) Oral every 6 hours PRN Temp greater or equal to 38C (100.4F), Mild Pain (1 - 3)  dextrose 40% Gel 15 Gram(s) Oral once PRN Blood Glucose LESS THAN 70 milliGRAM(s)/deciliter  glucagon  Injectable 1 milliGRAM(s) IntraMuscular once PRN Glucose LESS THAN 70 milligrams/deciliter  guaiFENesin   Syrup  (Sugar-Free) 200 milliGRAM(s) Oral every 6 hours PRN Cough  ondansetron Injectable 4 milliGRAM(s) IV Push every 6 hours PRN Nausea and/or Vomiting    Allergies    No Known Allergies    Intolerances        VITAL SIGNS:  ICU Vital Signs Last 24 Hrs  T(C): 36.3 (13 May 2020 04:23), Max: 36.7 (12 May 2020 08:59)  T(F): 97.4 (13 May 2020 04:23), Max: 98 (12 May 2020 08:59)  HR: 79 (13 May 2020 04:23) (72 - 93)  BP: 155/64 (13 May 2020 04:23) (153/89 - 186/77)  BP(mean): --  ABP: --  ABP(mean): --  RR: 18 (13 May 2020 04:23) (18 - 18)  SpO2: 96% (13 May 2020 04:23) (95% - 97%)    PHYSICAL EXAM:  GENERAL: NAD.  CHEST/LUNG: Breathing unlabored on 2L NC-RA, continues to have non-productive cough.   HEART: Regular rate and rhythm  ABDOMEN: Soft, Nontender, Nondistended  EXTREMITIES:  2+ Peripheral Pulses No cyanosis or edema  PSYCH: Normal affect.  Neuro: AAO x4, attention and language intact.  Can follow 2 step crossed commands. 4/5 strength throughout.     LABS: CMP daily for remdesivir trial             LABS:                        13.2   6.80  )-----------( 432      ( 13 May 2020 06:42 )             43.7                         14.5   7.15  )-----------( 469      ( 11 May 2020 06:19 )             43.5                         14.1   7.09  )-----------( 474      ( 10 May 2020 09:44 )             45.5           05-13    143  |  104  |  23  ----------------------------<  176<H>  4.0   |  29  |  0.87  05-12    140  |  101  |  20  ----------------------------<  314<H>  3.3<L>   |  26  |  0.79  05-11    142  |  103  |  13  ----------------------------<  170<H>  3.5   |  24  |  0.83    Ca    9.3      13 May 2020 06:42  Ca    9.7      12 May 2020 11:29  Ca    9.3      11 May 2020 06:19  Phos  3.0     05-13  Mg     1.7     05-13    TPro  7.3  /  Alb  2.5<L>  /  TBili  0.5  /  DBili  x   /  AST  20  /  ALT  22  /  AlkPhos  77  05-13  TPro  7.4  /  Alb  3.0<L>  /  TBili  0.5  /  DBili  x   /  AST  23  /  ALT  25  /  AlkPhos  78  05-12  TPro  7.1  /  Alb  2.7<L>  /  TBili  0.5  /  DBili  x   /  AST  24  /  ALT  26  /  AlkPhos  69  05-11        CAPILLARY BLOOD GLUCOSE      POCT Blood Glucose.: 194 mg/dL (12 May 2020 21:35)  POCT Blood Glucose.: 186 mg/dL (12 May 2020 17:04)  POCT Blood Glucose.: 241 mg/dL (12 May 2020 12:13)  POCT Blood Glucose.: 168 mg/dL (12 May 2020 08:11)      COVID-19 LABS  T(C): 36.3 (05-13-20 @ 04:23), Max: 36.7 (05-12-20 @ 08:59)  RR: 18 (05-13-20 @ 04:23) (18 - 18)  SpO2: 96% (05-13-20 @ 04:23) (95% - 97%)    COVID-19 PCR: Detected (05-08)      D-Dimer Assay, Quantitative: 481 ng/mL DDU <H> (05-11)  D-Dimer Assay, Quantitative: 333 ng/mL DDU <H> (05-09)  D-Dimer Assay, Quantitative: 582 ng/mL DDU <H> (05-06)  D-Dimer Assay, Quantitative: 521 ng/mL DDU <H> (05-03)      Ferritin, Serum: 404 ng/mL <H> (05-11)  Ferritin, Serum: 430 ng/mL <H> (05-09)  Ferritin, Serum: 510 ng/mL <H> (05-07)  Ferritin, Serum: 433 ng/mL <H> (05-06)  Ferritin, Serum: 499 ng/mL <H> (05-03)      C-Reactive Protein, Serum: 1.61 ug/mL (05-13)  C-Reactive Protein, Serum: 3.09 mg/dL <H> (05-11)  C-Reactive Protein, Serum: 4.58 ug/mL (05-09)  C-Reactive Protein, Serum: 6.70 ug/mL (05-07)  C-Reactive Protein, Serum: 6.60 mg/dL <H> (05-06)  C-Reactive Protein, Serum: 8.45 mg/dL <H> (05-03)      Auto Lymphocyte #: 1.23 K/uL (05-11)  Auto Lymphocyte #: 1.67 K/uL (05-07)  Auto Lymphocyte #: 1.41 K/uL (05-06)      Pro-Calcitonin (Superimposed bacterial infection)   Procalcitonin, Serum: 24.20 ng/mL <H> (05-05)  Procalcitonin, Serum: 41.79 ng/mL <H> (05-04)  Procalcitonin, Serum: 48.08 ng/mL <H> (05-03)      Imaging:        Meds Received            IMAGING: Radiology personally reviewed    CT Angio Chest w/ IV Cont (05.04.20 @ 10:00)   Chest:  1.  No pulmonary embolus, however, the segmental and subsegmental vessels are not well evaluated secondary to respiratory motion.  2.  Bilateral opacities most pronounced the left upper lobe can occur in the setting of atypical viral infection/lung injury.    Abdomen and pelvis:  1.  No bowel obstruction.  2.  Bilateral renal lesions are most compatible with cysts.  3.  The appendix is normal.    Meds Received  Azithromycin as outpatient x 5 days.   Hydroxychloroquine + Famotidine 05/04-05/06 (Un-enrolled 2* YEIMI)  Convalescent plasma x 1 dose 05/07/20  Remdesivir under EAU 05/09-05/13    5/10 EKG: QTC Calculation(Bezet) 419 ms

## 2020-05-13 NOTE — PROGRESS NOTE ADULT - PROBLEM SELECTOR PLAN 6
RESOLVED  - Cr BL 0.8 on admission. Possibly pre renal in setting of dehydration. Cr now returning to normal.   -no hydronephrosis on CT A/P 5/4.  -hyperK false 2/2 hemolysis.  -No volume overload on exam.

## 2020-05-13 NOTE — PROGRESS NOTE ADULT - ASSESSMENT
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     S/p COVID + at urgent care 04/26/20 s/p azithromycin x 5 days, famotidine + HCQ x 2 days, convalescent plasma x1 now on remdesivir (5/9- 5/13).    5/13: Pending discharge home after confirming with daughter (nurse) that she will be able to assist mom who is unable to ambulate at the moment 2/2 deconditioning and home o2 is delivered.

## 2020-05-13 NOTE — PROGRESS NOTE ADULT - PROBLEM SELECTOR PLAN 8
At home on 100 losartan.  BP here in 160s 170s. Holding in setting of YEIMI.  -Hydralazine standing and PRN  -Losartan restarted 5/11 100mg BID At home on 100 losartan.  BP here in 160s 170s. Initially held in setting of YEIMI.  -Hydralazine standing and PRN  -Losartan restarted 5/11 100mg BID

## 2020-05-13 NOTE — PROGRESS NOTE ADULT - SUBJECTIVE AND OBJECTIVE BOX
Subjective: Patient seen and examined. No new events except as noted.   feels well     REVIEW OF SYSTEMS:    CONSTITUTIONAL: No weakness, fevers or chills  EYES/ENT: No visual changes;  No vertigo or throat pain   NECK: No pain or stiffness  RESPIRATORY: No cough, wheezing, hemoptysis; No shortness of breath  CARDIOVASCULAR: No chest pain or palpitations  GASTROINTESTINAL: No abdominal or epigastric pain. No nausea, vomiting, or hematemesis; No diarrhea or constipation. No melena or hematochezia.  GENITOURINARY: No dysuria, frequency or hematuria  NEUROLOGICAL: No numbness or weakness  SKIN: No itching, burning, rashes, or lesions   All other review of systems is negative unless indicated above.    MEDICATIONS:  MEDICATIONS  (STANDING):  aspirin  chewable 81 milliGRAM(s) Oral daily  ATENolol  Tablet 25 milliGRAM(s) Oral daily  atorvastatin 80 milliGRAM(s) Oral at bedtime  benzonatate 100 milliGRAM(s) Oral three times a day  dextrose 5%. 1000 milliLiter(s) (50 mL/Hr) IV Continuous <Continuous>  dextrose 50% Injectable 12.5 Gram(s) IV Push once  dextrose 50% Injectable 25 Gram(s) IV Push once  dextrose 50% Injectable 25 Gram(s) IV Push once  enoxaparin Injectable 40 milliGRAM(s) SubCutaneous daily  insulin glargine Injectable (LANTUS) 10 Unit(s) SubCutaneous at bedtime  insulin lispro (HumaLOG) corrective regimen sliding scale   SubCutaneous three times a day before meals  insulin lispro (HumaLOG) corrective regimen sliding scale   SubCutaneous at bedtime  latanoprost 0.005% Ophthalmic Solution 1 Drop(s) Both EYES at bedtime  losartan 100 milliGRAM(s) Oral daily  nystatin    Suspension 473346 Unit(s) Oral four times a day  remdesivir  IVPB   IV Intermittent   remdesivir  IVPB 100 milliGRAM(s) IV Intermittent every 24 hours  sodium chloride 0.9%. 1000 milliLiter(s) (75 mL/Hr) IV Continuous <Continuous>      PHYSICAL EXAM:  T(C): 36.3 (05-13-20 @ 04:23), Max: 36.6 (05-12-20 @ 21:28)  HR: 79 (05-13-20 @ 04:23) (72 - 80)  BP: 155/64 (05-13-20 @ 04:23) (153/89 - 186/77)  RR: 18 (05-13-20 @ 04:23) (18 - 18)  SpO2: 96% (05-13-20 @ 04:23) (95% - 97%)  Wt(kg): --  I&O's Summary    12 May 2020 07:01  -  13 May 2020 07:00  --------------------------------------------------------  IN: 730 mL / OUT: 1450 mL / NET: -720 mL          Appearance: Normal	  HEENT:   Normal oral mucosa, PERRL, EOMI	  Lymphatic: No lymphadenopathy , no edema  Cardiovascular: Normal S1 S2, No JVD, No murmurs , Peripheral pulses palpable 2+ bilaterally  Respiratory: Lungs clear to auscultation, normal effort 	  Gastrointestinal:  Soft, Non-tender, + BS	  Skin: No rashes, No ecchymoses, No cyanosis, warm to touch  Musculoskeletal: Normal range of motion, normal strength  Psychiatry:  Mood & affect appropriate  Ext: No edema      LABS:    CARDIAC MARKERS:                                13.2   6.80  )-----------( 432      ( 13 May 2020 06:42 )             43.7     05-13    143  |  104  |  23  ----------------------------<  176<H>  4.0   |  29  |  0.87    Ca    9.3      13 May 2020 06:42  Phos  3.0     05-13  Mg     1.7     05-13    TPro  7.3  /  Alb  2.5<L>  /  TBili  0.5  /  DBili  x   /  AST  20  /  ALT  22  /  AlkPhos  77  05-13    proBNP:   Lipid Profile:   HgA1c:   TSH:             TELEMETRY: 	    ECG:  	  RADIOLOGY:   DIAGNOSTIC TESTING:  [ ] Echocardiogram:  [ ]  Catheterization:  [ ] Stress Test:    OTHER:

## 2020-05-14 LAB
ALBUMIN SERPL ELPH-MCNC: 2.8 G/DL — LOW (ref 3.3–5)
ALP SERPL-CCNC: 77 U/L — SIGNIFICANT CHANGE UP (ref 40–120)
ALT FLD-CCNC: 21 U/L — SIGNIFICANT CHANGE UP (ref 10–45)
ANION GAP SERPL CALC-SCNC: 13 MMOL/L — SIGNIFICANT CHANGE UP (ref 5–17)
AST SERPL-CCNC: 23 U/L — SIGNIFICANT CHANGE UP (ref 10–40)
BASOPHILS # BLD AUTO: 0.05 K/UL — SIGNIFICANT CHANGE UP (ref 0–0.2)
BASOPHILS NFR BLD AUTO: 0.7 % — SIGNIFICANT CHANGE UP (ref 0–2)
BILIRUB SERPL-MCNC: 0.5 MG/DL — SIGNIFICANT CHANGE UP (ref 0.2–1.2)
BUN SERPL-MCNC: 23 MG/DL — SIGNIFICANT CHANGE UP (ref 7–23)
CALCIUM SERPL-MCNC: 8.9 MG/DL — SIGNIFICANT CHANGE UP (ref 8.4–10.5)
CHLORIDE SERPL-SCNC: 104 MMOL/L — SIGNIFICANT CHANGE UP (ref 96–108)
CO2 SERPL-SCNC: 30 MMOL/L — SIGNIFICANT CHANGE UP (ref 22–31)
CREAT SERPL-MCNC: 0.79 MG/DL — SIGNIFICANT CHANGE UP (ref 0.5–1.3)
EOSINOPHIL # BLD AUTO: 0.13 K/UL — SIGNIFICANT CHANGE UP (ref 0–0.5)
EOSINOPHIL NFR BLD AUTO: 1.9 % — SIGNIFICANT CHANGE UP (ref 0–6)
GLUCOSE BLDC GLUCOMTR-MCNC: 106 MG/DL — HIGH (ref 70–99)
GLUCOSE BLDC GLUCOMTR-MCNC: 123 MG/DL — HIGH (ref 70–99)
GLUCOSE BLDC GLUCOMTR-MCNC: 156 MG/DL — HIGH (ref 70–99)
GLUCOSE BLDC GLUCOMTR-MCNC: 84 MG/DL — SIGNIFICANT CHANGE UP (ref 70–99)
GLUCOSE SERPL-MCNC: 90 MG/DL — SIGNIFICANT CHANGE UP (ref 70–99)
HCT VFR BLD CALC: 44.1 % — SIGNIFICANT CHANGE UP (ref 34.5–45)
HGB BLD-MCNC: 14 G/DL — SIGNIFICANT CHANGE UP (ref 11.5–15.5)
IMM GRANULOCYTES NFR BLD AUTO: 0.7 % — SIGNIFICANT CHANGE UP (ref 0–1.5)
LYMPHOCYTES # BLD AUTO: 2.33 K/UL — SIGNIFICANT CHANGE UP (ref 1–3.3)
LYMPHOCYTES # BLD AUTO: 34.3 % — SIGNIFICANT CHANGE UP (ref 13–44)
MAGNESIUM SERPL-MCNC: 1.5 MG/DL — LOW (ref 1.6–2.6)
MCHC RBC-ENTMCNC: 24.6 PG — LOW (ref 27–34)
MCHC RBC-ENTMCNC: 31.7 GM/DL — LOW (ref 32–36)
MCV RBC AUTO: 77.4 FL — LOW (ref 80–100)
MONOCYTES # BLD AUTO: 1.21 K/UL — HIGH (ref 0–0.9)
MONOCYTES NFR BLD AUTO: 17.8 % — HIGH (ref 2–14)
NEUTROPHILS # BLD AUTO: 3.03 K/UL — SIGNIFICANT CHANGE UP (ref 1.8–7.4)
NEUTROPHILS NFR BLD AUTO: 44.6 % — SIGNIFICANT CHANGE UP (ref 43–77)
NRBC # BLD: 0 /100 WBCS — SIGNIFICANT CHANGE UP (ref 0–0)
PHOSPHATE SERPL-MCNC: 2.9 MG/DL — SIGNIFICANT CHANGE UP (ref 2.5–4.5)
PLATELET # BLD AUTO: 442 K/UL — HIGH (ref 150–400)
POTASSIUM SERPL-MCNC: 3.7 MMOL/L — SIGNIFICANT CHANGE UP (ref 3.5–5.3)
POTASSIUM SERPL-SCNC: 3.7 MMOL/L — SIGNIFICANT CHANGE UP (ref 3.5–5.3)
PROT SERPL-MCNC: 7.3 G/DL — SIGNIFICANT CHANGE UP (ref 6–8.3)
RBC # BLD: 5.7 M/UL — HIGH (ref 3.8–5.2)
RBC # FLD: 16.7 % — HIGH (ref 10.3–14.5)
SARS-COV-2 RNA SPEC QL NAA+PROBE: SIGNIFICANT CHANGE UP
SODIUM SERPL-SCNC: 147 MMOL/L — HIGH (ref 135–145)
WBC # BLD: 6.8 K/UL — SIGNIFICANT CHANGE UP (ref 3.8–10.5)
WBC # FLD AUTO: 6.8 K/UL — SIGNIFICANT CHANGE UP (ref 3.8–10.5)

## 2020-05-14 PROCEDURE — 93010 ELECTROCARDIOGRAM REPORT: CPT

## 2020-05-14 PROCEDURE — 99232 SBSQ HOSP IP/OBS MODERATE 35: CPT | Mod: CS,GC

## 2020-05-14 RX ORDER — MAGNESIUM SULFATE 500 MG/ML
2 VIAL (ML) INJECTION ONCE
Refills: 0 | Status: COMPLETED | OUTPATIENT
Start: 2020-05-14 | End: 2020-05-14

## 2020-05-14 RX ORDER — RIVAROXABAN 15 MG-20MG
1 KIT ORAL
Qty: 30 | Refills: 0
Start: 2020-05-14 | End: 2020-06-12

## 2020-05-14 RX ADMIN — Medication 100 MILLIGRAM(S): at 21:57

## 2020-05-14 RX ADMIN — Medication 500000 UNIT(S): at 22:16

## 2020-05-14 RX ADMIN — Medication 500000 UNIT(S): at 05:14

## 2020-05-14 RX ADMIN — Medication 5 UNIT(S): at 11:21

## 2020-05-14 RX ADMIN — ATORVASTATIN CALCIUM 80 MILLIGRAM(S): 80 TABLET, FILM COATED ORAL at 21:57

## 2020-05-14 RX ADMIN — Medication 100 MILLIGRAM(S): at 05:14

## 2020-05-14 RX ADMIN — Medication 500000 UNIT(S): at 17:36

## 2020-05-14 RX ADMIN — ENOXAPARIN SODIUM 40 MILLIGRAM(S): 100 INJECTION SUBCUTANEOUS at 15:41

## 2020-05-14 RX ADMIN — Medication 5 UNIT(S): at 17:40

## 2020-05-14 RX ADMIN — Medication 500000 UNIT(S): at 14:43

## 2020-05-14 RX ADMIN — INSULIN GLARGINE 15 UNIT(S): 100 INJECTION, SOLUTION SUBCUTANEOUS at 21:57

## 2020-05-14 RX ADMIN — LOSARTAN POTASSIUM 100 MILLIGRAM(S): 100 TABLET, FILM COATED ORAL at 05:14

## 2020-05-14 RX ADMIN — Medication 50 GRAM(S): at 11:20

## 2020-05-14 RX ADMIN — Medication 500000 UNIT(S): at 00:52

## 2020-05-14 RX ADMIN — Medication 1: at 15:42

## 2020-05-14 RX ADMIN — LATANOPROST 1 DROP(S): 0.05 SOLUTION/ DROPS OPHTHALMIC; TOPICAL at 21:57

## 2020-05-14 RX ADMIN — Medication 100 MILLIGRAM(S): at 15:41

## 2020-05-14 RX ADMIN — Medication 81 MILLIGRAM(S): at 15:41

## 2020-05-14 RX ADMIN — ATENOLOL 25 MILLIGRAM(S): 25 TABLET ORAL at 05:14

## 2020-05-14 NOTE — PROGRESS NOTE ADULT - ASSESSMENT
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     S/p COVID + at urgent care 04/26/20 s/p azithromycin x 5 days, famotidine + HCQ x 2 days, convalescent plasma x1 now on remdesivir (5/9- 5/13). Inflammatory markers downtrending.    5/14: Pending d/c awaiting home O2 set-up and repeat COVID swab. 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     S/p COVID + at urgent care 04/26/20 s/p azithromycin x 5 days, famotidine + HCQ x 2 days, convalescent plasma x1 now on remdesivir (5/9- 5/13). Inflammatory markers downtrending.    5/14: Pending d/c awaiting home O2 set-up and repeat COVID swab per family request.

## 2020-05-14 NOTE — PROGRESS NOTE ADULT - SUBJECTIVE AND OBJECTIVE BOX
Subjective: Patient seen and examined. No new events except as noted.   no cp or sob     REVIEW OF SYSTEMS:    CONSTITUTIONAL:+ weakness, fevers or chills  EYES/ENT: No visual changes;  No vertigo or throat pain   NECK: No pain or stiffness  RESPIRATORY: No cough, wheezing, hemoptysis; No shortness of breath  CARDIOVASCULAR: No chest pain or palpitations  GASTROINTESTINAL: No abdominal or epigastric pain. No nausea, vomiting, or hematemesis; No diarrhea or constipation. No melena or hematochezia.  GENITOURINARY: No dysuria, frequency or hematuria  NEUROLOGICAL: No numbness or weakness  SKIN: No itching, burning, rashes, or lesions   All other review of systems is negative unless indicated above.    MEDICATIONS:  MEDICATIONS  (STANDING):  aspirin  chewable 81 milliGRAM(s) Oral daily  ATENolol  Tablet 25 milliGRAM(s) Oral daily  atorvastatin 80 milliGRAM(s) Oral at bedtime  benzonatate 100 milliGRAM(s) Oral three times a day  dextrose 5%. 1000 milliLiter(s) (50 mL/Hr) IV Continuous <Continuous>  dextrose 50% Injectable 12.5 Gram(s) IV Push once  dextrose 50% Injectable 25 Gram(s) IV Push once  dextrose 50% Injectable 25 Gram(s) IV Push once  enoxaparin Injectable 40 milliGRAM(s) SubCutaneous daily  insulin glargine Injectable (LANTUS) 15 Unit(s) SubCutaneous at bedtime  insulin lispro (HumaLOG) corrective regimen sliding scale   SubCutaneous three times a day before meals  insulin lispro (HumaLOG) corrective regimen sliding scale   SubCutaneous at bedtime  insulin lispro Injectable (HumaLOG) 5 Unit(s) SubCutaneous three times a day before meals  latanoprost 0.005% Ophthalmic Solution 1 Drop(s) Both EYES at bedtime  losartan 100 milliGRAM(s) Oral daily  magnesium sulfate  IVPB 2 Gram(s) IV Intermittent once  nystatin    Suspension [id number] Unit(s) Oral four times a day  sodium chloride 0.9%. 1000 milliLiter(s) (75 mL/Hr) IV Continuous <Continuous>      PHYSICAL EXAM:  T(C): 36.5 (05-14-20 @ 04:59), Max: 37.7 (05-13-20 @ 20:49)  HR: 73 (05-14-20 @ 04:59) (68 - 75)  BP: 162/73 (05-14-20 @ 04:59) (150/67 - 162/75)  RR: 18 (05-14-20 @ 04:59) (18 - 19)  SpO2: 93% (05-14-20 @ 04:59) (89% - 96%)  Wt(kg): --  I&O's Summary    13 May 2020 07:01  -  14 May 2020 07:00  --------------------------------------------------------  IN: 730 mL / OUT: 1400 mL / NET: -670 mL          Appearance: NAD 1 liter NC 	  HEENT:   Normal oral mucosa, PERRL, EOMI	  Lymphatic: No lymphadenopathy , no edema  Cardiovascular: Normal S1 S2, No JVD, No murmurs , Peripheral pulses palpable 2+ bilaterally  Respiratory: Lungs clear to auscultation, normal effort 	  Gastrointestinal:  Soft, Non-tender, + BS	  Skin: No rashes, No ecchymoses, No cyanosis, warm to touch  Musculoskeletal: Normal range of motion, normal strength  Psychiatry:  Mood & affect appropriate  Ext: No edema      LABS:    CARDIAC MARKERS:                                14.0   6.80  )-----------( 442      ( 14 May 2020 07:01 )             44.1     05-14    147<H>  |  104  |  23  ----------------------------<  90  3.7   |  30  |  0.79    Ca    8.9      14 May 2020 07:01  Phos  2.9     05-14  Mg     1.5     05-14    TPro  7.3  /  Alb  2.8<L>  /  TBili  0.5  /  DBili  x   /  AST  23  /  ALT  21  /  AlkPhos  77  05-14    proBNP:   Lipid Profile:   HgA1c:   TSH:             TELEMETRY: 	    ECG:  	  RADIOLOGY:   DIAGNOSTIC TESTING:  [ ] Echocardiogram:  [ ]  Catheterization:  [ ] Stress Test:    OTHER:

## 2020-05-14 NOTE — CHART NOTE - NSCHARTNOTEFT_GEN_A_CORE
Pt 86% on 2L NC while ambulating. 1-2L at rest with SaO2 91%. SaO2 70% standing on RA. Pt oxygen saturation: 91-92% on RA at rest, desaturates to 70% on RA (standing or any other exertion), which improves to 78-86% on 2L NC with exertion. Patient requires oxygen at home for ADLS.    Keila Benson MD [facility]  PGY-1 Internal Medicine  [facility] Pager: [phone] | NS: [phone] Pt oxygen saturation: 91% on RA at rest, desaturates to 70% on RA (standing or any other exertion), which improves to 86% on 2L NC with exertion. Patient requires oxygen at home for ADLS.    Keila Benson MD [facility]  PGY-1 Internal Medicine  [facility] Pager: [phone] | NS: [phone]

## 2020-05-14 NOTE — PROGRESS NOTE ADULT - SUBJECTIVE AND OBJECTIVE BOX
Keila Benson MD [facility]  PGY-1 Internal Medicine  [facility] Pager: [phone] | NS: [phone]    Patient is a 87y old  Female who presents with a chief complaint of Diarrhea (06 May 2020 14:22)    SUBJECTIVE / OVERNIGHT EVENTS: Overnight, no acute events. On 1L NC satting 93-96%. Cough improved.  Doing well now on remdesivir trial (5/9-5/13) to end today.  Pt unable to ambulate per PT notes, so will need home O2 delivered and transport to take mom into house per family, as well as COVID retesting per daughter request.    MEDICATIONS  (STANDING):  aspirin  chewable 81 milliGRAM(s) Oral daily  ATENolol  Tablet 25 milliGRAM(s) Oral daily  atorvastatin 80 milliGRAM(s) Oral at bedtime  benzonatate 100 milliGRAM(s) Oral three times a day  dextrose 5%. 1000 milliLiter(s) (50 mL/Hr) IV Continuous <Continuous>  dextrose 50% Injectable 12.5 Gram(s) IV Push once  dextrose 50% Injectable 25 Gram(s) IV Push once  dextrose 50% Injectable 25 Gram(s) IV Push once  enoxaparin Injectable 40 milliGRAM(s) SubCutaneous daily  insulin glargine Injectable (LANTUS) 15 Unit(s) SubCutaneous at bedtime  insulin lispro (HumaLOG) corrective regimen sliding scale   SubCutaneous three times a day before meals  insulin lispro (HumaLOG) corrective regimen sliding scale   SubCutaneous at bedtime  insulin lispro Injectable (HumaLOG) 5 Unit(s) SubCutaneous three times a day before meals  latanoprost 0.005% Ophthalmic Solution 1 Drop(s) Both EYES at bedtime  losartan 100 milliGRAM(s) Oral daily  magnesium sulfate  IVPB 2 Gram(s) IV Intermittent once  nystatin    Suspension [id number] Unit(s) Oral four times a day  sodium chloride 0.9%. 1000 milliLiter(s) (75 mL/Hr) IV Continuous <Continuous>    MEDICATIONS  (PRN):  acetaminophen   Tablet .. 650 milliGRAM(s) Oral every 6 hours PRN Temp greater or equal to 38C (100.4F), Mild Pain (1 - 3)  dextrose 40% Gel 15 Gram(s) Oral once PRN Blood Glucose LESS THAN 70 milliGRAM(s)/deciliter  glucagon  Injectable 1 milliGRAM(s) IntraMuscular once PRN Glucose LESS THAN 70 milligrams/deciliter  guaiFENesin   Syrup  (Sugar-Free) 200 milliGRAM(s) Oral every 6 hours PRN Cough  ondansetron Injectable 4 milliGRAM(s) IV Push every 6 hours PRN Nausea and/or Vomiting    Allergies    No Known Allergies    Intolerances        VITAL SIGNS:  ICU Vital Signs Last 24 Hrs  T(C): 36.5 (14 May 2020 04:59), Max: 37.7 (13 May 2020 20:49)  T(F): 97.7 (14 May 2020 04:59), Max: 99.8 (13 May 2020 20:49)  HR: 73 (14 May 2020 04:59) (68 - 75)  BP: 162/73 (14 May 2020 04:59) (150/67 - 162/75)  BP(mean): --  ABP: --  ABP(mean): --  RR: 18 (14 May 2020 04:59) (18 - 19)  SpO2: 93% (14 May 2020 04:59) (89% - 96%)      PHYSICAL EXAM:  GENERAL: NAD.  CHEST/LUNG: Breathing unlabored on 1L NC-RA, continues to have non-productive cough.   HEART: Regular rate and rhythm  ABDOMEN: Soft, Nontender, Nondistended  EXTREMITIES:  2+ Peripheral Pulses No cyanosis or edema  PSYCH: Normal affect.  Neuro: AAO x4, attention and language intact.  Can follow 2 step crossed commands. 4/5 strength throughout.     LABS: CMP daily for remdesivir trial  LABS:                        14.0   6.80  )-----------( 442      ( 14 May 2020 07:01 )             44.1                         13.2   6.80  )-----------( 432      ( 13 May 2020 06:42 )             43.7           05-14    147<H>  |  104  |  23  ----------------------------<  90  3.7   |  30  |  0.79  05-13    143  |  104  |  23  ----------------------------<  176<H>  4.0   |  29  |  0.87  05-12    140  |  101  |  20  ----------------------------<  314<H>  3.3<L>   |  26  |  0.79    Ca    8.9      14 May 2020 07:01  Ca    9.3      13 May 2020 06:42  Ca    9.7      12 May 2020 11:29  Phos  2.9     05-14  Mg     1.5     05-14    TPro  7.3  /  Alb  2.8<L>  /  TBili  0.5  /  DBili  x   /  AST  23  /  ALT  21  /  AlkPhos  77  05-14  TPro  7.3  /  Alb  2.5<L>  /  TBili  0.5  /  DBili  x   /  AST  20  /  ALT  22  /  AlkPhos  77  05-13  TPro  7.4  /  Alb  3.0<L>  /  TBili  0.5  /  DBili  x   /  AST  23  /  ALT  25  /  AlkPhos  78  05-12        CAPILLARY BLOOD GLUCOSE      POCT Blood Glucose.: 84 mg/dL (14 May 2020 08:08)  POCT Blood Glucose.: 134 mg/dL (13 May 2020 20:46)  POCT Blood Glucose.: 142 mg/dL (13 May 2020 16:57)  POCT Blood Glucose.: 235 mg/dL (13 May 2020 12:37)  POCT Blood Glucose.: 164 mg/dL (13 May 2020 08:18)    COVID-19 LABS  T(C): 36.5 (05-14-20 @ 04:59), Max: 37.7 (05-13-20 @ 20:49)  RR: 18 (05-14-20 @ 04:59) (18 - 19)  SpO2: 93% (05-14-20 @ 04:59) (89% - 96%)    COVID-19 PCR: Detected (05-08)      D-Dimer Assay, Quantitative: 357 ng/mL DDU <H> (05-13)  D-Dimer Assay, Quantitative: 481 ng/mL DDU <H> (05-11)  D-Dimer Assay, Quantitative: 333 ng/mL DDU <H> (05-09)  D-Dimer Assay, Quantitative: 582 ng/mL DDU <H> (05-06)  D-Dimer Assay, Quantitative: 521 ng/mL DDU <H> (05-03)      Ferritin, Serum: 359 ng/mL <H> (05-13)  Ferritin, Serum: 404 ng/mL <H> (05-11)  Ferritin, Serum: 430 ng/mL <H> (05-09)  Ferritin, Serum: 510 ng/mL <H> (05-07)  Ferritin, Serum: 433 ng/mL <H> (05-06)  Ferritin, Serum: 499 ng/mL <H> (05-03)      C-Reactive Protein, Serum: 1.61 ug/mL (05-13)  C-Reactive Protein, Serum: 3.09 mg/dL <H> (05-11)  C-Reactive Protein, Serum: 4.58 ug/mL (05-09)  C-Reactive Protein, Serum: 6.70 ug/mL (05-07)  C-Reactive Protein, Serum: 6.60 mg/dL <H> (05-06)  C-Reactive Protein, Serum: 8.45 mg/dL <H> (05-03)      Auto Lymphocyte #: 1.23 K/uL (05-11)      Pro-Calcitonin (Superimposed bacterial infection)   Procalcitonin, Serum: 24.20 ng/mL <H> (05-05)  Procalcitonin, Serum: 41.79 ng/mL <H> (05-04)  Procalcitonin, Serum: 48.08 ng/mL <H> (05-03)      Imaging:        Meds Received          IMAGING: Radiology personally reviewed  CT Angio Chest w/ IV Cont (05.04.20 @ 10:00)   Chest:  1.  No pulmonary embolus, however, the segmental and subsegmental vessels are not well evaluated secondary to respiratory motion.  2.  Bilateral opacities most pronounced the left upper lobe can occur in the setting of atypical viral infection/lung injury.    Abdomen and pelvis:  1.  No bowel obstruction.  2.  Bilateral renal lesions are most compatible with cysts.  3.  The appendix is normal.    Meds Received  Azithromycin as outpatient x 5 days.   Hydroxychloroquine + Famotidine 05/04-05/06 (Un-enrolled 2* YEIMI)  Convalescent plasma x 1 dose 05/07/20  Remdesivir under EAU 05/09-05/13    5/10 EKG: QTC Calculation(Bezet) 419 ms Keila Benson MD [facility]  PGY-1 Internal Medicine  [facility] Pager: [phone] | NS: [phone]    Patient is a 87y old  Female who presents with a chief complaint of Diarrhea (06 May 2020 14:22)    SUBJECTIVE / OVERNIGHT EVENTS: Overnight, no acute events. On 1L NC satting 93-96%. Cough improved.  Doing well now on remdesivir trial (5/9-5/13) to end today.  Pt unable to ambulate per PT notes, so will need home O2 delivered and transport to take mom into house per family, as well as COVID retesting per daughter request, sent this am.    MEDICATIONS  (STANDING):  aspirin  chewable 81 milliGRAM(s) Oral daily  ATENolol  Tablet 25 milliGRAM(s) Oral daily  atorvastatin 80 milliGRAM(s) Oral at bedtime  benzonatate 100 milliGRAM(s) Oral three times a day  dextrose 5%. 1000 milliLiter(s) (50 mL/Hr) IV Continuous <Continuous>  dextrose 50% Injectable 12.5 Gram(s) IV Push once  dextrose 50% Injectable 25 Gram(s) IV Push once  dextrose 50% Injectable 25 Gram(s) IV Push once  enoxaparin Injectable 40 milliGRAM(s) SubCutaneous daily  insulin glargine Injectable (LANTUS) 15 Unit(s) SubCutaneous at bedtime  insulin lispro (HumaLOG) corrective regimen sliding scale   SubCutaneous three times a day before meals  insulin lispro (HumaLOG) corrective regimen sliding scale   SubCutaneous at bedtime  insulin lispro Injectable (HumaLOG) 5 Unit(s) SubCutaneous three times a day before meals  latanoprost 0.005% Ophthalmic Solution 1 Drop(s) Both EYES at bedtime  losartan 100 milliGRAM(s) Oral daily  magnesium sulfate  IVPB 2 Gram(s) IV Intermittent once  nystatin    Suspension [id number] Unit(s) Oral four times a day  sodium chloride 0.9%. 1000 milliLiter(s) (75 mL/Hr) IV Continuous <Continuous>    MEDICATIONS  (PRN):  acetaminophen   Tablet .. 650 milliGRAM(s) Oral every 6 hours PRN Temp greater or equal to 38C (100.4F), Mild Pain (1 - 3)  dextrose 40% Gel 15 Gram(s) Oral once PRN Blood Glucose LESS THAN 70 milliGRAM(s)/deciliter  glucagon  Injectable 1 milliGRAM(s) IntraMuscular once PRN Glucose LESS THAN 70 milligrams/deciliter  guaiFENesin   Syrup  (Sugar-Free) 200 milliGRAM(s) Oral every 6 hours PRN Cough  ondansetron Injectable 4 milliGRAM(s) IV Push every 6 hours PRN Nausea and/or Vomiting    Allergies    No Known Allergies    Intolerances        VITAL SIGNS:  ICU Vital Signs Last 24 Hrs  T(C): 36.5 (14 May 2020 04:59), Max: 37.7 (13 May 2020 20:49)  T(F): 97.7 (14 May 2020 04:59), Max: 99.8 (13 May 2020 20:49)  HR: 73 (14 May 2020 04:59) (68 - 75)  BP: 162/73 (14 May 2020 04:59) (150/67 - 162/75)  BP(mean): --  ABP: --  ABP(mean): --  RR: 18 (14 May 2020 04:59) (18 - 19)  SpO2: 93% (14 May 2020 04:59) (89% - 96%)      PHYSICAL EXAM:  GENERAL: NAD.  CHEST/LUNG: Breathing unlabored on 1L NC-RA, continues to have non-productive cough.   HEART: Regular rate and rhythm  ABDOMEN: Soft, Nontender, Nondistended  EXTREMITIES:  2+ Peripheral Pulses No cyanosis or edema  PSYCH: Normal affect.  Neuro: AAO x4, attention and language intact.  Can follow 2 step crossed commands. 4/5 strength throughout.     LABS: CMP daily for remdesivir trial  LABS:                        14.0   6.80  )-----------( 442      ( 14 May 2020 07:01 )             44.1                         13.2   6.80  )-----------( 432      ( 13 May 2020 06:42 )             43.7           05-14    147<H>  |  104  |  23  ----------------------------<  90  3.7   |  30  |  0.79  05-13    143  |  104  |  23  ----------------------------<  176<H>  4.0   |  29  |  0.87  05-12    140  |  101  |  20  ----------------------------<  314<H>  3.3<L>   |  26  |  0.79    Ca    8.9      14 May 2020 07:01  Ca    9.3      13 May 2020 06:42  Ca    9.7      12 May 2020 11:29  Phos  2.9     05-14  Mg     1.5     05-14    TPro  7.3  /  Alb  2.8<L>  /  TBili  0.5  /  DBili  x   /  AST  23  /  ALT  21  /  AlkPhos  77  05-14  TPro  7.3  /  Alb  2.5<L>  /  TBili  0.5  /  DBili  x   /  AST  20  /  ALT  22  /  AlkPhos  77  05-13  TPro  7.4  /  Alb  3.0<L>  /  TBili  0.5  /  DBili  x   /  AST  23  /  ALT  25  /  AlkPhos  78  05-12        CAPILLARY BLOOD GLUCOSE      POCT Blood Glucose.: 84 mg/dL (14 May 2020 08:08)  POCT Blood Glucose.: 134 mg/dL (13 May 2020 20:46)  POCT Blood Glucose.: 142 mg/dL (13 May 2020 16:57)  POCT Blood Glucose.: 235 mg/dL (13 May 2020 12:37)  POCT Blood Glucose.: 164 mg/dL (13 May 2020 08:18)    COVID-19 LABS  T(C): 36.5 (05-14-20 @ 04:59), Max: 37.7 (05-13-20 @ 20:49)  RR: 18 (05-14-20 @ 04:59) (18 - 19)  SpO2: 93% (05-14-20 @ 04:59) (89% - 96%)    COVID-19 PCR: Detected (05-08)      D-Dimer Assay, Quantitative: 357 ng/mL DDU <H> (05-13)  D-Dimer Assay, Quantitative: 481 ng/mL DDU <H> (05-11)  D-Dimer Assay, Quantitative: 333 ng/mL DDU <H> (05-09)  D-Dimer Assay, Quantitative: 582 ng/mL DDU <H> (05-06)  D-Dimer Assay, Quantitative: 521 ng/mL DDU <H> (05-03)      Ferritin, Serum: 359 ng/mL <H> (05-13)  Ferritin, Serum: 404 ng/mL <H> (05-11)  Ferritin, Serum: 430 ng/mL <H> (05-09)  Ferritin, Serum: 510 ng/mL <H> (05-07)  Ferritin, Serum: 433 ng/mL <H> (05-06)  Ferritin, Serum: 499 ng/mL <H> (05-03)      C-Reactive Protein, Serum: 1.61 ug/mL (05-13)  C-Reactive Protein, Serum: 3.09 mg/dL <H> (05-11)  C-Reactive Protein, Serum: 4.58 ug/mL (05-09)  C-Reactive Protein, Serum: 6.70 ug/mL (05-07)  C-Reactive Protein, Serum: 6.60 mg/dL <H> (05-06)  C-Reactive Protein, Serum: 8.45 mg/dL <H> (05-03)      Auto Lymphocyte #: 1.23 K/uL (05-11)      Pro-Calcitonin (Superimposed bacterial infection)   Procalcitonin, Serum: 24.20 ng/mL <H> (05-05)  Procalcitonin, Serum: 41.79 ng/mL <H> (05-04)  Procalcitonin, Serum: 48.08 ng/mL <H> (05-03)      Imaging:        Meds Received          IMAGING: Radiology personally reviewed  CT Angio Chest w/ IV Cont (05.04.20 @ 10:00)   Chest:  1.  No pulmonary embolus, however, the segmental and subsegmental vessels are not well evaluated secondary to respiratory motion.  2.  Bilateral opacities most pronounced the left upper lobe can occur in the setting of atypical viral infection/lung injury.    Abdomen and pelvis:  1.  No bowel obstruction.  2.  Bilateral renal lesions are most compatible with cysts.  3.  The appendix is normal.    Meds Received  Azithromycin as outpatient x 5 days.   Hydroxychloroquine + Famotidine 05/04-05/06 (Un-enrolled 2* YEIMI)  Convalescent plasma x 1 dose 05/07/20  Remdesivir under EAU 05/09-05/13    5/10 EKG: QTC Calculation(Bezet) 419 ms

## 2020-05-14 NOTE — CHART NOTE - NSCHARTNOTEFT_GEN_A_CORE
Keila Benson MD [facility]  PGY-1 Internal Medicine  [facility] Pager: [phone] | NS: [phone]    Due to condition, patient has a severe mobility limitation and requires a transport wheelchair to assist in daily ADLS. Patient cannot ambulate safely with a cane or a walker. Patient does not have sufficient upper body strength to self propel a standard wheelchair. Patient has a caregiver to assist with maneuvering the wheelchair. Pt has not expressed an unwillingness to use the wheelchair. Pt has ample room in the house for said wheelchair. Use of a transport wheelchair will significantly improve the patient's ability to participate in daily ADLs and the patient will use it on a regular basis in the home.    Script written for transport wheelchair and 3-1 commode.

## 2020-05-15 ENCOUNTER — TRANSCRIPTION ENCOUNTER (OUTPATIENT)
Age: 85
End: 2020-05-15

## 2020-05-15 VITALS
SYSTOLIC BLOOD PRESSURE: 152 MMHG | TEMPERATURE: 99 F | DIASTOLIC BLOOD PRESSURE: 78 MMHG | OXYGEN SATURATION: 93 % | HEART RATE: 93 BPM | RESPIRATION RATE: 20 BRPM

## 2020-05-15 LAB
ALBUMIN SERPL ELPH-MCNC: 2.8 G/DL — LOW (ref 3.3–5)
ALP SERPL-CCNC: 84 U/L — SIGNIFICANT CHANGE UP (ref 40–120)
ALT FLD-CCNC: 21 U/L — SIGNIFICANT CHANGE UP (ref 10–45)
ANION GAP SERPL CALC-SCNC: 11 MMOL/L — SIGNIFICANT CHANGE UP (ref 5–17)
AST SERPL-CCNC: 23 U/L — SIGNIFICANT CHANGE UP (ref 10–40)
BILIRUB SERPL-MCNC: 0.6 MG/DL — SIGNIFICANT CHANGE UP (ref 0.2–1.2)
BUN SERPL-MCNC: 18 MG/DL — SIGNIFICANT CHANGE UP (ref 7–23)
CALCIUM SERPL-MCNC: 9 MG/DL — SIGNIFICANT CHANGE UP (ref 8.4–10.5)
CHLORIDE SERPL-SCNC: 99 MMOL/L — SIGNIFICANT CHANGE UP (ref 96–108)
CO2 SERPL-SCNC: 31 MMOL/L — SIGNIFICANT CHANGE UP (ref 22–31)
CREAT SERPL-MCNC: 0.73 MG/DL — SIGNIFICANT CHANGE UP (ref 0.5–1.3)
GLUCOSE BLDC GLUCOMTR-MCNC: 130 MG/DL — HIGH (ref 70–99)
GLUCOSE BLDC GLUCOMTR-MCNC: 133 MG/DL — HIGH (ref 70–99)
GLUCOSE BLDC GLUCOMTR-MCNC: 239 MG/DL — HIGH (ref 70–99)
GLUCOSE SERPL-MCNC: 109 MG/DL — HIGH (ref 70–99)
MAGNESIUM SERPL-MCNC: 1.8 MG/DL — SIGNIFICANT CHANGE UP (ref 1.6–2.6)
PHOSPHATE SERPL-MCNC: 2.8 MG/DL — SIGNIFICANT CHANGE UP (ref 2.5–4.5)
POTASSIUM SERPL-MCNC: 3.3 MMOL/L — LOW (ref 3.5–5.3)
POTASSIUM SERPL-SCNC: 3.3 MMOL/L — LOW (ref 3.5–5.3)
PROT SERPL-MCNC: 7.2 G/DL — SIGNIFICANT CHANGE UP (ref 6–8.3)
SODIUM SERPL-SCNC: 141 MMOL/L — SIGNIFICANT CHANGE UP (ref 135–145)

## 2020-05-15 PROCEDURE — 99238 HOSP IP/OBS DSCHRG MGMT 30/<: CPT | Mod: CS

## 2020-05-15 RX ORDER — POTASSIUM CHLORIDE 20 MEQ
40 PACKET (EA) ORAL EVERY 4 HOURS
Refills: 0 | Status: COMPLETED | OUTPATIENT
Start: 2020-05-15 | End: 2020-05-15

## 2020-05-15 RX ORDER — ACETAMINOPHEN 500 MG
2 TABLET ORAL
Qty: 0 | Refills: 0 | DISCHARGE
Start: 2020-05-15

## 2020-05-15 RX ADMIN — ATENOLOL 25 MILLIGRAM(S): 25 TABLET ORAL at 04:34

## 2020-05-15 RX ADMIN — ENOXAPARIN SODIUM 40 MILLIGRAM(S): 100 INJECTION SUBCUTANEOUS at 11:34

## 2020-05-15 RX ADMIN — Medication 40 MILLIEQUIVALENT(S): at 11:37

## 2020-05-15 RX ADMIN — Medication 500000 UNIT(S): at 11:33

## 2020-05-15 RX ADMIN — Medication 100 MILLIGRAM(S): at 11:33

## 2020-05-15 RX ADMIN — Medication 40 MILLIEQUIVALENT(S): at 13:50

## 2020-05-15 RX ADMIN — Medication 100 MILLIGRAM(S): at 04:34

## 2020-05-15 RX ADMIN — Medication 500000 UNIT(S): at 04:34

## 2020-05-15 RX ADMIN — Medication 2: at 11:34

## 2020-05-15 RX ADMIN — Medication 500000 UNIT(S): at 16:58

## 2020-05-15 RX ADMIN — Medication 5 UNIT(S): at 11:35

## 2020-05-15 RX ADMIN — LOSARTAN POTASSIUM 100 MILLIGRAM(S): 100 TABLET, FILM COATED ORAL at 04:34

## 2020-05-15 RX ADMIN — Medication 81 MILLIGRAM(S): at 11:34

## 2020-05-15 RX ADMIN — Medication 5 UNIT(S): at 08:30

## 2020-05-15 NOTE — PROGRESS NOTE ADULT - PROBLEM/PLAN-2
Detail Level: Detailed
Wound Evaluated By: Dr. Justice
Body Location Override (Optional - Billing Will Still Be Based On Selected Body Map Location If Applicable): left upper abdomen
DISPLAY PLAN FREE TEXT

## 2020-05-15 NOTE — PROGRESS NOTE ADULT - PROBLEM SELECTOR PROBLEM 1
Acute respiratory failure, unspecified whether with hypoxia or hypercapnia
COVID-19 virus infection

## 2020-05-15 NOTE — DISCHARGE NOTE NURSING/CASE MANAGEMENT/SOCIAL WORK - NSDCPEPTSTRK_GEN_ALL_CORE
Need for follow up after discharge/Prescribed medications/Risk factors for stroke/Stroke education booklet/Signs and symptoms of stroke/Stroke support groups for patients, families, and friends/Stroke warning signs and symptoms/Call 911 for stroke

## 2020-05-15 NOTE — PROGRESS NOTE ADULT - REASON FOR ADMISSION
Diarrhea

## 2020-05-15 NOTE — PROGRESS NOTE ADULT - PROBLEM SELECTOR PLAN 4
RESOLVED  -Non bloody Diarrhea likely 2* COVID, less likelihood of C-diff.    -CT Abd/pelvis w./o evidence of acute abdomen.  -Tolerating soft diet. Advance as tolerated. RESOLVED  -Non bloody Diarrhea likely 2* COVID, less likelihood of C-diff.    -Tolerating soft diet. Advance as tolerated.

## 2020-05-15 NOTE — PROGRESS NOTE ADULT - PROBLEM SELECTOR PLAN 3
-COVID vs. other superimposed bacterial PNA with left lung consolidation vs diarrhea/n/v (resolved.)  - CT C/A/P w/o clear evidence of abdominal/pelvic infection-->presumed bacterial PNA.  - Blood cx 5/4 NGTD  - s/p zosyn until 05/09/2020 (total 5 day course) -Sepsis secondary to COVID/ other superimposed bacterial PNA with left lung consolidation   - s/p Rx   - Resolved

## 2020-05-15 NOTE — PROGRESS NOTE ADULT - SUBJECTIVE AND OBJECTIVE BOX
Subjective: Patient seen and examined. No new events except as noted.     REVIEW OF SYSTEMS:    CONSTITUTIONAL: + weakness, fevers or chills  EYES/ENT: No visual changes;  No vertigo or throat pain   NECK: No pain or stiffness  RESPIRATORY: No cough, wheezing, hemoptysis; No shortness of breath  CARDIOVASCULAR: No chest pain or palpitations  GASTROINTESTINAL: No abdominal or epigastric pain. No nausea, vomiting, or hematemesis; No diarrhea or constipation. No melena or hematochezia.  GENITOURINARY: No dysuria, frequency or hematuria  NEUROLOGICAL: No numbness or weakness  SKIN: No itching, burning, rashes, or lesions   All other review of systems is negative unless indicated above.    MEDICATIONS:  MEDICATIONS  (STANDING):  aspirin  chewable 81 milliGRAM(s) Oral daily  ATENolol  Tablet 25 milliGRAM(s) Oral daily  atorvastatin 80 milliGRAM(s) Oral at bedtime  benzonatate 100 milliGRAM(s) Oral three times a day  dextrose 5%. 1000 milliLiter(s) (50 mL/Hr) IV Continuous <Continuous>  dextrose 50% Injectable 12.5 Gram(s) IV Push once  dextrose 50% Injectable 25 Gram(s) IV Push once  dextrose 50% Injectable 25 Gram(s) IV Push once  enoxaparin Injectable 40 milliGRAM(s) SubCutaneous daily  insulin glargine Injectable (LANTUS) 15 Unit(s) SubCutaneous at bedtime  insulin lispro (HumaLOG) corrective regimen sliding scale   SubCutaneous three times a day before meals  insulin lispro (HumaLOG) corrective regimen sliding scale   SubCutaneous at bedtime  insulin lispro Injectable (HumaLOG) 5 Unit(s) SubCutaneous three times a day before meals  latanoprost 0.005% Ophthalmic Solution 1 Drop(s) Both EYES at bedtime  losartan 100 milliGRAM(s) Oral daily  nystatin    Suspension [id number] Unit(s) Oral four times a day  potassium chloride    Tablet ER 40 milliEquivalent(s) Oral every 4 hours  sodium chloride 0.9%. 1000 milliLiter(s) (75 mL/Hr) IV Continuous <Continuous>      PHYSICAL EXAM:  T(C): 36.7 (05-15-20 @ 04:51), Max: 36.9 (05-14-20 @ 13:01)  HR: 71 (05-15-20 @ 04:51) (67 - 76)  BP: 154/73 (05-15-20 @ 04:51) (154/73 - 171/76)  RR: 20 (05-15-20 @ 04:51) (18 - 22)  SpO2: 92% (05-15-20 @ 04:51) (75% - 100%)  Wt(kg): --  I&O's Summary    14 May 2020 07:01  -  15 May 2020 07:00  --------------------------------------------------------  IN: 770 mL / OUT: 700 mL / NET: 70 mL    15 May 2020 07:01  -  15 May 2020 11:15  --------------------------------------------------------  IN: 240 mL / OUT: 400 mL / NET: -160 mL          Appearance: NAD  HEENT:   Normal oral mucosa, PERRL, EOMI	  Lymphatic: No lymphadenopathy , no edema  Cardiovascular: Normal S1 S2, No JVD, No murmurs , Peripheral pulses palpable 2+ bilaterally  Respiratory: Lungs clear to auscultation, normal effort 	  Gastrointestinal:  Soft, Non-tender, + BS	  Skin: No rashes, No ecchymoses, No cyanosis, warm to touch  Musculoskeletal: Normal range of motion, normal strength  Psychiatry:  Mood & affect appropriate  Ext: No edema      LABS:    CARDIAC MARKERS:                                14.0   6.80  )-----------( 442      ( 14 May 2020 07:01 )             44.1     05-15    141  |  99  |  18  ----------------------------<  109<H>  3.3<L>   |  31  |  0.73    Ca    9.0      15 May 2020 06:08  Phos  2.8     05-15  Mg     1.8     05-15    TPro  7.2  /  Alb  2.8<L>  /  TBili  0.6  /  DBili  x   /  AST  23  /  ALT  21  /  AlkPhos  84  05-15    proBNP:   Lipid Profile:   HgA1c:   TSH:             TELEMETRY: 	    ECG:  	  RADIOLOGY:   DIAGNOSTIC TESTING:  [ ] Echocardiogram:  [ ]  Catheterization:  [ ] Stress Test:    OTHER:

## 2020-05-15 NOTE — PROGRESS NOTE ADULT - PROBLEM SELECTOR PLAN 5
RESOLVED  -Zofran 6q8h PRN for nausea
RESOLVED  -Zofran 6q8h PRN for nausea,
Zofran 6q8h PRN for nausea,
Zofran 6q8h PRN for nausea,
stable   ASA
Zofran 6q8h PRN if QTC not prolonged

## 2020-05-15 NOTE — PROGRESS NOTE ADULT - PROBLEM SELECTOR PROBLEM 6
Acute kidney injury
Type 2 diabetes mellitus with other neurologic complication

## 2020-05-15 NOTE — DISCHARGE NOTE NURSING/CASE MANAGEMENT/SOCIAL WORK - NSDCDMENAME_GEN_ALL_CORE_FT
[facility] surgical  supply , delivering home 02, dme equip : wheelchair, 3 :1 commode

## 2020-05-15 NOTE — DISCHARGE NOTE NURSING/CASE MANAGEMENT/SOCIAL WORK - NSDCFUADDAPPT_GEN_ALL_CORE_FT
Please follow up with your primary care doctor within two-four weeks of discharge from the hospital. If you do not have one, please call [phone] to set up a new PCP appointment, which may be done as telemedicine appointment.     Please follow up with your cardiologist within two-four weeks of discharge from the hospital to make sure your heart medications are optimized.

## 2020-05-15 NOTE — PROGRESS NOTE ADULT - PROBLEM SELECTOR PROBLEM 2
Acute respiratory failure, unspecified whether with hypoxia or hypercapnia
Type 2 diabetes mellitus
Acute respiratory failure, unspecified whether with hypoxia or hypercapnia

## 2020-05-15 NOTE — PROGRESS NOTE ADULT - PROBLEM SELECTOR PROBLEM 7
Hypertension, unspecified type
Hypertension, unspecified type
Type 2 diabetes mellitus with other neurologic complication
Hypertension, unspecified type

## 2020-05-15 NOTE — PROGRESS NOTE ADULT - PROBLEM SELECTOR PROBLEM 5
CVA (cerebral vascular accident)
Non-intractable vomiting with nausea

## 2020-05-15 NOTE — PROGRESS NOTE ADULT - ATTENDING COMMENTS
Patient seen and examined with Dr. Newman    I agree with his interval history exam and plans  as ntoed aobve    Patient with COVID pneumonia- tolerating nasal supplemental oxygen and speaking in full sentences  good appetite  lungs- clear anteriorly  started on expanded access remdisivir- plan for a five day course    monitor renal function on LFTS    Jai Maldonado MD  [phone]  After 5pm/weekends [phone]      Jai Maldonado MD  [phone]  After 5pm/weekends [phone]
Patient seen and examined with Dr. Newman    I agree with his interval history exam and plans as noted above    COVID- pneumonia  enrolled in convalescent plasma trial  supportive care  supplemental oxygen as needed    ? Aspiration pneumonia  Can discontinue Zosyn and observe    Jai Maldonado MD  [phone]  After 5pm/weekends [phone]
Patient seen and examined with Dr. Newman    I agree with his itnerval history exam and plans as noted above    COVID-19+ pneumonia  improving  nasal oxygen  receiving remdisivir through expanded access  monitor LFTs and creatinine daily while on therapy    Call if questions arise    Jai Maldonado MD  [phone]  After 5pm/weekends [phone]
Patient seen and examined with Dr. Paty GORDILLO agree with his interval history exam and plans as noted above    Patient appears to be improving  decreasing supplemental oxygen requirements  low grade temps, WBC wnl    Complete Zosyn day 3/5 for possible superimposed aspiration pneumonia in the setting of COVID viral pneumonia based upon X-ray findings    Jai Maldonado MD  [phone]  After 5pm/weekends [phone]
Patient seen and examined with Dr. Newman    I agree with his interval history, exam and plans as ntoed above    Patient with some deterioration in her clinical status  admitted with COVId pneumonia- a bit more cofused today   Would continue Zosyn day4/5 for possible bacterial superinfection    Covid-  supportive care  completing five days of hydroxychloroquine  referred to Convalescent plasma trial  maintain airborne and contact isolation    Jai Maldonado MD  [phone]  After 5pm/weekends [phone]
Advanced care planning was discussed with patient and family.  Advanced care planning forms were reviewed and discussed as appropriate.  Differential diagnosis and plan of care discussed with patient after the evaluation.   Pain assessed and judicious use of narcotics when appropriate was discussed.  Importance of Fall prevention discussed.  Counseling on Smoking and Alcohol cessation was offered when appropriate.  Counseling on Diet, exercise, and medication compliance was done.
87 year old female  with a PMHx  HTN, Basilar artery thrombosis, , T2DM , Depression, and  Glaucoma with acute hypoxemic respiratory failure / COVID 19 PNA However, Procalcitonin is high in 40s , suspicious for super imposed bacterial pna-   start empirically on zosyn and vanc and de escalate based on culture  patient is also enrolled in clinical trial
87 year old female  with a PMHx  HTN, Basilar artery thrombosis, , T2DM , Depression, and  Glaucoma with acute hypoxemic respiratory failure / COVID 19 PNA with high  Procalcitonin suspicious for super imposed bacterial pna-    continue zosyn through tomorrow   Cr slightly elevated, hold ARB for now, cont hydralazine
87 year old female  with a PMHx  HTN, Basilar artery thrombosis, , T2DM , Depression, and  Glaucoma with acute hypoxemic respiratory failure / COVID 19 PNA with high  Procalcitonin suspicious for super imposed bacterial pna- continue to have worsening cough   continue zosyn and monitor closely   Cr slightly elevated, hold ARB for now, start hydralazine
87 year old female  with a PMHx  HTN, Basilar artery thrombosis, , T2DM , Depression, and  Glaucoma with acute hypoxemic respiratory failure / COVID 19 PNA/ high  Procalcitonin suspicious for super imposed bacterial pna-    last day of zosyn today   Cr is improved   continue to monitor closely
87 year old female  with a PMHx  HTN, Basilar artery thrombosis, , T2DM , Depression, and  Glaucoma with acute hypoxemic respiratory failure / COVID 19 PNA/ high  Procalcitonin suspicious for super imposed bacterial pna-s/p  zosyn   Cr is improved continue remdesivir and monitor creatinine closely
Acute hypoxic respiratory failure from COVID-19 PNA, weaned down to 2L O2 via NC.  To continue remdesivir through tomorrow, then d/c planning, possibly home with home care.  Will attempt to wean to RA to determine home O2 needs.
87 year old female  with a PMHx  HTN, Basilar artery thrombosis, , T2DM , Depression, and  Glaucoma with acute hypoxemic respiratory failure / COVID 19 PNA with high  Procalcitonin suspicious for super imposed bacterial pna- continue to have worsening cough   ID consulted  d/c vanco   continue zosyn and monitor closely   continue Plaquenil as part of the clinical trial   QTC is ok
87 year old female  with a PMHx  HTN, Basilar artery thrombosis, , T2DM , Depression, and  Glaucoma with acute hypoxemic respiratory failure / COVID 19 PNA/ high  Procalcitonin suspicious for super imposed bacterial pna-s/p  zosyn   Cr is improved continue remdesivir and monitor creatinine closely
Acute hypoxic respiratory failure from COVID-19 PNA, overall, improving.   d/c planning, with PT/ home care and oxygen. would benefit from extended DVT prophylaxis.
Acute hypoxic respiratory failure from COVID-19 PNA, weaned down to 2L O2 via NC.   d/c planning, possibly home with PT/ home care.  attempt to wean to RA to determine home O2 needs.
87 year old female  with a PMHx  HTN, Basilar artery thrombosis, , T2DM , Depression, and  Glaucoma with acute hypoxemic respiratory failure / COVID 19 PNA with high  Procalcitonin suspicious for super imposed bacterial pna- continue to have worsening cough   continue zosyn and monitor closely   Cr slightly elevated, hold ARB for now, contt hydralazine
-Acute hypoxic respiratory failure from COVID-19 PNA, overall, progressing well      -d/c planning, with PT/ home care  -would benefit from extended DVT prophylaxis.

## 2020-05-15 NOTE — DISCHARGE NOTE NURSING/CASE MANAGEMENT/SOCIAL WORK - PATIENT PORTAL LINK FT
You can access the FollowMyHealth Patient Portal offered by [facility] by registering at the following website: [email or web address]. By joining [employer]’s FollowMyHealth portal, you will also be able to view your health information using other applications (apps) compatible with our system.

## 2020-05-15 NOTE — PROGRESS NOTE ADULT - PROBLEM SELECTOR PLAN 2
Acute hypoxic respiratory failure 2* COVID vs. less likely superimposed bacterial PNA.  - Procal elevated, repeat still high but was downtrending.  Was started on Zosyn + Vancomycin 05/04/20 empirically.  ID c/s and less likely suspicion for superimposed bacterial PNA.   - MRSA (-)-->Vanco d/c 05/05/20  - Zosyn 05/04/20 x 5 days.  Discontinued 05/09  - SaO2 >91% on 4L, stable but also not improving.    - Weaning trial 1L per 1 hr.  If SpO2 >88% and no respiratory distress, can continue weaning trial.  - D/W ID, no further concern for bacterial superimposed infection at this time. Acute hypoxic respiratory failure 2* COVID and /or  less likely superimposed bacterial PNA.  s/p RX   resolved on RA

## 2020-05-15 NOTE — PROGRESS NOTE ADULT - PROBLEM SELECTOR PROBLEM 4
Diarrhea, unspecified type
HTN (hypertension)
Diarrhea, unspecified type

## 2020-05-15 NOTE — PROGRESS NOTE ADULT - SUBJECTIVE AND OBJECTIVE BOX
Keila Benson MD [facility]  PGY-1 Internal Medicine  [facility] Pager: [phone] | NS: [phone]    Patient is a 87y old  Female who presents with a chief complaint of Diarrhea (06 May 2020 14:22)    SUBJECTIVE / OVERNIGHT EVENTS: Overnight, no acute events. On 1L NC-RA satting 92-96%. Feeling better. Awaiting home O2 delivery and transport wheelchair. COVID retest 4/14 negative.    MEDICATIONS  (STANDING):  aspirin  chewable 81 milliGRAM(s) Oral daily  ATENolol  Tablet 25 milliGRAM(s) Oral daily  atorvastatin 80 milliGRAM(s) Oral at bedtime  benzonatate 100 milliGRAM(s) Oral three times a day  dextrose 5%. 1000 milliLiter(s) (50 mL/Hr) IV Continuous <Continuous>  dextrose 50% Injectable 12.5 Gram(s) IV Push once  dextrose 50% Injectable 25 Gram(s) IV Push once  dextrose 50% Injectable 25 Gram(s) IV Push once  enoxaparin Injectable 40 milliGRAM(s) SubCutaneous daily  insulin glargine Injectable (LANTUS) 15 Unit(s) SubCutaneous at bedtime  insulin lispro (HumaLOG) corrective regimen sliding scale   SubCutaneous three times a day before meals  insulin lispro (HumaLOG) corrective regimen sliding scale   SubCutaneous at bedtime  insulin lispro Injectable (HumaLOG) 5 Unit(s) SubCutaneous three times a day before meals  latanoprost 0.005% Ophthalmic Solution 1 Drop(s) Both EYES at bedtime  losartan 100 milliGRAM(s) Oral daily  nystatin    Suspension [id number] Unit(s) Oral four times a day  sodium chloride 0.9%. 1000 milliLiter(s) (75 mL/Hr) IV Continuous <Continuous>    MEDICATIONS  (PRN):  acetaminophen   Tablet .. 650 milliGRAM(s) Oral every 6 hours PRN Temp greater or equal to 38C (100.4F), Mild Pain (1 - 3)  dextrose 40% Gel 15 Gram(s) Oral once PRN Blood Glucose LESS THAN 70 milliGRAM(s)/deciliter  glucagon  Injectable 1 milliGRAM(s) IntraMuscular once PRN Glucose LESS THAN 70 milligrams/deciliter  guaiFENesin   Syrup  (Sugar-Free) 200 milliGRAM(s) Oral every 6 hours PRN Cough  ondansetron Injectable 4 milliGRAM(s) IV Push every 6 hours PRN Nausea and/or Vomiting    Allergies    No Known Allergies    Intolerances        VITAL SIGNS:  ICU Vital Signs Last 24 Hrs  T(C): 36.7 (15 May 2020 04:51), Max: 36.9 (14 May 2020 13:01)  T(F): 98.1 (15 May 2020 04:51), Max: 98.4 (14 May 2020 13:01)  HR: 71 (15 May 2020 04:51) (67 - 76)  BP: 154/73 (15 May 2020 04:51) (154/73 - 171/76)  BP(mean): --  ABP: --  ABP(mean): --  RR: 20 (15 May 2020 04:51) (18 - 22)  SpO2: 92% (15 May 2020 04:51) (75% - 100%)      PHYSICAL EXAM:  GENERAL: NAD.  CHEST/LUNG: Breathing unlabored on 1L NC-RA, continues to have non-productive cough.   HEART: Regular rate and rhythm  ABDOMEN: Soft, Nontender, Nondistended  EXTREMITIES:  2+ Peripheral Pulses No cyanosis or edema  PSYCH: Normal affect.  Neuro: AAO x4, attention and language intact.  Can follow 2 step crossed commands. 4/5 strength throughout.     LABS:                        14.0   6.80  )-----------( 442      ( 14 May 2020 07:01 )             44.1                         13.2   6.80  )-----------( 432      ( 13 May 2020 06:42 )             43.7           05-15    141  |  99  |  18  ----------------------------<  109<H>  3.3<L>   |  31  |  0.73  05-14    147<H>  |  104  |  23  ----------------------------<  90  3.7   |  30  |  0.79  05-13    143  |  104  |  23  ----------------------------<  176<H>  4.0   |  29  |  0.87    Ca    9.0      15 May 2020 06:08  Ca    8.9      14 May 2020 07:01  Ca    9.3      13 May 2020 06:42  Phos  2.8     05-15  Mg     1.8     05-15    TPro  7.2  /  Alb  2.8<L>  /  TBili  0.6  /  DBili  x   /  AST  23  /  ALT  21  /  AlkPhos  84  05-15  TPro  7.3  /  Alb  2.8<L>  /  TBili  0.5  /  DBili  x   /  AST  23  /  ALT  21  /  AlkPhos  77  05-14  TPro  7.3  /  Alb  2.5<L>  /  TBili  0.5  /  DBili  x   /  AST  20  /  ALT  22  /  AlkPhos  77  05-13        CAPILLARY BLOOD GLUCOSE      POCT Blood Glucose.: 133 mg/dL (15 May 2020 07:58)  POCT Blood Glucose.: 123 mg/dL (14 May 2020 21:47)  POCT Blood Glucose.: 106 mg/dL (14 May 2020 17:32)  POCT Blood Glucose.: 156 mg/dL (14 May 2020 12:03)  POCT Blood Glucose.: 84 mg/dL (14 May 2020 08:08)            IMAGING: Radiology personally reviewed  CT Angio Chest w/ IV Cont (05.04.20 @ 10:00)   Chest:  1.  No pulmonary embolus, however, the segmental and subsegmental vessels are not well evaluated secondary to respiratory motion.  2.  Bilateral opacities most pronounced the left upper lobe can occur in the setting of atypical viral infection/lung injury.    Abdomen and pelvis:  1.  No bowel obstruction.  2.  Bilateral renal lesions are most compatible with cysts.  3.  The appendix is normal.    Meds Received  Azithromycin as outpatient x 5 days.   Hydroxychloroquine + Famotidine 05/04-05/06 (Un-enrolled 2* YEIMI)  Convalescent plasma x 1 dose 05/07/20  Remdesivir under EAU 05/09-05/13    5/10 EKG: QTC Calculation(Bezet) 419 ms

## 2020-05-15 NOTE — PROGRESS NOTE ADULT - ASSESSMENT
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     S/p COVID + at urgent care 04/26/20 s/p azithromycin x 5 days, famotidine + HCQ x 2 days, convalescent plasma x1 now on remdesivir (5/9- 5/13). Inflammatory markers downtrending.    5/14-5/15: Pending d/c awaiting home O2 set-up and repeat COVID swab neg.

## 2020-05-15 NOTE — PROGRESS NOTE ADULT - PROBLEM SELECTOR PROBLEM 3
Diarrhea, unspecified type
SIRS (systemic inflammatory response syndrome)

## 2020-05-15 NOTE — PROGRESS NOTE ADULT - PROBLEM SELECTOR PLAN 6
RESOLVED  - Cr BL 0.8 on admission. Possibly pre renal in setting of dehydration. Cr now returning to normal.   -no hydronephrosis on CT A/P 5/4.  -hyperK false 2/2 hemolysis.  -No volume overload on exam. RESOLVED  - Cr BL 0.8 on admission. Possibly pre renal in setting of dehydration. Cr now returning to normal.   -no hydronephrosis on CT A/P 5/4.  -No volume overload on exam.

## 2020-05-15 NOTE — PROGRESS NOTE ADULT - PROBLEM SELECTOR PLAN 1
COVID + at urgent care facility 04/26/20, repeat COVID-19 nasal PCR 05/08 +.  On admission, febrile to Tmax 104.5F and hypoxic on ambulation, requiring 5L.   - s/p  remdesivir (5/9- 5/13).  - S/P Azithromycin x 5 days (for superimposed bacterial PNA) as outpatient.  - S/P Famotidine + Hydroxychloroquine x 2 days    - S/P Convalescent plasma x1 (05/07/20)   - D/W patient, family, ID and pharmacy regarding emergency authorization use and will start.  Daily CMP monitoring for LFT and renal function given recent YEIMI.   - Afebrile in last 48 hrs.  Still on 4L NC at rest and with ambulation.    - ID following, appreciate recs.  - Supportive care     -Tessalon pearls and Robitussin PRN for cough.   - D-dimer elevation, no PE on CTA PE study.  Based on algorithm with Cr Cl of 87 and BMI <30, PT currently on Lovenox 40mg daily.  While appreciate higher risk of PE in age population and per D-dimer from cardiology, for now will keep on lower dose.  Will trend D-dimer and consider higher dosing if needed.    - Trend CRP, Ferritin, D-dimer QOD if progressive worsening COVID + at urgent care facility 04/26/20, repeat COVID-19 nasal PCR 05/08 +.  On admission,  - s/p  remdesivir (5/9- 5/13).  - S/P Azithromycin x 5 days (for superimposed bacterial PNA) as outpatient.  - S/P Famotidine + Hydroxychloroquine x 2 days    - S/P Convalescent plasma x1 (05/07/20)   - Afebrile in last 48 hrs.  -Tessalon pearls and Robitussin PRN for cough.   Overall, improved and repeat COVID -19 PCR is negative

## 2020-05-15 NOTE — PROGRESS NOTE ADULT - PROBLEM SELECTOR PLAN 8
At home on 100 losartan.  BP here in 160s 170s. Initially held in setting of YEIMI.  -Hydralazine standing and PRN  -Losartan restarted 5/11 100mg BID

## 2020-05-18 RX ORDER — TRAVOPROST 0.04 MG/ML
1 SOLUTION/ DROPS OPHTHALMIC
Qty: 0 | Refills: 0 | DISCHARGE

## 2020-05-18 RX ORDER — METFORMIN HYDROCHLORIDE 850 MG/1
1 TABLET ORAL
Qty: 0 | Refills: 0 | DISCHARGE

## 2020-05-18 RX ORDER — ATORVASTATIN CALCIUM 80 MG/1
1 TABLET, FILM COATED ORAL
Qty: 0 | Refills: 0 | DISCHARGE

## 2020-05-18 RX ORDER — ASPIRIN/CALCIUM CARB/MAGNESIUM 324 MG
1 TABLET ORAL
Qty: 0 | Refills: 0 | DISCHARGE

## 2020-05-18 RX ORDER — LOSARTAN POTASSIUM 100 MG/1
1 TABLET, FILM COATED ORAL
Qty: 0 | Refills: 0 | DISCHARGE

## 2020-05-18 RX ORDER — INSULIN DEGLUDEC 100 U/ML
12 INJECTION, SOLUTION SUBCUTANEOUS
Qty: 0 | Refills: 0 | DISCHARGE

## 2020-05-18 RX ORDER — ATENOLOL 25 MG/1
1 TABLET ORAL
Qty: 0 | Refills: 0 | DISCHARGE

## 2020-05-26 ENCOUNTER — TRANSCRIPTION ENCOUNTER (OUTPATIENT)
Age: 85
End: 2020-05-26

## 2020-06-02 PROBLEM — H40.9 UNSPECIFIED GLAUCOMA: Chronic | Status: ACTIVE | Noted: 2020-05-04

## 2020-06-02 PROBLEM — I10 ESSENTIAL (PRIMARY) HYPERTENSION: Chronic | Status: ACTIVE | Noted: 2020-05-03

## 2020-06-02 PROBLEM — E11.9 TYPE 2 DIABETES MELLITUS WITHOUT COMPLICATIONS: Chronic | Status: ACTIVE | Noted: 2020-05-04

## 2020-06-02 PROBLEM — I63.9 CEREBRAL INFARCTION, UNSPECIFIED: Chronic | Status: ACTIVE | Noted: 2020-05-03

## 2020-06-02 PROCEDURE — 82803 BLOOD GASES ANY COMBINATION: CPT

## 2020-06-02 PROCEDURE — 86901 BLOOD TYPING SEROLOGIC RH(D): CPT

## 2020-06-02 PROCEDURE — 84145 PROCALCITONIN (PCT): CPT

## 2020-06-02 PROCEDURE — 84484 ASSAY OF TROPONIN QUANT: CPT

## 2020-06-02 PROCEDURE — 83036 HEMOGLOBIN GLYCOSYLATED A1C: CPT

## 2020-06-02 PROCEDURE — 83880 ASSAY OF NATRIURETIC PEPTIDE: CPT

## 2020-06-02 PROCEDURE — 85730 THROMBOPLASTIN TIME PARTIAL: CPT

## 2020-06-02 PROCEDURE — 85027 COMPLETE CBC AUTOMATED: CPT

## 2020-06-02 PROCEDURE — 82565 ASSAY OF CREATININE: CPT

## 2020-06-02 PROCEDURE — 85379 FIBRIN DEGRADATION QUANT: CPT

## 2020-06-02 PROCEDURE — 83615 LACTATE (LD) (LDH) ENZYME: CPT

## 2020-06-02 PROCEDURE — 87640 STAPH A DNA AMP PROBE: CPT

## 2020-06-02 PROCEDURE — 82550 ASSAY OF CK (CPK): CPT

## 2020-06-02 PROCEDURE — 87086 URINE CULTURE/COLONY COUNT: CPT

## 2020-06-02 PROCEDURE — 83605 ASSAY OF LACTIC ACID: CPT

## 2020-06-02 PROCEDURE — 97161 PT EVAL LOW COMPLEX 20 MIN: CPT

## 2020-06-02 PROCEDURE — 85610 PROTHROMBIN TIME: CPT

## 2020-06-02 PROCEDURE — 71275 CT ANGIOGRAPHY CHEST: CPT

## 2020-06-02 PROCEDURE — 81003 URINALYSIS AUTO W/O SCOPE: CPT

## 2020-06-02 PROCEDURE — 86900 BLOOD TYPING SEROLOGIC ABO: CPT

## 2020-06-02 PROCEDURE — 97530 THERAPEUTIC ACTIVITIES: CPT

## 2020-06-02 PROCEDURE — 85014 HEMATOCRIT: CPT

## 2020-06-02 PROCEDURE — 80053 COMPREHEN METABOLIC PANEL: CPT

## 2020-06-02 PROCEDURE — 83690 ASSAY OF LIPASE: CPT

## 2020-06-02 PROCEDURE — 84132 ASSAY OF SERUM POTASSIUM: CPT

## 2020-06-02 PROCEDURE — 84295 ASSAY OF SERUM SODIUM: CPT

## 2020-06-02 PROCEDURE — 96374 THER/PROPH/DIAG INJ IV PUSH: CPT

## 2020-06-02 PROCEDURE — 82947 ASSAY GLUCOSE BLOOD QUANT: CPT

## 2020-06-02 PROCEDURE — 82962 GLUCOSE BLOOD TEST: CPT

## 2020-06-02 PROCEDURE — 97110 THERAPEUTIC EXERCISES: CPT

## 2020-06-02 PROCEDURE — 93970 EXTREMITY STUDY: CPT

## 2020-06-02 PROCEDURE — 80048 BASIC METABOLIC PNL TOTAL CA: CPT

## 2020-06-02 PROCEDURE — 82330 ASSAY OF CALCIUM: CPT

## 2020-06-02 PROCEDURE — 99285 EMERGENCY DEPT VISIT HI MDM: CPT | Mod: 25

## 2020-06-02 PROCEDURE — 86140 C-REACTIVE PROTEIN: CPT

## 2020-06-02 PROCEDURE — 87641 MR-STAPH DNA AMP PROBE: CPT

## 2020-06-02 PROCEDURE — 71045 X-RAY EXAM CHEST 1 VIEW: CPT

## 2020-06-02 PROCEDURE — 83735 ASSAY OF MAGNESIUM: CPT

## 2020-06-02 PROCEDURE — 84100 ASSAY OF PHOSPHORUS: CPT

## 2020-06-02 PROCEDURE — 86850 RBC ANTIBODY SCREEN: CPT

## 2020-06-02 PROCEDURE — U0003: CPT

## 2020-06-02 PROCEDURE — 74177 CT ABD & PELVIS W/CONTRAST: CPT

## 2020-06-02 PROCEDURE — 82728 ASSAY OF FERRITIN: CPT

## 2020-06-02 PROCEDURE — 87040 BLOOD CULTURE FOR BACTERIA: CPT

## 2020-06-02 PROCEDURE — 82435 ASSAY OF BLOOD CHLORIDE: CPT

## 2020-06-02 PROCEDURE — 93005 ELECTROCARDIOGRAM TRACING: CPT

## 2020-06-02 PROCEDURE — 81001 URINALYSIS AUTO W/SCOPE: CPT

## 2020-06-02 PROCEDURE — 97116 GAIT TRAINING THERAPY: CPT

## 2020-06-02 PROCEDURE — 87635 SARS-COV-2 COVID-19 AMP PRB: CPT

## 2020-06-04 ENCOUNTER — APPOINTMENT (OUTPATIENT)
Dept: INTERNAL MEDICINE | Facility: CLINIC | Age: 85
End: 2020-06-04
Payer: MEDICARE

## 2020-06-04 VITALS
SYSTOLIC BLOOD PRESSURE: 148 MMHG | BODY MASS INDEX: 23.37 KG/M2 | HEART RATE: 81 BPM | WEIGHT: 127 LBS | HEIGHT: 62 IN | DIASTOLIC BLOOD PRESSURE: 70 MMHG | OXYGEN SATURATION: 94 %

## 2020-06-04 PROCEDURE — 99214 OFFICE O/P EST MOD 30 MIN: CPT | Mod: CS

## 2020-06-04 NOTE — HISTORY OF PRESENT ILLNESS
[FreeTextEntry1] : had covid pneumoni and in hospital  2 weeks  she went for dirrhea vomiting cough and low sats  now she  has sats in the 90s  and even when she exerts she goes down only to 94 from the high 90s on ra  she say her latest   nasal swab  was neg  in hospital  her  sugars were high  daughter says am glucoses are in the 100s she continues on all the  same meds and is finishing a course of xarelto

## 2020-06-04 NOTE — ASSESSMENT
[FreeTextEntry1] : she is much better and her gas exchange is  close to normal  explained to daughter will hold off f/u ct chest as  even if not fully cleared yet will make no difference as she is better

## 2020-06-04 NOTE — PHYSICAL EXAM
[No Acute Distress] : no acute distress [Well Nourished] : well nourished [Well Developed] : well developed [Normal Sclera/Conjunctiva] : normal sclera/conjunctiva [Normal] : no acute distress, well nourished, well developed and well-appearing [No JVD] : no jugular venous distention [Normal Outer Ear/Nose] : the outer ears and nose were normal in appearance [Supple] : supple [No Respiratory Distress] : no respiratory distress  [No Accessory Muscle Use] : no accessory muscle use [Clear to Auscultation] : lungs were clear to auscultation bilaterally [Normal Rate] : normal rate  [Normal S1, S2] : normal S1 and S2 [Regular Rhythm] : with a regular rhythm [No Edema] : there was no peripheral edema [No Carotid Bruits] : no carotid bruits

## 2020-07-15 NOTE — PROGRESS NOTE ADULT - PROBLEM SELECTOR PLAN 2
Pt resting in stretcher with no signs of distress. VS stable. Plan of care reviewed and pt denies needs or questions at this time. Will continue to monitor. Acute hypoxic respiratory failure 2* COVID vs. less likely superimposed bacterial PNA.  - Procal elevated, repeat still high but was downtrending.  Was started on Zosyn + Vancomycin 05/04/20 empirically.  ID c/s and less likely suspicion for superimposed bacterial PNA.   - MRSA (-)-->Vanco d/c 05/05/20  - Zosyn 05/04/20 x 5 days.  Discontinued 05/09  - SaO2 >91% on 4L, stable but also not improving.    - Weaning trial 1L per 1 hr.  If SpO2 >88% and no respiratory distress, can continue weaning trial.  - D/W ID, no further concern for bacterial superimposed infection at this time.

## 2020-09-02 ENCOUNTER — APPOINTMENT (OUTPATIENT)
Dept: INTERNAL MEDICINE | Facility: CLINIC | Age: 85
End: 2020-09-02
Payer: MEDICARE

## 2020-09-02 VITALS
SYSTOLIC BLOOD PRESSURE: 140 MMHG | HEIGHT: 62 IN | HEART RATE: 69 BPM | DIASTOLIC BLOOD PRESSURE: 85 MMHG | OXYGEN SATURATION: 95 % | BODY MASS INDEX: 25.4 KG/M2 | WEIGHT: 138 LBS

## 2020-09-02 VITALS — DIASTOLIC BLOOD PRESSURE: 82 MMHG | SYSTOLIC BLOOD PRESSURE: 120 MMHG

## 2020-09-02 PROCEDURE — 99214 OFFICE O/P EST MOD 30 MIN: CPT

## 2020-09-02 RX ORDER — ESCITALOPRAM OXALATE 20 MG/1
20 TABLET ORAL
Qty: 90 | Refills: 3 | Status: DISCONTINUED | COMMUNITY
Start: 2019-01-16 | End: 2020-09-02

## 2020-09-02 RX ORDER — ONDANSETRON 4 MG/1
4 TABLET, ORALLY DISINTEGRATING ORAL EVERY 8 HOURS
Qty: 90 | Refills: 0 | Status: DISCONTINUED | COMMUNITY
Start: 2020-04-29 | End: 2020-09-02

## 2020-09-02 NOTE — HISTORY OF PRESENT ILLNESS
[FreeTextEntry1] : follow up  of htn and dm.  she feels good  her last a1c at [facility]  was  under 7  she does some walking.  she has not been to optho since covid.  she is off lexapro otherwise all meds the same  and she has no  def side effects

## 2020-09-02 NOTE — PHYSICAL EXAM
[No Acute Distress] : no acute distress [Well Nourished] : well nourished [Well Developed] : well developed [Well-Appearing] : well-appearing [Normal] : no acute distress, well nourished, well developed and well-appearing [Normal Sclera/Conjunctiva] : normal sclera/conjunctiva [PERRL] : pupils equal round and reactive to light [EOMI] : extraocular movements intact [Normal Outer Ear/Nose] : the outer ears and nose were normal in appearance [Normal Oropharynx] : the oropharynx was normal [No JVD] : no jugular venous distention [No Lymphadenopathy] : no lymphadenopathy [Supple] : supple [No Respiratory Distress] : no respiratory distress  [No Accessory Muscle Use] : no accessory muscle use [Clear to Auscultation] : lungs were clear to auscultation bilaterally [Normal Rate] : normal rate  [Regular Rhythm] : with a regular rhythm [Normal S1, S2] : normal S1 and S2 [No Murmur] : no murmur heard [No Carotid Bruits] : no carotid bruits [No Varicosities] : no varicosities [No Edema] : there was no peripheral edema [Pedal Pulses Present] : the pedal pulses are present [No Palpable Aorta] : no palpable aorta [No Extremity Clubbing/Cyanosis] : no extremity clubbing/cyanosis [Soft] : abdomen soft [Non Tender] : non-tender [Non-distended] : non-distended [No Masses] : no abdominal mass palpated [No HSM] : no HSM [Normal Bowel Sounds] : normal bowel sounds [Normal Posterior Cervical Nodes] : no posterior cervical lymphadenopathy [Normal Anterior Cervical Nodes] : no anterior cervical lymphadenopathy [No CVA Tenderness] : no CVA  tenderness [No Spinal Tenderness] : no spinal tenderness [No Joint Swelling] : no joint swelling [Grossly Normal Strength/Tone] : grossly normal strength/tone [No Rash] : no rash [Coordination Grossly Intact] : coordination grossly intact [No Focal Deficits] : no focal deficits [Normal Gait] : normal gait [Speech Grossly Normal] : speech grossly normal [Normal Affect] : the affect was normal [Normal Mood] : the mood was normal [Normal Insight/Judgement] : insight and judgment were intact

## 2020-11-30 ENCOUNTER — APPOINTMENT (OUTPATIENT)
Dept: INTERNAL MEDICINE | Facility: CLINIC | Age: 85
End: 2020-11-30
Payer: MEDICARE

## 2020-11-30 ENCOUNTER — NON-APPOINTMENT (OUTPATIENT)
Age: 85
End: 2020-11-30

## 2020-11-30 VITALS
HEART RATE: 75 BPM | HEIGHT: 62 IN | DIASTOLIC BLOOD PRESSURE: 84 MMHG | SYSTOLIC BLOOD PRESSURE: 162 MMHG | TEMPERATURE: 98.9 F | BODY MASS INDEX: 26.13 KG/M2 | WEIGHT: 142 LBS

## 2020-11-30 DIAGNOSIS — Z63.5 DISRUPTION OF FAMILY BY SEPARATION AND DIVORCE: ICD-10-CM

## 2020-11-30 DIAGNOSIS — I67.2 CEREBRAL ATHEROSCLEROSIS: ICD-10-CM

## 2020-11-30 DIAGNOSIS — R14.0 ABDOMINAL DISTENSION (GASEOUS): ICD-10-CM

## 2020-11-30 DIAGNOSIS — Z20.828 CONTACT WITH AND (SUSPECTED) EXPOSURE TO OTHER VIRAL COMMUNICABLE DISEASES: ICD-10-CM

## 2020-11-30 DIAGNOSIS — U07.1 COVID-19: ICD-10-CM

## 2020-11-30 DIAGNOSIS — Z78.9 OTHER SPECIFIED HEALTH STATUS: ICD-10-CM

## 2020-11-30 DIAGNOSIS — Z86.59 PERSONAL HISTORY OF OTHER MENTAL AND BEHAVIORAL DISORDERS: ICD-10-CM

## 2020-11-30 DIAGNOSIS — Z95.818 PRESENCE OF OTHER CARDIAC IMPLANTS AND GRAFTS: ICD-10-CM

## 2020-11-30 DIAGNOSIS — R50.9 FEVER, UNSPECIFIED: ICD-10-CM

## 2020-11-30 DIAGNOSIS — Z01.84 ENCOUNTER FOR ANTIBODY RESPONSE EXAMINATION: ICD-10-CM

## 2020-11-30 DIAGNOSIS — J12.82 COVID-19: ICD-10-CM

## 2020-11-30 LAB
BILIRUB UR QL STRIP: NEGATIVE
CLARITY UR: CLEAR
COLLECTION METHOD: NORMAL
GLUCOSE UR-MCNC: NEGATIVE
HCG UR QL: 0.2 EU/DL
HGB UR QL STRIP.AUTO: NEGATIVE
KETONES UR-MCNC: NEGATIVE
LEUKOCYTE ESTERASE UR QL STRIP: NEGATIVE
NITRITE UR QL STRIP: NEGATIVE
PH UR STRIP: 5.5
PROT UR STRIP-MCNC: NEGATIVE
SP GR UR STRIP: 1.01

## 2020-11-30 PROCEDURE — 36415 COLL VENOUS BLD VENIPUNCTURE: CPT

## 2020-11-30 PROCEDURE — 93000 ELECTROCARDIOGRAM COMPLETE: CPT

## 2020-11-30 PROCEDURE — G0439: CPT

## 2020-11-30 PROCEDURE — 81003 URINALYSIS AUTO W/O SCOPE: CPT | Mod: QW

## 2020-11-30 RX ORDER — ACETAMINOPHEN 650 MG/1
650 SUPPOSITORY RECTAL
Qty: 60 | Refills: 0 | Status: DISCONTINUED | COMMUNITY
Start: 2020-04-27 | End: 2020-11-30

## 2020-11-30 RX ORDER — CYCLOSPORINE 0.5 MG/ML
0.05 EMULSION OPHTHALMIC
Qty: 60 | Refills: 0 | Status: COMPLETED | COMMUNITY
Start: 2020-10-15

## 2020-11-30 RX ORDER — LATANOPROST/PF 0.005 %
0.01 DROPS OPHTHALMIC (EYE)
Qty: 2 | Refills: 0 | Status: COMPLETED | COMMUNITY
Start: 2020-11-06

## 2020-11-30 RX ORDER — HYDRALAZINE HYDROCHLORIDE 10 MG/1
10 TABLET ORAL
Qty: 180 | Refills: 0 | Status: COMPLETED | COMMUNITY
Start: 2020-10-21

## 2020-11-30 RX ORDER — ACETAMINOPHEN 650 MG/1
650 SUPPOSITORY RECTAL
Qty: 60 | Refills: 0 | Status: DISCONTINUED | COMMUNITY
Start: 2020-04-29 | End: 2020-11-30

## 2020-11-30 RX ORDER — ONDANSETRON 2 MG/ML
4 INJECTION INTRAMUSCULAR; INTRAVENOUS EVERY 8 HOURS
Qty: 30 | Refills: 1 | Status: DISCONTINUED | COMMUNITY
Start: 2020-04-29 | End: 2020-11-30

## 2020-11-30 SDOH — SOCIAL STABILITY - SOCIAL INSECURITY: DISRUPTION OF FAMILY BY SEPARATION AND DIVORCE: Z63.5

## 2020-11-30 NOTE — RESULTS/DATA
[ECG Reviewed] : reviewed [Normal] : The 12 - lead ECG is normal [NSR] : normal sinus rhythm [P Waves Normal] : the P wave is normal [ECG Intervals NC.] : NC interval is normal [Normal QRS] : the QRS is normal [ECG Axis] : the QRS axis is normal [Normal ST Segments] : the ST segments are normal [ECG T. Waves] : normal [No Interval Change] : no interval change

## 2020-11-30 NOTE — PHYSICAL EXAM
[No Acute Distress] : no acute distress [Well Nourished] : well nourished [Well Developed] : well developed [Well-Appearing] : well-appearing [Normal Sclera/Conjunctiva] : normal sclera/conjunctiva [EOMI] : extraocular movements intact [Normal Outer Ear/Nose] : the outer ears and nose were normal in appearance [No JVD] : no jugular venous distention [No Lymphadenopathy] : no lymphadenopathy [Supple] : supple [Thyroid Normal, No Nodules] : the thyroid was normal and there were no nodules present [No Respiratory Distress] : no respiratory distress  [No Accessory Muscle Use] : no accessory muscle use [Clear to Auscultation] : lungs were clear to auscultation bilaterally [Normal Rate] : normal rate  [Regular Rhythm] : with a regular rhythm [Normal S1, S2] : normal S1 and S2 [No Murmur] : no murmur heard [No Carotid Bruits] : no carotid bruits [No Abdominal Bruit] : a ~M bruit was not heard ~T in the abdomen [No Varicosities] : no varicosities [Pedal Pulses Present] : the pedal pulses are present [No Edema] : there was no peripheral edema [No Palpable Aorta] : no palpable aorta [No Extremity Clubbing/Cyanosis] : no extremity clubbing/cyanosis [Normal Appearance] : normal in appearance [No Nipple Discharge] : no nipple discharge [No Axillary Lymphadenopathy] : no axillary lymphadenopathy [Soft] : abdomen soft [Non Tender] : non-tender [Non-distended] : non-distended [No Masses] : no abdominal mass palpated [No HSM] : no HSM [Normal Bowel Sounds] : normal bowel sounds [No Hernias] : no hernias [Normal Supraclavicular Nodes] : no supraclavicular lymphadenopathy [Normal Axillary Nodes] : no axillary lymphadenopathy [Normal Posterior Cervical Nodes] : no posterior cervical lymphadenopathy [Normal Anterior Cervical Nodes] : no anterior cervical lymphadenopathy [Normal Inguinal Nodes] : no inguinal lymphadenopathy [No CVA Tenderness] : no CVA  tenderness [No Spinal Tenderness] : no spinal tenderness [No Joint Swelling] : no joint swelling [Grossly Normal Strength/Tone] : grossly normal strength/tone [No Rash] : no rash [No Skin Lesions] : no skin lesions [Coordination Grossly Intact] : coordination grossly intact [No Focal Deficits] : no focal deficits [Speech Grossly Normal] : speech grossly normal [Normal Affect] : the affect was normal [Alert and Oriented x3] : oriented to person, place, and time [Normal Mood] : the mood was normal [Kyphosis] : no kyphosis [Scoliosis] : no scoliosis [Acne] : no acne [de-identified] : overweight [de-identified] : r [de-identified] : right TM ois normal, left possible perforated or covered by an exostosis

## 2020-11-30 NOTE — REVIEW OF SYSTEMS
[Cough] : cough [Skin Rash] : skin rash [Fever] : no fever [Chills] : no chills [Fatigue] : no fatigue [Hot Flashes] : no hot flashes [Night Sweats] : no night sweats [Recent Change In Weight] : ~T no recent weight change [Redness] : no redness [Dryness] : no dryness  [Itching] : no itching [Nasal Discharge] : no nasal discharge [Postnasal Drip] : no postnasal drip [Chest Pain] : no chest pain [Palpitations] : no palpitations [Lower Ext Edema] : no lower extremity edema [Shortness Of Breath] : no shortness of breath [Wheezing] : no wheezing [Abdominal Pain] : no abdominal pain [Nausea] : no nausea [Constipation] : no constipation [Diarrhea] : diarrhea [Vomiting] : no vomiting [Heartburn] : no heartburn [Incontinence] : no incontinence [Nocturia] : no nocturia [Frequency] : no frequency [Joint Pain] : no joint pain [Joint Stiffness] : no joint stiffness [Joint Swelling] : no joint swelling [Headache] : no headache [Dizziness] : no dizziness [Insomnia] : no insomnia [Anxiety] : no anxiety [Depression] : no depression [Easy Bruising] : no easy bruising [Swollen Glands] : no swollen glands

## 2020-11-30 NOTE — REASON FOR VISIT
[Annual Wellness Visit] : an annual wellness visit [FreeTextEntry1] : : Kristyn Duncan RN, her daughter

## 2020-11-30 NOTE — ASSESSMENT
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 \par Hypertension as shehas ot taken her antihypertensive medications today.\par Diabetes, sees Endocrinologist. \par Gait limited post CVA.

## 2020-11-30 NOTE — PLAN
[FreeTextEntry1] : Referred to Dr. Gonzalez. \par Blood work\par Flu shot refused. \par COVID antibodies.

## 2020-11-30 NOTE — HISTORY OF PRESENT ILLNESS
[FreeTextEntry1] : CPE [de-identified] : The patient is being seen for a health maintenance evaluation.\par Exercise: The patient walks up and down stairs and stretch band\par Diet: Diabetic  diet \par Dental: Has not had dental examhas partial dentures, hasn't gone since pandemic\par Hearing: normal  \par Vision. Glasses: full-time\par             Checks:  recently\par Has had urticarial rashes, was seen by allergist, taking Allegra and this has helped\par

## 2020-11-30 NOTE — HEALTH RISK ASSESSMENT
[Good] : ~his/her~  mood as  good [No] : In the past 12 months have you used drugs other than those required for medical reasons? No [No falls in past year] : Patient reported no falls in the past year [0] : 2) Feeling down, depressed, or hopeless: Not at all (0) [Patient declined mammogram] : Patient declined mammogram [Patient declined PAP Smear] : Patient declined PAP Smear [Patient declined bone density test] : Patient declined bone density test [Patient declined colonoscopy] : Patient declined colonoscopy [None] : None [With Family] : lives with family [Retired] : retired [High School] : high school [] :  [Feels Safe at Home] : Feels safe at home [Fully functional (bathing, dressing, toileting, transferring, walking, feeding)] : Fully functional (bathing, dressing, toileting, transferring, walking, feeding) [Independent] : using telephone [Full assistance needed] : managing finances [Smoke Detector] : smoke detector [Carbon Monoxide Detector] : carbon monoxide detector [Seat Belt] :  uses seat belt [With Patient/Caregiver] : With Patient/Caregiver [] : No [Audit-CScore] : 0 [Change in mental status noted] : No change in mental status noted [Sexually Active] : not sexually active [Reports changes in hearing] : Reports no changes in hearing [Reports changes in vision] : Reports no changes in vision [Reports changes in dental health] : Reports no changes in dental health [AdvancecareDate] : 11/30/2020

## 2020-12-01 LAB
ALBUMIN SERPL ELPH-MCNC: 4.3 G/DL
ALP BLD-CCNC: 98 U/L
ALT SERPL-CCNC: 45 U/L
ANION GAP SERPL CALC-SCNC: 13 MMOL/L
AST SERPL-CCNC: 29 U/L
BASOPHILS # BLD AUTO: 0.04 K/UL
BASOPHILS NFR BLD AUTO: 0.8 %
BILIRUB SERPL-MCNC: 0.7 MG/DL
BUN SERPL-MCNC: 10 MG/DL
CALCIUM SERPL-MCNC: 9.6 MG/DL
CHLORIDE SERPL-SCNC: 104 MMOL/L
CHOLEST SERPL-MCNC: 126 MG/DL
CO2 SERPL-SCNC: 24 MMOL/L
CREAT SERPL-MCNC: 0.58 MG/DL
EOSINOPHIL # BLD AUTO: 0.06 K/UL
EOSINOPHIL NFR BLD AUTO: 1.1 %
GLUCOSE SERPL-MCNC: 113 MG/DL
HCT VFR BLD CALC: 47.5 %
HDLC SERPL-MCNC: 58 MG/DL
HGB BLD-MCNC: 14.6 G/DL
IMM GRANULOCYTES NFR BLD AUTO: 0.2 %
LDLC SERPL CALC-MCNC: 59 MG/DL
LYMPHOCYTES # BLD AUTO: 2.09 K/UL
LYMPHOCYTES NFR BLD AUTO: 40 %
MAN DIFF?: NORMAL
MCHC RBC-ENTMCNC: 24.8 PG
MCHC RBC-ENTMCNC: 30.7 GM/DL
MCV RBC AUTO: 80.6 FL
MONOCYTES # BLD AUTO: 0.49 K/UL
MONOCYTES NFR BLD AUTO: 9.4 %
NEUTROPHILS # BLD AUTO: 2.54 K/UL
NEUTROPHILS NFR BLD AUTO: 48.5 %
NONHDLC SERPL-MCNC: 69 MG/DL
PLATELET # BLD AUTO: 182 K/UL
POTASSIUM SERPL-SCNC: 3.9 MMOL/L
PROT SERPL-MCNC: 7.5 G/DL
RBC # BLD: 5.89 M/UL
RBC # FLD: 18.9 %
SARS-COV-2 IGG SERPL IA-ACNC: 6.04 INDEX
SARS-COV-2 IGG SERPL QL IA: POSITIVE
SODIUM SERPL-SCNC: 140 MMOL/L
TRIGL SERPL-MCNC: 50 MG/DL
WBC # FLD AUTO: 5.23 K/UL

## 2020-12-02 PROBLEM — I67.2 INTRACRANIAL ATHEROSCLEROSIS: Status: ACTIVE | Noted: 2018-06-20

## 2021-01-02 NOTE — DISCHARGE NOTE PROVIDER - NSDCHHHOMEBOUND_GEN_ALL_CORE
Fall risk/Ataxic gait/Chest pain/weakness during/after ambulation   greater than 20 feet/Stroke/TBI (neurological/cognitive impairment) Performed

## 2021-01-23 NOTE — ED PROVIDER NOTE - CPE EDP EYES NORM
PT BP WAS REPORTED AT 93/63. CALLED PROVIDER TO INFORM. PT REMAINS
ASYMPTOMATIC. WILL RECHECK BP AT 1630 AND REPORT BACK. normal...

## 2021-02-12 ENCOUNTER — APPOINTMENT (OUTPATIENT)
Dept: INTERNAL MEDICINE | Facility: CLINIC | Age: 86
End: 2021-02-12
Payer: MEDICARE

## 2021-02-12 PROCEDURE — 99213 OFFICE O/P EST LOW 20 MIN: CPT | Mod: 95

## 2021-02-12 NOTE — HISTORY OF PRESENT ILLNESS
[Home] : at home, [unfilled] , at the time of the visit. [Medical Office: ([facility])___] : at the medical office located in  [Family Member] : family member [FreeTextEntry3] : daughter,  Kristyn Duncan [FreeTextEntry8] : Patient's daughter noted a rash beneath left breast and on left back this AM . Apparently does not hurt the patient and not burning. she had fallen some months ago in that area and thought that this might be causing a small amount of pain. She can lie on that side.

## 2021-02-12 NOTE — PLAN
[FreeTextEntry1] : Valtrex.\par As she is not in pain, advised Tylenol  PRN. If necessary can prescribe oxycodone -APAP in the next few days if it should become necessary.

## 2021-02-12 NOTE — PHYSICAL EXAM
[No Acute Distress] : no acute distress [Well Nourished] : well nourished [Well Developed] : well developed [Well-Appearing] : well-appearing [de-identified] : Vesicular and papular lewsion under left breast and on back in a shingle distribution, unilateral.

## 2021-02-26 ENCOUNTER — APPOINTMENT (OUTPATIENT)
Dept: INTERNAL MEDICINE | Facility: CLINIC | Age: 86
End: 2021-02-26
Payer: MEDICARE

## 2021-02-26 VITALS
HEIGHT: 62 IN | TEMPERATURE: 97.5 F | HEART RATE: 76 BPM | DIASTOLIC BLOOD PRESSURE: 96 MMHG | SYSTOLIC BLOOD PRESSURE: 160 MMHG | WEIGHT: 140 LBS | BODY MASS INDEX: 25.76 KG/M2

## 2021-02-26 PROCEDURE — 36415 COLL VENOUS BLD VENIPUNCTURE: CPT

## 2021-02-26 PROCEDURE — 99213 OFFICE O/P EST LOW 20 MIN: CPT | Mod: 25

## 2021-02-26 RX ORDER — MUPIROCIN 20 MG/G
2 OINTMENT TOPICAL 3 TIMES DAILY
Qty: 1 | Refills: 1 | Status: ACTIVE | COMMUNITY
Start: 2021-02-26 | End: 1900-01-01

## 2021-02-26 NOTE — HISTORY OF PRESENT ILLNESS
[Family Member] : family member [FreeTextEntry8] : Still in pain in the left axilla not at zoster site. The zoster lesions have mostly healed. No fever no chills. No SOB or chest pain. Appetite decreased. Constipated, not using laxative, is on Tramadol. .

## 2021-02-26 NOTE — PHYSICAL EXAM
[No Acute Distress] : no acute distress [Well Nourished] : well nourished [Well Developed] : well developed [Well-Appearing] : well-appearing [Normal Appearance] : normal in appearance [de-identified] : Pain in left axilla but no masses are felt no skin lesions there. [de-identified] : Zosteriform lesions beneath left breast are mostly healed. Daughter notes that the area is painful although colon not seem tender. She has some zoster like healed lesions on her left forehead as well.

## 2021-03-01 LAB
ALBUMIN SERPL ELPH-MCNC: 4.5 G/DL
ALP BLD-CCNC: 93 U/L
ALT SERPL-CCNC: 32 U/L
ANION GAP SERPL CALC-SCNC: 14 MMOL/L
AST SERPL-CCNC: 32 U/L
BILIRUB SERPL-MCNC: 0.5 MG/DL
BUN SERPL-MCNC: 9 MG/DL
CALCIUM SERPL-MCNC: 10 MG/DL
CHLORIDE SERPL-SCNC: 105 MMOL/L
CHOLEST SERPL-MCNC: 142 MG/DL
CK SERPL-CCNC: 112 U/L
CO2 SERPL-SCNC: 23 MMOL/L
CREAT SERPL-MCNC: 0.77 MG/DL
ESTIMATED AVERAGE GLUCOSE: 143 MG/DL
FOLATE SERPL-MCNC: 11.8 NG/ML
FRUCTOSAMINE SERPL-MCNC: 329 UMOL/L
GLUCOSE SERPL-MCNC: 133 MG/DL
HBA1C MFR BLD HPLC: 6.6 %
HDLC SERPL-MCNC: 58 MG/DL
LDLC SERPL CALC-MCNC: 72 MG/DL
NONHDLC SERPL-MCNC: 84 MG/DL
POTASSIUM SERPL-SCNC: 4.6 MMOL/L
PROT SERPL-MCNC: 7.7 G/DL
SODIUM SERPL-SCNC: 141 MMOL/L
T4 FREE SERPL-MCNC: 1.4 NG/DL
TRIGL SERPL-MCNC: 60 MG/DL
TSH SERPL-ACNC: 0.95 UIU/ML
VIT B12 SERPL-MCNC: 1113 PG/ML

## 2021-03-10 ENCOUNTER — APPOINTMENT (OUTPATIENT)
Dept: ENDOCRINOLOGY | Facility: CLINIC | Age: 86
End: 2021-03-10
Payer: MEDICARE

## 2021-03-10 ENCOUNTER — RESULT CHARGE (OUTPATIENT)
Age: 86
End: 2021-03-10

## 2021-03-10 VITALS
SYSTOLIC BLOOD PRESSURE: 145 MMHG | OXYGEN SATURATION: 98 % | DIASTOLIC BLOOD PRESSURE: 60 MMHG | TEMPERATURE: 98 F | RESPIRATION RATE: 15 BRPM | BODY MASS INDEX: 25.76 KG/M2 | HEART RATE: 63 BPM | WEIGHT: 140 LBS | HEIGHT: 62 IN

## 2021-03-10 VITALS — HEART RATE: 63 BPM | SYSTOLIC BLOOD PRESSURE: 145 MMHG | DIASTOLIC BLOOD PRESSURE: 60 MMHG

## 2021-03-10 LAB — GLUCOSE BLDC GLUCOMTR-MCNC: 293

## 2021-03-10 PROCEDURE — 99204 OFFICE O/P NEW MOD 45 MIN: CPT

## 2021-03-10 PROCEDURE — 95250 CONT GLUC MNTR PHYS/QHP EQP: CPT

## 2021-03-10 PROCEDURE — 95251 CONT GLUC MNTR ANALYSIS I&R: CPT

## 2021-03-10 NOTE — HISTORY OF PRESENT ILLNESS
[FreeTextEntry1] : HISTORY OF PRESENT ILLNESS. \par \par Ms. BOYCE was diagnosed with Diabetes Mellitus Type 2 more than 30 years ago. She reports history HTN, dyslipidemia, CVA and denies CAD,  known complications of retinopathy, nephropathy, or neuropathy. She is presently on metformin 500 mg bid, Tresiba 20 un (increased recently because of her shingles). \par Blood sugars are checked once a day. \par Did not bring log book, but reported are typically as following: fbs (prior to shingles) 90's-low 100's, recently in 140's- 170's, ppg- not checking\par Hypoglycemia frequency: none\par Fingerstick glucose in the office today is 293 mg/dL 2 hours after eating. \par Diet: not following ADA\par Exercise: none\par \par Lab review: a1c- 6.6 <-- 8.4\par \par Last dilated eye - to see 04/21\par Last podiatry visit  - none\par Last cardiology evaluation - 1/21\par Last stress test - 2020\par Last 2-D Echo - 2020\par Last nephrology evaluation - none\par Last neurology evaluation-none\par

## 2021-03-10 NOTE — ASSESSMENT
[FreeTextEntry1] : Current approaches to diabetes management are discussed with the patient and the daughter. \par Target ranges for blood sugar, blood pressure and cholesterol reviewed, and risk reduction strategies verified. \par Hypoglycemia precautions reviewed with the patient. \par Suggested extensive diabetes education program, including nutritional and diabetes teaching and evaluation. \par Proper dietary restrictions and exercise routines discussed. \par Glucometer/SMBG and log book charting discussed. Check PPG.\par - Tresiba 20 un HS\par - d/c Metformin\par - Janumet 50/500 mg bid\par - Oliver PRO\par - continue lipitor 80 mg HS\par - losartan, hydralazine\par RTC 3 months

## 2021-03-12 ENCOUNTER — NON-APPOINTMENT (OUTPATIENT)
Age: 86
End: 2021-03-12

## 2021-04-05 ENCOUNTER — APPOINTMENT (OUTPATIENT)
Dept: INTERNAL MEDICINE | Facility: CLINIC | Age: 86
End: 2021-04-05
Payer: MEDICARE

## 2021-04-05 VITALS
HEIGHT: 62 IN | DIASTOLIC BLOOD PRESSURE: 78 MMHG | SYSTOLIC BLOOD PRESSURE: 140 MMHG | HEART RATE: 64 BPM | BODY MASS INDEX: 25.76 KG/M2 | WEIGHT: 140 LBS

## 2021-04-05 DIAGNOSIS — B37.2 CANDIDIASIS OF SKIN AND NAIL: ICD-10-CM

## 2021-04-05 PROCEDURE — 99213 OFFICE O/P EST LOW 20 MIN: CPT

## 2021-04-05 RX ORDER — NYSTATIN 100000 [USP'U]/G
100000 CREAM TOPICAL 3 TIMES DAILY
Qty: 1 | Refills: 0 | Status: ACTIVE | COMMUNITY
Start: 2021-04-05 | End: 1900-01-01

## 2021-04-05 NOTE — PHYSICAL EXAM
[No Acute Distress] : no acute distress [Well Nourished] : well nourished [Well Developed] : well developed [Well-Appearing] : well-appearing [Normal Voice/Communication] : normal voice/communication [No Respiratory Distress] : no respiratory distress  [No Accessory Muscle Use] : no accessory muscle use [Clear to Auscultation] : lungs were clear to auscultation bilaterally [Normal Percussion] : the chest was normal to percussion [Normal Rate] : normal rate  [Regular Rhythm] : with a regular rhythm [Normal S1, S2] : normal S1 and S2 [No Murmur] : no murmur heard [No Edema] : there was no peripheral edema [Normal Appearance] : normal in appearance [de-identified] : MArked erythema under the left breast appears hemorrhagic,lesions on the posterior left chest have healed.

## 2021-04-05 NOTE — ASSESSMENT
[FreeTextEntry1] : Possible secondary fungal infection at the shingles site under the left breast anteriorly, healed posteriorly.

## 2021-04-05 NOTE — HISTORY OF PRESENT ILLNESS
[Family Member] : family member [FreeTextEntry1] : follow up [de-identified] : Has burning under the breast over the last 2 days. Daughter Kristyn gave her cold compresses, Allegra and gabapentin, not controlling the situation.\par No fever but she notes chills--not shaking. Pain migrates --she feels it's nerve pain. Lidocaine helped a little but causes burning under the breast. \par No SOB, no chest pressure, no palpitations.

## 2021-04-05 NOTE — PLAN
[FreeTextEntry1] : Nystatin cream  2-3 times per day. Avoiding triamcinolone as this may make the skin atrophic.

## 2021-04-07 ENCOUNTER — APPOINTMENT (OUTPATIENT)
Dept: ENDOCRINOLOGY | Facility: CLINIC | Age: 86
End: 2021-04-07

## 2021-04-08 RX ORDER — GABAPENTIN 100 MG/1
100 CAPSULE ORAL
Qty: 30 | Refills: 0 | Status: DISCONTINUED | COMMUNITY
Start: 2021-02-26

## 2021-04-08 RX ORDER — HYDRALAZINE HYDROCHLORIDE 25 MG/1
25 TABLET ORAL
Qty: 270 | Refills: 0 | Status: DISCONTINUED | COMMUNITY
Start: 2021-02-02

## 2021-04-14 ENCOUNTER — NON-APPOINTMENT (OUTPATIENT)
Age: 86
End: 2021-04-14

## 2021-05-11 RX ORDER — SILVER SULFADIAZINE 10 MG/G
1 CREAM TOPICAL TWICE DAILY
Qty: 1 | Refills: 1 | Status: ACTIVE | COMMUNITY
Start: 2021-04-08 | End: 1900-01-01

## 2021-06-30 ENCOUNTER — APPOINTMENT (OUTPATIENT)
Dept: ENDOCRINOLOGY | Facility: CLINIC | Age: 86
End: 2021-06-30
Payer: MEDICARE

## 2021-06-30 VITALS
HEART RATE: 67 BPM | RESPIRATION RATE: 16 BRPM | TEMPERATURE: 98 F | WEIGHT: 145 LBS | SYSTOLIC BLOOD PRESSURE: 120 MMHG | OXYGEN SATURATION: 98 % | DIASTOLIC BLOOD PRESSURE: 76 MMHG | BODY MASS INDEX: 26.68 KG/M2 | HEIGHT: 62 IN

## 2021-06-30 LAB — GLUCOSE BLDC GLUCOMTR-MCNC: 130

## 2021-06-30 PROCEDURE — 99214 OFFICE O/P EST MOD 30 MIN: CPT | Mod: 25

## 2021-06-30 PROCEDURE — 36415 COLL VENOUS BLD VENIPUNCTURE: CPT

## 2021-06-30 PROCEDURE — 82962 GLUCOSE BLOOD TEST: CPT

## 2021-06-30 NOTE — HISTORY OF PRESENT ILLNESS
[FreeTextEntry1] : F/u for diabetes management\par \par *** Jun 30, 2021 ***\par \par feels well, no new c/o \par 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 \par \par Ms. BOYCE was diagnosed with Diabetes Mellitus Type 2 more than 30 years ago. She reports history HTN, dyslipidemia, CVA and denies CAD,  known complications of retinopathy, nephropathy, or neuropathy. She is presently on metformin 500 mg bid, Tresiba 20 un (increased recently because of her shingles). \par Blood sugars are checked once a day. \par Did not bring log book, but reported are typically as following: fbs (prior to shingles) 90's-low 100's, recently in 140's- 170's, ppg- not checking\par Hypoglycemia frequency: none\par Fingerstick glucose in the office today is 293 mg/dL 2 hours after eating. \par Diet: not following ADA\par Exercise: none\par \par Lab review: a1c- 6.6 <-- 8.4\par \par Last dilated eye - to see 04/21\par Last podiatry visit  - none\par Last cardiology evaluation - 1/21\par Last stress test - 2020\par Last 2-D Echo - 2020\par Last nephrology evaluation - none\par Last neurology evaluation-none\par

## 2021-06-30 NOTE — ASSESSMENT
[FreeTextEntry1] : - Tresiba 20 un HS\par - Janumet 50/500 mg bid\par - advised to lower Tresiba\par - continue lipitor 80 mg HS\par - losartan, hydralazine\par RTC 3 -4months

## 2021-07-09 ENCOUNTER — TRANSCRIPTION ENCOUNTER (OUTPATIENT)
Age: 86
End: 2021-07-09

## 2021-07-09 LAB
ALBUMIN SERPL ELPH-MCNC: 4.2 G/DL
ALP BLD-CCNC: 76 U/L
ALT SERPL-CCNC: 27 U/L
ANION GAP SERPL CALC-SCNC: 11 MMOL/L
AST SERPL-CCNC: 22 U/L
BILIRUB SERPL-MCNC: 0.2 MG/DL
BUN SERPL-MCNC: 17 MG/DL
CALCIUM SERPL-MCNC: 9.4 MG/DL
CHLORIDE SERPL-SCNC: 104 MMOL/L
CO2 SERPL-SCNC: 24 MMOL/L
CREAT SERPL-MCNC: 0.68 MG/DL
CREAT SPEC-SCNC: 86 MG/DL
ESTIMATED AVERAGE GLUCOSE: 120 MG/DL
FRUCTOSAMINE SERPL-MCNC: 301 UMOL/L
GLUCOSE SERPL-MCNC: 148 MG/DL
GLYCOMARK.: 16.4 UG/ML
HBA1C MFR BLD HPLC: 5.8 %
MICROALBUMIN 24H UR DL<=1MG/L-MCNC: <1.2 MG/DL
MICROALBUMIN/CREAT 24H UR-RTO: NORMAL MG/G
POTASSIUM SERPL-SCNC: 5.4 MMOL/L
PROT SERPL-MCNC: 6.9 G/DL
SODIUM SERPL-SCNC: 139 MMOL/L

## 2021-07-15 ENCOUNTER — APPOINTMENT (OUTPATIENT)
Dept: INTERNAL MEDICINE | Facility: CLINIC | Age: 86
End: 2021-07-15
Payer: MEDICARE

## 2021-07-15 VITALS
HEART RATE: 61 BPM | TEMPERATURE: 97.2 F | WEIGHT: 149 LBS | DIASTOLIC BLOOD PRESSURE: 62 MMHG | SYSTOLIC BLOOD PRESSURE: 138 MMHG | BODY MASS INDEX: 27.42 KG/M2 | HEIGHT: 62 IN | OXYGEN SATURATION: 97 %

## 2021-07-15 DIAGNOSIS — L98.9 DISORDER OF THE SKIN AND SUBCUTANEOUS TISSUE, UNSPECIFIED: ICD-10-CM

## 2021-07-15 DIAGNOSIS — Z86.19 PERSONAL HISTORY OF OTHER INFECTIOUS AND PARASITIC DISEASES: ICD-10-CM

## 2021-07-15 PROCEDURE — 99213 OFFICE O/P EST LOW 20 MIN: CPT

## 2021-07-15 RX ORDER — LIDOCAINE 5 G/100G
5 OINTMENT TOPICAL
Qty: 1 | Refills: 2 | Status: COMPLETED | COMMUNITY
Start: 2021-03-16 | End: 2021-07-15

## 2021-07-15 RX ORDER — NYSTATIN 100000 1/G
100000 POWDER TOPICAL
Qty: 1 | Refills: 3 | Status: COMPLETED | COMMUNITY
Start: 2021-07-15 | End: 2021-11-12

## 2021-07-15 RX ORDER — IBUPROFEN 600 MG/1
600 TABLET, FILM COATED ORAL 3 TIMES DAILY
Qty: 15 | Refills: 3 | Status: COMPLETED | COMMUNITY
Start: 2021-04-09 | End: 2021-07-15

## 2021-07-15 RX ORDER — TRAMADOL HYDROCHLORIDE 50 MG/1
50 TABLET, COATED ORAL
Qty: 30 | Refills: 0 | Status: DISCONTINUED | COMMUNITY
Start: 2021-02-19 | End: 2021-07-15

## 2021-07-15 RX ORDER — OXYCODONE AND ACETAMINOPHEN 5; 325 MG/1; MG/1
5-325 TABLET ORAL
Qty: 21 | Refills: 0 | Status: COMPLETED | COMMUNITY
Start: 2021-07-15 | End: 2021-07-22

## 2021-07-15 RX ORDER — VALACYCLOVIR 1 G/1
1 TABLET, FILM COATED ORAL EVERY 8 HOURS
Qty: 21 | Refills: 0 | Status: COMPLETED | COMMUNITY
Start: 2021-02-12 | End: 2021-07-15

## 2021-07-15 NOTE — PHYSICAL EXAM
[No Acute Distress] : no acute distress [Well Nourished] : well nourished [Well Developed] : well developed [Well-Appearing] : well-appearing [No Respiratory Distress] : no respiratory distress  [No Accessory Muscle Use] : no accessory muscle use [Clear to Auscultation] : lungs were clear to auscultation bilaterally [Normal Rate] : normal rate  [Regular Rhythm] : with a regular rhythm [Normal S1, S2] : normal S1 and S2 [No Murmur] : no murmur heard [de-identified] : Patient wearing protective face mask

## 2021-07-15 NOTE — HEALTH RISK ASSESSMENT
[0] : 2) Feeling down, depressed, or hopeless: Not at all (0) [PHQ-2 Negative - No further assessment needed] : PHQ-2 Negative - No further assessment needed [[id number]] : 0

## 2021-10-06 ENCOUNTER — TRANSCRIPTION ENCOUNTER (OUTPATIENT)
Age: 86
End: 2021-10-06

## 2021-10-06 PROBLEM — U07.1 PNEUMONIA DUE TO COVID-19 VIRUS: Status: RESOLVED | Noted: 2020-06-04 | Resolved: 2020-11-30

## 2021-10-07 ENCOUNTER — TRANSCRIPTION ENCOUNTER (OUTPATIENT)
Age: 86
End: 2021-10-07

## 2021-10-14 ENCOUNTER — APPOINTMENT (OUTPATIENT)
Dept: INTERNAL MEDICINE | Facility: CLINIC | Age: 86
End: 2021-10-14
Payer: MEDICARE

## 2021-10-14 VITALS
WEIGHT: 147 LBS | OXYGEN SATURATION: 97 % | SYSTOLIC BLOOD PRESSURE: 183 MMHG | BODY MASS INDEX: 27.05 KG/M2 | HEIGHT: 62 IN | TEMPERATURE: 97.6 F | HEART RATE: 81 BPM | DIASTOLIC BLOOD PRESSURE: 84 MMHG

## 2021-10-14 VITALS — SYSTOLIC BLOOD PRESSURE: 160 MMHG | DIASTOLIC BLOOD PRESSURE: 70 MMHG

## 2021-10-14 PROCEDURE — 36415 COLL VENOUS BLD VENIPUNCTURE: CPT

## 2021-10-14 PROCEDURE — 99214 OFFICE O/P EST MOD 30 MIN: CPT | Mod: 25

## 2021-10-14 RX ORDER — METFORMIN HYDROCHLORIDE 500 MG/1
500 TABLET, COATED ORAL
Qty: 180 | Refills: 3 | Status: COMPLETED | COMMUNITY
End: 2021-10-13

## 2021-10-14 NOTE — ASSESSMENT
[FreeTextEntry1] : 88 y.o. woman with multiple medical comorbidities now with uncontrolled HTN and improving post herpetic neuralgia

## 2021-10-14 NOTE — PLAN
[FreeTextEntry1] : - Treatment as above\par - Continue with all other treatments \par  - Labs done in office\par - Further management pending lab results

## 2021-10-14 NOTE — PHYSICAL EXAM
[No Acute Distress] : no acute distress [Well Nourished] : well nourished [Well Developed] : well developed [Well-Appearing] : well-appearing [Normal Sclera/Conjunctiva] : normal sclera/conjunctiva [PERRL] : pupils equal round and reactive to light [EOMI] : extraocular movements intact [Normal Outer Ear/Nose] : the outer ears and nose were normal in appearance [Normal Oropharynx] : the oropharynx was normal [No JVD] : no jugular venous distention [No Lymphadenopathy] : no lymphadenopathy [Supple] : supple [Thyroid Normal, No Nodules] : the thyroid was normal and there were no nodules present [No Respiratory Distress] : no respiratory distress  [No Accessory Muscle Use] : no accessory muscle use [Clear to Auscultation] : lungs were clear to auscultation bilaterally [Normal Rate] : normal rate  [Regular Rhythm] : with a regular rhythm [Normal S1, S2] : normal S1 and S2 [No Murmur] : no murmur heard [No Carotid Bruits] : no carotid bruits [No Abdominal Bruit] : a ~M bruit was not heard ~T in the abdomen [No Varicosities] : no varicosities [Pedal Pulses Present] : the pedal pulses are present [No Edema] : there was no peripheral edema [No Palpable Aorta] : no palpable aorta [No Extremity Clubbing/Cyanosis] : no extremity clubbing/cyanosis [Soft] : abdomen soft [Non Tender] : non-tender [Non-distended] : non-distended [No Masses] : no abdominal mass palpated [No HSM] : no HSM [Normal Bowel Sounds] : normal bowel sounds [Normal Posterior Cervical Nodes] : no posterior cervical lymphadenopathy [Normal Anterior Cervical Nodes] : no anterior cervical lymphadenopathy [No CVA Tenderness] : no CVA  tenderness [No Spinal Tenderness] : no spinal tenderness [No Joint Swelling] : no joint swelling [Grossly Normal Strength/Tone] : grossly normal strength/tone [No Rash] : no rash [Coordination Grossly Intact] : coordination grossly intact [No Focal Deficits] : no focal deficits [Normal Gait] : normal gait [Deep Tendon Reflexes (DTR)] : deep tendon reflexes were 2+ and symmetric [Normal Affect] : the affect was normal [Normal Insight/Judgement] : insight and judgment were intact [Speech Grossly Normal] : speech grossly normal [Alert and Oriented x3] : oriented to person, place, and time [Normal Mood] : the mood was normal [de-identified] : Patient wearing protective face mask

## 2021-10-14 NOTE — HISTORY OF PRESENT ILLNESS
[FreeTextEntry1] : Follow up for postherpetic neuralgia and HLD [de-identified] : Patient is a 89 yo with a Hx stroke 3 Y ago (received TPA and thrombectomy),  R carotid stenosis, HTN, HLD, Diabetes who comes in for f/u after shingles breakout in February 24th, which began as a sporadic outbreak of a painful pruritic rash on the left side t7 dermatome, after taking remdesivir for COVID for 3 weeks.\par \par Was started on valacyclovir which she took for 3 weeks.\par \par Lesions resolved in 2 weeks\par The nerve pain persisted.  So she was placed on gabapentin with no relief, experienced leg edema instead.\par \par Recurrred intermittently over the last few months until she was started on amytriptyline. Has had relief for the last 3 months. Lesions re resolved.\par \par She states that she had never had a shingles vaccine prior to this episode. \par Began the series of vaccinations 3 months ago, 2 injections, the last of which was this time last month in September.\par \par Since then she has had some redness and scabs, itching, and tenderness to palpation however the sx have become less severe. \par She also endorses a "bulge" on the left part upper back at the level of the T6, which is self resolving.\par \par Denies fevers, chills, headaches, eye or ear pain, no nausea vomiting.\par \par \par \par

## 2021-11-03 ENCOUNTER — TRANSCRIPTION ENCOUNTER (OUTPATIENT)
Age: 86
End: 2021-11-03

## 2021-11-03 LAB
ALBUMIN SERPL ELPH-MCNC: 4.5 G/DL
ALP BLD-CCNC: 103 U/L
ALT SERPL-CCNC: 37 U/L
ANION GAP SERPL CALC-SCNC: 11 MMOL/L
AST SERPL-CCNC: 27 U/L
BILIRUB SERPL-MCNC: 0.5 MG/DL
BUN SERPL-MCNC: 18 MG/DL
CALCIUM SERPL-MCNC: 10.5 MG/DL
CHLORIDE SERPL-SCNC: 105 MMOL/L
CHOLEST SERPL-MCNC: 153 MG/DL
CK SERPL-CCNC: 83 U/L
CO2 SERPL-SCNC: 27 MMOL/L
CREAT SERPL-MCNC: 0.66 MG/DL
GLUCOSE SERPL-MCNC: 104 MG/DL
HDLC SERPL-MCNC: 62 MG/DL
LDLC SERPL CALC-MCNC: 81 MG/DL
NONHDLC SERPL-MCNC: 91 MG/DL
POTASSIUM SERPL-SCNC: 5 MMOL/L
PROT SERPL-MCNC: 8 G/DL
SODIUM SERPL-SCNC: 143 MMOL/L
TRIGL SERPL-MCNC: 46 MG/DL

## 2021-12-09 ENCOUNTER — NON-APPOINTMENT (OUTPATIENT)
Age: 86
End: 2021-12-09

## 2021-12-09 ENCOUNTER — APPOINTMENT (OUTPATIENT)
Dept: ENDOCRINOLOGY | Facility: CLINIC | Age: 86
End: 2021-12-09
Payer: MEDICARE

## 2021-12-09 ENCOUNTER — APPOINTMENT (OUTPATIENT)
Dept: INTERNAL MEDICINE | Facility: CLINIC | Age: 86
End: 2021-12-09
Payer: MEDICARE

## 2021-12-09 VITALS
WEIGHT: 145 LBS | DIASTOLIC BLOOD PRESSURE: 85 MMHG | SYSTOLIC BLOOD PRESSURE: 189 MMHG | BODY MASS INDEX: 26.52 KG/M2 | HEART RATE: 74 BPM | OXYGEN SATURATION: 95 %

## 2021-12-09 VITALS
TEMPERATURE: 97.8 F | SYSTOLIC BLOOD PRESSURE: 120 MMHG | DIASTOLIC BLOOD PRESSURE: 70 MMHG | WEIGHT: 145 LBS | HEIGHT: 62 IN | HEART RATE: 61 BPM | RESPIRATION RATE: 16 BRPM | BODY MASS INDEX: 26.68 KG/M2 | OXYGEN SATURATION: 93 %

## 2021-12-09 VITALS — DIASTOLIC BLOOD PRESSURE: 82 MMHG | SYSTOLIC BLOOD PRESSURE: 168 MMHG | RESPIRATION RATE: 16 BRPM

## 2021-12-09 LAB — GLUCOSE BLDC GLUCOMTR-MCNC: 66

## 2021-12-09 PROCEDURE — 93000 ELECTROCARDIOGRAM COMPLETE: CPT

## 2021-12-09 PROCEDURE — G0439: CPT

## 2021-12-09 PROCEDURE — 82962 GLUCOSE BLOOD TEST: CPT

## 2021-12-09 PROCEDURE — 99214 OFFICE O/P EST MOD 30 MIN: CPT | Mod: 25

## 2021-12-09 RX ORDER — GABAPENTIN 300 MG/1
300 CAPSULE ORAL
Qty: 90 | Refills: 3 | Status: COMPLETED | COMMUNITY
Start: 2021-02-26 | End: 2021-12-08

## 2021-12-09 NOTE — HISTORY OF PRESENT ILLNESS
[Family Member] : family member [FreeTextEntry1] : Patient presents for CPE and follow up for chronic medical conditions.  [de-identified] : Patient presents for CPE and follow up for chronic medical conditions. Patient presents for follow up for her chronic medical conditions. She reports compliance with all prescribed medical therapy and dietary restrictions.

## 2021-12-09 NOTE — PHYSICAL EXAM
[No Acute Distress] : no acute distress [Well Nourished] : well nourished [Well Developed] : well developed [Well-Appearing] : well-appearing [Normal Sclera/Conjunctiva] : normal sclera/conjunctiva [PERRL] : pupils equal round and reactive to light [EOMI] : extraocular movements intact [Normal Outer Ear/Nose] : the outer ears and nose were normal in appearance [Normal Oropharynx] : the oropharynx was normal [No JVD] : no jugular venous distention [No Lymphadenopathy] : no lymphadenopathy [Supple] : supple [Thyroid Normal, No Nodules] : the thyroid was normal and there were no nodules present [No Respiratory Distress] : no respiratory distress  [No Accessory Muscle Use] : no accessory muscle use [Clear to Auscultation] : lungs were clear to auscultation bilaterally [Normal Rate] : normal rate  [Regular Rhythm] : with a regular rhythm [Normal S1, S2] : normal S1 and S2 [No Murmur] : no murmur heard [No Carotid Bruits] : no carotid bruits [No Abdominal Bruit] : a ~M bruit was not heard ~T in the abdomen [No Varicosities] : no varicosities [No Palpable Aorta] : no palpable aorta [No Extremity Clubbing/Cyanosis] : no extremity clubbing/cyanosis [Soft] : abdomen soft [Non Tender] : non-tender [Non-distended] : non-distended [No Masses] : no abdominal mass palpated [No HSM] : no HSM [Normal Bowel Sounds] : normal bowel sounds [Normal Posterior Cervical Nodes] : no posterior cervical lymphadenopathy [Normal Anterior Cervical Nodes] : no anterior cervical lymphadenopathy [No CVA Tenderness] : no CVA  tenderness [No Spinal Tenderness] : no spinal tenderness [No Joint Swelling] : no joint swelling [Grossly Normal Strength/Tone] : grossly normal strength/tone [No Rash] : no rash [Coordination Grossly Intact] : coordination grossly intact [No Focal Deficits] : no focal deficits [Normal Gait] : normal gait [Deep Tendon Reflexes (DTR)] : deep tendon reflexes were 2+ and symmetric [Speech Grossly Normal] : speech grossly normal [Normal Affect] : the affect was normal [Alert and Oriented x3] : oriented to person, place, and time [Normal Mood] : the mood was normal [Normal Insight/Judgement] : insight and judgment were intact [de-identified] : Patient wearing protective face mask  [de-identified] : 2+ b/l LE pitting edema

## 2021-12-09 NOTE — ASSESSMENT
[FreeTextEntry1] : - decr  Tresiba 17 un HS\par - Janumet 50/500 mg bid\par - advised to lower Tresiba further if any hypo's\par - continue lipitor 80 mg HS\par - losartan, hydralazine\par - screening DXA\par RTC 3 -4months

## 2021-12-09 NOTE — HISTORY OF PRESENT ILLNESS
[FreeTextEntry1] : F/u for diabetes management\par \par *** Dec 09, 2021 ***\par \par feels well, no new c/o\par taking Tresiba 20 u HS,  Janumet 50/500 mg bid, lipitor 80 mg HS, cozaar 100 mg, hydralazine 50 mg tid\par reports fbs- 90's-110's, ppg- 120's\par denies hypo's\par today fbs - 66 (did not eat since last night)\par \par *** Jun 30, 2021 ***\par \par feels well, no new c/o \par 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 \par \par Ms. BOYCE was diagnosed with Diabetes Mellitus Type 2 more than 30 years ago. She reports history HTN, dyslipidemia, CVA and denies CAD,  known complications of retinopathy, nephropathy, or neuropathy. She is presently on metformin 500 mg bid, Tresiba 20 un (increased recently because of her shingles). \par Blood sugars are checked once a day. \par Did not bring log book, but reported are typically as following: fbs (prior to shingles) 90's-low 100's, recently in 140's- 170's, ppg- not checking\par Hypoglycemia frequency: none\par Fingerstick glucose in the office today is 293 mg/dL 2 hours after eating. \par Diet: not following ADA\par Exercise: none\par \par Lab review: a1c- 6.6 <-- 8.4\par \par Last dilated eye - \par Last podiatry visit  - none\par Last cardiology evaluation - 1/21\par Last stress test - 2020\par Last 2-D Echo - 2020\par Last nephrology evaluation - none\par Last neurology evaluation-none\par

## 2021-12-09 NOTE — HEALTH RISK ASSESSMENT
[No] : In the past 12 months have you used drugs other than those required for medical reasons? No [Two or more falls in past year] : Patient reported two or more falls in the past year [0] : 2) Feeling down, depressed, or hopeless: Not at all (0) [PHQ-2 Negative - No further assessment needed] : PHQ-2 Negative - No further assessment needed [HIV Test offered] : HIV Test offered [Hepatitis C test offered] : Hepatitis C test offered [None] : None [With Family] : lives with family [# of Members in Household ___] :  household currently consist of [unfilled] member(s) [Employed] : employed [College] : College [Single] : single [] :  [# Of Children ___] : has [unfilled] children [Feels Safe at Home] : Feels safe at home [Independent] : using telephone [Some assistance needed] : managing medications [Full assistance needed] : managing finances [Reports changes in dental health] : Reports changes in dental health [Seat Belt] :  uses seat belt [Patient/Caregiver unclear of wishes] : , patient/caregiver unclear of wishes [Name: ___] : Health Care Proxy's Name: [unfilled]  [Relationship: ___] : Relationship: [unfilled] [I will adhere to the patient's wishes.] : I will adhere to the patient's wishes. [Time Spent: ___ minutes] : Time Spent: [unfilled] minutes [] : No [de-identified] : Cardiology [de-identified] : Walking [de-identified] : Carbohydrate restricted diet.  [[id number]] : 0 [Sexually Active] : not sexually active [Reports changes in hearing] : Reports no changes in hearing [Reports changes in vision] : Reports no changes in vision [Smoke Detector] : no smoke detector [Carbon Monoxide Detector] : no carbon monoxide detector [Guns at Home] : no guns at home [de-identified] : Patient wears corrective lens [AdvancecareDate] : 12/21

## 2021-12-09 NOTE — PLAN
[FreeTextEntry1] : - Treatment as above\par - Short interval follow up with cardiology\par  - Labs done in office\par - Further management pending lab results

## 2022-01-10 ENCOUNTER — NON-APPOINTMENT (OUTPATIENT)
Age: 87
End: 2022-01-10

## 2022-01-10 LAB
25(OH)D3 SERPL-MCNC: 36.7 NG/ML
ALBUMIN SERPL ELPH-MCNC: 4.1 G/DL
ALP BLD-CCNC: 87 U/L
ALT SERPL-CCNC: 23 U/L
ANION GAP SERPL CALC-SCNC: 13 MMOL/L
AST SERPL-CCNC: 24 U/L
BASOPHILS # BLD AUTO: 0.03 K/UL
BASOPHILS NFR BLD AUTO: 0.4 %
BILIRUB SERPL-MCNC: 0.5 MG/DL
BUN SERPL-MCNC: 12 MG/DL
CALCIUM SERPL-MCNC: 9.8 MG/DL
CHLORIDE SERPL-SCNC: 102 MMOL/L
CHOLEST SERPL-MCNC: 135 MG/DL
CO2 SERPL-SCNC: 24 MMOL/L
CREAT SERPL-MCNC: 0.75 MG/DL
CREAT SPEC-SCNC: 37 MG/DL
EOSINOPHIL # BLD AUTO: 0.08 K/UL
EOSINOPHIL NFR BLD AUTO: 1.1 %
ESTIMATED AVERAGE GLUCOSE: 140 MG/DL
GLUCOSE SERPL-MCNC: 97 MG/DL
HBA1C MFR BLD HPLC: 6.5 %
HCT VFR BLD CALC: 49 %
HCV AB SER QL: NONREACTIVE
HCV S/CO RATIO: 0.19 S/CO
HDLC SERPL-MCNC: 55 MG/DL
HGB BLD-MCNC: 15.3 G/DL
HIV1+2 AB SPEC QL IA.RAPID: NONREACTIVE
IMM GRANULOCYTES NFR BLD AUTO: 0.3 %
LDLC SERPL CALC-MCNC: 71 MG/DL
LYMPHOCYTES # BLD AUTO: 2.5 K/UL
LYMPHOCYTES NFR BLD AUTO: 35.8 %
MAN DIFF?: NORMAL
MCHC RBC-ENTMCNC: 24.8 PG
MCHC RBC-ENTMCNC: 31.2 GM/DL
MCV RBC AUTO: 79.3 FL
METHYLMALONATE SERPL-SCNC: 196 NMOL/L
MICROALBUMIN 24H UR DL<=1MG/L-MCNC: <1.2 MG/DL
MICROALBUMIN/CREAT 24H UR-RTO: NORMAL MG/G
MONOCYTES # BLD AUTO: 0.96 K/UL
MONOCYTES NFR BLD AUTO: 13.8 %
NEUTROPHILS # BLD AUTO: 3.39 K/UL
NEUTROPHILS NFR BLD AUTO: 48.6 %
NONHDLC SERPL-MCNC: 79 MG/DL
PLATELET # BLD AUTO: 157 K/UL
POTASSIUM SERPL-SCNC: 5.3 MMOL/L
PROT SERPL-MCNC: 7.5 G/DL
RBC # BLD: 6.18 M/UL
RBC # FLD: 18.6 %
SODIUM SERPL-SCNC: 139 MMOL/L
TRIGL SERPL-MCNC: 39 MG/DL
TSH SERPL-ACNC: 1.98 UIU/ML
VIT B12 SERPL-MCNC: >2000 PG/ML
WBC # FLD AUTO: 6.98 K/UL

## 2022-01-24 ENCOUNTER — APPOINTMENT (OUTPATIENT)
Dept: INTERNAL MEDICINE | Facility: CLINIC | Age: 87
End: 2022-01-24
Payer: MEDICARE

## 2022-01-24 VITALS
HEART RATE: 78 BPM | OXYGEN SATURATION: 97 % | DIASTOLIC BLOOD PRESSURE: 80 MMHG | TEMPERATURE: 97.8 F | SYSTOLIC BLOOD PRESSURE: 181 MMHG | BODY MASS INDEX: 27.23 KG/M2 | WEIGHT: 148 LBS | HEIGHT: 62 IN

## 2022-01-24 VITALS — RESPIRATION RATE: 16 BRPM | DIASTOLIC BLOOD PRESSURE: 86 MMHG | SYSTOLIC BLOOD PRESSURE: 144 MMHG

## 2022-01-24 PROCEDURE — 99213 OFFICE O/P EST LOW 20 MIN: CPT

## 2022-01-24 NOTE — PHYSICAL EXAM
[No Acute Distress] : no acute distress [Well Nourished] : well nourished [Well Developed] : well developed [Well-Appearing] : well-appearing [No Respiratory Distress] : no respiratory distress  [No Accessory Muscle Use] : no accessory muscle use [Clear to Auscultation] : lungs were clear to auscultation bilaterally [Normal Rate] : normal rate  [Regular Rhythm] : with a regular rhythm [Normal S1, S2] : normal S1 and S2 [No Murmur] : no murmur heard [Speech Grossly Normal] : speech grossly normal [Memory Grossly Normal] : memory grossly normal [Normal Affect] : the affect was normal [Alert and Oriented x3] : oriented to person, place, and time [Normal Mood] : the mood was normal [Normal Insight/Judgement] : insight and judgment were intact [de-identified] : Patient wearing protective face mask  [de-identified] : 1+ b/l LE pitting edema

## 2022-01-24 NOTE — HISTORY OF PRESENT ILLNESS
[Family Member] : family member [FreeTextEntry1] : Follow up for HTN  [de-identified] : Patient presents for follow up for her chronic medical conditions. She reports compliance with all prescribed medical therapy and dietary restrictions. Average SBP are in the 140s at home. \par DTR reports that patient had normal TTE with cardiologist last month.

## 2022-03-03 ENCOUNTER — NON-APPOINTMENT (OUTPATIENT)
Age: 87
End: 2022-03-03

## 2022-03-21 NOTE — PROGRESS NOTE ADULT - ASSESSMENT
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

## 2022-03-31 ENCOUNTER — APPOINTMENT (OUTPATIENT)
Dept: INTERNAL MEDICINE | Facility: CLINIC | Age: 87
End: 2022-03-31
Payer: MEDICARE

## 2022-03-31 VITALS
HEART RATE: 77 BPM | SYSTOLIC BLOOD PRESSURE: 125 MMHG | BODY MASS INDEX: 26.31 KG/M2 | TEMPERATURE: 97.8 F | WEIGHT: 143 LBS | OXYGEN SATURATION: 96 % | HEIGHT: 62 IN | DIASTOLIC BLOOD PRESSURE: 69 MMHG

## 2022-03-31 PROCEDURE — 99214 OFFICE O/P EST MOD 30 MIN: CPT | Mod: 25

## 2022-03-31 NOTE — HISTORY OF PRESENT ILLNESS
[Family Member] : family member [FreeTextEntry1] : Follow up for HTN  [de-identified] : Patient presents for follow up for her chronic medical conditions. She reports compliance with all prescribed medical therapy and dietary restrictions. She was started on clonidine 0.1 mg 1 week ago. Patient also has ongoing GUO and has a stressed test schedule for next week. Home BP are in the 120s/60s.

## 2022-03-31 NOTE — PHYSICAL EXAM
[No Acute Distress] : no acute distress [Well Nourished] : well nourished [Well Developed] : well developed [Well-Appearing] : well-appearing [No Respiratory Distress] : no respiratory distress  [No Accessory Muscle Use] : no accessory muscle use [Clear to Auscultation] : lungs were clear to auscultation bilaterally [Normal Rate] : normal rate  [Regular Rhythm] : with a regular rhythm [Normal S1, S2] : normal S1 and S2 [No Murmur] : no murmur heard [Speech Grossly Normal] : speech grossly normal [Memory Grossly Normal] : memory grossly normal [Normal Affect] : the affect was normal [Alert and Oriented x3] : oriented to person, place, and time [Normal Mood] : the mood was normal [Normal Insight/Judgement] : insight and judgment were intact [Comprehensive Foot Exam Normal] : Right and left foot were examined and both feet are normal. No ulcers in either foot. Toes are normal and with full ROM.  Normal tactile sensation with monofilament testing throughout both feet [de-identified] : Patient wearing protective face mask  [de-identified] : Trace b/l LE pitting edema

## 2022-04-13 ENCOUNTER — APPOINTMENT (OUTPATIENT)
Dept: ENDOCRINOLOGY | Facility: CLINIC | Age: 87
End: 2022-04-13
Payer: MEDICARE

## 2022-04-13 VITALS
TEMPERATURE: 97.7 F | SYSTOLIC BLOOD PRESSURE: 150 MMHG | HEIGHT: 62 IN | BODY MASS INDEX: 26.31 KG/M2 | WEIGHT: 143 LBS | OXYGEN SATURATION: 97 % | DIASTOLIC BLOOD PRESSURE: 80 MMHG | HEART RATE: 83 BPM

## 2022-04-13 PROCEDURE — 99214 OFFICE O/P EST MOD 30 MIN: CPT

## 2022-04-13 NOTE — ASSESSMENT
[FreeTextEntry1] : - cont Tresiba 8 un HS\par - Janumet 50/500 mg bid\par - advised to lower Tresiba further if any hypo's\par - continue lipitor 80 mg HS\par - losartan, hydralazine\par - screening DXA\par RTC 3 -4 months [Diabetes Foot Care] : diabetes foot care [Long Term Vascular Complications] : long term vascular complications of diabetes [Carbohydrate Consistent Diet] : carbohydrate consistent diet [Importance of Diet and Exercise] : importance of diet and exercise to improve glycemic control, achieve weight loss and improve cardiovascular health [Exercise/Effect on Glucose] : exercise/effect on glucose [Hypoglycemia Management] : hypoglycemia management [Glucagon Use] : glucagon use [Action and use of Insulin] : action and use of short and long-acting insulin [Self Monitoring of Blood Glucose] : self monitoring of blood glucose [Injection Technique, Storage, Sharps Disposal] : injection technique, storage, and sharps disposal [Retinopathy Screening] : Patient was referred to ophthalmology for retinopathy screening [Diabetic Medications] : Risks and benefits of diabetic medications were discussed

## 2022-04-13 NOTE — HISTORY OF PRESENT ILLNESS
[FreeTextEntry1] : F/u for diabetes management\par \par *** Apr 13, 2022 ***\par \par feels great, no new c/o\par taking  Tresiba 8 un HS,  Janumet 50/500 mg bid, lipitor 80 mg HS, cozaar 100 mg, hydralazine 50 mg tid\par reports fbs- 80's- 90's, not checking ppg\par denies hypo's\par \par *** Dec 09, 2021 ***\par \par feels well, no new c/o\par taking Tresiba 20 u HS,  Janumet 50/500 mg bid, lipitor 80 mg HS, cozaar 100 mg, hydralazine 50 mg tid\par reports fbs- 90's-110's, ppg- 120's\par denies hypo's\par today fbs - 66 (did not eat since last night)\par \par *** Jun 30, 2021 ***\par \par feels well, no new c/o \par 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 \par \par Ms. OBYCE was diagnosed with Diabetes Mellitus Type 2 more than 30 years ago. She reports history HTN, dyslipidemia, CVA and denies CAD,  known complications of retinopathy, nephropathy, or neuropathy. She is presently on metformin 500 mg bid, Tresiba 20 un (increased recently because of her shingles). \par Blood sugars are checked once a day. \par Did not bring log book, but reported are typically as following: fbs (prior to shingles) 90's-low 100's, recently in 140's- 170's, ppg- not checking\par Hypoglycemia frequency: none\par Fingerstick glucose in the office today is 293 mg/dL 2 hours after eating. \par Diet: not following ADA\par Exercise: none\par \par Lab review: a1c- 6.6 <-- 8.4\par \par Last dilated eye - 6/21\par Last podiatry visit  - none\par Last cardiology evaluation - 1/21\par Last stress test - 2020\par Last 2-D Echo - 2020\par Last nephrology evaluation - none\par Last neurology evaluation-none\par

## 2022-05-10 LAB
ALBUMIN SERPL ELPH-MCNC: 4.5 G/DL
ALP BLD-CCNC: 73 U/L
ALT SERPL-CCNC: 38 U/L
ANION GAP SERPL CALC-SCNC: 12 MMOL/L
AST SERPL-CCNC: 33 U/L
BASOPHILS # BLD AUTO: 0.03 K/UL
BASOPHILS NFR BLD AUTO: 0.5 %
BILIRUB SERPL-MCNC: 0.6 MG/DL
BUN SERPL-MCNC: 15 MG/DL
CALCIUM SERPL-MCNC: 10.3 MG/DL
CHLORIDE SERPL-SCNC: 104 MMOL/L
CHOLEST SERPL-MCNC: 138 MG/DL
CO2 SERPL-SCNC: 25 MMOL/L
CREAT SERPL-MCNC: 0.73 MG/DL
EGFR: 79 ML/MIN/1.73M2
EOSINOPHIL # BLD AUTO: 0.07 K/UL
EOSINOPHIL NFR BLD AUTO: 1.1 %
ESTIMATED AVERAGE GLUCOSE: 126 MG/DL
GLUCOSE SERPL-MCNC: 104 MG/DL
HBA1C MFR BLD HPLC: 6 %
HCT VFR BLD CALC: 48.7 %
HDLC SERPL-MCNC: 55 MG/DL
HGB BLD-MCNC: 15.1 G/DL
IMM GRANULOCYTES NFR BLD AUTO: 0.5 %
LDLC SERPL CALC-MCNC: 74 MG/DL
LYMPHOCYTES # BLD AUTO: 2.44 K/UL
LYMPHOCYTES NFR BLD AUTO: 37.9 %
MAN DIFF?: NORMAL
MCHC RBC-ENTMCNC: 25 PG
MCHC RBC-ENTMCNC: 31 GM/DL
MCV RBC AUTO: 80.8 FL
MONOCYTES # BLD AUTO: 0.91 K/UL
MONOCYTES NFR BLD AUTO: 14.1 %
NEUTROPHILS # BLD AUTO: 2.96 K/UL
NEUTROPHILS NFR BLD AUTO: 45.9 %
NONHDLC SERPL-MCNC: 83 MG/DL
PLATELET # BLD AUTO: 221 K/UL
POTASSIUM SERPL-SCNC: 5.4 MMOL/L
PROT SERPL-MCNC: 8 G/DL
RBC # BLD: 6.03 M/UL
RBC # FLD: 18.1 %
SODIUM SERPL-SCNC: 142 MMOL/L
TRIGL SERPL-MCNC: 45 MG/DL
WBC # FLD AUTO: 6.44 K/UL

## 2022-06-07 LAB
ANION GAP SERPL CALC-SCNC: 11 MMOL/L
BUN SERPL-MCNC: 17 MG/DL
CALCIUM SERPL-MCNC: 10.3 MG/DL
CHLORIDE SERPL-SCNC: 103 MMOL/L
CO2 SERPL-SCNC: 26 MMOL/L
CREAT SERPL-MCNC: 0.62 MG/DL
EGFR: 85 ML/MIN/1.73M2
GLUCOSE SERPL-MCNC: 95 MG/DL
POTASSIUM SERPL-SCNC: 5.5 MMOL/L
SODIUM SERPL-SCNC: 140 MMOL/L

## 2022-06-08 ENCOUNTER — NON-APPOINTMENT (OUTPATIENT)
Age: 87
End: 2022-06-08

## 2022-06-10 ENCOUNTER — APPOINTMENT (OUTPATIENT)
Dept: INTERNAL MEDICINE | Facility: CLINIC | Age: 87
End: 2022-06-10
Payer: MEDICARE

## 2022-06-10 VITALS
DIASTOLIC BLOOD PRESSURE: 79 MMHG | BODY MASS INDEX: 25.76 KG/M2 | OXYGEN SATURATION: 96 % | TEMPERATURE: 97.8 F | HEART RATE: 78 BPM | HEIGHT: 62 IN | SYSTOLIC BLOOD PRESSURE: 163 MMHG | WEIGHT: 140 LBS

## 2022-06-10 VITALS — DIASTOLIC BLOOD PRESSURE: 80 MMHG | SYSTOLIC BLOOD PRESSURE: 160 MMHG

## 2022-06-10 PROCEDURE — 99214 OFFICE O/P EST MOD 30 MIN: CPT

## 2022-06-10 RX ORDER — LACTULOSE 10 G/15ML
20 SOLUTION ORAL
Qty: 600 | Refills: 3 | Status: ACTIVE | COMMUNITY
Start: 2022-06-10 | End: 1900-01-01

## 2022-06-10 NOTE — PHYSICAL EXAM
[Normal Sclera/Conjunctiva] : normal sclera/conjunctiva [Normal Outer Ear/Nose] : the outer ears and nose were normal in appearance [No JVD] : no jugular venous distention [No Lymphadenopathy] : no lymphadenopathy [Supple] : supple [No Accessory Muscle Use] : no accessory muscle use [Clear to Auscultation] : lungs were clear to auscultation bilaterally [Normal Rate] : normal rate  [Regular Rhythm] : with a regular rhythm [Normal S1, S2] : normal S1 and S2 [No Edema] : there was no peripheral edema [Soft] : abdomen soft [Non Tender] : non-tender [Non-distended] : non-distended [No HSM] : no HSM [Normal Bowel Sounds] : normal bowel sounds [Normal] : affect was normal and insight and judgment were intact

## 2022-06-10 NOTE — HISTORY OF PRESENT ILLNESS
[FreeTextEntry1] : follow up  on med problems  she feels good  her a1c has been  good on good diet. no  side effects from meds  no cardiopul symptoms  she has a loot  of constipation. fiber has not been tolerated but uses mirilax stikk often goes  1 week with no bowel movement  she does not do enough walking  had shingles  but still with  some pain in the upper abd area that was involved  unclear if she is taking her bp  meds reliable acc to her daughter

## 2022-06-28 ENCOUNTER — APPOINTMENT (OUTPATIENT)
Dept: INTERNAL MEDICINE | Facility: CLINIC | Age: 87
End: 2022-06-28

## 2022-08-01 ENCOUNTER — LABORATORY RESULT (OUTPATIENT)
Age: 87
End: 2022-08-01

## 2022-08-01 ENCOUNTER — APPOINTMENT (OUTPATIENT)
Dept: INTERNAL MEDICINE | Facility: CLINIC | Age: 87
End: 2022-08-01

## 2022-08-01 VITALS — SYSTOLIC BLOOD PRESSURE: 120 MMHG | DIASTOLIC BLOOD PRESSURE: 60 MMHG

## 2022-08-01 VITALS
BODY MASS INDEX: 25.76 KG/M2 | HEIGHT: 62 IN | DIASTOLIC BLOOD PRESSURE: 68 MMHG | HEART RATE: 72 BPM | OXYGEN SATURATION: 95 % | TEMPERATURE: 97.6 F | SYSTOLIC BLOOD PRESSURE: 150 MMHG | WEIGHT: 140 LBS

## 2022-08-01 DIAGNOSIS — B02.29 OTHER POSTHERPETIC NERVOUS SYSTEM INVOLVEMENT: ICD-10-CM

## 2022-08-01 PROCEDURE — 99213 OFFICE O/P EST LOW 20 MIN: CPT

## 2022-08-01 NOTE — HISTORY OF PRESENT ILLNESS
[Other: _____] : [unfilled] [FreeTextEntry1] : F/u [de-identified] : Ms. JERED BOYCE 89 year  female  with a PMH DM2, hyperlipidemia, HTN, postherpetic neuralgia present to the office to a f/u. Patient is accompany by her daughter Kristyn. Wants to do a blood test. Patient feels good, denies complaints at present time. Patient supposed to do a  R eye cataract surgery on 08/04/22, but surgery was cancelled. \par

## 2022-08-01 NOTE — PLAN
[FreeTextEntry1] : F/u exam is performed. Recommend  to do a blood test. Further management will depend on the blood test result\par Continue current meds at present time.\par RTC to f/u in 2 wks. Patient is verbalized understanding.\par

## 2022-08-16 ENCOUNTER — APPOINTMENT (OUTPATIENT)
Dept: INTERNAL MEDICINE | Facility: CLINIC | Age: 87
End: 2022-08-16

## 2022-08-16 LAB
25(OH)D3 SERPL-MCNC: 56.8 NG/ML
ALBUMIN SERPL ELPH-MCNC: 4.5 G/DL
ALP BLD-CCNC: 82 U/L
ALT SERPL-CCNC: 25 U/L
ANION GAP SERPL CALC-SCNC: 12 MMOL/L
APPEARANCE: CLEAR
AST SERPL-CCNC: 28 U/L
BASOPHILS # BLD AUTO: 0.03 K/UL
BASOPHILS NFR BLD AUTO: 0.4 %
BILIRUB SERPL-MCNC: 0.5 MG/DL
BILIRUBIN URINE: NEGATIVE
BLOOD URINE: NEGATIVE
BUN SERPL-MCNC: 18 MG/DL
CALCIUM SERPL-MCNC: 9.6 MG/DL
CHLORIDE SERPL-SCNC: 104 MMOL/L
CHOLEST SERPL-MCNC: 137 MG/DL
CO2 SERPL-SCNC: 24 MMOL/L
COLOR: YELLOW
CREAT SERPL-MCNC: 0.65 MG/DL
EGFR: 84 ML/MIN/1.73M2
EOSINOPHIL # BLD AUTO: 0.07 K/UL
EOSINOPHIL NFR BLD AUTO: 1 %
ESTIMATED AVERAGE GLUCOSE: 126 MG/DL
GLUCOSE QUALITATIVE U: NEGATIVE
GLUCOSE SERPL-MCNC: 124 MG/DL
HBA1C MFR BLD HPLC: 6 %
HCT VFR BLD CALC: 46.9 %
HDLC SERPL-MCNC: 54 MG/DL
HGB BLD-MCNC: 14.7 G/DL
IMM GRANULOCYTES NFR BLD AUTO: 0.1 %
IRON SATN MFR SERPL: 23 %
IRON SERPL-MCNC: 60 UG/DL
KETONES URINE: NEGATIVE
LDLC SERPL CALC-MCNC: 75 MG/DL
LEUKOCYTE ESTERASE URINE: ABNORMAL
LYMPHOCYTES # BLD AUTO: 2.51 K/UL
LYMPHOCYTES NFR BLD AUTO: 36.4 %
MAN DIFF?: NORMAL
MCHC RBC-ENTMCNC: 25 PG
MCHC RBC-ENTMCNC: 31.3 GM/DL
MCV RBC AUTO: 79.8 FL
MONOCYTES # BLD AUTO: 0.98 K/UL
MONOCYTES NFR BLD AUTO: 14.2 %
NEUTROPHILS # BLD AUTO: 3.29 K/UL
NEUTROPHILS NFR BLD AUTO: 47.9 %
NITRITE URINE: NEGATIVE
NONHDLC SERPL-MCNC: 83 MG/DL
PH URINE: 6.5
PLATELET # BLD AUTO: 167 K/UL
POTASSIUM SERPL-SCNC: 4.5 MMOL/L
PROT SERPL-MCNC: 7.7 G/DL
PROTEIN URINE: NORMAL
RBC # BLD: 5.88 M/UL
RBC # FLD: 18.2 %
SODIUM SERPL-SCNC: 140 MMOL/L
SPECIFIC GRAVITY URINE: 1.02
TIBC SERPL-MCNC: 259 UG/DL
TRIGL SERPL-MCNC: 44 MG/DL
TSH SERPL-ACNC: 0.73 UIU/ML
UIBC SERPL-MCNC: 199 UG/DL
UROBILINOGEN URINE: NORMAL
WBC # FLD AUTO: 6.89 K/UL

## 2022-08-16 PROCEDURE — 99213 OFFICE O/P EST LOW 20 MIN: CPT | Mod: 95

## 2022-08-16 NOTE — HISTORY OF PRESENT ILLNESS
[Home] : at home, [unfilled] , at the time of the visit. [Medical Office: ([facility])___] : at the medical office located in  [Verbal consent obtained from patient] : the patient, [unfilled] [Other: _____] : [unfilled] [FreeTextEntry3] : Kristyn daughter [FreeTextEntry1] : Lab results [de-identified] : Phone call was made to the patient via a video call to discuss lab results from. Verbal consent was obtained prior. Spoke with patient's daughter Kristyn, she is a health care proxy.  As per patient  she feels good, denies complaints at present time\par

## 2022-08-16 NOTE — PLAN
[FreeTextEntry1] : Result of the blood test discussed with the patient and patient's daughter in detail. \par Patient diabetis is well controlled, HbA1C is 6.0, recommend  to continue present meds. Rx was renewed\par Hyperlipidemia is well controlled. As per patient's daughter already for 1 year she cut the tablet in half, takes only 40mg qhs instead of 80mg. Will continue with 40mg qhs. New rx will be issued\par TSH, vit D is NL\par U/a is abnormal, urine culture is negative, patient is assymptomatic, no treatment, recommend  hydration. \par Continue present meds for a HTN atenolol, hydralazine, losartan, clonidine\par Recommend  to f/u with me in 3 mo. Patient and patient's daughter is verbalized understanding.\par

## 2022-09-06 ENCOUNTER — APPOINTMENT (OUTPATIENT)
Dept: ENDOCRINOLOGY | Facility: CLINIC | Age: 87
End: 2022-09-06

## 2022-09-06 VITALS
OXYGEN SATURATION: 96 % | BODY MASS INDEX: 24.48 KG/M2 | HEIGHT: 62 IN | RESPIRATION RATE: 16 BRPM | WEIGHT: 133 LBS | TEMPERATURE: 98 F | SYSTOLIC BLOOD PRESSURE: 136 MMHG | HEART RATE: 77 BPM | DIASTOLIC BLOOD PRESSURE: 60 MMHG

## 2022-09-06 LAB — GLUCOSE BLDC GLUCOMTR-MCNC: 173

## 2022-09-06 PROCEDURE — 82962 GLUCOSE BLOOD TEST: CPT

## 2022-09-06 PROCEDURE — 99214 OFFICE O/P EST MOD 30 MIN: CPT | Mod: 25

## 2022-09-06 PROCEDURE — 36415 COLL VENOUS BLD VENIPUNCTURE: CPT

## 2022-09-06 NOTE — HISTORY OF PRESENT ILLNESS
[FreeTextEntry1] : F/u for diabetes management\par \par *** Sep 06, 2022 ***\par \par doing well, denies new c/o\par taking  Tresiba 8 un HS,  Janumet 50/500 mg bid, lipitor 80 mg HS, cozaar 100 mg, hydralazine 50 mg tid, OTC vitamin D3\par reports fbs- 82- 90\par no hypo's\par did not do DXA yet\par \par *** Apr 13, 2022 ***\par \par feels great, no new c/o\par taking  Tresiba 8 un HS,  Janumet 50/500 mg bid, lipitor 80 mg HS, cozaar 100 mg, hydralazine 50 mg tid\par reports fbs- 80's- 90's, not checking ppg\par denies hypo's\par \par *** Dec 09, 2021 ***\par \par feels well, no new c/o\par taking Tresiba 20 u HS,  Janumet 50/500 mg bid, lipitor 80 mg HS, cozaar 100 mg, hydralazine 50 mg tid\par reports fbs- 90's-110's, ppg- 120's\par denies hypo's\par today fbs - 66 (did not eat since last night)\par \par *** Jun 30, 2021 ***\par \par feels well, no new c/o \par 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 \par \par Ms. BOYCE was diagnosed with Diabetes Mellitus Type 2 more than 30 years ago. She reports history HTN, dyslipidemia, CVA and denies CAD,  known complications of retinopathy, nephropathy, or neuropathy. She is presently on metformin 500 mg bid, Tresiba 20 un (increased recently because of her shingles). \par Blood sugars are checked once a day. \par Did not bring log book, but reported are typically as following: fbs (prior to shingles) 90's-low 100's, recently in 140's- 170's, ppg- not checking\par Hypoglycemia frequency: none\par Fingerstick glucose in the office today is 293 mg/dL 2 hours after eating. \par Diet: not following ADA\par Exercise: none\par \par Lab review: a1c- 6.6 <-- 8.4\par \par Last dilated eye - 6/21\par Last podiatry visit  - none\par Last cardiology evaluation - 1/21\par Last stress test - 2020\par Last 2-D Echo - 2020\par Last nephrology evaluation - none\par Last neurology evaluation-none\par

## 2022-09-06 NOTE — ASSESSMENT
[Diabetes Foot Care] : diabetes foot care [Long Term Vascular Complications] : long term vascular complications of diabetes [Carbohydrate Consistent Diet] : carbohydrate consistent diet [Importance of Diet and Exercise] : importance of diet and exercise to improve glycemic control, achieve weight loss and improve cardiovascular health [Exercise/Effect on Glucose] : exercise/effect on glucose [Hypoglycemia Management] : hypoglycemia management [Glucagon Use] : glucagon use [Action and use of Insulin] : action and use of short and long-acting insulin [Self Monitoring of Blood Glucose] : self monitoring of blood glucose [Injection Technique, Storage, Sharps Disposal] : injection technique, storage, and sharps disposal [Retinopathy Screening] : Patient was referred to ophthalmology for retinopathy screening [Diabetic Medications] : Risks and benefits of diabetic medications were discussed [FreeTextEntry1] : - cont Tresiba 8 un HS\par - Janumet 50/500 mg bid\par - advised to lower Tresiba if any hypo's\par - continue lipitor 80 mg HS\par - losartan, hydralazine\par - screening DXA\par RTC 3 -4 months

## 2022-09-08 LAB
25(OH)D3 SERPL-MCNC: 51 NG/ML
ALBUMIN SERPL ELPH-MCNC: 4.3 G/DL
ALP BLD-CCNC: 83 U/L
ALT SERPL-CCNC: 21 U/L
ANION GAP SERPL CALC-SCNC: 17 MMOL/L
AST SERPL-CCNC: 23 U/L
BILIRUB SERPL-MCNC: 0.4 MG/DL
BUN SERPL-MCNC: 15 MG/DL
CALCIUM SERPL-MCNC: 10.4 MG/DL
CHLORIDE SERPL-SCNC: 107 MMOL/L
CHOLEST SERPL-MCNC: 130 MG/DL
CO2 SERPL-SCNC: 21 MMOL/L
CREAT SERPL-MCNC: 0.73 MG/DL
CREAT SPEC-SCNC: 60 MG/DL
EGFR: 79 ML/MIN/1.73M2
ESTIMATED AVERAGE GLUCOSE: 126 MG/DL
FOLATE SERPL-MCNC: 14 NG/ML
FRUCTOSAMINE SERPL-MCNC: 327 UMOL/L
GLUCOSE BLDC GLUCOMTR-MCNC: 88
GLUCOSE SERPL-MCNC: 147 MG/DL
HBA1C MFR BLD HPLC: 6 %
HDLC SERPL-MCNC: 52 MG/DL
LDLC SERPL CALC-MCNC: 66 MG/DL
MICROALBUMIN 24H UR DL<=1MG/L-MCNC: <1.2 MG/DL
MICROALBUMIN/CREAT 24H UR-RTO: NORMAL MG/G
NONHDLC SERPL-MCNC: 77 MG/DL
POTASSIUM SERPL-SCNC: 5.5 MMOL/L
POTASSIUM SERPL-SCNC: 6 MMOL/L
PROT SERPL-MCNC: 7.7 G/DL
SODIUM SERPL-SCNC: 144 MMOL/L
T4 FREE SERPL-MCNC: 1.4 NG/DL
TRIGL SERPL-MCNC: 56 MG/DL
TSH SERPL-ACNC: 1.37 UIU/ML
VIT B12 SERPL-MCNC: 590 PG/ML

## 2022-10-19 RX ORDER — FLASH GLUCOSE SENSOR
KIT MISCELLANEOUS
Qty: 6 | Refills: 4 | Status: ACTIVE | COMMUNITY
Start: 2022-10-17 | End: 1900-01-01

## 2022-10-20 ENCOUNTER — APPOINTMENT (OUTPATIENT)
Dept: RADIOLOGY | Facility: IMAGING CENTER | Age: 87
End: 2022-10-20

## 2022-10-28 NOTE — OCCUPATIONAL THERAPY INITIAL EVALUATION ADULT - PERTINENT HX OF CURRENT PROBLEM, REHAB EVAL
84 yo F PMH of DM type 2 p/w slurred speech sudden onset at 8:45pm. Pt was shopping with daughter when she she felt an "earthquake" beneath her, felt dizzy. Daughter states pt got weak in b/l LE, felt an itch all over her face, numbness inb/l UE. Pt vomited 3x in ER. Pt reported that R hand felt weak/numnbess/cramping feeling.  tPA bolus given. NIHSS1, MRS-0 she got progressively worse, R side became plegic, + gaze deviation. CTA H&N + basilar occlusion. Transfered to [facility] for endovascular. 28-Oct-2022

## 2022-12-12 ENCOUNTER — APPOINTMENT (OUTPATIENT)
Dept: INTERNAL MEDICINE | Facility: CLINIC | Age: 87
End: 2022-12-12

## 2022-12-24 ENCOUNTER — RX RENEWAL (OUTPATIENT)
Age: 87
End: 2022-12-24

## 2022-12-29 ENCOUNTER — NON-APPOINTMENT (OUTPATIENT)
Age: 87
End: 2022-12-29

## 2023-01-10 ENCOUNTER — APPOINTMENT (OUTPATIENT)
Dept: ENDOCRINOLOGY | Facility: CLINIC | Age: 88
End: 2023-01-10
Payer: MEDICARE

## 2023-01-10 VITALS
SYSTOLIC BLOOD PRESSURE: 138 MMHG | WEIGHT: 128.3 LBS | HEIGHT: 62 IN | RESPIRATION RATE: 16 BRPM | TEMPERATURE: 97.7 F | HEART RATE: 74 BPM | BODY MASS INDEX: 23.61 KG/M2 | OXYGEN SATURATION: 96 % | DIASTOLIC BLOOD PRESSURE: 62 MMHG

## 2023-01-10 DIAGNOSIS — Z13.820 ENCOUNTER FOR SCREENING FOR OSTEOPOROSIS: ICD-10-CM

## 2023-01-10 LAB — GLUCOSE BLDC GLUCOMTR-MCNC: 96

## 2023-01-10 PROCEDURE — 82962 GLUCOSE BLOOD TEST: CPT

## 2023-01-10 PROCEDURE — 99214 OFFICE O/P EST MOD 30 MIN: CPT | Mod: 25

## 2023-01-10 NOTE — HISTORY OF PRESENT ILLNESS
[FreeTextEntry1] : F/u for diabetes management\par \par *** Bj 10, 2023 ***\par \par had some dental issues, had teeth extracted, was not eating properly, lost some weight. now, is eating better\par taking Tresiba 8 un HS,  Janumet 50/500 mg bid, lipitor 80 mg HS,  hydralazine 50 mg tid, OTC vitamin D3\par losartan is on hold due to hyperkalemia\par using hope but forgot to bring a reader\par reports fbs - 90's, ppg- low 100's\par \par no DXA yet\par \par *** Sep 06, 2022 ***\par \par doing well, denies new c/o\par taking  Tresiba 8 un HS,  Janumet 50/500 mg bid, lipitor 80 mg HS, cozaar 100 mg, hydralazine 50 mg tid, OTC vitamin D3\par reports fbs- 82- 90\par no hypo's\par did not do DXA yet\par \par *** Apr 13, 2022 ***\par \par feels great, no new c/o\par taking  Tresiba 8 un HS,  Janumet 50/500 mg bid, lipitor 80 mg HS, cozaar 100 mg, hydralazine 50 mg tid\par reports fbs- 80's- 90's, not checking ppg\par denies hypo's\par \par *** Dec 09, 2021 ***\par \par feels well, no new c/o\par taking Tresiba 20 u HS,  Janumet 50/500 mg bid, lipitor 80 mg HS, cozaar 100 mg, hydralazine 50 mg tid\par reports fbs- 90's-110's, ppg- 120's\par denies hypo's\par today fbs - 66 (did not eat since last night)\par \par *** Jun 30, 2021 ***\par \par feels well, no new c/o \par 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 \par \par Ms. BOYCE was diagnosed with Diabetes Mellitus Type 2 more than 30 years ago. She reports history HTN, dyslipidemia, CVA and denies CAD,  known complications of retinopathy, nephropathy, or neuropathy. She is presently on metformin 500 mg bid, Tresiba 20 un (increased recently because of her shingles). \par Blood sugars are checked once a day. \par Did not bring log book, but reported are typically as following: fbs (prior to shingles) 90's-low 100's, recently in 140's- 170's, ppg- not checking\par Hypoglycemia frequency: none\par Fingerstick glucose in the office today is 293 mg/dL 2 hours after eating. \par Diet: not following ADA\par Exercise: none\par \par Lab review: a1c- 6.6 <-- 8.4\par \par Last dilated eye - 10/22\par Last podiatry visit  - none\par Last cardiology evaluation - 1/21\par Last stress test - 2020\par Last 2-D Echo - 2020\par Last nephrology evaluation - none\par Last neurology evaluation-none\par

## 2023-01-10 NOTE — ASSESSMENT
[Diabetes Foot Care] : diabetes foot care [Long Term Vascular Complications] : long term vascular complications of diabetes [Carbohydrate Consistent Diet] : carbohydrate consistent diet [Importance of Diet and Exercise] : importance of diet and exercise to improve glycemic control, achieve weight loss and improve cardiovascular health [Exercise/Effect on Glucose] : exercise/effect on glucose [Hypoglycemia Management] : hypoglycemia management [Glucagon Use] : glucagon use [Action and use of Insulin] : action and use of short and long-acting insulin [Self Monitoring of Blood Glucose] : self monitoring of blood glucose [Injection Technique, Storage, Sharps Disposal] : injection technique, storage, and sharps disposal [Retinopathy Screening] : Patient was referred to ophthalmology for retinopathy screening [Diabetic Medications] : Risks and benefits of diabetic medications were discussed [FreeTextEntry1] : - cont Tresiba 8 un HS\par - Janumet 50/500 mg bid\par - advised to lower Tresiba if any hypo's\par - continue lipitor 80 mg HS\par - losartan, hydralazine\par - cont Oliver 2\par - screening DXA\par RTC 3 -4 months

## 2023-01-11 LAB
25(OH)D3 SERPL-MCNC: 65.8 NG/ML
ALBUMIN SERPL ELPH-MCNC: 4.4 G/DL
ALP BLD-CCNC: 78 U/L
ALT SERPL-CCNC: 21 U/L
ANION GAP SERPL CALC-SCNC: 11 MMOL/L
AST SERPL-CCNC: 24 U/L
BILIRUB SERPL-MCNC: 0.4 MG/DL
BUN SERPL-MCNC: 14 MG/DL
C PEPTIDE SERPL-MCNC: 1.6 NG/ML
CALCIUM SERPL-MCNC: 10.5 MG/DL
CHLORIDE SERPL-SCNC: 104 MMOL/L
CHOLEST SERPL-MCNC: 144 MG/DL
CO2 SERPL-SCNC: 26 MMOL/L
CREAT SERPL-MCNC: 0.65 MG/DL
CREAT SPEC-SCNC: 13 MG/DL
EGFR: 84 ML/MIN/1.73M2
ESTIMATED AVERAGE GLUCOSE: 126 MG/DL
FOLATE SERPL-MCNC: 12.5 NG/ML
FRUCTOSAMINE SERPL-MCNC: 327 UMOL/L
GLUCOSE SERPL-MCNC: 90 MG/DL
HBA1C MFR BLD HPLC: 6 %
HDLC SERPL-MCNC: 55 MG/DL
LDLC SERPL CALC-MCNC: 76 MG/DL
MICROALBUMIN 24H UR DL<=1MG/L-MCNC: <1.2 MG/DL
MICROALBUMIN/CREAT 24H UR-RTO: NORMAL MG/G
NONHDLC SERPL-MCNC: 89 MG/DL
POTASSIUM SERPL-SCNC: 4.9 MMOL/L
POTASSIUM SERPL-SCNC: 5.4 MMOL/L
PROT SERPL-MCNC: 7.8 G/DL
SODIUM SERPL-SCNC: 141 MMOL/L
T4 FREE SERPL-MCNC: 1.3 NG/DL
TRIGL SERPL-MCNC: 65 MG/DL
TSH SERPL-ACNC: 2.46 UIU/ML
VIT B12 SERPL-MCNC: 1297 PG/ML

## 2023-01-17 ENCOUNTER — APPOINTMENT (OUTPATIENT)
Dept: INTERNAL MEDICINE | Facility: CLINIC | Age: 88
End: 2023-01-17
Payer: MEDICARE

## 2023-01-17 ENCOUNTER — NON-APPOINTMENT (OUTPATIENT)
Age: 88
End: 2023-01-17

## 2023-01-17 VITALS — SYSTOLIC BLOOD PRESSURE: 120 MMHG | DIASTOLIC BLOOD PRESSURE: 70 MMHG

## 2023-01-17 VITALS
HEART RATE: 82 BPM | BODY MASS INDEX: 23.79 KG/M2 | OXYGEN SATURATION: 98 % | DIASTOLIC BLOOD PRESSURE: 65 MMHG | WEIGHT: 129.25 LBS | TEMPERATURE: 97.5 F | HEIGHT: 62 IN | SYSTOLIC BLOOD PRESSURE: 133 MMHG

## 2023-01-17 PROCEDURE — G0444 DEPRESSION SCREEN ANNUAL: CPT

## 2023-01-17 PROCEDURE — G0439: CPT

## 2023-01-17 PROCEDURE — 93000 ELECTROCARDIOGRAM COMPLETE: CPT | Mod: 59

## 2023-01-18 NOTE — HISTORY OF PRESENT ILLNESS
[Family Member] : family member [de-identified] : Ms. JERED BOYCE 89 year female with a PMH DM2, hyperlipidemia, HTN, postherpetic neuralgia, glaucoma present to the office for a physical exam. As per patient last night was feeling weakness in both legs and had cramps. Today she is feels OK. As per patient's daughter, patient  was seen by a cardiologist 2 wks ago(everything was OK). Patient is accompany by her daughter Kristyn. Patient speaks Creole

## 2023-01-18 NOTE — HEALTH RISK ASSESSMENT
[Fair] : ~his/her~ current health as fair  [Good] : ~his/her~  mood as  good [Never] : Never [No] : In the past 12 months have you used drugs other than those required for medical reasons? No [Two or more falls in past year] : Patient reported two or more falls in the past year [Assistive Device] : Patient uses an assistive device [0] : 2) Feeling down, depressed, or hopeless: Not at all (0) [PHQ-2 Negative - No further assessment needed] : PHQ-2 Negative - No further assessment needed [HIV test declined] : HIV test declined [Hepatitis C test declined] : Hepatitis C test declined [None] : None [# of Members in Household ___] :  household currently consist of [unfilled] member(s) [Retired] : retired [College] : College [] :  [# Of Children ___] : has [unfilled] children [Feels Safe at Home] : Feels safe at home [Fully functional (bathing, dressing, toileting, transferring, walking, feeding)] : Fully functional (bathing, dressing, toileting, transferring, walking, feeding) [Independent] : using telephone [Some assistance needed] : managing finances [Reports changes in vision] : Reports changes in vision [Reports changes in dental health] : Reports changes in dental health [Smoke Detector] : smoke detector [Carbon Monoxide Detector] : carbon monoxide detector [Safety elements used in home] : safety elements used in home [Seat Belt] :  uses seat belt [With Patient/Caregiver] : , with patient/caregiver [Designated Healthcare Proxy] : Designated healthcare proxy [Name: ___] : Health Care Proxy's Name: [unfilled]  [Relationship: ___] : Relationship: [unfilled] [Aggressive treatment] : aggressive treatment [I will adhere to the patient's wishes.] : I will adhere to the patient's wishes. [de-identified] : endocrinologist, cardiologist [de-identified] : walking [de-identified] : low carb  diet [de-identified] : no injures [[id number]] : 0 [Change in mental status noted] : No change in mental status noted [Language] : denies difficulty with language [Learning/Retaining New Information] : denies difficulty learning/retaining new information [Handling Complex Tasks] : denies difficulty handling complex tasks [Sexually Active] : not sexually active [Reports changes in hearing] : Reports no changes in hearing [Guns at Home] : no guns at home [Sunscreen] : does not use sunscreen [TB Exposure] : is not being exposed to tuberculosis [ColonoscopyDate] : long time ago [de-identified] : daughter [de-identified] : c/o blurry vision [de-identified] : seeing dentis for dentures [AdvancecareDate] : 01/17/2023

## 2023-01-18 NOTE — PLAN
[FreeTextEntry1] : Well adult exam is performed. \par Result of the blood test discussed with the patient in detail from  01/10/2023.\par Patient has well controlled DM2, HbA1C is 6.0, recommend  to continue present medication janumet, tresiba\par Hyperlipidemia is well controlled continue  lipitor 40mg qhs\par  HTN is well controlled, recommend to continue present meds atenolol, clonidine, hydralazine\par EKG was done  and reviewed, NSR 80 bpm, no acute st-t changes.\par Glaucoma continue travatan, alphagan\par Patient do not want to do mammo, colonoscopy, dexa test\par  RTC in 3 mo to f/u. Patient is verbalized understanding\par

## 2023-02-16 ENCOUNTER — OUTPATIENT (OUTPATIENT)
Dept: OUTPATIENT SERVICES | Facility: HOSPITAL | Age: 88
LOS: 1 days | End: 2023-02-16
Payer: COMMERCIAL

## 2023-02-16 ENCOUNTER — APPOINTMENT (OUTPATIENT)
Dept: RADIOLOGY | Facility: IMAGING CENTER | Age: 88
End: 2023-02-16
Payer: MEDICARE

## 2023-02-16 DIAGNOSIS — E78.5 HYPERLIPIDEMIA, UNSPECIFIED: ICD-10-CM

## 2023-02-16 DIAGNOSIS — E87.5 HYPERKALEMIA: ICD-10-CM

## 2023-02-16 DIAGNOSIS — Z98.890 OTHER SPECIFIED POSTPROCEDURAL STATES: Chronic | ICD-10-CM

## 2023-02-16 DIAGNOSIS — I10 ESSENTIAL (PRIMARY) HYPERTENSION: ICD-10-CM

## 2023-02-16 DIAGNOSIS — I63.9 CEREBRAL INFARCTION, UNSPECIFIED: Chronic | ICD-10-CM

## 2023-02-16 DIAGNOSIS — E11.9 TYPE 2 DIABETES MELLITUS WITHOUT COMPLICATIONS: ICD-10-CM

## 2023-02-16 DIAGNOSIS — Z13.820 ENCOUNTER FOR SCREENING FOR OSTEOPOROSIS: ICD-10-CM

## 2023-02-16 PROCEDURE — 77080 DXA BONE DENSITY AXIAL: CPT

## 2023-02-16 PROCEDURE — 77080 DXA BONE DENSITY AXIAL: CPT | Mod: 26

## 2023-02-21 ENCOUNTER — NON-APPOINTMENT (OUTPATIENT)
Age: 88
End: 2023-02-21

## 2023-03-10 NOTE — ED ADULT NURSE NOTE - NS ED PATIENT SAFETY CONCERN
Detail Level: Zone Initiate Treatment: Triamcinolone Cream twice daily for two weeks Render In Strict Bullet Format?: No No

## 2023-04-16 LAB
ALBUMIN SERPL ELPH-MCNC: 4.4 G/DL
ALP BLD-CCNC: 68 U/L
ALT SERPL-CCNC: 16 U/L
ANION GAP SERPL CALC-SCNC: 9 MMOL/L
AST SERPL-CCNC: 22 U/L
BASOPHILS # BLD AUTO: 0.06 K/UL
BASOPHILS NFR BLD AUTO: 1 %
BILIRUB SERPL-MCNC: 0.5 MG/DL
BUN SERPL-MCNC: 19 MG/DL
CALCIUM SERPL-MCNC: 10.3 MG/DL
CHLORIDE SERPL-SCNC: 105 MMOL/L
CHOLEST SERPL-MCNC: 132 MG/DL
CO2 SERPL-SCNC: 28 MMOL/L
CREAT SERPL-MCNC: 0.79 MG/DL
EGFR: 71 ML/MIN/1.73M2
EOSINOPHIL # BLD AUTO: 0.19 K/UL
EOSINOPHIL NFR BLD AUTO: 3 %
ESTIMATED AVERAGE GLUCOSE: 120 MG/DL
GLUCOSE SERPL-MCNC: 96 MG/DL
HBA1C MFR BLD HPLC: 5.8 %
HCT VFR BLD CALC: 46 %
HDLC SERPL-MCNC: 54 MG/DL
HGB BLD-MCNC: 14.5 G/DL
IMM GRANULOCYTES NFR BLD AUTO: 0.2 %
LDLC SERPL CALC-MCNC: 69 MG/DL
LYMPHOCYTES # BLD AUTO: 2.68 K/UL
LYMPHOCYTES NFR BLD AUTO: 42.6 %
MAN DIFF?: NORMAL
MCHC RBC-ENTMCNC: 24.9 PG
MCHC RBC-ENTMCNC: 31.5 GM/DL
MCV RBC AUTO: 78.9 FL
MONOCYTES # BLD AUTO: 0.88 K/UL
MONOCYTES NFR BLD AUTO: 14 %
NEUTROPHILS # BLD AUTO: 2.47 K/UL
NEUTROPHILS NFR BLD AUTO: 39.2 %
NONHDLC SERPL-MCNC: 79 MG/DL
PLATELET # BLD AUTO: 206 K/UL
POTASSIUM SERPL-SCNC: 5.6 MMOL/L
PROT SERPL-MCNC: 7.4 G/DL
RBC # BLD: 5.83 M/UL
RBC # FLD: 17.7 %
SODIUM SERPL-SCNC: 142 MMOL/L
TRIGL SERPL-MCNC: 47 MG/DL
TSH SERPL-ACNC: 2.19 UIU/ML
WBC # FLD AUTO: 6.29 K/UL

## 2023-04-17 ENCOUNTER — NON-APPOINTMENT (OUTPATIENT)
Age: 88
End: 2023-04-17

## 2023-04-17 ENCOUNTER — APPOINTMENT (OUTPATIENT)
Dept: INTERNAL MEDICINE | Facility: CLINIC | Age: 88
End: 2023-04-17
Payer: MEDICARE

## 2023-04-17 VITALS
WEIGHT: 126 LBS | BODY MASS INDEX: 23.19 KG/M2 | DIASTOLIC BLOOD PRESSURE: 76 MMHG | HEIGHT: 62 IN | OXYGEN SATURATION: 96 % | SYSTOLIC BLOOD PRESSURE: 170 MMHG | TEMPERATURE: 96.6 F | HEART RATE: 65 BPM

## 2023-04-17 DIAGNOSIS — R21 RASH AND OTHER NONSPECIFIC SKIN ERUPTION: ICD-10-CM

## 2023-04-17 PROCEDURE — 99214 OFFICE O/P EST MOD 30 MIN: CPT | Mod: 25

## 2023-04-17 PROCEDURE — 93000 ELECTROCARDIOGRAM COMPLETE: CPT

## 2023-04-17 RX ORDER — MOMETASONE FUROATE 1 MG/G
0.1 OINTMENT TOPICAL TWICE DAILY
Qty: 45 | Refills: 0 | Status: ACTIVE | COMMUNITY
Start: 2023-04-17 | End: 1900-01-01

## 2023-04-17 RX ORDER — SODIUM POLYSTYRENE SULFONATE 4.1 MEQ/G
POWDER, FOR SUSPENSION ORAL; RECTAL DAILY
Qty: 45 | Refills: 0 | Status: ACTIVE | COMMUNITY
Start: 2023-04-17 | End: 1900-01-01

## 2023-04-17 NOTE — HISTORY OF PRESENT ILLNESS
[Other: _____] : [unfilled] [FreeTextEntry1] : F/u exam [de-identified] : Ms. JERED BOYCE 89 year female with a PMH DM2, hyperlipidemia, HTN, postherpetic neuralgia, glaucoma present to the office for a f/u  on a lab results. Patient is accompany by her daughter. Patient feels OK, c/o rash on the chest since am. Rash is itchy

## 2023-04-17 NOTE — PLAN
[FreeTextEntry1] : Ms. JERED BOYCE 89 year female with a PMH DM2, hyperlipidemia, HTN, postherpetic neuralgia, glaucoma present to the office for a f/u  on a lab results\par Result of the blood test discussed with the patient in detail. \par Patient has well controlled DM2, HbA1C is 5.8, recommend to continue present medication janumet, tresiba\par Hyperlipidemia is well controlled continue lipitor 40mg qhs\par  HTN is controlled, recommend to continue present meds atenolol, clonidine, hydralazine\par Has an elev K level\par EKG was done and reviewed, NSR 60 bpm, no acute st-t changes, no peak T wave\par Will start Kayexalate for 3 days. Repeat K blood test week after. Referred to see nephrologist\par For a rash recommend  to use elocon cream, if symptoms will persisit RTC\par Glaucoma continue travatan, alphagan\par RTC in 3 mo to f/u. Patient is verbalized understanding

## 2023-05-09 ENCOUNTER — APPOINTMENT (OUTPATIENT)
Dept: INTERNAL MEDICINE | Facility: CLINIC | Age: 88
End: 2023-05-09
Payer: MEDICARE

## 2023-05-09 VITALS
OXYGEN SATURATION: 95 % | HEART RATE: 86 BPM | BODY MASS INDEX: 23.19 KG/M2 | WEIGHT: 126 LBS | SYSTOLIC BLOOD PRESSURE: 190 MMHG | HEIGHT: 62 IN | DIASTOLIC BLOOD PRESSURE: 90 MMHG

## 2023-05-09 DIAGNOSIS — K64.8 OTHER HEMORRHOIDS: ICD-10-CM

## 2023-05-09 PROCEDURE — 99203 OFFICE O/P NEW LOW 30 MIN: CPT

## 2023-05-09 RX ORDER — HYDROCORTISONE ACETATE 25 MG/1
25 SUPPOSITORY RECTAL TWICE DAILY
Qty: 20 | Refills: 0 | Status: ACTIVE | COMMUNITY
Start: 2023-05-09 | End: 1900-01-01

## 2023-05-09 NOTE — HISTORY OF PRESENT ILLNESS
[FreeTextEntry1] : Long term constipation> she is visiting my office with her daughter.  she has hard BM once every 2-3 days. She has had Constipated for at least 10 years and had hemorrhoid surgery 10 years ago. she has been using Lactulose 3x a day every now and then .  she tried miralax daily without help. Last coloscopy years ago.

## 2023-05-09 NOTE — PHYSICAL EXAM
[Alert] : alert [Normal Voice/Communication] : normal voice/communication [Healthy Appearing] : healthy appearing [No Acute Distress] : no acute distress [Sclera] : the sclera and conjunctiva were normal [Hearing Threshold Finger Rub Not [place]] : hearing was normal [Normal Lips/Gums] : the lips and gums were normal [Oropharynx] : the oropharynx was normal [Normal Appearance] : the appearance of the neck was normal [No Neck Mass] : no neck mass was observed [No Respiratory Distress] : no respiratory distress [No Acc Muscle Use] : no accessory muscle use [Respiration, Rhythm And Depth] : normal respiratory rhythm and effort [Auscultation Breath Sounds / Voice Sounds] : lungs were clear to auscultation bilaterally [Heart Rate And Rhythm] : heart rate was normal and rhythm regular [Normal S1, S2] : normal S1 and S2 [Murmurs] : no murmurs [Bowel Sounds] : normal bowel sounds [Abdomen Tenderness] : non-tender [No Masses] : no abdominal mass palpated [Abdomen Soft] : soft [] : no hepatosplenomegaly [Oriented To Time, Place, And Person] : oriented to person, place, and time

## 2023-05-16 ENCOUNTER — APPOINTMENT (OUTPATIENT)
Dept: ENDOCRINOLOGY | Facility: CLINIC | Age: 88
End: 2023-05-16
Payer: MEDICARE

## 2023-05-16 VITALS
OXYGEN SATURATION: 97 % | HEIGHT: 62 IN | BODY MASS INDEX: 23.19 KG/M2 | TEMPERATURE: 97.3 F | HEART RATE: 68 BPM | SYSTOLIC BLOOD PRESSURE: 118 MMHG | RESPIRATION RATE: 16 BRPM | WEIGHT: 126 LBS | DIASTOLIC BLOOD PRESSURE: 60 MMHG

## 2023-05-16 LAB — GLUCOSE BLDC GLUCOMTR-MCNC: 93

## 2023-05-16 PROCEDURE — 95251 CONT GLUC MNTR ANALYSIS I&R: CPT

## 2023-05-16 PROCEDURE — 82962 GLUCOSE BLOOD TEST: CPT

## 2023-05-16 PROCEDURE — 99214 OFFICE O/P EST MOD 30 MIN: CPT | Mod: 25

## 2023-05-16 NOTE — HISTORY OF PRESENT ILLNESS
[FreeTextEntry1] : F/u for diabetes management\par \par *** May 16, 2023 ***\par \par Feeling well overall , daughter reports reduced appetite, weakness over the past year,   daughter encourages hydration\par taking Tresiba 8 un HS,  Janumet 50/500 mg bid, lipitor 80 mg HS,  hydralazine 50 mg tid, OTC vitamin D3 and is wearing a Oliver 2 cgm.\par Hypoglycemia frequency:  Pt denies subjective lows.\par Reports typical BG is \par Fingerstick glucose in the office today is 93 mg/dL 3 hours after eating.\par Diet: not following ADA\par Exercise: no\par Lab review: a1c- 5.8% April'23\par Date of download:  05/16/2023\par Diabetes Medications and Dosage: as above\par Indication for CGMS: verify a change in the treatment regimen, identify periods of hypoglycemia/ hyperglycemia.\par Modal day report: pattern.\par Pt with HYPO  3% of the time ( NONE below 54),  89% in target range\par Hyperglycemia:  8% elevation\par Identified issues: inadequate nutrition\par dates analyzed:  4/25/2023- 05/08/2023\par \par DXA (2/16/23)- LS( -1.7), FN(-2.2), FRAX- 6.4% and 2.1%\par *** Bj 10, 2023 ***\par \par had some dental issues, had teeth extracted, was not eating properly, lost some weight. now, is eating better\par taking Tresiba 8 un HS,  Janumet 50/500 mg bid, lipitor 80 mg HS,  hydralazine 50 mg tid, OTC vitamin D3\par losartan is on hold due to hyperkalemia\par using oliver but forgot to bring a reader\par reports fbs - 90's, ppg- low 100's\par \par no DXA yet\par \par *** Sep 06, 2022 ***\par \par doing well, denies new c/o\par taking  Tresiba 8 un HS,  Janumet 50/500 mg bid, lipitor 80 mg HS, cozaar 100 mg, hydralazine 50 mg tid, OTC vitamin D3\par reports fbs- 82- 90\par no hypo's\par did not do DXA yet\par \par *** Apr 13, 2022 ***\par \par feels great, no new c/o\par taking  Tresiba 8 un HS,  Janumet 50/500 mg bid, lipitor 80 mg HS, cozaar 100 mg, hydralazine 50 mg tid\par reports fbs- 80's- 90's, not checking ppg\par denies hypo's\par \par *** Dec 09, 2021 ***\par \par feels well, no new c/o\par taking Tresiba 20 u HS,  Janumet 50/500 mg bid, lipitor 80 mg HS, cozaar 100 mg, hydralazine 50 mg tid\par reports fbs- 90's-110's, ppg- 120's\par denies hypo's\par today fbs - 66 (did not eat since last night)\par \par *** Jun 30, 2021 ***\par \par feels well, no new c/o \par 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 \par \par Ms. BOYCE was diagnosed with Diabetes Mellitus Type 2 more than 30 years ago. She reports history HTN, dyslipidemia, CVA and denies CAD,  known complications of retinopathy, nephropathy, or neuropathy. She is presently on metformin 500 mg bid, Tresiba 20 un (increased recently because of her shingles). \par Blood sugars are checked once a day. \par Did not bring log book, but reported are typically as following: fbs (prior to shingles) 90's-low 100's, recently in 140's- 170's, ppg- not checking\par Hypoglycemia frequency: none\par Fingerstick glucose in the office today is 293 mg/dL 2 hours after eating. \par Diet: not following ADA\par Exercise: none\par \par Lab review: a1c- 6.6 <-- 8.4\par \par Last dilated eye - 10/22\par Last podiatry visit  - none, will make appointment\par Last cardiology evaluation - April '22\par Last stress test - 2022\par Last 2-D Echo - 2022\par Last nephrology evaluation - none\par Last neurology evaluation- needs one \par

## 2023-05-16 NOTE — ASSESSMENT
[Diabetes Foot Care] : diabetes foot care [Long Term Vascular Complications] : long term vascular complications of diabetes [Carbohydrate Consistent Diet] : carbohydrate consistent diet [Importance of Diet and Exercise] : importance of diet and exercise to improve glycemic control, achieve weight loss and improve cardiovascular health [Exercise/Effect on Glucose] : exercise/effect on glucose [Hypoglycemia Management] : hypoglycemia management [Glucagon Use] : glucagon use [Action and use of Insulin] : action and use of short and long-acting insulin [Self Monitoring of Blood Glucose] : self monitoring of blood glucose [Injection Technique, Storage, Sharps Disposal] : injection technique, storage, and sharps disposal [Retinopathy Screening] : Patient was referred to ophthalmology for retinopathy screening [Diabetic Medications] : Risks and benefits of diabetic medications were discussed [FreeTextEntry1] : 1. T2DM\par - decrease Tresiba 5 un HS\par - Janumet 50/500 mg bid\par - advised to lower Tresiba if any hypo's\par - continue lipitor 80 mg HS\par - losartan, hydralazine\par - cont Oliver 2. recheck FS if sees any hypo's\par \par 2. Hyperkalemia\par low K+ diet\par repeat plasma K+, BMP\par \par 2. Osteopenia\par - calcium 500 mg bid, extra OTC vitamin D3 2,000 IU/day\par - repeat  DXA 2/25\par RTC 4 months

## 2023-05-16 NOTE — PROCEDURE
[FreeTextEntry1] : General: In no acute distress.\par \par Head: Normocephalic\par \par Skin: Normal, no bruises. There are no cushingoid features\par \par Eyes: No pallor, lid lag or orbitopathy.\par \par Neck: No audible carotid bruit\par \par Thyroid: palpable, non-tender. No audible bruit. Not enlarged. No palpable nodules. Helio’s negative\par \par Lymph nodes: no palpable neck lymphadenopathy.\par \par Chest: Lungs clear to auscultation\par \par Heart: S1, S2, no murmurs\par \par Abdomen: No tenderness, no masses, no striae\par \par Neurological: Deep tendon reflexes symmetrical, 2+ (biceps, brachioradialis, patellar).\par \par Lower Extremities: No edema, no cyanosis\par \par Foot exam: No ulcers, no onychomycotic nail changes. Good pedal pulses. 10-g [place] monofilament testing is reduced at great toe, and sole bilaterally. Vibratory sensation with 128-Hz tuning fork is preserved bilaterally.\par

## 2023-05-17 ENCOUNTER — NON-APPOINTMENT (OUTPATIENT)
Age: 88
End: 2023-05-17

## 2023-05-17 RX ORDER — SODIUM ZIRCONIUM CYCLOSILICATE 5 G/5G
5 POWDER, FOR SUSPENSION ORAL
Qty: 1 | Refills: 1 | Status: ACTIVE | COMMUNITY
Start: 2023-05-17 | End: 1900-01-01

## 2023-05-18 LAB
ANION GAP SERPL CALC-SCNC: 15 MMOL/L
BUN SERPL-MCNC: 18 MG/DL
CALCIUM SERPL-MCNC: 10.5 MG/DL
CHLORIDE SERPL-SCNC: 104 MMOL/L
CO2 SERPL-SCNC: 23 MMOL/L
CREAT SERPL-MCNC: 0.68 MG/DL
EGFR: 83 ML/MIN/1.73M2
GLUCOSE SERPL-MCNC: 63 MG/DL
POTASSIUM SERPL-SCNC: 5.6 MMOL/L
POTASSIUM SERPL-SCNC: 5.9 MMOL/L
SODIUM SERPL-SCNC: 142 MMOL/L

## 2023-07-03 ENCOUNTER — APPOINTMENT (OUTPATIENT)
Dept: INTERNAL MEDICINE | Facility: CLINIC | Age: 88
End: 2023-07-03
Payer: MEDICARE

## 2023-07-03 VITALS
SYSTOLIC BLOOD PRESSURE: 114 MMHG | DIASTOLIC BLOOD PRESSURE: 63 MMHG | HEIGHT: 62 IN | BODY MASS INDEX: 22.08 KG/M2 | OXYGEN SATURATION: 97 % | WEIGHT: 120 LBS | HEART RATE: 72 BPM

## 2023-07-03 PROCEDURE — 99213 OFFICE O/P EST LOW 20 MIN: CPT

## 2023-07-03 NOTE — PLAN
[FreeTextEntry1] : Ms. JERED BOYCE 89 year female with a PMH DM2, hyperlipidemia, HTN, postherpetic neuralgia, glaucoma present to the office for a f/u.\par F/u exam is done. Order for a blood test was placed, recommend  to do in august prior endo appoitment Meanwhile continue present meds\par Patient has well controlled DM2, HbA1C is 5.8, recommend to continue present medication janumet, tresiba\par Hyperlipidemia is well controlled continue lipitor 40mg qhs\par  HTN is  well controlled, recommend to continue present meds atenolol, clonidine, hydralazine\par Has an elev K level, off losartan, recommend  to see nephrologist\par Glaucoma continue travatan, alphagan\par RTC in 3 mo to f/u. Patient is verbalized understanding.

## 2023-07-03 NOTE — HISTORY OF PRESENT ILLNESS
[FreeTextEntry1] : F/u exam [de-identified] : Ms. JERED BOYCE 89 year female with a PMH DM2, hyperlipidemia, HTN, postherpetic neuralgia, glaucoma present to the office for a f/u. Patient is accompany by her daughter. As per patient she feels good, denies complaints at present time. Recently was seen y an endocrinologist. Reduce tresiba to 5 U qhs, continue janumet. Has a f/u appoitment with endo on 08/23

## 2023-07-08 NOTE — PROGRESS NOTE ADULT - PROBLEM SELECTOR PLAN 8
VTE Assessment already completed for this visit At home on 100 losartan.  BP here in 160s 170s. Initially held in setting of YEIMI.  -Hydralazine standing and PRN  -Losartan restarted 5/11 100mg BID

## 2023-07-17 ENCOUNTER — APPOINTMENT (OUTPATIENT)
Dept: INTERNAL MEDICINE | Facility: CLINIC | Age: 88
End: 2023-07-17

## 2023-08-15 RX ORDER — LINACLOTIDE 72 UG/1
72 CAPSULE, GELATIN COATED ORAL
Qty: 30 | Refills: 11 | Status: ACTIVE | COMMUNITY
Start: 2023-05-09 | End: 1900-01-01

## 2023-08-29 NOTE — DISCHARGE NOTE ADULT - BECAUSE OF A PHYSICAL, MENTAL OR EMOTIONAL CONDITION, DO YOU HAVE DIFFICULTY DOING  ERRANDS ALONE LIKE VISITING A DOCTOR'S OFFICE OR SHOPPING (15 YEARS AND OLDER)
Pt with a history of CLL, quadriplegia, neurogenic bladder with SP catheter, recurrent UTI who presented on 8/17/2023 from NH with vomiting. He was hypotensive and tachycardic. Workup noted severe leukocytosis (WBC 52), lactic acidosis (4.5), UTI. CT abd/pelvis showed 7mm obstructing stone in the proximal L ureter with moderate L hydronephrosis. Admitted to the ICU for septic shock, requiring pressors. Urology was consulted. Underwent cystoscopy with ureteral stent placement on 8/17/23 and  planned definitive treatment for stone (ureteroscopy with laser lithotripsy) after treatment of infection and improvement. Urine and blood cultures have since grown ESBL Klebsiella pneumoniae. ID is following and patient initially on meropenum, but changed to cefepime as ID notes it would have less effect on pt c diff/diarrhea. Pt had developed diarrhea on 8/21 and has tested cdiff PCR+/EIA-; treating for infection given clinical picture (diarrhea while on antibiotics as per ID. Pt diarrhea symptoms improved on oral vancomycin. Pt also found with acute hypoxic respiratory failure and found with pleural effusion/infiltrate and started on zyvox as per ID. Pt seen by pulmonary and s/p diagnostic thoracentesis and w/u more c/w transudative effusion and pt s/p therapeutic IR thoracentesis. Pt on IV lasix diuresis with improvement and anticipate transition to oral next day or so. Pt also with underlying CLL and wbc chronically around 60s. Pt is overall improved and cleared by ID, pulmonary to undergo ureteroscopy with laser lithotripsy today and he is aware that ID recommends he have procedure done as inpatient and while on treatment for invasive infections and to continue abx a few days postprocedure prior to return to NH. Anticipate dc to NH end of week if continued progress following  interventions once off IV abx.  
Yes

## 2023-09-15 LAB
ALBUMIN SERPL ELPH-MCNC: 4.8 G/DL
ALP BLD-CCNC: 74 U/L
ALT SERPL-CCNC: 20 U/L
ANION GAP SERPL CALC-SCNC: 15 MMOL/L
AST SERPL-CCNC: 24 U/L
BILIRUB DIRECT SERPL-MCNC: 0.2 MG/DL
BILIRUB INDIRECT SERPL-MCNC: 0.4 MG/DL
BILIRUB SERPL-MCNC: 0.6 MG/DL
BUN SERPL-MCNC: 19 MG/DL
CALCIUM SERPL-MCNC: 10.3 MG/DL
CHLORIDE SERPL-SCNC: 103 MMOL/L
CHOLEST SERPL-MCNC: 186 MG/DL
CO2 SERPL-SCNC: 23 MMOL/L
CREAT SERPL-MCNC: 0.76 MG/DL
EGFR: 74 ML/MIN/1.73M2
ESTIMATED AVERAGE GLUCOSE: 114 MG/DL
GLUCOSE SERPL-MCNC: 63 MG/DL
HBA1C MFR BLD HPLC: 5.6 %
HDLC SERPL-MCNC: 72 MG/DL
LDLC SERPL CALC-MCNC: 104 MG/DL
NONHDLC SERPL-MCNC: 114 MG/DL
POTASSIUM SERPL-SCNC: 4.3 MMOL/L
PROT SERPL-MCNC: 8.7 G/DL
SODIUM SERPL-SCNC: 141 MMOL/L
TRIGL SERPL-MCNC: 51 MG/DL
TSH SERPL-ACNC: 2.15 UIU/ML

## 2023-09-18 ENCOUNTER — APPOINTMENT (OUTPATIENT)
Dept: ENDOCRINOLOGY | Facility: CLINIC | Age: 88
End: 2023-09-18
Payer: MEDICARE

## 2023-09-18 ENCOUNTER — LABORATORY RESULT (OUTPATIENT)
Age: 88
End: 2023-09-18

## 2023-09-18 ENCOUNTER — RX RENEWAL (OUTPATIENT)
Age: 88
End: 2023-09-18

## 2023-09-18 VITALS
BODY MASS INDEX: 21.71 KG/M2 | DIASTOLIC BLOOD PRESSURE: 78 MMHG | OXYGEN SATURATION: 95 % | HEART RATE: 66 BPM | WEIGHT: 118 LBS | SYSTOLIC BLOOD PRESSURE: 157 MMHG | HEIGHT: 62 IN

## 2023-09-18 LAB — GLUCOSE BLDC GLUCOMTR-MCNC: 163

## 2023-09-18 PROCEDURE — 99214 OFFICE O/P EST MOD 30 MIN: CPT | Mod: 25

## 2023-09-18 PROCEDURE — 82962 GLUCOSE BLOOD TEST: CPT

## 2023-09-18 RX ORDER — LACTOSE-REDUCED FOOD
LIQUID (ML) ORAL
Qty: 24 | Refills: 6 | Status: ACTIVE | COMMUNITY
Start: 2023-09-18 | End: 1900-01-01

## 2023-09-19 LAB
BASOPHILS # BLD AUTO: 0.05 K/UL
BASOPHILS NFR BLD AUTO: 0.8 %
EOSINOPHIL # BLD AUTO: 0.04 K/UL
EOSINOPHIL NFR BLD AUTO: 0.7 %
FERRITIN SERPL-MCNC: 54 NG/ML
FOLATE SERPL-MCNC: >20 NG/ML
HCT VFR BLD CALC: 53.9 %
HGB BLD-MCNC: 16.1 G/DL
IMM GRANULOCYTES NFR BLD AUTO: 0.3 %
IRON SATN MFR SERPL: 27 %
IRON SERPL-MCNC: 89 UG/DL
LYMPHOCYTES # BLD AUTO: 2.3 K/UL
LYMPHOCYTES NFR BLD AUTO: 38.2 %
MAN DIFF?: NORMAL
MCHC RBC-ENTMCNC: 25 PG
MCHC RBC-ENTMCNC: 29.9 GM/DL
MCV RBC AUTO: 83.8 FL
MONOCYTES # BLD AUTO: 0.55 K/UL
MONOCYTES NFR BLD AUTO: 9.1 %
NEUTROPHILS # BLD AUTO: 3.06 K/UL
NEUTROPHILS NFR BLD AUTO: 50.9 %
PLATELET # BLD AUTO: NORMAL K/UL
RBC # BLD: 6.43 M/UL
RBC # FLD: 20.4 %
TIBC SERPL-MCNC: 325 UG/DL
UIBC SERPL-MCNC: 236 UG/DL
VIT B12 SERPL-MCNC: 1402 PG/ML
WBC # FLD AUTO: 6.02 K/UL

## 2023-09-20 LAB
DEPRECATED KAPPA LC FREE/LAMBDA SER: 1.76 RATIO
KAPPA LC CSF-MCNC: 1.77 MG/DL
KAPPA LC SERPL-MCNC: 3.12 MG/DL

## 2023-09-21 ENCOUNTER — APPOINTMENT (OUTPATIENT)
Dept: INTERNAL MEDICINE | Facility: CLINIC | Age: 88
End: 2023-09-21
Payer: MEDICARE

## 2023-09-21 VITALS
HEART RATE: 71 BPM | DIASTOLIC BLOOD PRESSURE: 74 MMHG | OXYGEN SATURATION: 96 % | WEIGHT: 118 LBS | HEIGHT: 62 IN | SYSTOLIC BLOOD PRESSURE: 159 MMHG | BODY MASS INDEX: 21.71 KG/M2

## 2023-09-21 PROCEDURE — 99214 OFFICE O/P EST MOD 30 MIN: CPT

## 2023-09-25 ENCOUNTER — RX RENEWAL (OUTPATIENT)
Age: 88
End: 2023-09-25

## 2023-09-26 ENCOUNTER — NON-APPOINTMENT (OUTPATIENT)
Age: 88
End: 2023-09-26

## 2023-09-26 ENCOUNTER — OUTPATIENT (OUTPATIENT)
Dept: OUTPATIENT SERVICES | Facility: HOSPITAL | Age: 88
LOS: 1 days | Discharge: HOME | End: 2023-09-26

## 2023-09-26 DIAGNOSIS — Z98.890 OTHER SPECIFIED POSTPROCEDURAL STATES: Chronic | ICD-10-CM

## 2023-09-26 DIAGNOSIS — R77.9 ABNORMALITY OF PLASMA PROTEIN, UNSPECIFIED: ICD-10-CM

## 2023-09-26 DIAGNOSIS — D58.2 OTHER HEMOGLOBINOPATHIES: ICD-10-CM

## 2023-09-26 DIAGNOSIS — I63.9 CEREBRAL INFARCTION, UNSPECIFIED: Chronic | ICD-10-CM

## 2023-09-26 DIAGNOSIS — D69.1 QUALITATIVE PLATELET DEFECTS: ICD-10-CM

## 2023-09-26 LAB
ALBUMIN MFR SERPL ELPH: 53.8 %
ALBUMIN SERPL-MCNC: 4.7 G/DL
ALBUMIN/GLOB SERPL: 1.1 RATIO
ALPHA1 GLOB MFR SERPL ELPH: 3.5 %
ALPHA1 GLOB SERPL ELPH-MCNC: 0.3 G/DL
ALPHA2 GLOB MFR SERPL ELPH: 11.4 %
ALPHA2 GLOB SERPL ELPH-MCNC: 1 G/DL
B-GLOBULIN MFR SERPL ELPH: 9.3 %
B-GLOBULIN SERPL ELPH-MCNC: 0.8 G/DL
GAMMA GLOB FLD ELPH-MCNC: 1.9 G/DL
GAMMA GLOB MFR SERPL ELPH: 22 %
INTERPRETATION SERPL IEP-IMP: NORMAL
M PROTEIN SPEC IFE-MCNC: NORMAL
PROT SERPL-MCNC: 8.8 G/DL
PROT SERPL-MCNC: 8.8 G/DL

## 2023-09-27 ENCOUNTER — RESULT REVIEW (OUTPATIENT)
Age: 88
End: 2023-09-27

## 2023-09-27 ENCOUNTER — LABORATORY RESULT (OUTPATIENT)
Age: 88
End: 2023-09-27

## 2023-09-27 ENCOUNTER — APPOINTMENT (OUTPATIENT)
Dept: HEMATOLOGY ONCOLOGY | Facility: CLINIC | Age: 88
End: 2023-09-27
Payer: MEDICARE

## 2023-09-27 ENCOUNTER — NON-APPOINTMENT (OUTPATIENT)
Age: 88
End: 2023-09-27

## 2023-09-27 VITALS
BODY MASS INDEX: 21.92 KG/M2 | HEIGHT: 62 IN | HEART RATE: 73 BPM | TEMPERATURE: 97.3 F | OXYGEN SATURATION: 95 % | RESPIRATION RATE: 16 BRPM | DIASTOLIC BLOOD PRESSURE: 75 MMHG | WEIGHT: 119.12 LBS | SYSTOLIC BLOOD PRESSURE: 181 MMHG

## 2023-09-27 DIAGNOSIS — K59.09 OTHER CONSTIPATION: ICD-10-CM

## 2023-09-27 DIAGNOSIS — E11.9 TYPE 2 DIABETES MELLITUS W/OUT COMPLICATIONS: ICD-10-CM

## 2023-09-27 DIAGNOSIS — R29.6 REPEATED FALLS: ICD-10-CM

## 2023-09-27 DIAGNOSIS — R77.8 OTHER SPECIFIED ABNORMALITIES OF PLASMA PROTEINS: ICD-10-CM

## 2023-09-27 DIAGNOSIS — R63.4 ABNORMAL WEIGHT LOSS: ICD-10-CM

## 2023-09-27 LAB
BASOPHILS # BLD AUTO: 0.04 K/UL — SIGNIFICANT CHANGE UP (ref 0–0.2)
BASOPHILS NFR BLD AUTO: 0.5 % — SIGNIFICANT CHANGE UP (ref 0–2)
EOSINOPHIL # BLD AUTO: 0.07 K/UL — SIGNIFICANT CHANGE UP (ref 0–0.5)
EOSINOPHIL NFR BLD AUTO: 0.9 % — SIGNIFICANT CHANGE UP (ref 0–6)
HCT VFR BLD CALC: 49.1 % — HIGH (ref 34.5–45)
HGB BLD-MCNC: 15.3 G/DL — SIGNIFICANT CHANGE UP (ref 11.5–15.5)
IMM GRANULOCYTES NFR BLD AUTO: 0.4 % — SIGNIFICANT CHANGE UP (ref 0–0.9)
LYMPHOCYTES # BLD AUTO: 2.79 K/UL — SIGNIFICANT CHANGE UP (ref 1–3.3)
LYMPHOCYTES # BLD AUTO: 35.6 % — SIGNIFICANT CHANGE UP (ref 13–44)
MCHC RBC-ENTMCNC: 24.9 PG — LOW (ref 27–34)
MCHC RBC-ENTMCNC: 31.2 G/DL — LOW (ref 32–36)
MCV RBC AUTO: 79.8 FL — LOW (ref 80–100)
MONOCYTES # BLD AUTO: 1.03 K/UL — HIGH (ref 0–0.9)
MONOCYTES NFR BLD AUTO: 13.2 % — SIGNIFICANT CHANGE UP (ref 2–14)
NEUTROPHILS # BLD AUTO: 3.87 K/UL — SIGNIFICANT CHANGE UP (ref 1.8–7.4)
NEUTROPHILS NFR BLD AUTO: 49.4 % — SIGNIFICANT CHANGE UP (ref 43–77)
NRBC # BLD: 0 /100 WBCS — SIGNIFICANT CHANGE UP (ref 0–0)
PLATELET # BLD AUTO: 185 K/UL — SIGNIFICANT CHANGE UP (ref 150–400)
PLATELET # PLAS AUTO: 184 K/UL
RBC # BLD: 6.15 M/UL — HIGH (ref 3.8–5.2)
RBC # FLD: 18 % — HIGH (ref 10.3–14.5)
WBC # BLD: 7.83 K/UL — SIGNIFICANT CHANGE UP (ref 3.8–10.5)
WBC # FLD AUTO: 7.83 K/UL — SIGNIFICANT CHANGE UP (ref 3.8–10.5)

## 2023-09-27 PROCEDURE — 99205 OFFICE O/P NEW HI 60 MIN: CPT

## 2023-09-28 LAB
ALBUMIN SERPL ELPH-MCNC: 4.6 G/DL
ALP BLD-CCNC: 79 U/L
ALT SERPL-CCNC: 34 U/L
ANION GAP SERPL CALC-SCNC: 10 MMOL/L
AST SERPL-CCNC: 35 U/L
BILIRUB SERPL-MCNC: 0.4 MG/DL
BUN SERPL-MCNC: 17 MG/DL
CALCIUM SERPL-MCNC: 10.8 MG/DL
CHLORIDE SERPL-SCNC: 102 MMOL/L
CO2 SERPL-SCNC: 27 MMOL/L
CREAT SERPL-MCNC: 0.67 MG/DL
EGFR: 83 ML/MIN/1.73M2
GLUCOSE SERPL-MCNC: 158 MG/DL
POTASSIUM SERPL-SCNC: 5.6 MMOL/L
PROT SERPL-MCNC: 8.2 G/DL
SODIUM SERPL-SCNC: 139 MMOL/L

## 2023-10-02 ENCOUNTER — APPOINTMENT (OUTPATIENT)
Dept: CT IMAGING | Facility: IMAGING CENTER | Age: 88
End: 2023-10-02

## 2023-10-02 ENCOUNTER — TRANSCRIPTION ENCOUNTER (OUTPATIENT)
Age: 88
End: 2023-10-02

## 2023-10-02 LAB — KAPPA LC 24H UR QL: NORMAL

## 2023-10-03 ENCOUNTER — TRANSCRIPTION ENCOUNTER (OUTPATIENT)
Age: 88
End: 2023-10-03

## 2023-10-03 LAB
ALBUMIN MFR SERPL ELPH: 53 %
ALBUMIN SERPL-MCNC: 4.3 G/DL
ALBUMIN/GLOB SERPL: 1.1 RATIO
ALPHA1 GLOB MFR SERPL ELPH: 3.5 %
ALPHA1 GLOB SERPL ELPH-MCNC: 0.3 G/DL
ALPHA2 GLOB MFR SERPL ELPH: 11.6 %
ALPHA2 GLOB SERPL ELPH-MCNC: 1 G/DL
B-GLOBULIN MFR SERPL ELPH: 9.5 %
B-GLOBULIN SERPL ELPH-MCNC: 0.8 G/DL
DEPRECATED KAPPA LC FREE/LAMBDA SER: 1.47 RATIO
GAMMA GLOB FLD ELPH-MCNC: 1.8 G/DL
GAMMA GLOB MFR SERPL ELPH: 22.4 %
IGA SER QL IEP: 203 MG/DL
IGG SER QL IEP: 2093 MG/DL
IGM SER QL IEP: 124 MG/DL
INTERPRETATION SERPL IEP-IMP: NORMAL
KAPPA LC CSF-MCNC: 1.89 MG/DL
KAPPA LC SERPL-MCNC: 2.78 MG/DL
M PROTEIN SPEC IFE-MCNC: NORMAL
PROT SERPL-MCNC: 8.2 G/DL
PROT SERPL-MCNC: 8.2 G/DL

## 2023-11-07 DIAGNOSIS — R05.9 COUGH, UNSPECIFIED: ICD-10-CM

## 2023-11-09 ENCOUNTER — RX RENEWAL (OUTPATIENT)
Age: 88
End: 2023-11-09

## 2023-11-10 ENCOUNTER — TRANSCRIPTION ENCOUNTER (OUTPATIENT)
Age: 88
End: 2023-11-10

## 2023-12-02 NOTE — ED PROVIDER NOTE - NIH STROKE SCALE: 6B. MOTOR LEG, RIGHT, QM
(2) Some effort against gravity; leg falls to bed by 5 secs, but has some effort against gravity
none

## 2023-12-06 ENCOUNTER — RX RENEWAL (OUTPATIENT)
Age: 88
End: 2023-12-06

## 2023-12-17 ENCOUNTER — RX RENEWAL (OUTPATIENT)
Age: 88
End: 2023-12-17

## 2024-01-05 NOTE — ED ADULT NURSE NOTE - ATTEMPT TO OOB
no [Negative] : Heme/Lymph [see HPI] : see HPI [Wake up at night to urinate  How many times?  ___] : wakes up to urinate [unfilled] times during the night [Leakage of urine with urgency] : leakage of urine with urgency [Unaware of when urine is leaking] : unaware of when urine is leaking

## 2024-01-30 ENCOUNTER — APPOINTMENT (OUTPATIENT)
Dept: INTERNAL MEDICINE | Facility: CLINIC | Age: 89
End: 2024-01-30
Payer: MEDICARE

## 2024-01-30 VITALS
WEIGHT: 120 LBS | DIASTOLIC BLOOD PRESSURE: 70 MMHG | HEART RATE: 72 BPM | HEIGHT: 62 IN | SYSTOLIC BLOOD PRESSURE: 133 MMHG | OXYGEN SATURATION: 100 % | BODY MASS INDEX: 22.08 KG/M2

## 2024-01-30 DIAGNOSIS — R74.8 ABNORMAL LEVELS OF OTHER SERUM ENZYMES: ICD-10-CM

## 2024-01-30 DIAGNOSIS — M85.80 OTHER SPECIFIED DISORDERS OF BONE DENSITY AND STRUCTURE, UNSPECIFIED SITE: ICD-10-CM

## 2024-01-30 DIAGNOSIS — H40.9 UNSPECIFIED GLAUCOMA: ICD-10-CM

## 2024-01-30 DIAGNOSIS — I63.02 CEREBRAL INFARCTION DUE TO THROMBOSIS OF BASILAR ARTERY: ICD-10-CM

## 2024-01-30 DIAGNOSIS — I10 ESSENTIAL (PRIMARY) HYPERTENSION: ICD-10-CM

## 2024-01-30 DIAGNOSIS — Z00.00 ENCOUNTER FOR GENERAL ADULT MEDICAL EXAMINATION W/OUT ABNORMAL FINDINGS: ICD-10-CM

## 2024-01-30 LAB
ALBUMIN SERPL ELPH-MCNC: 4.6 G/DL
ALP BLD-CCNC: 77 U/L
ALT SERPL-CCNC: 44 U/L
ANION GAP SERPL CALC-SCNC: 10 MMOL/L
AST SERPL-CCNC: 47 U/L
BILIRUB DIRECT SERPL-MCNC: 0.2 MG/DL
BILIRUB INDIRECT SERPL-MCNC: 0.4 MG/DL
BILIRUB SERPL-MCNC: 0.6 MG/DL
BUN SERPL-MCNC: 17 MG/DL
CALCIUM SERPL-MCNC: 10.3 MG/DL
CHLORIDE SERPL-SCNC: 105 MMOL/L
CHOLEST SERPL-MCNC: 143 MG/DL
CO2 SERPL-SCNC: 28 MMOL/L
CREAT SERPL-MCNC: 0.68 MG/DL
EGFR: 83 ML/MIN/1.73M2
ESTIMATED AVERAGE GLUCOSE: 120 MG/DL
GLUCOSE SERPL-MCNC: 115 MG/DL
HBA1C MFR BLD HPLC: 5.8 %
HCT VFR BLD CALC: 50.5 %
HDLC SERPL-MCNC: 59 MG/DL
HGB BLD-MCNC: 15.5 G/DL
IRON SATN MFR SERPL: 38 %
IRON SERPL-MCNC: 103 UG/DL
LDLC SERPL CALC-MCNC: 73 MG/DL
MCHC RBC-ENTMCNC: 25.1 PG
MCHC RBC-ENTMCNC: 30.7 GM/DL
MCV RBC AUTO: 81.8 FL
NONHDLC SERPL-MCNC: 84 MG/DL
PLATELET # BLD AUTO: 201 K/UL
POTASSIUM SERPL-SCNC: 5.3 MMOL/L
PROT SERPL-MCNC: 8.3 G/DL
RBC # BLD: 6.17 M/UL
RBC # FLD: 17.5 %
SODIUM SERPL-SCNC: 143 MMOL/L
TIBC SERPL-MCNC: 272 UG/DL
TRIGL SERPL-MCNC: 50 MG/DL
TSH SERPL-ACNC: 1.28 UIU/ML
UIBC SERPL-MCNC: 169 UG/DL
WBC # FLD AUTO: 5.92 K/UL

## 2024-01-30 PROCEDURE — G0439: CPT

## 2024-01-30 RX ORDER — AMITRIPTYLINE HYDROCHLORIDE 10 MG/1
10 TABLET, FILM COATED ORAL
Qty: 90 | Refills: 1 | Status: ACTIVE | COMMUNITY
Start: 2021-07-15 | End: 1900-01-01

## 2024-01-30 RX ORDER — BENZONATATE 100 MG/1
100 CAPSULE ORAL 3 TIMES DAILY
Qty: 30 | Refills: 0 | Status: DISCONTINUED | COMMUNITY
Start: 2023-11-07 | End: 2024-01-30

## 2024-01-30 RX ORDER — ATENOLOL 100 MG/1
100 TABLET ORAL DAILY
Qty: 90 | Refills: 1 | Status: ACTIVE | COMMUNITY
Start: 1900-01-01 | End: 1900-01-01

## 2024-01-30 RX ORDER — HYDRALAZINE HYDROCHLORIDE 100 MG/1
100 TABLET ORAL
Qty: 270 | Refills: 1 | Status: ACTIVE | COMMUNITY
Start: 1900-01-01 | End: 1900-01-01

## 2024-01-30 RX ORDER — ATORVASTATIN CALCIUM 40 MG/1
40 TABLET, FILM COATED ORAL
Qty: 90 | Refills: 1 | Status: ACTIVE | COMMUNITY
Start: 2022-08-16 | End: 1900-01-01

## 2024-01-30 RX ORDER — CLONIDINE HYDROCHLORIDE 0.1 MG/1
0.1 TABLET ORAL
Qty: 90 | Refills: 1 | Status: ACTIVE | COMMUNITY
Start: 1900-01-01 | End: 1900-01-01

## 2024-01-30 RX ORDER — MIRTAZAPINE 7.5 MG/1
7.5 TABLET, FILM COATED ORAL
Refills: 0 | Status: ACTIVE | COMMUNITY

## 2024-01-30 NOTE — PLAN
[FreeTextEntry1] : Ms. JERED BOYCE 89 year female with a PMH DM2, hyperlipidemia, HTN, postherpetic neuralgia, glaucoma present to the office for a physical exam Well adult exam is performed.  Result of the blood test discussed with the patient in detail from  01/30/2024 Patient has well controlled DM2, HbA1C is 5.8 recommend  to continue present medication janumet, d/c tresiba, f/u with endocrinologist Hyperlipidemia is well controlled continue  lipitor 40mg qhs  HTN is well controlled, recommend to continue present meds atenolol, clonidine, hydralazine EKG was not done, as per patient daughter, patient has an appointment with cardiologist next week Glaucoma continue travatan, alphagan Postherpetic neuralgia continue amitriptyline Has an elevated LFT's, recommend  to monitor LFT's, will repeat blood test in 3 mo, if elevated will do an abdominal u/s Patient do not want to do mammo, colonoscopy, dexa test  RTC in 3 mo to f/u. Patient is verbalized understanding

## 2024-01-30 NOTE — HEALTH RISK ASSESSMENT
[Fair] : ~his/her~ current health as fair  [Good] : ~his/her~  mood as  good [No] : In the past 12 months have you used drugs other than those required for medical reasons? No [Two or more falls in past year] : Patient reported two or more falls in the past year [Assistive Device] : Patient uses an assistive device [Little interest or pleasure doing things] : 1) Little interest or pleasure doing things [Feeling down, depressed, or hopeless] : 2) Feeling down, depressed, or hopeless [0] : 2) Feeling down, depressed, or hopeless: Not at all (0) [PHQ-2 Negative - No further assessment needed] : PHQ-2 Negative - No further assessment needed [de-identified] : endocrinologist, cardiologist [de-identified] :  trying to walk [de-identified] : low carb  diet [de-identified] : no injures [de-identified] : using cane for ambulation [[id number]] : 0 [HIV test declined] : HIV test declined [Hepatitis C test declined] : Hepatitis C test declined [Change in mental status noted] : No change in mental status noted [Language] : denies difficulty with language [Learning/Retaining New Information] : denies difficulty learning/retaining new information [Handling Complex Tasks] : denies difficulty handling complex tasks [None] : None [# of Members in Household ___] :  household currently consist of [unfilled] member(s) [Retired] : retired [College] : College [] :  [# Of Children ___] : has [unfilled] children [Sexually Active] : not sexually active [Feels Safe at Home] : Feels safe at home [Independent] : using telephone [Some assistance needed] : managing medications [Full assistance needed] : managing finances [Reports changes in hearing] : Reports no changes in hearing [Reports changes in vision] : Reports no changes in vision [Reports changes in dental health] : Reports changes in dental health [Smoke Detector] : smoke detector [Carbon Monoxide Detector] : carbon monoxide detector [Guns at Home] : no guns at home [Safety elements used in home] : safety elements used in home [Seat Belt] :  uses seat belt [Sunscreen] : does not use sunscreen [ColonoscopyDate] : long time ago [de-identified] : daughter [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 [AdvancecareDate] : 01/30/2024 [FreeTextEntry4] : Kristyn's phone #[phone] [Never] : Never

## 2024-01-30 NOTE — HISTORY OF PRESENT ILLNESS
[Family Member] : family member [de-identified] : Ms. JERED BOYCE 89 year female with a PMH DM2, hyperlipidemia, HTN, postherpetic neuralgia, glaucoma present to the office for a physical exam. Patient feels good, denies complaints at present time. Did blood test yeasterday(wants to know the results) Patient is accompany by her daughter Kristyn. Patient speaks Creole. Patient has a HHA 6 days a week from 9-4 pm Needs to renew all her meds

## 2024-02-12 ENCOUNTER — NON-APPOINTMENT (OUTPATIENT)
Age: 89
End: 2024-02-12

## 2024-02-15 ENCOUNTER — APPOINTMENT (OUTPATIENT)
Dept: ENDOCRINOLOGY | Facility: CLINIC | Age: 89
End: 2024-02-15
Payer: MEDICARE

## 2024-02-15 VITALS
TEMPERATURE: 98 F | RESPIRATION RATE: 16 BRPM | SYSTOLIC BLOOD PRESSURE: 140 MMHG | HEIGHT: 62 IN | HEART RATE: 74 BPM | OXYGEN SATURATION: 99 % | DIASTOLIC BLOOD PRESSURE: 70 MMHG | BODY MASS INDEX: 22.08 KG/M2 | WEIGHT: 120 LBS

## 2024-02-15 LAB — GLUCOSE BLDC GLUCOMTR-MCNC: 123

## 2024-02-15 PROCEDURE — G2211 COMPLEX E/M VISIT ADD ON: CPT | Mod: NC,1L

## 2024-02-15 PROCEDURE — 82962 GLUCOSE BLOOD TEST: CPT

## 2024-02-15 PROCEDURE — 95251 CONT GLUC MNTR ANALYSIS I&R: CPT

## 2024-02-15 PROCEDURE — 99214 OFFICE O/P EST MOD 30 MIN: CPT | Mod: 25

## 2024-02-15 NOTE — ASSESSMENT
[Diabetes Foot Care] : diabetes foot care [Long Term Vascular Complications] : long term vascular complications of diabetes [Carbohydrate Consistent Diet] : carbohydrate consistent diet [Importance of Diet and Exercise] : importance of diet and exercise to improve glycemic control, achieve weight loss and improve cardiovascular health [Exercise/Effect on Glucose] : exercise/effect on glucose [Hypoglycemia Management] : hypoglycemia management [Glucagon Use] : glucagon use [Action and use of Insulin] : action and use of short and long-acting insulin [Self Monitoring of Blood Glucose] : self monitoring of blood glucose [Injection Technique, Storage, Sharps Disposal] : injection technique, storage, and sharps disposal [Retinopathy Screening] : Patient was referred to ophthalmology for retinopathy screening [Diabetic Medications] : Risks and benefits of diabetic medications were discussed [FreeTextEntry1] : 1. T2DM - stop Tresiba and monitor. If needed, might resume 2-3 un only - Janumet 50/500 mg bid- potentially might uptitrate the dose - continue lipitor 80 mg HS - losartan, hydralazine - switch to Oliver 3. recheck FS if sees any hypo's - glucerna supplements - advised to have GI/PCP f/u in regards of weight loss evaluation. Consider abd CT scan. WIll add CBC/ iron studies/SPEP to the most recent studies  2. H/o hyperkalemia low K+ diet monitor plasma K+, BMP  2. Osteopenia - calcium 500 mg bid, extra OTC vitamin D3 2,000 IU/day - repeat DXA 2/25 RTC 4 months.

## 2024-02-15 NOTE — HISTORY OF PRESENT ILLNESS
[FreeTextEntry1] : F/u for diabetes management  *** Feb 15, 2024 ***  feels well, denies any c/o taking Tresiba 8 un HS (not sure why increased it),  Janumet 50/500 mg bid, lipitor 80 mg HS,  hydralazine 50 mg tid, OTC vitamin D3  wearing Oliver Date of download:  02/15/2024  Diabetes Medications and Dosage: as above Indication for CGMS: verify a change in the treatment regimen, identify periods of hypoglycemia/ hyperglycemia.  Modal day report: pattern.  Pt with HYPO  14% of the time ( NONE below 54),  78% in target range Hyperglycemia:  8% elevation  Identified issues: carbohydrate counting, hypo's are asymptomatic dates analyzed: 02/02/2024 - 02/15/2024  labs reviewed - a1c- 5.8%  *** Sep 18, 2023 ***  still with decreased appetite, losing weight using Glucerna taking Tresiba 5 un HS,  Janumet 50/500 mg bid, lipitor 80 mg HS,  hydralazine 50 mg tid, OTC vitamin D3  stopped wearing Oliver reports fbs-  denies hypo's  *** May 16, 2023 ***  Feeling well overall , daughter reports reduced appetite, weakness over the past year,   daughter encourages hydration taking Tresiba 8 un HS,  Janumet 50/500 mg bid, lipitor 80 mg HS,  hydralazine 50 mg tid, OTC vitamin D3 and is wearing a Oliver 2 cgm. Hypoglycemia frequency:  Pt denies subjective lows. Reports typical BG is  Fingerstick glucose in the office today is 93 mg/dL 3 hours after eating. Diet: not following ADA Exercise: no Lab review: a1c- 5.8% April'23 Date of download:  05/16/2023 Diabetes Medications and Dosage: as above Indication for CGMS: verify a change in the treatment regimen, identify periods of hypoglycemia/ hyperglycemia. Modal day report: pattern. Pt with HYPO  3% of the time ( NONE below 54),  89% in target range Hyperglycemia:  8% elevation Identified issues: inadequate nutrition dates analyzed:  4/25/2023- 05/08/2023  DXA (2/16/23)- LS( -1.7), FN(-2.2), FRAX- 6.4% and 2.1% *** Bj 10, 2023 ***  had some dental issues, had teeth extracted, was not eating properly, lost some weight. now, is eating better taking Tresiba 8 un HS,  Janumet 50/500 mg bid, lipitor 80 mg HS,  hydralazine 50 mg tid, OTC vitamin D3 losartan is on hold due to hyperkalemia using oliver but forgot to bring a reader reports fbs - 90's, ppg- low 100's  no DXA yet  *** Sep 06, 2022 ***  doing well, denies new c/o taking  Tresiba 8 un HS,  Janumet 50/500 mg bid, lipitor 80 mg HS, cozaar 100 mg, hydralazine 50 mg tid, OTC vitamin D3 reports fbs- 82- 90 no hypo's did not do DXA yet  *** Apr 13, 2022 ***  feels great, no new c/o taking  Tresiba 8 un HS,  Janumet 50/500 mg bid, lipitor 80 mg HS, cozaar 100 mg, hydralazine 50 mg tid reports fbs- 80's- 90's, not checking ppg denies hypo's  *** Dec 09, 2021 ***  feels well, no new c/o taking Tresiba 20 u HS,  Janumet 50/500 mg bid, lipitor 80 mg HS, cozaar 100 mg, hydralazine 50 mg tid reports fbs- 90's-110's, ppg- 120's denies hypo's today fbs - 66 (did not eat since last night)  *** Jun 30, 2021 ***  feels well, no new c/o  saw retina specialist Dr. 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   Ms. BOYCE was diagnosed with Diabetes Mellitus Type 2 more than 30 years ago. She reports history HTN, dyslipidemia, CVA and denies CAD,  known complications of retinopathy, nephropathy, or neuropathy. She is presently on metformin 500 mg bid, Tresiba 20 un (increased recently because of her shingles).  Blood sugars are checked once a day.  Did not bring log book, but reported are typically as following: fbs (prior to shingles) 90's-low 100's, recently in 140's- 170's, ppg- not checking Hypoglycemia frequency: none Fingerstick glucose in the office today is 293 mg/dL 2 hours after eating.  Diet: not following ADA Exercise: none  Lab review: a1c- 6.6 <-- 8.4  Last dilated eye - 10/22 Last podiatry visit  - none, will make appointment Last cardiology evaluation - April '22 Last stress test - 2022 Last 2-D Echo - 2022 Last nephrology evaluation - none Last neurology evaluation- needs one

## 2024-05-06 RX ORDER — SITAGLIPTIN AND METFORMIN HYDROCHLORIDE 50; 500 MG/1; MG/1
50-500 TABLET, FILM COATED ORAL
Qty: 180 | Refills: 0 | Status: ACTIVE | COMMUNITY
Start: 2021-03-10 | End: 1900-01-01

## 2024-05-07 ENCOUNTER — APPOINTMENT (OUTPATIENT)
Dept: ENDOCRINOLOGY | Facility: CLINIC | Age: 89
End: 2024-05-07
Payer: MEDICARE

## 2024-05-07 VITALS
BODY MASS INDEX: 21.53 KG/M2 | DIASTOLIC BLOOD PRESSURE: 66 MMHG | RESPIRATION RATE: 16 BRPM | OXYGEN SATURATION: 95 % | TEMPERATURE: 98.4 F | HEART RATE: 65 BPM | WEIGHT: 117 LBS | HEIGHT: 62 IN | SYSTOLIC BLOOD PRESSURE: 136 MMHG

## 2024-05-07 DIAGNOSIS — E78.5 HYPERLIPIDEMIA, UNSPECIFIED: ICD-10-CM

## 2024-05-07 DIAGNOSIS — E11.65 TYPE 2 DIABETES MELLITUS WITH HYPERGLYCEMIA: ICD-10-CM

## 2024-05-07 DIAGNOSIS — E87.5 HYPERKALEMIA: ICD-10-CM

## 2024-05-07 LAB — GLUCOSE BLDC GLUCOMTR-MCNC: 122

## 2024-05-07 PROCEDURE — 82962 GLUCOSE BLOOD TEST: CPT

## 2024-05-07 PROCEDURE — 95251 CONT GLUC MNTR ANALYSIS I&R: CPT

## 2024-05-07 PROCEDURE — 99214 OFFICE O/P EST MOD 30 MIN: CPT | Mod: 25

## 2024-05-07 PROCEDURE — G2211 COMPLEX E/M VISIT ADD ON: CPT | Mod: NC,1L

## 2024-05-07 RX ORDER — INSULIN DEGLUDEC INJECTION 100 U/ML
100 INJECTION, SOLUTION SUBCUTANEOUS
Qty: 1 | Refills: 3 | Status: ACTIVE | COMMUNITY
Start: 2019-01-15 | End: 1900-01-01

## 2024-05-07 NOTE — ASSESSMENT
[Diabetes Foot Care] : diabetes foot care [Long Term Vascular Complications] : long term vascular complications of diabetes [Carbohydrate Consistent Diet] : carbohydrate consistent diet [Importance of Diet and Exercise] : importance of diet and exercise to improve glycemic control, achieve weight loss and improve cardiovascular health [Exercise/Effect on Glucose] : exercise/effect on glucose [Hypoglycemia Management] : hypoglycemia management [Glucagon Use] : glucagon use [Action and use of Insulin] : action and use of short and long-acting insulin [Self Monitoring of Blood Glucose] : self monitoring of blood glucose [Injection Technique, Storage, Sharps Disposal] : injection technique, storage, and sharps disposal [Retinopathy Screening] : Patient was referred to ophthalmology for retinopathy screening [Diabetic Medications] : Risks and benefits of diabetic medications were discussed [FreeTextEntry1] : 1. T2DM - continue Tresiba 8 units qd and monitor for hypoglycemia. - Janumet 50/500 mg bid- potentially might uptitrate the dose - continue lipitor 80 mg HS - losartan, hydralazine - switch to Oliver 3. recheck FS if sees any hypo's - glucerna supplements - advised to have GI/PCP f/u in regards of weight loss evaluation. Consider abd CT scan. WIll add CBC/ iron studies/SPEP to the most recent studies  2. H/o hyperkalemia low K+ diet monitor plasma K+, BMP  2. Osteopenia - calcium 500 mg bid, extra OTC vitamin D3 2,000 IU/day - repeat DXA 2/25 RTC 4 months.

## 2024-05-07 NOTE — HISTORY OF PRESENT ILLNESS
[FreeTextEntry1] : F/u for diabetes management  *** May 07, 2024 ***  dx'ed with AFib yesterday- started on Eliquis by her cardioloigst stopped Tresiba, but had to resume b/o fasting sugar was rising upto 180-200 back on Tresiba 8 un, Janumet 50/500 mg bid, Lipitor 80 mg HS,  hydralazine 50 mg tid, OTC vitamin D3  still wears Oliver 2 (oliver 3 was not covered) Date of download:  05/07/2024  Diabetes Medications and Dosage: as above Indication for CGMS: verify a change in the treatment regimen, identify periods of hypoglycemia/ hyperglycemia.  Modal day report: pattern.  Pt with HYPO  0% of the time ( NONE below 54),  70% in target range Hyperglycemia:  30% elevation  Identified issues: carbohydrate counting dates analyzed:  04/24/2024- 05/07/2024  *** Feb 15, 2024 ***  feels well, denies any c/o taking Tresiba 8 un HS (not sure why increased it),  Janumet 50/500 mg bid, lipitor 80 mg HS,  hydralazine 50 mg tid, OTC vitamin D3  wearing Oliver Date of download:  02/15/2024  Diabetes Medications and Dosage: as above Indication for CGMS: verify a change in the treatment regimen, identify periods of hypoglycemia/ hyperglycemia.  Modal day report: pattern.  Pt with HYPO  14% of the time ( NONE below 54),  78% in target range Hyperglycemia:  8% elevation  Identified issues: carbohydrate counting, hypo's are asymptomatic dates analyzed: 02/02/2024 - 02/15/2024  labs reviewed - a1c- 5.8%  *** Sep 18, 2023 ***  still with decreased appetite, losing weight using Glucerna taking Tresiba 5 un HS,  Janumet 50/500 mg bid, lipitor 80 mg HS,  hydralazine 50 mg tid, OTC vitamin D3  stopped wearing Oliver reports fbs-  denies hypo's  *** May 16, 2023 ***  Feeling well overall , daughter reports reduced appetite, weakness over the past year,   daughter encourages hydration taking Tresiba 8 un HS,  Janumet 50/500 mg bid, lipitor 80 mg HS,  hydralazine 50 mg tid, OTC vitamin D3 and is wearing a Oliver 2 cgm. Hypoglycemia frequency:  Pt denies subjective lows. Reports typical BG is  Fingerstick glucose in the office today is 93 mg/dL 3 hours after eating. Diet: not following ADA Exercise: no Lab review: a1c- 5.8% April'23 Date of download:  05/16/2023 Diabetes Medications and Dosage: as above Indication for CGMS: verify a change in the treatment regimen, identify periods of hypoglycemia/ hyperglycemia. Modal day report: pattern. Pt with HYPO  3% of the time ( NONE below 54),  89% in target range Hyperglycemia:  8% elevation Identified issues: inadequate nutrition dates analyzed:  4/25/2023- 05/08/2023  DXA (2/16/23)- LS( -1.7), FN(-2.2), FRAX- 6.4% and 2.1% *** Bj 10, 2023 ***  had some dental issues, had teeth extracted, was not eating properly, lost some weight. now, is eating better taking Tresiba 8 un HS,  Janumet 50/500 mg bid, lipitor 80 mg HS,  hydralazine 50 mg tid, OTC vitamin D3 losartan is on hold due to hyperkalemia using oliver but forgot to bring a reader reports fbs - 90's, ppg- low 100's  no DXA yet  *** Sep 06, 2022 ***  doing well, denies new c/o taking  Tresiba 8 un HS,  Janumet 50/500 mg bid, lipitor 80 mg HS, cozaar 100 mg, hydralazine 50 mg tid, OTC vitamin D3 reports fbs- 82- 90 no hypo's did not do DXA yet  *** Apr 13, 2022 ***  feels great, no new c/o taking  Tresiba 8 un HS,  Janumet 50/500 mg bid, lipitor 80 mg HS, cozaar 100 mg, hydralazine 50 mg tid reports fbs- 80's- 90's, not checking ppg denies hypo's  *** Dec 09, 2021 ***  feels well, no new c/o taking Tresiba 20 u HS,  Janumet 50/500 mg bid, lipitor 80 mg HS, cozaar 100 mg, hydralazine 50 mg tid reports fbs- 90's-110's, ppg- 120's denies hypo's today fbs - 66 (did not eat since last night)  *** Jun 30, 2021 ***  feels well, no new c/o  saw retina specialist Dr. 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   Ms. BOYCE was diagnosed with Diabetes Mellitus Type 2 more than 30 years ago. She reports history HTN, dyslipidemia, CVA and denies CAD,  known complications of retinopathy, nephropathy, or neuropathy. She is presently on metformin 500 mg bid, Tresiba 20 un (increased recently because of her shingles).  Blood sugars are checked once a day.  Did not bring log book, but reported are typically as following: fbs (prior to shingles) 90's-low 100's, recently in 140's- 170's, ppg- not checking Hypoglycemia frequency: none Fingerstick glucose in the office today is 293 mg/dL 2 hours after eating.  Diet: not following ADA Exercise: none  Lab review: a1c- 6.6 <-- 8.4  Last dilated eye - 10/23, to see next week Last podiatry visit  - none, will make appointment Last cardiology evaluation - April '23, to see today Last stress test - 2022 Last 2-D Echo - 2022 Last nephrology evaluation - none Last neurology evaluation- needs one

## 2024-05-15 LAB
25(OH)D3 SERPL-MCNC: 85.1 NG/ML
ALBUMIN SERPL ELPH-MCNC: 4.4 G/DL
ALP BLD-CCNC: 69 U/L
ALT SERPL-CCNC: 20 U/L
ANION GAP SERPL CALC-SCNC: 10 MMOL/L
AST SERPL-CCNC: 27 U/L
BILIRUB SERPL-MCNC: 0.7 MG/DL
BUN SERPL-MCNC: 10 MG/DL
CALCIUM SERPL-MCNC: 10.4 MG/DL
CHLORIDE SERPL-SCNC: 105 MMOL/L
CHOLEST SERPL-MCNC: 126 MG/DL
CO2 SERPL-SCNC: 27 MMOL/L
CREAT SERPL-MCNC: 0.71 MG/DL
EGFR: 80 ML/MIN/1.73M2
ESTIMATED AVERAGE GLUCOSE: 137 MG/DL
FOLATE SERPL-MCNC: >20 NG/ML
FRUCTOSAMINE SERPL-MCNC: 356 UMOL/L
GLUCOSE SERPL-MCNC: 131 MG/DL
HBA1C MFR BLD HPLC: 6.4 %
HDLC SERPL-MCNC: 59 MG/DL
LDLC SERPL CALC-MCNC: 56 MG/DL
NONHDLC SERPL-MCNC: 67 MG/DL
POTASSIUM SERPL-SCNC: 5.5 MMOL/L
PROT SERPL-MCNC: 7.6 G/DL
SODIUM SERPL-SCNC: 142 MMOL/L
T4 FREE SERPL-MCNC: 1.7 NG/DL
TRIGL SERPL-MCNC: 47 MG/DL
TSH SERPL-ACNC: 1.62 UIU/ML
VIT B12 SERPL-MCNC: 778 PG/ML

## 2024-05-28 ENCOUNTER — APPOINTMENT (OUTPATIENT)
Dept: INTERNAL MEDICINE | Facility: CLINIC | Age: 89
End: 2024-05-28

## 2024-07-11 ENCOUNTER — OUTPATIENT (OUTPATIENT)
Dept: OUTPATIENT SERVICES | Facility: HOSPITAL | Age: 89
LOS: 1 days | End: 2024-07-11
Payer: COMMERCIAL

## 2024-07-11 ENCOUNTER — APPOINTMENT (OUTPATIENT)
Dept: RADIOLOGY | Facility: IMAGING CENTER | Age: 89
End: 2024-07-11
Payer: MEDICARE

## 2024-07-11 DIAGNOSIS — Z98.890 OTHER SPECIFIED POSTPROCEDURAL STATES: Chronic | ICD-10-CM

## 2024-07-11 DIAGNOSIS — R05.9 COUGH, UNSPECIFIED: ICD-10-CM

## 2024-07-11 DIAGNOSIS — I63.9 CEREBRAL INFARCTION, UNSPECIFIED: Chronic | ICD-10-CM

## 2024-07-11 PROCEDURE — 71046 X-RAY EXAM CHEST 2 VIEWS: CPT | Mod: 26

## 2024-07-11 PROCEDURE — 71046 X-RAY EXAM CHEST 2 VIEWS: CPT

## 2024-07-16 ENCOUNTER — APPOINTMENT (OUTPATIENT)
Dept: INTERNAL MEDICINE | Facility: CLINIC | Age: 89
End: 2024-07-16
Payer: MEDICARE

## 2024-07-16 VITALS
SYSTOLIC BLOOD PRESSURE: 185 MMHG | OXYGEN SATURATION: 96 % | BODY MASS INDEX: 21.53 KG/M2 | HEART RATE: 69 BPM | HEIGHT: 62 IN | WEIGHT: 117 LBS | DIASTOLIC BLOOD PRESSURE: 77 MMHG

## 2024-07-16 VITALS
HEART RATE: 69 BPM | HEIGHT: 62 IN | WEIGHT: 117 LBS | DIASTOLIC BLOOD PRESSURE: 70 MMHG | BODY MASS INDEX: 21.53 KG/M2 | OXYGEN SATURATION: 97 % | SYSTOLIC BLOOD PRESSURE: 145 MMHG

## 2024-07-16 DIAGNOSIS — E87.5 HYPERKALEMIA: ICD-10-CM

## 2024-07-16 DIAGNOSIS — M85.80 OTHER SPECIFIED DISORDERS OF BONE DENSITY AND STRUCTURE, UNSPECIFIED SITE: ICD-10-CM

## 2024-07-16 DIAGNOSIS — E11.9 TYPE 2 DIABETES MELLITUS W/OUT COMPLICATIONS: ICD-10-CM

## 2024-07-16 DIAGNOSIS — E78.5 HYPERLIPIDEMIA, UNSPECIFIED: ICD-10-CM

## 2024-07-16 DIAGNOSIS — I10 ESSENTIAL (PRIMARY) HYPERTENSION: ICD-10-CM

## 2024-07-16 DIAGNOSIS — H40.9 UNSPECIFIED GLAUCOMA: ICD-10-CM

## 2024-07-16 DIAGNOSIS — I48.91 UNSPECIFIED ATRIAL FIBRILLATION: ICD-10-CM

## 2024-07-16 PROCEDURE — G2211 COMPLEX E/M VISIT ADD ON: CPT

## 2024-07-16 PROCEDURE — 99214 OFFICE O/P EST MOD 30 MIN: CPT

## 2024-07-16 RX ORDER — APIXABAN 2.5 MG/1
2.5 TABLET, FILM COATED ORAL
Refills: 0 | Status: ACTIVE | COMMUNITY

## 2024-07-18 LAB — POTASSIUM SERPL-SCNC: 4.7 MMOL/L

## 2024-08-06 ENCOUNTER — APPOINTMENT (OUTPATIENT)
Dept: ENDOCRINOLOGY | Facility: CLINIC | Age: 89
End: 2024-08-06

## 2024-08-06 PROCEDURE — 99214 OFFICE O/P EST MOD 30 MIN: CPT | Mod: 25

## 2024-08-06 PROCEDURE — 95251 CONT GLUC MNTR ANALYSIS I&R: CPT

## 2024-08-06 PROCEDURE — G2211 COMPLEX E/M VISIT ADD ON: CPT | Mod: NC

## 2024-08-06 NOTE — HISTORY OF PRESENT ILLNESS
[FreeTextEntry1] : F/u for diabetes management  *** Aug 06, 2024 ***  feels well. remains on Eliquis, seeing cardio taking  Tresiba 8 units qd,  Janumet 50/500 mg bid,  lipitor 80 mg HS still wearing Garo 2, but not scanning often Date of download:  08/06/2024  Diabetes Medications and Dosage: as above Indication for CGMS: verify a change in the treatment regimen, identify periods of hypoglycemia/ hyperglycemia.  Modal day report: pattern.  Pt with HYPO  0% of the time ( NONE below 54),  93% in target range Hyperglycemia:  7% elevation  Identified issues: carbohydrate counting time active- 12% dates analyzed: 07/24/2024 - 08/06/2024  POC a1c- 6.2%  *** May 07, 2024 ***  dx'ed with AFib yesterday- started on Eliquis by her cardioloigst stopped Tresiba, but had to resume b/o fasting sugar was rising upto 180-200 back on Tresiba 8 un, Janumet 50/500 mg bid, Lipitor 80 mg HS,  hydralazine 50 mg tid, OTC vitamin D3  still wears Garo 2 (garo 3 was not covered) Date of download:  05/07/2024  Diabetes Medications and Dosage: as above Indication for CGMS: verify a change in the treatment regimen, identify periods of hypoglycemia/ hyperglycemia.  Modal day report: pattern.  Pt with HYPO  0% of the time ( NONE below 54),  70% in target range Hyperglycemia:  30% elevation  Identified issues: carbohydrate counting dates analyzed:  04/24/2024- 05/07/2024  *** Feb 15, 2024 ***  feels well, denies any c/o taking Tresiba 8 un HS (not sure why increased it),  Janumet 50/500 mg bid, lipitor 80 mg HS,  hydralazine 50 mg tid, OTC vitamin D3  wearing Garo Date of download:  02/15/2024  Diabetes Medications and Dosage: as above Indication for CGMS: verify a change in the treatment regimen, identify periods of hypoglycemia/ hyperglycemia.  Modal day report: pattern.  Pt with HYPO  14% of the time ( NONE below 54),  78% in target range Hyperglycemia:  8% elevation  Identified issues: carbohydrate counting, hypo's are asymptomatic dates analyzed: 02/02/2024 - 02/15/2024  labs reviewed - a1c- 5.8%  *** Sep 18, 2023 ***  still with decreased appetite, losing weight using Glucerna taking Tresiba 5 un HS,  Janumet 50/500 mg bid, lipitor 80 mg HS,  hydralazine 50 mg tid, OTC vitamin D3  stopped wearing Garo reports fbs-  denies hypo's  *** May 16, 2023 ***  Feeling well overall , daughter reports reduced appetite, weakness over the past year,   daughter encourages hydration taking Tresiba 8 un HS,  Janumet 50/500 mg bid, lipitor 80 mg HS,  hydralazine 50 mg tid, OTC vitamin D3 and is wearing a Garo 2 cgm. Hypoglycemia frequency:  Pt denies subjective lows. Reports typical BG is  Fingerstick glucose in the office today is 93 mg/dL 3 hours after eating. Diet: not following ADA Exercise: no Lab review: a1c- 5.8% April'23 Date of download:  05/16/2023 Diabetes Medications and Dosage: as above Indication for CGMS: verify a change in the treatment regimen, identify periods of hypoglycemia/ hyperglycemia. Modal day report: pattern. Pt with HYPO  3% of the time ( NONE below 54),  89% in target range Hyperglycemia:  8% elevation Identified issues: inadequate nutrition dates analyzed:  4/25/2023- 05/08/2023  DXA (2/16/23)- LS( -1.7), FN(-2.2), FRAX- 6.4% and 2.1% *** Bj 10, 2023 ***  had some dental issues, had teeth extracted, was not eating properly, lost some weight. now, is eating better taking Tresiba 8 un HS,  Janumet 50/500 mg bid, lipitor 80 mg HS,  hydralazine 50 mg tid, OTC vitamin D3 losartan is on hold due to hyperkalemia using garo but forgot to bring a reader reports fbs - 90's, ppg- low 100's  no DXA yet  *** Sep 06, 2022 ***  doing well, denies new c/o taking  Tresiba 8 un HS,  Janumet 50/500 mg bid, lipitor 80 mg HS, cozaar 100 mg, hydralazine 50 mg tid, OTC vitamin D3 reports fbs- 82- 90 no hypo's did not do DXA yet  *** Apr 13, 2022 ***  feels great, no new c/o taking  Tresiba 8 un HS,  Janumet 50/500 mg bid, lipitor 80 mg HS, cozaar 100 mg, hydralazine 50 mg tid reports fbs- 80's- 90's, not checking ppg denies hypo's  *** Dec 09, 2021 ***  feels well, no new c/o taking Tresiba 20 u HS,  Janumet 50/500 mg bid, lipitor 80 mg HS, cozaar 100 mg, hydralazine 50 mg tid reports fbs- 90's-110's, ppg- 120's denies hypo's today fbs - 66 (did not eat since last night)  *** Jun 30, 2021 ***  feels well, no new c/o  saw retina specialist Dr. 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   Ms. BOYCE was diagnosed with Diabetes Mellitus Type 2 more than 30 years ago. She reports history HTN, dyslipidemia, CVA and denies CAD,  known complications of retinopathy, nephropathy, or neuropathy. She is presently on metformin 500 mg bid, Tresiba 20 un (increased recently because of her shingles).  Blood sugars are checked once a day.  Did not bring log book, but reported are typically as following: fbs (prior to shingles) 90's-low 100's, recently in 140's- 170's, ppg- not checking Hypoglycemia frequency: none Fingerstick glucose in the office today is 293 mg/dL 2 hours after eating.  Diet: not following ADA Exercise: none  Lab review: a1c- 6.6 <-- 8.4  Last dilated eye - 10/23, to see next week Last podiatry visit  - none, will make appointment Last cardiology evaluation - April '23, to see today Last stress test - 2022 Last 2-D Echo - 2022 Last nephrology evaluation - none Last neurology evaluation- needs one

## 2024-08-12 RX ORDER — BLOOD-GLUCOSE SENSOR
EACH MISCELLANEOUS
Qty: 2 | Refills: 2 | Status: ACTIVE | COMMUNITY
Start: 2024-08-12 | End: 1900-01-01

## 2024-09-03 ENCOUNTER — APPOINTMENT (OUTPATIENT)
Dept: INTERNAL MEDICINE | Facility: CLINIC | Age: 89
End: 2024-09-03

## 2024-09-05 ENCOUNTER — APPOINTMENT (OUTPATIENT)
Dept: INTERNAL MEDICINE | Facility: CLINIC | Age: 89
End: 2024-09-05
Payer: MEDICARE

## 2024-09-05 VITALS
DIASTOLIC BLOOD PRESSURE: 82 MMHG | SYSTOLIC BLOOD PRESSURE: 184 MMHG | WEIGHT: 116 LBS | HEART RATE: 65 BPM | HEIGHT: 62 IN | OXYGEN SATURATION: 98 % | BODY MASS INDEX: 21.35 KG/M2

## 2024-09-05 DIAGNOSIS — I10 ESSENTIAL (PRIMARY) HYPERTENSION: ICD-10-CM

## 2024-09-05 DIAGNOSIS — E11.9 TYPE 2 DIABETES MELLITUS W/OUT COMPLICATIONS: ICD-10-CM

## 2024-09-05 DIAGNOSIS — I48.91 UNSPECIFIED ATRIAL FIBRILLATION: ICD-10-CM

## 2024-09-05 DIAGNOSIS — K59.09 OTHER CONSTIPATION: ICD-10-CM

## 2024-09-05 PROCEDURE — 99213 OFFICE O/P EST LOW 20 MIN: CPT

## 2024-09-05 NOTE — HISTORY OF PRESENT ILLNESS
[FreeTextEntry1] : Chronic constipation was doing well on Linzess 72 . stopped taking it 4 months ago.   new medication is Eliquis since has afib- sees cardiologist

## 2024-09-10 ENCOUNTER — RX RENEWAL (OUTPATIENT)
Age: 89
End: 2024-09-10

## 2024-09-20 ENCOUNTER — RX RENEWAL (OUTPATIENT)
Age: 89
End: 2024-09-20

## 2024-10-31 ENCOUNTER — APPOINTMENT (OUTPATIENT)
Dept: ENDOCRINOLOGY | Facility: CLINIC | Age: 89
End: 2024-10-31
Payer: MEDICARE

## 2024-10-31 VITALS
TEMPERATURE: 98.4 F | HEART RATE: 62 BPM | OXYGEN SATURATION: 96 % | RESPIRATION RATE: 16 BRPM | SYSTOLIC BLOOD PRESSURE: 146 MMHG | BODY MASS INDEX: 21.35 KG/M2 | DIASTOLIC BLOOD PRESSURE: 62 MMHG | HEIGHT: 62 IN | WEIGHT: 116 LBS

## 2024-10-31 DIAGNOSIS — E11.9 TYPE 2 DIABETES MELLITUS W/OUT COMPLICATIONS: ICD-10-CM

## 2024-10-31 DIAGNOSIS — E87.5 HYPERKALEMIA: ICD-10-CM

## 2024-10-31 DIAGNOSIS — M85.80 OTHER SPECIFIED DISORDERS OF BONE DENSITY AND STRUCTURE, UNSPECIFIED SITE: ICD-10-CM

## 2024-10-31 DIAGNOSIS — E78.5 HYPERLIPIDEMIA, UNSPECIFIED: ICD-10-CM

## 2024-10-31 LAB — GLUCOSE BLDC GLUCOMTR-MCNC: 88

## 2024-10-31 PROCEDURE — 82962 GLUCOSE BLOOD TEST: CPT

## 2024-10-31 PROCEDURE — 99214 OFFICE O/P EST MOD 30 MIN: CPT | Mod: 25

## 2024-10-31 PROCEDURE — 95251 CONT GLUC MNTR ANALYSIS I&R: CPT

## 2024-11-01 ENCOUNTER — TRANSCRIPTION ENCOUNTER (OUTPATIENT)
Age: 89
End: 2024-11-01

## 2024-11-01 LAB
25(OH)D3 SERPL-MCNC: 44 NG/ML
ALBUMIN SERPL ELPH-MCNC: 4.4 G/DL
ALP BLD-CCNC: 80 U/L
ALT SERPL-CCNC: 22 U/L
ANION GAP SERPL CALC-SCNC: 12 MMOL/L
AST SERPL-CCNC: 24 U/L
BASOPHILS # BLD AUTO: 0.02 K/UL
BASOPHILS NFR BLD AUTO: 0.4 %
BILIRUB SERPL-MCNC: 0.4 MG/DL
BUN SERPL-MCNC: 7 MG/DL
CALCIUM SERPL-MCNC: 10.1 MG/DL
CHLORIDE SERPL-SCNC: 105 MMOL/L
CHOLEST SERPL-MCNC: 125 MG/DL
CO2 SERPL-SCNC: 26 MMOL/L
CREAT SERPL-MCNC: 0.72 MG/DL
CREAT SPEC-SCNC: 20 MG/DL
EGFR: 79 ML/MIN/1.73M2
EOSINOPHIL # BLD AUTO: 0.02 K/UL
EOSINOPHIL NFR BLD AUTO: 0.4 %
ESTIMATED AVERAGE GLUCOSE: 134 MG/DL
FOLATE SERPL-MCNC: 16.1 NG/ML
GLUCOSE SERPL-MCNC: 78 MG/DL
HBA1C MFR BLD HPLC: 6.3 %
HCT VFR BLD CALC: 43.6 %
HDLC SERPL-MCNC: 52 MG/DL
HGB BLD-MCNC: 13.6 G/DL
IMM GRANULOCYTES NFR BLD AUTO: 0.2 %
IRON SATN MFR SERPL: 18 %
IRON SERPL-MCNC: 48 UG/DL
LDLC SERPL CALC-MCNC: 62 MG/DL
LYMPHOCYTES # BLD AUTO: 2.15 K/UL
LYMPHOCYTES NFR BLD AUTO: 45.5 %
MAN DIFF?: NORMAL
MCHC RBC-ENTMCNC: 25.7 PG
MCHC RBC-ENTMCNC: 31.2 G/DL
MCV RBC AUTO: 82.4 FL
MICROALBUMIN 24H UR DL<=1MG/L-MCNC: <1.2 MG/DL
MICROALBUMIN/CREAT 24H UR-RTO: NORMAL MG/G
MONOCYTES # BLD AUTO: 0.76 K/UL
MONOCYTES NFR BLD AUTO: 16.1 %
NEUTROPHILS # BLD AUTO: 1.77 K/UL
NEUTROPHILS NFR BLD AUTO: 37.4 %
NONHDLC SERPL-MCNC: 73 MG/DL
PLATELET # BLD AUTO: 211 K/UL
POTASSIUM SERPL-SCNC: 4.2 MMOL/L
PROT SERPL-MCNC: 7.6 G/DL
RBC # BLD: 5.29 M/UL
RBC # FLD: 16.6 %
SODIUM SERPL-SCNC: 143 MMOL/L
T4 FREE SERPL-MCNC: 1.4 NG/DL
TIBC SERPL-MCNC: 264 UG/DL
TRIGL SERPL-MCNC: 46 MG/DL
TSH SERPL-ACNC: 2.35 UIU/ML
UIBC SERPL-MCNC: 216 UG/DL
VIT B12 SERPL-MCNC: 474 PG/ML
WBC # FLD AUTO: 4.73 K/UL

## 2024-12-19 ENCOUNTER — RX RENEWAL (OUTPATIENT)
Age: 88
End: 2024-12-19

## 2025-01-06 ENCOUNTER — APPOINTMENT (OUTPATIENT)
Dept: ENDOCRINOLOGY | Facility: CLINIC | Age: 89
End: 2025-01-06
Payer: MEDICARE

## 2025-01-06 VITALS
SYSTOLIC BLOOD PRESSURE: 178 MMHG | RESPIRATION RATE: 16 BRPM | DIASTOLIC BLOOD PRESSURE: 64 MMHG | BODY MASS INDEX: 20.43 KG/M2 | OXYGEN SATURATION: 96 % | WEIGHT: 111 LBS | HEIGHT: 62 IN | HEART RATE: 59 BPM

## 2025-01-06 DIAGNOSIS — M85.80 OTHER SPECIFIED DISORDERS OF BONE DENSITY AND STRUCTURE, UNSPECIFIED SITE: ICD-10-CM

## 2025-01-06 DIAGNOSIS — E11.9 TYPE 2 DIABETES MELLITUS W/OUT COMPLICATIONS: ICD-10-CM

## 2025-01-06 DIAGNOSIS — E78.5 HYPERLIPIDEMIA, UNSPECIFIED: ICD-10-CM

## 2025-01-06 LAB
GLUCOSE BLDC GLUCOMTR-MCNC: 104
HBA1C MFR BLD HPLC: 6

## 2025-01-06 PROCEDURE — 83036 HEMOGLOBIN GLYCOSYLATED A1C: CPT | Mod: QW

## 2025-01-06 PROCEDURE — 95251 CONT GLUC MNTR ANALYSIS I&R: CPT

## 2025-01-06 PROCEDURE — 99214 OFFICE O/P EST MOD 30 MIN: CPT | Mod: 25

## 2025-01-06 RX ORDER — INSULIN ASPART 100 [IU]/ML
100 INJECTION, SOLUTION INTRAVENOUS; SUBCUTANEOUS
Qty: 1 | Refills: 3 | Status: ACTIVE | COMMUNITY
Start: 2025-01-06 | End: 1900-01-01

## 2025-03-27 ENCOUNTER — RX RENEWAL (OUTPATIENT)
Age: 89
End: 2025-03-27

## 2025-04-07 ENCOUNTER — APPOINTMENT (OUTPATIENT)
Dept: ENDOCRINOLOGY | Facility: CLINIC | Age: 89
End: 2025-04-07
Payer: MEDICARE

## 2025-04-07 VITALS
DIASTOLIC BLOOD PRESSURE: 75 MMHG | BODY MASS INDEX: 20.24 KG/M2 | RESPIRATION RATE: 16 BRPM | OXYGEN SATURATION: 97 % | HEIGHT: 62 IN | HEART RATE: 74 BPM | WEIGHT: 110 LBS | SYSTOLIC BLOOD PRESSURE: 150 MMHG

## 2025-04-07 DIAGNOSIS — E11.9 TYPE 2 DIABETES MELLITUS W/OUT COMPLICATIONS: ICD-10-CM

## 2025-04-07 DIAGNOSIS — I10 ESSENTIAL (PRIMARY) HYPERTENSION: ICD-10-CM

## 2025-04-07 DIAGNOSIS — M85.80 OTHER SPECIFIED DISORDERS OF BONE DENSITY AND STRUCTURE, UNSPECIFIED SITE: ICD-10-CM

## 2025-04-07 DIAGNOSIS — E87.5 HYPERKALEMIA: ICD-10-CM

## 2025-04-07 DIAGNOSIS — E78.5 HYPERLIPIDEMIA, UNSPECIFIED: ICD-10-CM

## 2025-04-07 LAB — GLUCOSE BLDC GLUCOMTR-MCNC: 167

## 2025-04-07 PROCEDURE — 95251 CONT GLUC MNTR ANALYSIS I&R: CPT

## 2025-04-07 PROCEDURE — 82962 GLUCOSE BLOOD TEST: CPT

## 2025-04-07 PROCEDURE — 99214 OFFICE O/P EST MOD 30 MIN: CPT | Mod: 25

## 2025-04-08 ENCOUNTER — TRANSCRIPTION ENCOUNTER (OUTPATIENT)
Age: 89
End: 2025-04-08

## 2025-04-08 RX ORDER — BLOOD SUGAR DIAGNOSTIC
32G X 6 MM STRIP MISCELLANEOUS
Qty: 2 | Refills: 0 | Status: ACTIVE | COMMUNITY
Start: 2025-04-08 | End: 1900-01-01

## 2025-04-09 LAB
25(OH)D3 SERPL-MCNC: 43.8 NG/ML
ALBUMIN SERPL ELPH-MCNC: 4.3 G/DL
ALP BLD-CCNC: 90 U/L
ALT SERPL-CCNC: 27 U/L
ANION GAP SERPL CALC-SCNC: 15 MMOL/L
AST SERPL-CCNC: 34 U/L
BASOPHILS # BLD AUTO: 0.04 K/UL
BASOPHILS NFR BLD AUTO: 0.9 %
BILIRUB SERPL-MCNC: 0.5 MG/DL
BUN SERPL-MCNC: 14 MG/DL
CALCIUM SERPL-MCNC: 9.8 MG/DL
CHLORIDE SERPL-SCNC: 107 MMOL/L
CHOLEST SERPL-MCNC: 130 MG/DL
CO2 SERPL-SCNC: 23 MMOL/L
CREAT SERPL-MCNC: 0.75 MG/DL
CREAT SPEC-SCNC: 179 MG/DL
EGFRCR SERPLBLD CKD-EPI 2021: 75 ML/MIN/1.73M2
EOSINOPHIL # BLD AUTO: 0.03 K/UL
EOSINOPHIL NFR BLD AUTO: 0.7 %
ESTIMATED AVERAGE GLUCOSE: 123 MG/DL
FOLATE SERPL-MCNC: 10.8 NG/ML
GLUCOSE SERPL-MCNC: 172 MG/DL
HBA1C MFR BLD HPLC: 5.9 %
HCT VFR BLD CALC: 46.9 %
HDLC SERPL-MCNC: 57 MG/DL
HGB BLD-MCNC: 14.3 G/DL
IMM GRANULOCYTES NFR BLD AUTO: 0.2 %
LDLC SERPL-MCNC: 61 MG/DL
LYMPHOCYTES # BLD AUTO: 1.78 K/UL
LYMPHOCYTES NFR BLD AUTO: 38.6 %
MAN DIFF?: NORMAL
MCHC RBC-ENTMCNC: 25.7 PG
MCHC RBC-ENTMCNC: 30.5 G/DL
MCV RBC AUTO: 84.2 FL
MICROALBUMIN 24H UR DL<=1MG/L-MCNC: 9.6 MG/DL
MICROALBUMIN/CREAT 24H UR-RTO: 54 MG/G
MONOCYTES # BLD AUTO: 0.55 K/UL
MONOCYTES NFR BLD AUTO: 11.9 %
NEUTROPHILS # BLD AUTO: 2.2 K/UL
NEUTROPHILS NFR BLD AUTO: 47.7 %
NONHDLC SERPL-MCNC: 73 MG/DL
PLATELET # BLD AUTO: 195 K/UL
POTASSIUM SERPL-SCNC: 4.5 MMOL/L
POTASSIUM SERPL-SCNC: 5 MMOL/L
PROT SERPL-MCNC: 7.9 G/DL
RBC # BLD: 5.57 M/UL
RBC # FLD: 18.6 %
SODIUM SERPL-SCNC: 145 MMOL/L
T4 FREE SERPL-MCNC: 1.4 NG/DL
TRIGL SERPL-MCNC: 56 MG/DL
TSH SERPL-ACNC: 2.64 UIU/ML
VIT B12 SERPL-MCNC: 415 PG/ML
WBC # FLD AUTO: 4.61 K/UL

## 2025-04-16 NOTE — ED PROVIDER NOTE - ATTENDING CONTRIBUTION TO CARE
2024
I was physically present for the E/M service provided. I agree with above history, physical, and plan which I have reviewed and edited where appropriate. I was physically present for the key portions of the service provided.    Afebrile. VS non-actionable. Awake and alert, following commands. Advanced aphasia but attempts to respond in single garbled words. +Mild right facial droop. Pupils equal, reactive to light and +3. Protecting airway. Motor strength 0/5 RUE, 1/5 RLE, 5/5 LUE + LLE. Lungs CTA. Heart RRR. Afebrile. Stroke team at bedside and pt admitted to NSICU shortly after arrival.

## 2025-04-21 ENCOUNTER — RX RENEWAL (OUTPATIENT)
Age: 89
End: 2025-04-21

## 2025-05-06 ENCOUNTER — APPOINTMENT (OUTPATIENT)
Dept: INTERNAL MEDICINE | Facility: CLINIC | Age: 89
End: 2025-05-06
Payer: MEDICARE

## 2025-05-06 ENCOUNTER — NON-APPOINTMENT (OUTPATIENT)
Age: 89
End: 2025-05-06

## 2025-05-06 VITALS
DIASTOLIC BLOOD PRESSURE: 80 MMHG | BODY MASS INDEX: 19.32 KG/M2 | OXYGEN SATURATION: 97 % | WEIGHT: 105 LBS | HEIGHT: 62 IN | SYSTOLIC BLOOD PRESSURE: 156 MMHG | HEART RATE: 65 BPM

## 2025-05-06 DIAGNOSIS — Z00.00 ENCOUNTER FOR GENERAL ADULT MEDICAL EXAMINATION W/OUT ABNORMAL FINDINGS: ICD-10-CM

## 2025-05-06 DIAGNOSIS — R74.8 ABNORMAL LEVELS OF OTHER SERUM ENZYMES: ICD-10-CM

## 2025-05-06 DIAGNOSIS — E11.9 TYPE 2 DIABETES MELLITUS W/OUT COMPLICATIONS: ICD-10-CM

## 2025-05-06 DIAGNOSIS — I63.02 CEREBRAL INFARCTION DUE TO THROMBOSIS OF BASILAR ARTERY: ICD-10-CM

## 2025-05-06 DIAGNOSIS — D69.1 QUALITATIVE PLATELET DEFECTS: ICD-10-CM

## 2025-05-06 DIAGNOSIS — R63.0 ANOREXIA: ICD-10-CM

## 2025-05-06 DIAGNOSIS — B35.9 DERMATOPHYTOSIS, UNSPECIFIED: ICD-10-CM

## 2025-05-06 DIAGNOSIS — M85.80 OTHER SPECIFIED DISORDERS OF BONE DENSITY AND STRUCTURE, UNSPECIFIED SITE: ICD-10-CM

## 2025-05-06 DIAGNOSIS — I10 ESSENTIAL (PRIMARY) HYPERTENSION: ICD-10-CM

## 2025-05-06 DIAGNOSIS — H40.9 UNSPECIFIED GLAUCOMA: ICD-10-CM

## 2025-05-06 DIAGNOSIS — E78.5 HYPERLIPIDEMIA, UNSPECIFIED: ICD-10-CM

## 2025-05-06 DIAGNOSIS — E87.5 HYPERKALEMIA: ICD-10-CM

## 2025-05-06 DIAGNOSIS — I48.91 UNSPECIFIED ATRIAL FIBRILLATION: ICD-10-CM

## 2025-05-06 PROCEDURE — 93000 ELECTROCARDIOGRAM COMPLETE: CPT

## 2025-05-06 PROCEDURE — G0439: CPT

## 2025-05-06 RX ORDER — NAFTIFINE HYDROCHLORIDE 20 MG/G
2 CREAM TOPICAL
Qty: 1 | Refills: 0 | Status: ACTIVE | COMMUNITY
Start: 2025-05-06 | End: 1900-01-01

## 2025-05-08 ENCOUNTER — APPOINTMENT (OUTPATIENT)
Dept: RADIOLOGY | Facility: IMAGING CENTER | Age: 89
End: 2025-05-08
Payer: MEDICARE

## 2025-05-08 ENCOUNTER — OUTPATIENT (OUTPATIENT)
Dept: OUTPATIENT SERVICES | Facility: HOSPITAL | Age: 89
LOS: 1 days | End: 2025-05-08
Payer: COMMERCIAL

## 2025-05-08 ENCOUNTER — APPOINTMENT (OUTPATIENT)
Dept: CT IMAGING | Facility: IMAGING CENTER | Age: 89
End: 2025-05-08
Payer: MEDICARE

## 2025-05-08 DIAGNOSIS — Z98.890 OTHER SPECIFIED POSTPROCEDURAL STATES: Chronic | ICD-10-CM

## 2025-05-08 DIAGNOSIS — I63.9 CEREBRAL INFARCTION, UNSPECIFIED: Chronic | ICD-10-CM

## 2025-05-08 DIAGNOSIS — Z00.8 ENCOUNTER FOR OTHER GENERAL EXAMINATION: ICD-10-CM

## 2025-05-08 PROCEDURE — 76937 US GUIDE VASCULAR ACCESS: CPT

## 2025-05-08 PROCEDURE — 75574 CT ANGIO HRT W/3D IMAGE: CPT | Mod: 26

## 2025-05-08 PROCEDURE — 75574 CT ANGIO HRT W/3D IMAGE: CPT

## 2025-05-08 PROCEDURE — 77080 DXA BONE DENSITY AXIAL: CPT

## 2025-05-08 PROCEDURE — 76937 US GUIDE VASCULAR ACCESS: CPT | Mod: 26

## 2025-05-08 PROCEDURE — 36410 VNPNXR 3YR/> PHY/QHP DX/THER: CPT

## 2025-05-08 PROCEDURE — 77080 DXA BONE DENSITY AXIAL: CPT | Mod: 26

## 2025-06-26 RX ORDER — BLOOD-GLUCOSE SENSOR
EACH MISCELLANEOUS
Qty: 6 | Refills: 6 | Status: ACTIVE | COMMUNITY
Start: 2025-06-26 | End: 1900-01-01

## 2025-07-17 ENCOUNTER — APPOINTMENT (OUTPATIENT)
Dept: INTERNAL MEDICINE | Facility: CLINIC | Age: 89
End: 2025-07-17
Payer: MEDICARE

## 2025-07-17 VITALS
WEIGHT: 101 LBS | SYSTOLIC BLOOD PRESSURE: 148 MMHG | OXYGEN SATURATION: 95 % | BODY MASS INDEX: 18.58 KG/M2 | HEIGHT: 62 IN | DIASTOLIC BLOOD PRESSURE: 70 MMHG | HEART RATE: 70 BPM

## 2025-07-17 PROBLEM — R19.7 DIARRHEA: Status: ACTIVE | Noted: 2025-07-17

## 2025-07-17 LAB
BASOPHILS # BLD AUTO: 0.03 K/UL
BASOPHILS NFR BLD AUTO: 0.5 %
EOSINOPHIL # BLD AUTO: 0.02 K/UL
EOSINOPHIL NFR BLD AUTO: 0.4 %
HCT VFR BLD CALC: 47.1 %
HGB BLD-MCNC: 14.7 G/DL
IMM GRANULOCYTES NFR BLD AUTO: 0.2 %
LYMPHOCYTES # BLD AUTO: 2.48 K/UL
LYMPHOCYTES NFR BLD AUTO: 45 %
MAN DIFF?: NORMAL
MCHC RBC-ENTMCNC: 25.5 PG
MCHC RBC-ENTMCNC: 31.2 G/DL
MCV RBC AUTO: 81.8 FL
MONOCYTES # BLD AUTO: 0.71 K/UL
MONOCYTES NFR BLD AUTO: 12.9 %
NEUTROPHILS # BLD AUTO: 2.26 K/UL
NEUTROPHILS NFR BLD AUTO: 41 %
PLATELET # BLD AUTO: 170 K/UL
RBC # BLD: 5.76 M/UL
RBC # FLD: 18.6 %
WBC # FLD AUTO: 5.51 K/UL

## 2025-07-17 PROCEDURE — 99214 OFFICE O/P EST MOD 30 MIN: CPT | Mod: 25

## 2025-07-17 RX ORDER — AZITHROMYCIN 500 MG/1
500 TABLET, FILM COATED ORAL DAILY
Qty: 1 | Refills: 0 | Status: ACTIVE | COMMUNITY
Start: 2025-07-17 | End: 1900-01-01

## 2025-07-18 LAB
ALBUMIN SERPL ELPH-MCNC: 4.4 G/DL
ALP BLD-CCNC: 99 U/L
ALT SERPL-CCNC: 61 U/L
ANION GAP SERPL CALC-SCNC: 19 MMOL/L
AST SERPL-CCNC: 47 U/L
BILIRUB SERPL-MCNC: 0.6 MG/DL
BUN SERPL-MCNC: 11 MG/DL
CALCIUM SERPL-MCNC: 10.4 MG/DL
CHLORIDE SERPL-SCNC: 106 MMOL/L
CO2 SERPL-SCNC: 20 MMOL/L
CREAT SERPL-MCNC: 0.73 MG/DL
EGFRCR SERPLBLD CKD-EPI 2021: 77 ML/MIN/1.73M2
GLUCOSE SERPL-MCNC: 98 MG/DL
LPL SERPL-CCNC: 59 U/L
POTASSIUM SERPL-SCNC: 4.8 MMOL/L
PROT SERPL-MCNC: 8.3 G/DL
SODIUM SERPL-SCNC: 146 MMOL/L

## 2025-07-21 ENCOUNTER — APPOINTMENT (OUTPATIENT)
Dept: ENDOCRINOLOGY | Facility: CLINIC | Age: 89
End: 2025-07-21
Payer: MEDICARE

## 2025-07-21 VITALS
DIASTOLIC BLOOD PRESSURE: 56 MMHG | HEIGHT: 62 IN | HEART RATE: 56 BPM | OXYGEN SATURATION: 95 % | WEIGHT: 101 LBS | BODY MASS INDEX: 18.58 KG/M2 | RESPIRATION RATE: 16 BRPM | SYSTOLIC BLOOD PRESSURE: 149 MMHG

## 2025-07-21 DIAGNOSIS — E87.5 HYPERKALEMIA: ICD-10-CM

## 2025-07-21 DIAGNOSIS — E11.9 TYPE 2 DIABETES MELLITUS W/OUT COMPLICATIONS: ICD-10-CM

## 2025-07-21 DIAGNOSIS — R10.816 EPIGASTRIC ABDOMINAL TENDERNESS: ICD-10-CM

## 2025-07-21 DIAGNOSIS — I10 ESSENTIAL (PRIMARY) HYPERTENSION: ICD-10-CM

## 2025-07-21 DIAGNOSIS — E78.5 HYPERLIPIDEMIA, UNSPECIFIED: ICD-10-CM

## 2025-07-21 LAB
GLUCOSE BLDC GLUCOMTR-MCNC: 134
HBA1C MFR BLD HPLC: 5.9

## 2025-07-21 PROCEDURE — 95251 CONT GLUC MNTR ANALYSIS I&R: CPT

## 2025-07-21 PROCEDURE — 99214 OFFICE O/P EST MOD 30 MIN: CPT | Mod: 25

## 2025-07-28 ENCOUNTER — TRANSCRIPTION ENCOUNTER (OUTPATIENT)
Age: 89
End: 2025-07-28

## 2025-08-04 ENCOUNTER — APPOINTMENT (OUTPATIENT)
Dept: ULTRASOUND IMAGING | Facility: IMAGING CENTER | Age: 89
End: 2025-08-04

## 2025-08-19 RX ORDER — ISOPROPYL ALCOHOL 0.7 ML/ML
SWAB TOPICAL
Qty: 2 | Refills: 6 | Status: ACTIVE | OUTPATIENT
Start: 2025-08-19

## 2025-08-19 RX ORDER — BLOOD-GLUCOSE METER
W/DEVICE EACH MISCELLANEOUS
Qty: 1 | Refills: 0 | Status: ACTIVE | OUTPATIENT
Start: 2025-08-19

## 2025-08-19 RX ORDER — LANCETS 30 GAUGE
EACH MISCELLANEOUS
Qty: 3 | Refills: 4 | Status: ACTIVE | OUTPATIENT
Start: 2025-08-19

## 2025-08-19 RX ORDER — BLOOD SUGAR DIAGNOSTIC
STRIP MISCELLANEOUS 3 TIMES DAILY
Qty: 270 | Refills: 3 | Status: ACTIVE | OUTPATIENT
Start: 2025-08-19

## 2025-08-25 ENCOUNTER — TRANSCRIPTION ENCOUNTER (OUTPATIENT)
Age: 89
End: 2025-08-25

## 2025-09-11 ENCOUNTER — TRANSCRIPTION ENCOUNTER (OUTPATIENT)
Age: 89
End: 2025-09-11

## 2025-09-16 ENCOUNTER — APPOINTMENT (OUTPATIENT)
Dept: INTERNAL MEDICINE | Facility: CLINIC | Age: 89
End: 2025-09-16
Payer: MEDICARE

## 2025-09-16 VITALS
WEIGHT: 96 LBS | HEIGHT: 62 IN | DIASTOLIC BLOOD PRESSURE: 70 MMHG | SYSTOLIC BLOOD PRESSURE: 150 MMHG | BODY MASS INDEX: 17.66 KG/M2 | OXYGEN SATURATION: 99 % | HEART RATE: 77 BPM

## 2025-09-16 DIAGNOSIS — M85.80 OTHER SPECIFIED DISORDERS OF BONE DENSITY AND STRUCTURE, UNSPECIFIED SITE: ICD-10-CM

## 2025-09-16 DIAGNOSIS — I63.02 CEREBRAL INFARCTION DUE TO THROMBOSIS OF BASILAR ARTERY: ICD-10-CM

## 2025-09-16 DIAGNOSIS — I48.91 UNSPECIFIED ATRIAL FIBRILLATION: ICD-10-CM

## 2025-09-16 DIAGNOSIS — E11.65 TYPE 2 DIABETES MELLITUS WITH HYPERGLYCEMIA: ICD-10-CM

## 2025-09-16 DIAGNOSIS — E78.5 HYPERLIPIDEMIA, UNSPECIFIED: ICD-10-CM

## 2025-09-16 DIAGNOSIS — R74.8 ABNORMAL LEVELS OF OTHER SERUM ENZYMES: ICD-10-CM

## 2025-09-16 DIAGNOSIS — E11.9 TYPE 2 DIABETES MELLITUS W/OUT COMPLICATIONS: ICD-10-CM

## 2025-09-16 DIAGNOSIS — I10 ESSENTIAL (PRIMARY) HYPERTENSION: ICD-10-CM

## 2025-09-16 PROCEDURE — 99214 OFFICE O/P EST MOD 30 MIN: CPT | Mod: 25

## 2025-09-18 LAB
ALBUMIN SERPL ELPH-MCNC: 4.5 G/DL
ALP BLD-CCNC: 76 U/L
ALT SERPL-CCNC: 23 U/L
ANION GAP SERPL CALC-SCNC: 12 MMOL/L
AST SERPL-CCNC: 37 U/L
BASOPHILS # BLD AUTO: 0.05 K/UL
BASOPHILS NFR BLD AUTO: 0.9 %
BILIRUB SERPL-MCNC: 0.8 MG/DL
BUN SERPL-MCNC: 23 MG/DL
CALCIUM SERPL-MCNC: 10.9 MG/DL
CHLORIDE SERPL-SCNC: 107 MMOL/L
CHOLEST SERPL-MCNC: 151 MG/DL
CO2 SERPL-SCNC: 22 MMOL/L
CREAT SERPL-MCNC: 0.95 MG/DL
EGFRCR SERPLBLD CKD-EPI 2021: 56 ML/MIN/1.73M2
EOSINOPHIL # BLD AUTO: 0.05 K/UL
EOSINOPHIL NFR BLD AUTO: 0.9 %
ESTIMATED AVERAGE GLUCOSE: 123 MG/DL
GLUCOSE SERPL-MCNC: 168 MG/DL
HBA1C MFR BLD HPLC: 5.9 %
HCT VFR BLD CALC: 49.8 %
HDLC SERPL-MCNC: 61 MG/DL
HGB BLD-MCNC: 16.1 G/DL
IMM GRANULOCYTES NFR BLD AUTO: 0.2 %
IRON SATN MFR SERPL: 52 %
IRON SERPL-MCNC: 147 UG/DL
LDLC SERPL-MCNC: 79 MG/DL
LYMPHOCYTES # BLD AUTO: 1.97 K/UL
LYMPHOCYTES NFR BLD AUTO: 36.8 %
MAN DIFF?: NORMAL
MCHC RBC-ENTMCNC: 26.8 PG
MCHC RBC-ENTMCNC: 32.3 G/DL
MCV RBC AUTO: 82.9 FL
MONOCYTES # BLD AUTO: 0.74 K/UL
MONOCYTES NFR BLD AUTO: 13.8 %
NEUTROPHILS # BLD AUTO: 2.54 K/UL
NEUTROPHILS NFR BLD AUTO: 47.4 %
NONHDLC SERPL-MCNC: 91 MG/DL
PLATELET # BLD AUTO: 190 K/UL
POTASSIUM SERPL-SCNC: 6.3 MMOL/L
PROT SERPL-MCNC: 8.4 G/DL
RBC # BLD: 6.01 M/UL
RBC # FLD: 17.6 %
SODIUM SERPL-SCNC: 141 MMOL/L
TIBC SERPL-MCNC: 282 UG/DL
TRIGL SERPL-MCNC: 53 MG/DL
TSH SERPL-ACNC: 2.55 UIU/ML
UIBC SERPL-MCNC: 135 UG/DL
WBC # FLD AUTO: 5.36 K/UL